# Patient Record
Sex: FEMALE | Race: WHITE | NOT HISPANIC OR LATINO | Employment: OTHER | URBAN - NONMETROPOLITAN AREA
[De-identification: names, ages, dates, MRNs, and addresses within clinical notes are randomized per-mention and may not be internally consistent; named-entity substitution may affect disease eponyms.]

---

## 2017-01-25 ENCOUNTER — OFFICE VISIT (OUTPATIENT)
Dept: NEUROLOGY | Facility: CLINIC | Age: 27
End: 2017-01-25

## 2017-01-25 VITALS
SYSTOLIC BLOOD PRESSURE: 180 MMHG | WEIGHT: 157 LBS | BODY MASS INDEX: 24.64 KG/M2 | OXYGEN SATURATION: 98 % | HEART RATE: 76 BPM | HEIGHT: 67 IN | DIASTOLIC BLOOD PRESSURE: 120 MMHG

## 2017-01-25 DIAGNOSIS — G44.209 TENSION HEADACHE: ICD-10-CM

## 2017-01-25 DIAGNOSIS — M54.2 NECK PAIN: Primary | ICD-10-CM

## 2017-01-25 DIAGNOSIS — M79.18 MYOFASCIAL MUSCLE PAIN: ICD-10-CM

## 2017-01-25 DIAGNOSIS — I69.30 SEQUELAE, POST-STROKE: ICD-10-CM

## 2017-01-25 PROCEDURE — 20553 NJX 1/MLT TRIGGER POINTS 3/>: CPT | Performed by: PSYCHIATRY & NEUROLOGY

## 2017-01-25 RX ORDER — AMITRIPTYLINE HYDROCHLORIDE 10 MG/1
TABLET, FILM COATED ORAL
Qty: 30 TABLET | Refills: 3 | Status: ON HOLD | OUTPATIENT
Start: 2017-01-25 | End: 2017-03-16

## 2017-01-25 RX ORDER — METHYLPREDNISOLONE ACETATE 40 MG/ML
200 INJECTION, SUSPENSION INTRA-ARTICULAR; INTRALESIONAL; INTRAMUSCULAR; SOFT TISSUE ONCE
Status: COMPLETED | OUTPATIENT
Start: 2017-01-25 | End: 2017-01-25

## 2017-01-25 RX ORDER — BUPIVACAINE HYDROCHLORIDE 7.5 MG/ML
5 INJECTION, SOLUTION EPIDURAL; RETROBULBAR ONCE
Status: COMPLETED | OUTPATIENT
Start: 2017-01-25 | End: 2017-01-25

## 2017-01-25 RX ADMIN — METHYLPREDNISOLONE ACETATE 200 MG: 40 INJECTION, SUSPENSION INTRA-ARTICULAR; INTRALESIONAL; INTRAMUSCULAR; SOFT TISSUE at 20:04

## 2017-01-25 RX ADMIN — BUPIVACAINE HYDROCHLORIDE 37.5 MG: 7.5 INJECTION, SOLUTION EPIDURAL; RETROBULBAR at 20:02

## 2017-01-25 RX ADMIN — BUPIVACAINE HYDROCHLORIDE 37.5 MG: 7.5 INJECTION, SOLUTION EPIDURAL; RETROBULBAR at 20:03

## 2017-02-19 ENCOUNTER — HOSPITAL ENCOUNTER (EMERGENCY)
Facility: HOSPITAL | Age: 27
Discharge: ANOTHER HOSPITAL | End: 2017-02-19
Attending: EMERGENCY MEDICINE | Admitting: EMERGENCY MEDICINE

## 2017-02-19 VITALS
BODY MASS INDEX: 23.32 KG/M2 | DIASTOLIC BLOOD PRESSURE: 130 MMHG | SYSTOLIC BLOOD PRESSURE: 220 MMHG | RESPIRATION RATE: 20 BRPM | HEIGHT: 65 IN | OXYGEN SATURATION: 98 % | TEMPERATURE: 97.8 F | HEART RATE: 92 BPM | WEIGHT: 140 LBS

## 2017-02-19 DIAGNOSIS — I67.4 HYPERTENSIVE ENCEPHALOPATHY: Primary | ICD-10-CM

## 2017-02-19 LAB
ALBUMIN SERPL-MCNC: 4.7 G/DL (ref 3.5–5)
ALBUMIN/GLOB SERPL: 1.6 G/DL (ref 1–2)
ALP SERPL-CCNC: 68 U/L (ref 38–126)
ALT SERPL W P-5'-P-CCNC: 16 U/L (ref 13–69)
ANION GAP SERPL CALCULATED.3IONS-SCNC: 26 MMOL/L
AST SERPL-CCNC: 18 U/L (ref 15–46)
BASOPHILS # BLD AUTO: 0.02 10*3/MM3 (ref 0–0.2)
BASOPHILS NFR BLD AUTO: 0.3 % (ref 0–2.5)
BILIRUB SERPL-MCNC: 0.7 MG/DL (ref 0.2–1.3)
BUN BLD-MCNC: 53 MG/DL (ref 7–20)
BUN/CREAT SERPL: 3.6 (ref 7.1–23.5)
CALCIUM SPEC-SCNC: 10.1 MG/DL (ref 8.4–10.2)
CHLORIDE SERPL-SCNC: 101 MMOL/L (ref 98–107)
CO2 SERPL-SCNC: 19 MMOL/L (ref 26–30)
CREAT BLD-MCNC: 14.6 MG/DL (ref 0.6–1.3)
DEPRECATED RDW RBC AUTO: 46.4 FL (ref 37–54)
EOSINOPHIL # BLD AUTO: 0.09 10*3/MM3 (ref 0–0.7)
EOSINOPHIL NFR BLD AUTO: 1.5 % (ref 0–7)
ERYTHROCYTE [DISTWIDTH] IN BLOOD BY AUTOMATED COUNT: 13 % (ref 11.5–14.5)
GFR SERPL CREATININE-BSD FRML MDRD: 3 ML/MIN/1.73
GLOBULIN UR ELPH-MCNC: 3 GM/DL
GLUCOSE BLD-MCNC: 157 MG/DL (ref 74–98)
GLUCOSE BLDC GLUCOMTR-MCNC: 147 MG/DL (ref 70–130)
HCG SERPL QL: NEGATIVE
HCT VFR BLD AUTO: 35.8 % (ref 37–47)
HGB BLD-MCNC: 11.9 G/DL (ref 12–16)
IMM GRANULOCYTES # BLD: 0.02 10*3/MM3 (ref 0–0.06)
IMM GRANULOCYTES NFR BLD: 0.3 % (ref 0–0.6)
LIPASE SERPL-CCNC: 200 U/L (ref 23–300)
LYMPHOCYTES # BLD AUTO: 1.16 10*3/MM3 (ref 0.6–3.4)
LYMPHOCYTES NFR BLD AUTO: 19.2 % (ref 10–50)
MAGNESIUM SERPL-MCNC: 2.7 MG/DL (ref 1.6–2.3)
MCH RBC QN AUTO: 32.8 PG (ref 27–31)
MCHC RBC AUTO-ENTMCNC: 33.2 G/DL (ref 30–37)
MCV RBC AUTO: 98.6 FL (ref 81–99)
MONOCYTES # BLD AUTO: 0.42 10*3/MM3 (ref 0–0.9)
MONOCYTES NFR BLD AUTO: 6.9 % (ref 0–12)
NEUTROPHILS # BLD AUTO: 4.34 10*3/MM3 (ref 2–6.9)
NEUTROPHILS NFR BLD AUTO: 71.8 % (ref 37–80)
NRBC BLD MANUAL-RTO: 0 /100 WBC (ref 0–0)
PLATELET # BLD AUTO: 117 10*3/MM3 (ref 130–400)
PMV BLD AUTO: 10 FL (ref 6–12)
POTASSIUM BLD-SCNC: 5 MMOL/L (ref 3.5–5.1)
PROT SERPL-MCNC: 7.7 G/DL (ref 6.3–8.2)
RBC # BLD AUTO: 3.63 10*6/MM3 (ref 4.2–5.4)
SODIUM BLD-SCNC: 141 MMOL/L (ref 137–145)
WBC NRBC COR # BLD: 6.05 10*3/MM3 (ref 4.8–10.8)

## 2017-02-19 PROCEDURE — 99291 CRITICAL CARE FIRST HOUR: CPT

## 2017-02-19 PROCEDURE — 93005 ELECTROCARDIOGRAM TRACING: CPT | Performed by: EMERGENCY MEDICINE

## 2017-02-19 PROCEDURE — 80053 COMPREHEN METABOLIC PANEL: CPT | Performed by: EMERGENCY MEDICINE

## 2017-02-19 PROCEDURE — 96376 TX/PRO/DX INJ SAME DRUG ADON: CPT

## 2017-02-19 PROCEDURE — 83690 ASSAY OF LIPASE: CPT | Performed by: EMERGENCY MEDICINE

## 2017-02-19 PROCEDURE — 85025 COMPLETE CBC W/AUTO DIFF WBC: CPT | Performed by: EMERGENCY MEDICINE

## 2017-02-19 PROCEDURE — 84703 CHORIONIC GONADOTROPIN ASSAY: CPT | Performed by: EMERGENCY MEDICINE

## 2017-02-19 PROCEDURE — 96361 HYDRATE IV INFUSION ADD-ON: CPT

## 2017-02-19 PROCEDURE — 83735 ASSAY OF MAGNESIUM: CPT | Performed by: EMERGENCY MEDICINE

## 2017-02-19 PROCEDURE — 25010000002 LORAZEPAM PER 2 MG: Performed by: EMERGENCY MEDICINE

## 2017-02-19 PROCEDURE — 96366 THER/PROPH/DIAG IV INF ADDON: CPT

## 2017-02-19 PROCEDURE — 96372 THER/PROPH/DIAG INJ SC/IM: CPT

## 2017-02-19 PROCEDURE — 25010000002 ONDANSETRON PER 1 MG: Performed by: EMERGENCY MEDICINE

## 2017-02-19 PROCEDURE — 96375 TX/PRO/DX INJ NEW DRUG ADDON: CPT

## 2017-02-19 PROCEDURE — 96365 THER/PROPH/DIAG IV INF INIT: CPT

## 2017-02-19 PROCEDURE — 25010000002 CHLORPROMAZINE HCL 50 MG/2ML SOLUTION: Performed by: EMERGENCY MEDICINE

## 2017-02-19 PROCEDURE — 82962 GLUCOSE BLOOD TEST: CPT

## 2017-02-19 RX ORDER — SODIUM CHLORIDE 9 MG/ML
125 INJECTION, SOLUTION INTRAVENOUS CONTINUOUS
Status: DISCONTINUED | OUTPATIENT
Start: 2017-02-19 | End: 2017-02-20 | Stop reason: HOSPADM

## 2017-02-19 RX ORDER — LABETALOL HYDROCHLORIDE 5 MG/ML
10 INJECTION, SOLUTION INTRAVENOUS ONCE
Status: COMPLETED | OUTPATIENT
Start: 2017-02-19 | End: 2017-02-19

## 2017-02-19 RX ORDER — ONDANSETRON 2 MG/ML
4 INJECTION INTRAMUSCULAR; INTRAVENOUS ONCE
Status: COMPLETED | OUTPATIENT
Start: 2017-02-19 | End: 2017-02-19

## 2017-02-19 RX ORDER — SODIUM CHLORIDE 0.9 % (FLUSH) 0.9 %
10 SYRINGE (ML) INJECTION AS NEEDED
Status: DISCONTINUED | OUTPATIENT
Start: 2017-02-19 | End: 2017-02-20 | Stop reason: HOSPADM

## 2017-02-19 RX ORDER — LORAZEPAM 2 MG/ML
1 INJECTION INTRAMUSCULAR ONCE
Status: COMPLETED | OUTPATIENT
Start: 2017-02-19 | End: 2017-02-19

## 2017-02-19 RX ORDER — CHLORPROMAZINE HYDROCHLORIDE 25 MG/ML
12.5 INJECTION INTRAMUSCULAR ONCE
Status: COMPLETED | OUTPATIENT
Start: 2017-02-19 | End: 2017-02-19

## 2017-02-19 RX ORDER — NITROGLYCERIN 20 MG/100ML
5-200 INJECTION INTRAVENOUS
Status: DISCONTINUED | OUTPATIENT
Start: 2017-02-19 | End: 2017-02-20 | Stop reason: HOSPADM

## 2017-02-19 RX ORDER — ONDANSETRON 2 MG/ML
INJECTION INTRAMUSCULAR; INTRAVENOUS
Status: DISCONTINUED
Start: 2017-02-19 | End: 2017-02-20 | Stop reason: HOSPADM

## 2017-02-19 RX ORDER — ONDANSETRON 4 MG/1
4 TABLET, ORALLY DISINTEGRATING ORAL ONCE
Status: DISCONTINUED | OUTPATIENT
Start: 2017-02-19 | End: 2017-02-20 | Stop reason: HOSPADM

## 2017-02-19 RX ADMIN — LABETALOL HYDROCHLORIDE 10 MG: 5 INJECTION, SOLUTION INTRAVENOUS at 20:20

## 2017-02-19 RX ADMIN — ONDANSETRON 4 MG: 2 INJECTION INTRAMUSCULAR; INTRAVENOUS at 20:05

## 2017-02-19 RX ADMIN — SODIUM CHLORIDE 125 ML/HR: 9 INJECTION, SOLUTION INTRAVENOUS at 20:11

## 2017-02-19 RX ADMIN — LORAZEPAM 1 MG: 2 INJECTION INTRAMUSCULAR; INTRAVENOUS at 20:43

## 2017-02-19 RX ADMIN — NITROGLYCERIN 30 MCG/MIN: 20 INJECTION INTRAVENOUS at 20:50

## 2017-02-19 RX ADMIN — CHLORPROMAZINE HYDROCHLORIDE 12.5 MG: 25 INJECTION INTRAMUSCULAR at 20:07

## 2017-02-19 RX ADMIN — LORAZEPAM 1 MG: 2 INJECTION INTRAMUSCULAR; INTRAVENOUS at 20:10

## 2017-02-20 LAB
HOLD SPECIMEN: NORMAL
HOLD SPECIMEN: NORMAL
WHOLE BLOOD HOLD SPECIMEN: NORMAL
WHOLE BLOOD HOLD SPECIMEN: NORMAL

## 2017-03-16 ENCOUNTER — HOSPITAL ENCOUNTER (INPATIENT)
Facility: HOSPITAL | Age: 27
LOS: 22 days | Discharge: HOME | End: 2017-04-07
Attending: FAMILY MEDICINE | Admitting: FAMILY MEDICINE

## 2017-03-16 PROBLEM — R53.81 DEBILITY: Status: ACTIVE | Noted: 2017-03-16

## 2017-03-16 PROCEDURE — 25010000002 HEPARIN (PORCINE) PER 1000 UNITS: Performed by: FAMILY MEDICINE

## 2017-03-16 PROCEDURE — 25010000002 VANCOMYCIN PER 500 MG: Performed by: FAMILY MEDICINE

## 2017-03-16 RX ORDER — BISACODYL 10 MG
10 SUPPOSITORY, RECTAL RECTAL DAILY PRN
Status: DISCONTINUED | OUTPATIENT
Start: 2017-03-16 | End: 2017-04-07 | Stop reason: HOSPADM

## 2017-03-16 RX ORDER — LEVETIRACETAM 100 MG/ML
750 SOLUTION ORAL NIGHTLY
Status: CANCELLED | OUTPATIENT
Start: 2017-03-16

## 2017-03-16 RX ORDER — L.ACID,CASEI/B.ANIMAL/S.THERMO 16B CELL
1 CAPSULE ORAL DAILY
Status: CANCELLED | OUTPATIENT
Start: 2017-03-16

## 2017-03-16 RX ORDER — CLONIDINE HYDROCHLORIDE 0.1 MG/1
0.1 TABLET ORAL EVERY 6 HOURS PRN
Status: DISCONTINUED | OUTPATIENT
Start: 2017-03-16 | End: 2017-04-07 | Stop reason: HOSPADM

## 2017-03-16 RX ORDER — L.ACID,CASEI/B.ANIMAL/S.THERMO 16B CELL
1 CAPSULE ORAL DAILY
Status: DISCONTINUED | OUTPATIENT
Start: 2017-03-17 | End: 2017-04-07 | Stop reason: HOSPADM

## 2017-03-16 RX ORDER — HYDROXYCHLOROQUINE SULFATE 200 MG/1
200 TABLET, FILM COATED ORAL EVERY 12 HOURS SCHEDULED
Status: CANCELLED | OUTPATIENT
Start: 2017-03-16

## 2017-03-16 RX ORDER — SULFAMETHOXAZOLE AND TRIMETHOPRIM 800; 160 MG/1; MG/1
0.5 TABLET ORAL 3 TIMES WEEKLY
Status: CANCELLED | OUTPATIENT
Start: 2017-03-17

## 2017-03-16 RX ORDER — ONDANSETRON 4 MG/1
4 TABLET, ORALLY DISINTEGRATING ORAL EVERY 6 HOURS PRN
Status: DISCONTINUED | OUTPATIENT
Start: 2017-03-16 | End: 2017-04-07 | Stop reason: HOSPADM

## 2017-03-16 RX ORDER — AMLODIPINE BESYLATE 10 MG/1
10 TABLET ORAL
Status: DISCONTINUED | OUTPATIENT
Start: 2017-03-17 | End: 2017-04-07 | Stop reason: HOSPADM

## 2017-03-16 RX ORDER — CLONAZEPAM 0.5 MG/1
0.5 TABLET ORAL DAILY PRN
COMMUNITY
End: 2018-09-13 | Stop reason: SDUPTHER

## 2017-03-16 RX ORDER — AMITRIPTYLINE HYDROCHLORIDE 10 MG/1
10 TABLET, FILM COATED ORAL NIGHTLY
Status: CANCELLED | OUTPATIENT
Start: 2017-03-16

## 2017-03-16 RX ORDER — CINACALCET 30 MG/1
60 TABLET, FILM COATED ORAL
Status: DISCONTINUED | OUTPATIENT
Start: 2017-03-17 | End: 2017-03-16

## 2017-03-16 RX ORDER — SODIUM BICARBONATE 650 MG/1
1300 TABLET ORAL 2 TIMES DAILY
Status: CANCELLED | OUTPATIENT
Start: 2017-03-16

## 2017-03-16 RX ORDER — MAGNESIUM HYDROXIDE/ALUMINUM HYDROXICE/SIMETHICONE 120; 1200; 1200 MG/30ML; MG/30ML; MG/30ML
30 SUSPENSION ORAL EVERY 6 HOURS PRN
Status: CANCELLED | OUTPATIENT
Start: 2017-03-16

## 2017-03-16 RX ORDER — AMITRIPTYLINE HYDROCHLORIDE 10 MG/1
10 TABLET, FILM COATED ORAL NIGHTLY
Status: DISCONTINUED | OUTPATIENT
Start: 2017-03-16 | End: 2017-04-07 | Stop reason: HOSPADM

## 2017-03-16 RX ORDER — PANTOPRAZOLE SODIUM 40 MG/1
40 TABLET, DELAYED RELEASE ORAL
Status: DISCONTINUED | OUTPATIENT
Start: 2017-03-17 | End: 2017-04-07 | Stop reason: HOSPADM

## 2017-03-16 RX ORDER — ENALAPRIL MALEATE 10 MG/1
10 TABLET ORAL 2 TIMES DAILY
COMMUNITY
End: 2017-04-07 | Stop reason: HOSPADM

## 2017-03-16 RX ORDER — CALCIUM ACETATE 667 MG/1
667 CAPSULE ORAL 4 TIMES DAILY
Status: DISCONTINUED | OUTPATIENT
Start: 2017-03-17 | End: 2017-04-07 | Stop reason: HOSPADM

## 2017-03-16 RX ORDER — ONDANSETRON 4 MG/1
4 TABLET, FILM COATED ORAL EVERY 6 HOURS PRN
Status: DISCONTINUED | OUTPATIENT
Start: 2017-03-16 | End: 2017-04-07 | Stop reason: HOSPADM

## 2017-03-16 RX ORDER — CALCIUM ACETATE 667 MG/1
667 CAPSULE ORAL 4 TIMES DAILY
Status: CANCELLED | OUTPATIENT
Start: 2017-03-16

## 2017-03-16 RX ORDER — CLONIDINE HYDROCHLORIDE 0.3 MG/1
0.3 TABLET ORAL EVERY 8 HOURS SCHEDULED
Status: CANCELLED | OUTPATIENT
Start: 2017-03-16

## 2017-03-16 RX ORDER — ONDANSETRON 4 MG/1
4 TABLET, ORALLY DISINTEGRATING ORAL EVERY 6 HOURS PRN
Status: CANCELLED | OUTPATIENT
Start: 2017-03-16

## 2017-03-16 RX ORDER — ONDANSETRON 2 MG/ML
4 INJECTION INTRAMUSCULAR; INTRAVENOUS EVERY 6 HOURS PRN
Status: DISCONTINUED | OUTPATIENT
Start: 2017-03-16 | End: 2017-04-07 | Stop reason: HOSPADM

## 2017-03-16 RX ORDER — SULFAMETHOXAZOLE AND TRIMETHOPRIM 800; 160 MG/1; MG/1
0.5 TABLET ORAL 3 TIMES WEEKLY
Status: DISCONTINUED | OUTPATIENT
Start: 2017-03-17 | End: 2017-04-07 | Stop reason: HOSPADM

## 2017-03-16 RX ORDER — CLONIDINE HYDROCHLORIDE 0.3 MG/1
0.3 TABLET ORAL EVERY 8 HOURS SCHEDULED
Status: DISCONTINUED | OUTPATIENT
Start: 2017-03-16 | End: 2017-03-31

## 2017-03-16 RX ORDER — HEPARIN SODIUM 5000 [USP'U]/ML
5000 INJECTION, SOLUTION INTRAVENOUS; SUBCUTANEOUS EVERY 8 HOURS SCHEDULED
Status: DISCONTINUED | OUTPATIENT
Start: 2017-03-16 | End: 2017-03-21

## 2017-03-16 RX ORDER — CLONIDINE HYDROCHLORIDE 0.1 MG/1
0.1 TABLET ORAL DAILY PRN
COMMUNITY
End: 2017-10-13 | Stop reason: HOSPADM

## 2017-03-16 RX ORDER — OXYCODONE HYDROCHLORIDE 5 MG/1
5 TABLET ORAL EVERY 4 HOURS PRN
Status: CANCELLED | OUTPATIENT
Start: 2017-03-16 | End: 2017-03-26

## 2017-03-16 RX ORDER — AMLODIPINE BESYLATE 5 MG/1
5 TABLET ORAL DAILY
COMMUNITY
End: 2017-04-07 | Stop reason: HOSPADM

## 2017-03-16 RX ORDER — OXYCODONE HYDROCHLORIDE 5 MG/1
5 TABLET ORAL EVERY 4 HOURS PRN
Status: DISCONTINUED | OUTPATIENT
Start: 2017-03-16 | End: 2017-03-16

## 2017-03-16 RX ORDER — CARVEDILOL 25 MG/1
25 TABLET ORAL EVERY 12 HOURS SCHEDULED
Status: CANCELLED | OUTPATIENT
Start: 2017-03-16

## 2017-03-16 RX ORDER — LEVETIRACETAM 100 MG/ML
750 SOLUTION ORAL NIGHTLY
Status: DISCONTINUED | OUTPATIENT
Start: 2017-03-16 | End: 2017-04-07 | Stop reason: HOSPADM

## 2017-03-16 RX ORDER — HEPARIN SODIUM 5000 [USP'U]/ML
5000 INJECTION, SOLUTION INTRAVENOUS; SUBCUTANEOUS EVERY 8 HOURS SCHEDULED
Status: CANCELLED | OUTPATIENT
Start: 2017-03-16

## 2017-03-16 RX ORDER — HYDROXYCHLOROQUINE SULFATE 200 MG/1
200 TABLET, FILM COATED ORAL EVERY 12 HOURS SCHEDULED
Status: DISCONTINUED | OUTPATIENT
Start: 2017-03-16 | End: 2017-04-07 | Stop reason: HOSPADM

## 2017-03-16 RX ORDER — CARVEDILOL 25 MG/1
25 TABLET ORAL EVERY 12 HOURS SCHEDULED
Status: DISCONTINUED | OUTPATIENT
Start: 2017-03-16 | End: 2017-04-05

## 2017-03-16 RX ORDER — ACETAMINOPHEN 325 MG/1
650 TABLET ORAL EVERY 4 HOURS PRN
Status: DISCONTINUED | OUTPATIENT
Start: 2017-03-16 | End: 2017-04-07 | Stop reason: HOSPADM

## 2017-03-16 RX ORDER — AMLODIPINE BESYLATE 10 MG/1
10 TABLET ORAL
Status: CANCELLED | OUTPATIENT
Start: 2017-03-16

## 2017-03-16 RX ORDER — ALUMINA, MAGNESIA, AND SIMETHICONE 2400; 2400; 240 MG/30ML; MG/30ML; MG/30ML
15 SUSPENSION ORAL EVERY 6 HOURS PRN
Status: DISCONTINUED | OUTPATIENT
Start: 2017-03-16 | End: 2017-04-07 | Stop reason: HOSPADM

## 2017-03-16 RX ORDER — ACETAMINOPHEN 325 MG/1
650 TABLET ORAL EVERY 4 HOURS PRN
Status: CANCELLED | OUTPATIENT
Start: 2017-03-16

## 2017-03-16 RX ORDER — CINACALCET 30 MG/1
60 TABLET, FILM COATED ORAL
Status: DISCONTINUED | OUTPATIENT
Start: 2017-03-17 | End: 2017-04-07 | Stop reason: HOSPADM

## 2017-03-16 RX ORDER — ONDANSETRON 4 MG/1
4 TABLET, FILM COATED ORAL EVERY 6 HOURS PRN
Status: CANCELLED | OUTPATIENT
Start: 2017-03-16

## 2017-03-16 RX ORDER — PANTOPRAZOLE SODIUM 40 MG/1
40 TABLET, DELAYED RELEASE ORAL
Status: CANCELLED | OUTPATIENT
Start: 2017-03-17

## 2017-03-16 RX ORDER — SODIUM BICARBONATE 650 MG/1
1300 TABLET ORAL 2 TIMES DAILY
Status: DISCONTINUED | OUTPATIENT
Start: 2017-03-16 | End: 2017-03-17

## 2017-03-16 RX ORDER — BISACODYL 10 MG
10 SUPPOSITORY, RECTAL RECTAL DAILY PRN
Status: CANCELLED | OUTPATIENT
Start: 2017-03-16

## 2017-03-16 RX ORDER — ONDANSETRON 2 MG/ML
4 INJECTION INTRAMUSCULAR; INTRAVENOUS EVERY 6 HOURS PRN
Status: CANCELLED | OUTPATIENT
Start: 2017-03-16

## 2017-03-16 RX ADMIN — CARVEDILOL 25 MG: 25 TABLET, FILM COATED ORAL at 21:11

## 2017-03-16 RX ADMIN — CLONIDINE HYDROCHLORIDE 0.3 MG: 0.3 TABLET ORAL at 21:11

## 2017-03-16 RX ADMIN — LEVETIRACETAM 750 MG: 100 SOLUTION ORAL at 21:12

## 2017-03-16 RX ADMIN — SODIUM BICARBONATE TAB 650 MG 1300 MG: 650 TAB at 21:11

## 2017-03-16 RX ADMIN — HYDROXYCHLOROQUINE SULFATE 200 MG: 200 TABLET ORAL at 21:12

## 2017-03-16 RX ADMIN — VANCOMYCIN HYDROCHLORIDE 1250 MG: 5 INJECTION, POWDER, LYOPHILIZED, FOR SOLUTION INTRAVENOUS at 19:54

## 2017-03-16 RX ADMIN — AMITRIPTYLINE HYDROCHLORIDE 10 MG: 10 TABLET, FILM COATED ORAL at 21:12

## 2017-03-16 RX ADMIN — CLONIDINE HYDROCHLORIDE 0.1 MG: 0.3 TABLET ORAL at 19:55

## 2017-03-17 LAB
ALBUMIN SERPL-MCNC: 2.9 G/DL (ref 3.5–5)
ALBUMIN/GLOB SERPL: 1.1 G/DL (ref 1.5–2.5)
ALP SERPL-CCNC: 46 U/L (ref 35–104)
ALT SERPL W P-5'-P-CCNC: 20 U/L (ref 10–36)
ANION GAP SERPL CALCULATED.3IONS-SCNC: 12.3 MMOL/L (ref 3.6–11.2)
AST SERPL-CCNC: 25 U/L (ref 10–30)
BASOPHILS # BLD AUTO: 0.01 10*3/MM3 (ref 0–0.3)
BASOPHILS NFR BLD AUTO: 0.3 % (ref 0–2)
BILIRUB SERPL-MCNC: 0.2 MG/DL (ref 0.2–1.8)
BUN BLD-MCNC: 44 MG/DL (ref 7–21)
BUN/CREAT SERPL: 5.5 (ref 7–25)
CALCIUM SPEC-SCNC: 9 MG/DL (ref 7.7–10)
CHLORIDE SERPL-SCNC: 98 MMOL/L (ref 99–112)
CO2 SERPL-SCNC: 24.7 MMOL/L (ref 24.3–31.9)
CREAT BLD-MCNC: 8.01 MG/DL (ref 0.43–1.29)
DEPRECATED RDW RBC AUTO: 45.9 FL (ref 37–54)
EOSINOPHIL # BLD AUTO: 0.07 10*3/MM3 (ref 0–0.7)
EOSINOPHIL NFR BLD AUTO: 1.9 % (ref 0–5)
ERYTHROCYTE [DISTWIDTH] IN BLOOD BY AUTOMATED COUNT: 13.1 % (ref 11.5–14.5)
GFR SERPL CREATININE-BSD FRML MDRD: 6 ML/MIN/1.73
GLOBULIN UR ELPH-MCNC: 2.7 GM/DL
GLUCOSE BLD-MCNC: 111 MG/DL (ref 70–110)
HAV IGM SERPL QL IA: NORMAL
HBV CORE IGM SERPL QL IA: NORMAL
HBV SURFACE AG SERPL QL IA: NORMAL
HCT VFR BLD AUTO: 19.6 % (ref 37–47)
HCV AB SER DONR QL: NORMAL
HGB BLD-MCNC: 6.4 G/DL (ref 12–16)
IMM GRANULOCYTES # BLD: 0.07 10*3/MM3 (ref 0–0.03)
IMM GRANULOCYTES NFR BLD: 1.9 % (ref 0–0.5)
LYMPHOCYTES # BLD AUTO: 1 10*3/MM3 (ref 1–3)
LYMPHOCYTES NFR BLD AUTO: 27.2 % (ref 21–51)
MCH RBC QN AUTO: 31.4 PG (ref 27–33)
MCHC RBC AUTO-ENTMCNC: 32.7 G/DL (ref 33–37)
MCV RBC AUTO: 96.1 FL (ref 80–94)
MONOCYTES # BLD AUTO: 0.37 10*3/MM3 (ref 0.1–0.9)
MONOCYTES NFR BLD AUTO: 10.1 % (ref 0–10)
NEUTROPHILS # BLD AUTO: 2.15 10*3/MM3 (ref 1.4–6.5)
NEUTROPHILS NFR BLD AUTO: 58.6 % (ref 30–70)
OSMOLALITY SERPL CALC.SUM OF ELEC: 282 MOSM/KG (ref 273–305)
PLATELET # BLD AUTO: 170 10*3/MM3 (ref 130–400)
PMV BLD AUTO: 10.8 FL (ref 6–10)
POTASSIUM BLD-SCNC: 4.5 MMOL/L (ref 3.5–5.3)
PROT SERPL-MCNC: 5.6 G/DL (ref 6–8)
RBC # BLD AUTO: 2.04 10*6/MM3 (ref 4.2–5.4)
SODIUM BLD-SCNC: 135 MMOL/L (ref 135–153)
VANCOMYCIN SERPL-MCNC: 63.1 MCG/ML
WBC NRBC COR # BLD: 3.67 10*3/MM3 (ref 4.5–12.5)

## 2017-03-17 PROCEDURE — 5A1D60Z PERFORMANCE OF URINARY FILTRATION, MULTIPLE: ICD-10-PCS | Performed by: INTERNAL MEDICINE

## 2017-03-17 PROCEDURE — 92523 SPEECH SOUND LANG COMPREHEN: CPT

## 2017-03-17 PROCEDURE — 63710000001 PREDNISONE PER 5 MG: Performed by: FAMILY MEDICINE

## 2017-03-17 PROCEDURE — 80074 ACUTE HEPATITIS PANEL: CPT | Performed by: INTERNAL MEDICINE

## 2017-03-17 PROCEDURE — 97112 NEUROMUSCULAR REEDUCATION: CPT

## 2017-03-17 PROCEDURE — 80053 COMPREHEN METABOLIC PANEL: CPT | Performed by: FAMILY MEDICINE

## 2017-03-17 PROCEDURE — 63510000001 EPOETIN ALFA PER 1000 UNITS: Performed by: INTERNAL MEDICINE

## 2017-03-17 PROCEDURE — 85025 COMPLETE CBC W/AUTO DIFF WBC: CPT | Performed by: FAMILY MEDICINE

## 2017-03-17 PROCEDURE — 97167 OT EVAL HIGH COMPLEX 60 MIN: CPT

## 2017-03-17 PROCEDURE — 80202 ASSAY OF VANCOMYCIN: CPT | Performed by: INTERNAL MEDICINE

## 2017-03-17 PROCEDURE — 92610 EVALUATE SWALLOWING FUNCTION: CPT

## 2017-03-17 RX ADMIN — CLONIDINE HYDROCHLORIDE 0.3 MG: 0.3 TABLET ORAL at 21:03

## 2017-03-17 RX ADMIN — SULFAMETHOXAZOLE AND TRIMETHOPRIM 80 MG: 800; 160 TABLET ORAL at 09:04

## 2017-03-17 RX ADMIN — LEVETIRACETAM 750 MG: 100 SOLUTION ORAL at 20:16

## 2017-03-17 RX ADMIN — ACETAMINOPHEN 650 MG: 325 TABLET, FILM COATED ORAL at 19:44

## 2017-03-17 RX ADMIN — PANTOPRAZOLE SODIUM 40 MG: 40 TABLET, DELAYED RELEASE ORAL at 06:15

## 2017-03-17 RX ADMIN — CLONIDINE HYDROCHLORIDE 0.1 MG: 0.3 TABLET ORAL at 17:14

## 2017-03-17 RX ADMIN — HYDROXYCHLOROQUINE SULFATE 200 MG: 200 TABLET ORAL at 20:16

## 2017-03-17 RX ADMIN — Medication 1 CAPSULE: at 09:05

## 2017-03-17 RX ADMIN — CALCIUM ACETATE 667 MG: 667 CAPSULE ORAL at 09:04

## 2017-03-17 RX ADMIN — AMITRIPTYLINE HYDROCHLORIDE 10 MG: 10 TABLET, FILM COATED ORAL at 20:15

## 2017-03-17 RX ADMIN — PREDNISONE 35 MG: 20 TABLET ORAL at 09:04

## 2017-03-17 RX ADMIN — HYDROXYCHLOROQUINE SULFATE 200 MG: 200 TABLET ORAL at 09:05

## 2017-03-17 RX ADMIN — CALCIUM ACETATE 667 MG: 667 CAPSULE ORAL at 20:15

## 2017-03-17 RX ADMIN — CARVEDILOL 25 MG: 25 TABLET, FILM COATED ORAL at 09:04

## 2017-03-17 RX ADMIN — CARVEDILOL 25 MG: 25 TABLET, FILM COATED ORAL at 20:15

## 2017-03-17 RX ADMIN — CLONIDINE HYDROCHLORIDE 0.3 MG: 0.3 TABLET ORAL at 14:16

## 2017-03-17 RX ADMIN — AMLODIPINE BESYLATE 10 MG: 10 TABLET ORAL at 09:04

## 2017-03-17 RX ADMIN — CALCIUM ACETATE 667 MG: 667 CAPSULE ORAL at 12:23

## 2017-03-17 RX ADMIN — CLONIDINE HYDROCHLORIDE 0.3 MG: 0.3 TABLET ORAL at 06:11

## 2017-03-17 RX ADMIN — SODIUM CHLORIDE 1000 ML: 9 INJECTION, SOLUTION INTRAVENOUS at 15:02

## 2017-03-17 RX ADMIN — ERYTHROPOIETIN 10000 UNITS: 20000 INJECTION, SOLUTION INTRAVENOUS; SUBCUTANEOUS at 18:45

## 2017-03-17 RX ADMIN — SODIUM BICARBONATE TAB 650 MG 1300 MG: 650 TAB at 09:05

## 2017-03-18 LAB
25(OH)D3 SERPL-MCNC: 20 NG/ML
BASOPHILS # BLD AUTO: 0.02 10*3/MM3 (ref 0–0.3)
BASOPHILS NFR BLD AUTO: 0.5 % (ref 0–2)
CRP SERPL-MCNC: <0.5 MG/DL (ref 0–0.99)
DEPRECATED RDW RBC AUTO: 46 FL (ref 37–54)
EOSINOPHIL # BLD AUTO: 0.06 10*3/MM3 (ref 0–0.7)
EOSINOPHIL NFR BLD AUTO: 1.5 % (ref 0–5)
ERYTHROCYTE [DISTWIDTH] IN BLOOD BY AUTOMATED COUNT: 13 % (ref 11.5–14.5)
FERRITIN SERPL-MCNC: 873 NG/ML (ref 10–290.3)
HCT VFR BLD AUTO: 20.8 % (ref 37–47)
HGB BLD-MCNC: 6.7 G/DL (ref 12–16)
IMM GRANULOCYTES # BLD: 0.14 10*3/MM3 (ref 0–0.03)
IMM GRANULOCYTES NFR BLD: 3.5 % (ref 0–0.5)
IRON 24H UR-MRATE: 104 MCG/DL (ref 49–151)
IRON SATN MFR SERPL: 45 % (ref 15–50)
LYMPHOCYTES # BLD AUTO: 1.21 10*3/MM3 (ref 1–3)
LYMPHOCYTES NFR BLD AUTO: 30.2 % (ref 21–51)
MCH RBC QN AUTO: 31.2 PG (ref 27–33)
MCHC RBC AUTO-ENTMCNC: 32.2 G/DL (ref 33–37)
MCV RBC AUTO: 96.7 FL (ref 80–94)
MONOCYTES # BLD AUTO: 0.33 10*3/MM3 (ref 0.1–0.9)
MONOCYTES NFR BLD AUTO: 8.2 % (ref 0–10)
NEUTROPHILS # BLD AUTO: 2.25 10*3/MM3 (ref 1.4–6.5)
NEUTROPHILS NFR BLD AUTO: 56.1 % (ref 30–70)
PHOSPHATE SERPL-MCNC: 4.7 MG/DL (ref 2.7–4.5)
PLATELET # BLD AUTO: 176 10*3/MM3 (ref 130–400)
PMV BLD AUTO: 9.8 FL (ref 6–10)
PTH-INTACT SERPL-MCNC: 470 PG/ML (ref 14–72)
RBC # BLD AUTO: 2.15 10*6/MM3 (ref 4.2–5.4)
TIBC SERPL-MCNC: 229 MCG/DL (ref 241–421)
VANCOMYCIN SERPL-MCNC: 32.1 MCG/ML
WBC NRBC COR # BLD: 4.01 10*3/MM3 (ref 4.5–12.5)

## 2017-03-18 PROCEDURE — 97530 THERAPEUTIC ACTIVITIES: CPT

## 2017-03-18 PROCEDURE — 63710000001 PREDNISONE PER 5 MG: Performed by: FAMILY MEDICINE

## 2017-03-18 PROCEDURE — 82728 ASSAY OF FERRITIN: CPT | Performed by: INTERNAL MEDICINE

## 2017-03-18 PROCEDURE — 83540 ASSAY OF IRON: CPT | Performed by: INTERNAL MEDICINE

## 2017-03-18 PROCEDURE — 83970 ASSAY OF PARATHORMONE: CPT | Performed by: INTERNAL MEDICINE

## 2017-03-18 PROCEDURE — 97535 SELF CARE MNGMENT TRAINING: CPT

## 2017-03-18 PROCEDURE — 82306 VITAMIN D 25 HYDROXY: CPT | Performed by: INTERNAL MEDICINE

## 2017-03-18 PROCEDURE — 86140 C-REACTIVE PROTEIN: CPT | Performed by: INTERNAL MEDICINE

## 2017-03-18 PROCEDURE — 97112 NEUROMUSCULAR REEDUCATION: CPT

## 2017-03-18 PROCEDURE — 83550 IRON BINDING TEST: CPT | Performed by: INTERNAL MEDICINE

## 2017-03-18 PROCEDURE — 92507 TX SP LANG VOICE COMM INDIV: CPT

## 2017-03-18 PROCEDURE — 85025 COMPLETE CBC W/AUTO DIFF WBC: CPT | Performed by: FAMILY MEDICINE

## 2017-03-18 PROCEDURE — 80202 ASSAY OF VANCOMYCIN: CPT | Performed by: INTERNAL MEDICINE

## 2017-03-18 PROCEDURE — 84100 ASSAY OF PHOSPHORUS: CPT | Performed by: INTERNAL MEDICINE

## 2017-03-18 RX ORDER — LISINOPRIL 10 MG/1
20 TABLET ORAL
Status: DISCONTINUED | OUTPATIENT
Start: 2017-03-18 | End: 2017-03-20

## 2017-03-18 RX ORDER — ERGOCALCIFEROL 1.25 MG/1
50000 CAPSULE ORAL
Status: DISCONTINUED | OUTPATIENT
Start: 2017-03-18 | End: 2017-04-07 | Stop reason: HOSPADM

## 2017-03-18 RX ORDER — CALCITRIOL 0.25 UG/1
0.5 CAPSULE, LIQUID FILLED ORAL DAILY
Status: DISCONTINUED | OUTPATIENT
Start: 2017-03-18 | End: 2017-04-07 | Stop reason: HOSPADM

## 2017-03-18 RX ADMIN — CALCIUM ACETATE 667 MG: 667 CAPSULE ORAL at 20:22

## 2017-03-18 RX ADMIN — LISINOPRIL 20 MG: 10 TABLET ORAL at 09:15

## 2017-03-18 RX ADMIN — CLONIDINE HYDROCHLORIDE 0.3 MG: 0.3 TABLET ORAL at 21:02

## 2017-03-18 RX ADMIN — CARVEDILOL 25 MG: 25 TABLET, FILM COATED ORAL at 20:22

## 2017-03-18 RX ADMIN — PREDNISONE 35 MG: 20 TABLET ORAL at 09:15

## 2017-03-18 RX ADMIN — LEVETIRACETAM 750 MG: 100 SOLUTION ORAL at 20:22

## 2017-03-18 RX ADMIN — ERGOCALCIFEROL 50000 UNITS: 1.25 CAPSULE ORAL at 09:15

## 2017-03-18 RX ADMIN — AMLODIPINE BESYLATE 10 MG: 10 TABLET ORAL at 09:14

## 2017-03-18 RX ADMIN — PANTOPRAZOLE SODIUM 40 MG: 40 TABLET, DELAYED RELEASE ORAL at 05:15

## 2017-03-18 RX ADMIN — Medication 1 CAPSULE: at 09:15

## 2017-03-18 RX ADMIN — HYDROXYCHLOROQUINE SULFATE 200 MG: 200 TABLET ORAL at 20:22

## 2017-03-18 RX ADMIN — CARVEDILOL 25 MG: 25 TABLET, FILM COATED ORAL at 09:15

## 2017-03-18 RX ADMIN — CALCIUM ACETATE 667 MG: 667 CAPSULE ORAL at 09:14

## 2017-03-18 RX ADMIN — CALCIUM ACETATE 667 MG: 667 CAPSULE ORAL at 17:25

## 2017-03-18 RX ADMIN — HYDROXYCHLOROQUINE SULFATE 200 MG: 200 TABLET ORAL at 09:15

## 2017-03-18 RX ADMIN — ALUMINUM HYDROXIDE, MAGNESIUM HYDROXIDE, AND DIMETHICONE 15 ML: 400; 400; 40 SUSPENSION ORAL at 15:13

## 2017-03-18 RX ADMIN — CLONIDINE HYDROCHLORIDE 0.3 MG: 0.3 TABLET ORAL at 15:14

## 2017-03-18 RX ADMIN — AMITRIPTYLINE HYDROCHLORIDE 10 MG: 10 TABLET, FILM COATED ORAL at 20:21

## 2017-03-18 RX ADMIN — CLONIDINE HYDROCHLORIDE 0.1 MG: 0.3 TABLET ORAL at 05:15

## 2017-03-18 RX ADMIN — CALCIUM ACETATE 667 MG: 667 CAPSULE ORAL at 11:56

## 2017-03-18 RX ADMIN — CLONIDINE HYDROCHLORIDE 0.3 MG: 0.3 TABLET ORAL at 05:16

## 2017-03-18 RX ADMIN — CALCITRIOL 0.5 MCG: 0.25 CAPSULE ORAL at 09:15

## 2017-03-19 PROCEDURE — 63710000001 PREDNISONE PER 5 MG: Performed by: FAMILY MEDICINE

## 2017-03-19 RX ORDER — HYDRALAZINE HYDROCHLORIDE 25 MG/1
25 TABLET, FILM COATED ORAL EVERY 12 HOURS SCHEDULED
Status: DISCONTINUED | OUTPATIENT
Start: 2017-03-19 | End: 2017-03-20

## 2017-03-19 RX ADMIN — HYDROXYCHLOROQUINE SULFATE 200 MG: 200 TABLET ORAL at 08:40

## 2017-03-19 RX ADMIN — HYDRALAZINE HYDROCHLORIDE 25 MG: 25 TABLET ORAL at 20:35

## 2017-03-19 RX ADMIN — CALCIUM ACETATE 667 MG: 667 CAPSULE ORAL at 12:01

## 2017-03-19 RX ADMIN — PREDNISONE 35 MG: 20 TABLET ORAL at 08:41

## 2017-03-19 RX ADMIN — CALCIUM ACETATE 667 MG: 667 CAPSULE ORAL at 17:44

## 2017-03-19 RX ADMIN — ALUMINUM HYDROXIDE, MAGNESIUM HYDROXIDE, AND DIMETHICONE 15 ML: 400; 400; 40 SUSPENSION ORAL at 17:44

## 2017-03-19 RX ADMIN — CLONIDINE HYDROCHLORIDE 0.3 MG: 0.3 TABLET ORAL at 14:46

## 2017-03-19 RX ADMIN — AMITRIPTYLINE HYDROCHLORIDE 10 MG: 10 TABLET, FILM COATED ORAL at 20:34

## 2017-03-19 RX ADMIN — LISINOPRIL 20 MG: 10 TABLET ORAL at 08:40

## 2017-03-19 RX ADMIN — Medication: at 12:02

## 2017-03-19 RX ADMIN — ONDANSETRON 4 MG: 4 TABLET, ORALLY DISINTEGRATING ORAL at 11:10

## 2017-03-19 RX ADMIN — CALCIUM ACETATE 667 MG: 667 CAPSULE ORAL at 20:34

## 2017-03-19 RX ADMIN — HYDROXYCHLOROQUINE SULFATE 200 MG: 200 TABLET ORAL at 20:35

## 2017-03-19 RX ADMIN — PANTOPRAZOLE SODIUM 40 MG: 40 TABLET, DELAYED RELEASE ORAL at 05:19

## 2017-03-19 RX ADMIN — CALCITRIOL 0.5 MCG: 0.25 CAPSULE ORAL at 08:40

## 2017-03-19 RX ADMIN — CALCIUM ACETATE 667 MG: 667 CAPSULE ORAL at 08:40

## 2017-03-19 RX ADMIN — CARVEDILOL 25 MG: 25 TABLET, FILM COATED ORAL at 20:35

## 2017-03-19 RX ADMIN — CLONIDINE HYDROCHLORIDE 0.3 MG: 0.3 TABLET ORAL at 21:13

## 2017-03-19 RX ADMIN — HYDRALAZINE HYDROCHLORIDE 25 MG: 25 TABLET ORAL at 11:59

## 2017-03-19 RX ADMIN — AMLODIPINE BESYLATE 10 MG: 10 TABLET ORAL at 08:39

## 2017-03-19 RX ADMIN — CLONIDINE HYDROCHLORIDE 0.1 MG: 0.3 TABLET ORAL at 05:18

## 2017-03-19 RX ADMIN — CLONIDINE HYDROCHLORIDE 0.3 MG: 0.3 TABLET ORAL at 05:19

## 2017-03-19 RX ADMIN — Medication 1 CAPSULE: at 08:40

## 2017-03-19 RX ADMIN — CARVEDILOL 25 MG: 25 TABLET, FILM COATED ORAL at 08:40

## 2017-03-19 RX ADMIN — LEVETIRACETAM 750 MG: 100 SOLUTION ORAL at 20:32

## 2017-03-20 LAB — VANCOMYCIN SERPL-MCNC: 27.8 MCG/ML

## 2017-03-20 PROCEDURE — 92507 TX SP LANG VOICE COMM INDIV: CPT

## 2017-03-20 PROCEDURE — 81001 URINALYSIS AUTO W/SCOPE: CPT | Performed by: FAMILY MEDICINE

## 2017-03-20 PROCEDURE — 87086 URINE CULTURE/COLONY COUNT: CPT | Performed by: FAMILY MEDICINE

## 2017-03-20 PROCEDURE — 80202 ASSAY OF VANCOMYCIN: CPT | Performed by: FAMILY MEDICINE

## 2017-03-20 PROCEDURE — 25010000002 VANCOMYCIN PER 500 MG: Performed by: INTERNAL MEDICINE

## 2017-03-20 PROCEDURE — 97112 NEUROMUSCULAR REEDUCATION: CPT

## 2017-03-20 PROCEDURE — 63710000001 PREDNISONE PER 5 MG: Performed by: FAMILY MEDICINE

## 2017-03-20 PROCEDURE — 63510000001 EPOETIN ALFA PER 1000 UNITS: Performed by: INTERNAL MEDICINE

## 2017-03-20 PROCEDURE — 97535 SELF CARE MNGMENT TRAINING: CPT

## 2017-03-20 RX ORDER — POLYETHYLENE GLYCOL 3350 17 G/17G
17 POWDER, FOR SOLUTION ORAL DAILY
Status: DISCONTINUED | OUTPATIENT
Start: 2017-03-20 | End: 2017-03-28

## 2017-03-20 RX ORDER — HYDRALAZINE HYDROCHLORIDE 25 MG/1
25 TABLET, FILM COATED ORAL EVERY 8 HOURS SCHEDULED
Status: DISCONTINUED | OUTPATIENT
Start: 2017-03-20 | End: 2017-03-23

## 2017-03-20 RX ORDER — LISINOPRIL 10 MG/1
40 TABLET ORAL
Status: DISCONTINUED | OUTPATIENT
Start: 2017-03-20 | End: 2017-03-25

## 2017-03-20 RX ADMIN — CALCIUM ACETATE 667 MG: 667 CAPSULE ORAL at 21:57

## 2017-03-20 RX ADMIN — CLONIDINE HYDROCHLORIDE 0.3 MG: 0.3 TABLET ORAL at 05:25

## 2017-03-20 RX ADMIN — ERYTHROPOIETIN 10000 UNITS: 20000 INJECTION, SOLUTION INTRAVENOUS; SUBCUTANEOUS at 18:49

## 2017-03-20 RX ADMIN — LISINOPRIL 40 MG: 10 TABLET ORAL at 07:42

## 2017-03-20 RX ADMIN — CLONIDINE HYDROCHLORIDE 0.3 MG: 0.3 TABLET ORAL at 22:00

## 2017-03-20 RX ADMIN — Medication 1 CAPSULE: at 09:38

## 2017-03-20 RX ADMIN — CARVEDILOL 25 MG: 25 TABLET, FILM COATED ORAL at 07:43

## 2017-03-20 RX ADMIN — CLONIDINE HYDROCHLORIDE 0.3 MG: 0.3 TABLET ORAL at 13:33

## 2017-03-20 RX ADMIN — PREDNISONE 35 MG: 20 TABLET ORAL at 07:42

## 2017-03-20 RX ADMIN — CALCITRIOL 0.5 MCG: 0.25 CAPSULE ORAL at 09:38

## 2017-03-20 RX ADMIN — VANCOMYCIN HYDROCHLORIDE 1000 MG: 5 INJECTION, POWDER, LYOPHILIZED, FOR SOLUTION INTRAVENOUS at 18:51

## 2017-03-20 RX ADMIN — SODIUM CHLORIDE 1000 ML: 9 INJECTION, SOLUTION INTRAVENOUS at 13:30

## 2017-03-20 RX ADMIN — POLYETHYLENE GLYCOL (3350) 17 G: 17 POWDER, FOR SOLUTION ORAL at 18:50

## 2017-03-20 RX ADMIN — CALCIUM ACETATE 667 MG: 667 CAPSULE ORAL at 18:49

## 2017-03-20 RX ADMIN — SULFAMETHOXAZOLE AND TRIMETHOPRIM 80 MG: 800; 160 TABLET ORAL at 09:37

## 2017-03-20 RX ADMIN — ONDANSETRON 4 MG: 4 TABLET, ORALLY DISINTEGRATING ORAL at 02:10

## 2017-03-20 RX ADMIN — HYDROXYCHLOROQUINE SULFATE 200 MG: 200 TABLET ORAL at 21:57

## 2017-03-20 RX ADMIN — CARVEDILOL 25 MG: 25 TABLET, FILM COATED ORAL at 21:59

## 2017-03-20 RX ADMIN — HYDROXYCHLOROQUINE SULFATE 200 MG: 200 TABLET ORAL at 09:38

## 2017-03-20 RX ADMIN — ACETAMINOPHEN 650 MG: 325 TABLET, FILM COATED ORAL at 16:48

## 2017-03-20 RX ADMIN — AMITRIPTYLINE HYDROCHLORIDE 10 MG: 10 TABLET, FILM COATED ORAL at 21:57

## 2017-03-20 RX ADMIN — HYDRALAZINE HYDROCHLORIDE 25 MG: 25 TABLET ORAL at 07:43

## 2017-03-20 RX ADMIN — CALCIUM ACETATE 667 MG: 667 CAPSULE ORAL at 12:02

## 2017-03-20 RX ADMIN — CINACALCET 60 MG: 30 TABLET, FILM COATED ORAL at 07:41

## 2017-03-20 RX ADMIN — BISACODYL 10 MG: 10 SUPPOSITORY RECTAL at 02:10

## 2017-03-20 RX ADMIN — LEVETIRACETAM 750 MG: 100 SOLUTION ORAL at 21:57

## 2017-03-20 RX ADMIN — HYDRALAZINE HYDROCHLORIDE 25 MG: 25 TABLET ORAL at 22:00

## 2017-03-20 RX ADMIN — AMLODIPINE BESYLATE 10 MG: 10 TABLET ORAL at 07:43

## 2017-03-20 RX ADMIN — ACETAMINOPHEN 650 MG: 325 TABLET, FILM COATED ORAL at 21:57

## 2017-03-20 RX ADMIN — PANTOPRAZOLE SODIUM 40 MG: 40 TABLET, DELAYED RELEASE ORAL at 05:25

## 2017-03-20 RX ADMIN — CALCIUM ACETATE 667 MG: 667 CAPSULE ORAL at 07:41

## 2017-03-20 RX ADMIN — Medication: at 09:39

## 2017-03-21 LAB
AMORPH URATE CRY URNS QL MICRO: ABNORMAL /HPF
BACTERIA UR QL AUTO: ABNORMAL /HPF
BILIRUB UR QL STRIP: NEGATIVE
CLARITY UR: ABNORMAL
COLOR UR: YELLOW
DEPRECATED RDW RBC AUTO: 45.2 FL (ref 37–54)
ERYTHROCYTE [DISTWIDTH] IN BLOOD BY AUTOMATED COUNT: 13 % (ref 11.5–14.5)
GLUCOSE UR STRIP-MCNC: ABNORMAL MG/DL
HCT VFR BLD AUTO: 20.2 % (ref 37–47)
HGB BLD-MCNC: 6.2 G/DL (ref 12–16)
HGB UR QL STRIP.AUTO: NEGATIVE
HYALINE CASTS UR QL AUTO: ABNORMAL /LPF
HYPOCHROMIA BLD QL: ABNORMAL
KETONES UR QL STRIP: NEGATIVE
LEUKOCYTE ESTERASE UR QL STRIP.AUTO: ABNORMAL
LYMPHOCYTES # BLD MANUAL: 1.13 10*3/MM3 (ref 1–3)
LYMPHOCYTES NFR BLD MANUAL: 23 % (ref 21–51)
LYMPHOCYTES NFR BLD MANUAL: 6 % (ref 0–10)
MACROCYTES BLD QL SMEAR: ABNORMAL
MCH RBC QN AUTO: 31.5 PG (ref 27–33)
MCHC RBC AUTO-ENTMCNC: 30.7 G/DL (ref 33–37)
MCV RBC AUTO: 102.5 FL (ref 80–94)
MONOCYTES # BLD AUTO: 0.29 10*3/MM3 (ref 0.1–0.9)
NEUTROPHILS # BLD AUTO: 3.48 10*3/MM3 (ref 1.4–6.5)
NEUTROPHILS NFR BLD MANUAL: 71 % (ref 30–70)
NITRITE UR QL STRIP: POSITIVE
PH UR STRIP.AUTO: >=9 [PH] (ref 5–8)
PLAT MORPH BLD: NORMAL
PLATELET # BLD AUTO: 182 10*3/MM3 (ref 130–400)
PMV BLD AUTO: 10 FL (ref 6–10)
PROT UR QL STRIP: ABNORMAL
RBC # BLD AUTO: 1.97 10*6/MM3 (ref 4.2–5.4)
RBC # UR: ABNORMAL /HPF
REF LAB TEST METHOD: ABNORMAL
SCAN SLIDE: NORMAL
SP GR UR STRIP: 1.01 (ref 1–1.03)
SQUAMOUS #/AREA URNS HPF: ABNORMAL /HPF
UROBILINOGEN UR QL STRIP: ABNORMAL
WBC NRBC COR # BLD: 4.9 10*3/MM3 (ref 4.5–12.5)
WBC UR QL AUTO: ABNORMAL /HPF

## 2017-03-21 PROCEDURE — 85007 BL SMEAR W/DIFF WBC COUNT: CPT | Performed by: INTERNAL MEDICINE

## 2017-03-21 PROCEDURE — 97535 SELF CARE MNGMENT TRAINING: CPT

## 2017-03-21 PROCEDURE — 63710000001 PREDNISONE PER 5 MG: Performed by: FAMILY MEDICINE

## 2017-03-21 PROCEDURE — 63710000001 PREDNISONE PER 1 MG: Performed by: FAMILY MEDICINE

## 2017-03-21 PROCEDURE — 97112 NEUROMUSCULAR REEDUCATION: CPT

## 2017-03-21 PROCEDURE — 92507 TX SP LANG VOICE COMM INDIV: CPT

## 2017-03-21 PROCEDURE — 85025 COMPLETE CBC W/AUTO DIFF WBC: CPT | Performed by: INTERNAL MEDICINE

## 2017-03-21 RX ORDER — HEPARIN SODIUM 5000 [USP'U]/ML
5000 INJECTION, SOLUTION INTRAVENOUS; SUBCUTANEOUS EVERY 12 HOURS SCHEDULED
Status: DISCONTINUED | OUTPATIENT
Start: 2017-03-21 | End: 2017-04-07 | Stop reason: HOSPADM

## 2017-03-21 RX ADMIN — Medication: at 09:06

## 2017-03-21 RX ADMIN — CALCIUM ACETATE 667 MG: 667 CAPSULE ORAL at 21:36

## 2017-03-21 RX ADMIN — AMLODIPINE BESYLATE 10 MG: 10 TABLET ORAL at 09:03

## 2017-03-21 RX ADMIN — LISINOPRIL 40 MG: 10 TABLET ORAL at 09:03

## 2017-03-21 RX ADMIN — HYDRALAZINE HYDROCHLORIDE 25 MG: 25 TABLET ORAL at 05:57

## 2017-03-21 RX ADMIN — ONDANSETRON 4 MG: 4 TABLET, FILM COATED ORAL at 09:25

## 2017-03-21 RX ADMIN — CINACALCET 60 MG: 30 TABLET, FILM COATED ORAL at 09:04

## 2017-03-21 RX ADMIN — CLONIDINE HYDROCHLORIDE 0.3 MG: 0.3 TABLET ORAL at 13:07

## 2017-03-21 RX ADMIN — PREDNISONE 35 MG: 20 TABLET ORAL at 09:02

## 2017-03-21 RX ADMIN — LEVETIRACETAM 750 MG: 100 SOLUTION ORAL at 21:37

## 2017-03-21 RX ADMIN — HYDRALAZINE HYDROCHLORIDE 25 MG: 25 TABLET ORAL at 13:07

## 2017-03-21 RX ADMIN — HYDRALAZINE HYDROCHLORIDE 25 MG: 25 TABLET ORAL at 21:36

## 2017-03-21 RX ADMIN — CARVEDILOL 25 MG: 25 TABLET, FILM COATED ORAL at 09:03

## 2017-03-21 RX ADMIN — ACETAMINOPHEN 650 MG: 325 TABLET, FILM COATED ORAL at 09:04

## 2017-03-21 RX ADMIN — CALCIUM ACETATE 667 MG: 667 CAPSULE ORAL at 09:03

## 2017-03-21 RX ADMIN — CLONIDINE HYDROCHLORIDE 0.3 MG: 0.3 TABLET ORAL at 21:36

## 2017-03-21 RX ADMIN — HYDROXYCHLOROQUINE SULFATE 200 MG: 200 TABLET ORAL at 21:36

## 2017-03-21 RX ADMIN — AMITRIPTYLINE HYDROCHLORIDE 10 MG: 10 TABLET, FILM COATED ORAL at 21:36

## 2017-03-21 RX ADMIN — HYDROXYCHLOROQUINE SULFATE 200 MG: 200 TABLET ORAL at 09:03

## 2017-03-21 RX ADMIN — PANTOPRAZOLE SODIUM 40 MG: 40 TABLET, DELAYED RELEASE ORAL at 05:57

## 2017-03-21 RX ADMIN — CALCITRIOL 0.5 MCG: 0.25 CAPSULE ORAL at 09:03

## 2017-03-21 RX ADMIN — Medication 1 CAPSULE: at 09:03

## 2017-03-21 RX ADMIN — CALCIUM ACETATE 667 MG: 667 CAPSULE ORAL at 11:32

## 2017-03-21 RX ADMIN — CLONIDINE HYDROCHLORIDE 0.1 MG: 0.3 TABLET ORAL at 02:27

## 2017-03-21 RX ADMIN — CALCIUM ACETATE 667 MG: 667 CAPSULE ORAL at 17:28

## 2017-03-21 RX ADMIN — CLONIDINE HYDROCHLORIDE 0.3 MG: 0.3 TABLET ORAL at 05:57

## 2017-03-21 RX ADMIN — CARVEDILOL 25 MG: 25 TABLET, FILM COATED ORAL at 21:36

## 2017-03-22 LAB — VANCOMYCIN SERPL-MCNC: 30.8 MCG/ML

## 2017-03-22 PROCEDURE — 63510000001 EPOETIN ALFA PER 1000 UNITS: Performed by: INTERNAL MEDICINE

## 2017-03-22 PROCEDURE — 97112 NEUROMUSCULAR REEDUCATION: CPT

## 2017-03-22 PROCEDURE — 80202 ASSAY OF VANCOMYCIN: CPT | Performed by: INTERNAL MEDICINE

## 2017-03-22 PROCEDURE — 63710000001 PREDNISONE PER 5 MG: Performed by: FAMILY MEDICINE

## 2017-03-22 PROCEDURE — 97535 SELF CARE MNGMENT TRAINING: CPT

## 2017-03-22 PROCEDURE — 92507 TX SP LANG VOICE COMM INDIV: CPT

## 2017-03-22 PROCEDURE — 63710000001 PREDNISONE PER 1 MG: Performed by: FAMILY MEDICINE

## 2017-03-22 PROCEDURE — 25010000002 VANCOMYCIN PER 500 MG: Performed by: INTERNAL MEDICINE

## 2017-03-22 RX ADMIN — HYDROXYCHLOROQUINE SULFATE 200 MG: 200 TABLET ORAL at 22:52

## 2017-03-22 RX ADMIN — POLYETHYLENE GLYCOL (3350) 17 G: 17 POWDER, FOR SOLUTION ORAL at 08:58

## 2017-03-22 RX ADMIN — PANTOPRAZOLE SODIUM 40 MG: 40 TABLET, DELAYED RELEASE ORAL at 05:51

## 2017-03-22 RX ADMIN — CALCIUM ACETATE 667 MG: 667 CAPSULE ORAL at 11:59

## 2017-03-22 RX ADMIN — LEVETIRACETAM 750 MG: 100 SOLUTION ORAL at 21:55

## 2017-03-22 RX ADMIN — SULFAMETHOXAZOLE AND TRIMETHOPRIM 80 MG: 800; 160 TABLET ORAL at 08:58

## 2017-03-22 RX ADMIN — ACETAMINOPHEN 650 MG: 325 TABLET, FILM COATED ORAL at 18:17

## 2017-03-22 RX ADMIN — Medication: at 09:03

## 2017-03-22 RX ADMIN — CLONIDINE HYDROCHLORIDE 0.3 MG: 0.3 TABLET ORAL at 05:51

## 2017-03-22 RX ADMIN — HYDRALAZINE HYDROCHLORIDE 25 MG: 25 TABLET ORAL at 14:03

## 2017-03-22 RX ADMIN — CALCIUM ACETATE 667 MG: 667 CAPSULE ORAL at 08:58

## 2017-03-22 RX ADMIN — CALCIUM ACETATE 667 MG: 667 CAPSULE ORAL at 17:23

## 2017-03-22 RX ADMIN — ACETAMINOPHEN 650 MG: 325 TABLET, FILM COATED ORAL at 21:55

## 2017-03-22 RX ADMIN — CINACALCET 60 MG: 30 TABLET, FILM COATED ORAL at 08:59

## 2017-03-22 RX ADMIN — CALCITRIOL 0.5 MCG: 0.25 CAPSULE ORAL at 08:58

## 2017-03-22 RX ADMIN — LISINOPRIL 40 MG: 10 TABLET ORAL at 08:58

## 2017-03-22 RX ADMIN — SODIUM CHLORIDE 1000 ML: 9 INJECTION, SOLUTION INTRAVENOUS at 16:45

## 2017-03-22 RX ADMIN — Medication 1 CAPSULE: at 09:01

## 2017-03-22 RX ADMIN — HYDROXYCHLOROQUINE SULFATE 200 MG: 200 TABLET ORAL at 08:58

## 2017-03-22 RX ADMIN — VANCOMYCIN HYDROCHLORIDE 1000 MG: 5 INJECTION, POWDER, LYOPHILIZED, FOR SOLUTION INTRAVENOUS at 20:13

## 2017-03-22 RX ADMIN — AMITRIPTYLINE HYDROCHLORIDE 10 MG: 10 TABLET, FILM COATED ORAL at 21:55

## 2017-03-22 RX ADMIN — ERYTHROPOIETIN 15000 UNITS: 20000 INJECTION, SOLUTION INTRAVENOUS; SUBCUTANEOUS at 21:55

## 2017-03-22 RX ADMIN — ONDANSETRON 4 MG: 4 TABLET, FILM COATED ORAL at 09:07

## 2017-03-22 RX ADMIN — CLONIDINE HYDROCHLORIDE 0.3 MG: 0.3 TABLET ORAL at 14:04

## 2017-03-22 RX ADMIN — CALCIUM ACETATE 667 MG: 667 CAPSULE ORAL at 21:54

## 2017-03-22 RX ADMIN — AMLODIPINE BESYLATE 10 MG: 10 TABLET ORAL at 08:58

## 2017-03-22 RX ADMIN — CARVEDILOL 25 MG: 25 TABLET, FILM COATED ORAL at 21:54

## 2017-03-22 RX ADMIN — CLONIDINE HYDROCHLORIDE 0.3 MG: 0.3 TABLET ORAL at 21:54

## 2017-03-22 RX ADMIN — HYDRALAZINE HYDROCHLORIDE 25 MG: 25 TABLET ORAL at 21:54

## 2017-03-22 RX ADMIN — HYDRALAZINE HYDROCHLORIDE 25 MG: 25 TABLET ORAL at 05:51

## 2017-03-22 RX ADMIN — PREDNISONE 35 MG: 20 TABLET ORAL at 08:59

## 2017-03-22 RX ADMIN — CARVEDILOL 25 MG: 25 TABLET, FILM COATED ORAL at 08:58

## 2017-03-23 LAB
BACTERIA SPEC AEROBE CULT: NORMAL
BASOPHILS # BLD AUTO: 0.09 10*3/MM3 (ref 0–0.3)
BASOPHILS NFR BLD AUTO: 1.3 % (ref 0–2)
DEPRECATED RDW RBC AUTO: 49.7 FL (ref 37–54)
EOSINOPHIL # BLD AUTO: 0.03 10*3/MM3 (ref 0–0.7)
EOSINOPHIL NFR BLD AUTO: 0.4 % (ref 0–5)
ERYTHROCYTE [DISTWIDTH] IN BLOOD BY AUTOMATED COUNT: 13.8 % (ref 11.5–14.5)
GASTROCULT GAST QL: NEGATIVE
HCT VFR BLD AUTO: 20.8 % (ref 37–47)
HGB BLD-MCNC: 6.5 G/DL (ref 12–16)
IMM GRANULOCYTES # BLD: 0.97 10*3/MM3 (ref 0–0.03)
IMM GRANULOCYTES NFR BLD: 13.8 % (ref 0–0.5)
LYMPHOCYTES # BLD AUTO: 1.65 10*3/MM3 (ref 1–3)
LYMPHOCYTES NFR BLD AUTO: 23.5 % (ref 21–51)
MCH RBC QN AUTO: 31.4 PG (ref 27–33)
MCHC RBC AUTO-ENTMCNC: 31.3 G/DL (ref 33–37)
MCV RBC AUTO: 100.5 FL (ref 80–94)
MONOCYTES # BLD AUTO: 0.54 10*3/MM3 (ref 0.1–0.9)
MONOCYTES NFR BLD AUTO: 7.7 % (ref 0–10)
NEUTROPHILS # BLD AUTO: 3.75 10*3/MM3 (ref 1.4–6.5)
NEUTROPHILS NFR BLD AUTO: 53.3 % (ref 30–70)
PLAT MORPH BLD: NORMAL
PLATELET # BLD AUTO: 165 10*3/MM3 (ref 130–400)
PMV BLD AUTO: 9.7 FL (ref 6–10)
RBC # BLD AUTO: 2.07 10*6/MM3 (ref 4.2–5.4)
RBC MORPH BLD: NORMAL
WBC NRBC COR # BLD: 7.03 10*3/MM3 (ref 4.5–12.5)

## 2017-03-23 PROCEDURE — 85025 COMPLETE CBC W/AUTO DIFF WBC: CPT | Performed by: FAMILY MEDICINE

## 2017-03-23 PROCEDURE — 97112 NEUROMUSCULAR REEDUCATION: CPT

## 2017-03-23 PROCEDURE — 85007 BL SMEAR W/DIFF WBC COUNT: CPT | Performed by: FAMILY MEDICINE

## 2017-03-23 PROCEDURE — 97535 SELF CARE MNGMENT TRAINING: CPT

## 2017-03-23 PROCEDURE — 63710000001 PREDNISONE PER 5 MG: Performed by: FAMILY MEDICINE

## 2017-03-23 PROCEDURE — 63710000001 PREDNISONE PER 1 MG: Performed by: FAMILY MEDICINE

## 2017-03-23 PROCEDURE — 92507 TX SP LANG VOICE COMM INDIV: CPT

## 2017-03-23 PROCEDURE — 82270 OCCULT BLOOD FECES: CPT | Performed by: FAMILY MEDICINE

## 2017-03-23 RX ORDER — HYDRALAZINE HYDROCHLORIDE 50 MG/1
50 TABLET, FILM COATED ORAL EVERY 8 HOURS SCHEDULED
Status: DISCONTINUED | OUTPATIENT
Start: 2017-03-23 | End: 2017-03-24

## 2017-03-23 RX ADMIN — HYDRALAZINE HYDROCHLORIDE 50 MG: 50 TABLET ORAL at 21:53

## 2017-03-23 RX ADMIN — CALCIUM ACETATE 667 MG: 667 CAPSULE ORAL at 17:16

## 2017-03-23 RX ADMIN — POLYETHYLENE GLYCOL (3350) 17 G: 17 POWDER, FOR SOLUTION ORAL at 08:56

## 2017-03-23 RX ADMIN — CLONIDINE HYDROCHLORIDE 0.3 MG: 0.3 TABLET ORAL at 13:36

## 2017-03-23 RX ADMIN — PREDNISONE 35 MG: 20 TABLET ORAL at 08:56

## 2017-03-23 RX ADMIN — Medication: at 08:51

## 2017-03-23 RX ADMIN — PANTOPRAZOLE SODIUM 40 MG: 40 TABLET, DELAYED RELEASE ORAL at 05:38

## 2017-03-23 RX ADMIN — HYDRALAZINE HYDROCHLORIDE 25 MG: 25 TABLET ORAL at 05:38

## 2017-03-23 RX ADMIN — CALCIUM ACETATE 667 MG: 667 CAPSULE ORAL at 08:56

## 2017-03-23 RX ADMIN — HYDROXYCHLOROQUINE SULFATE 200 MG: 200 TABLET ORAL at 21:52

## 2017-03-23 RX ADMIN — LEVETIRACETAM 750 MG: 100 SOLUTION ORAL at 21:53

## 2017-03-23 RX ADMIN — CLONIDINE HYDROCHLORIDE 0.3 MG: 0.3 TABLET ORAL at 05:38

## 2017-03-23 RX ADMIN — CINACALCET 60 MG: 30 TABLET, FILM COATED ORAL at 08:56

## 2017-03-23 RX ADMIN — CLONIDINE HYDROCHLORIDE 0.1 MG: 0.3 TABLET ORAL at 02:12

## 2017-03-23 RX ADMIN — CALCIUM ACETATE 667 MG: 667 CAPSULE ORAL at 21:52

## 2017-03-23 RX ADMIN — HYDROXYCHLOROQUINE SULFATE 200 MG: 200 TABLET ORAL at 08:59

## 2017-03-23 RX ADMIN — Medication 1 CAPSULE: at 08:56

## 2017-03-23 RX ADMIN — CALCITRIOL 0.5 MCG: 0.25 CAPSULE ORAL at 08:56

## 2017-03-23 RX ADMIN — CLONIDINE HYDROCHLORIDE 0.3 MG: 0.3 TABLET ORAL at 21:53

## 2017-03-23 RX ADMIN — CALCIUM ACETATE 667 MG: 667 CAPSULE ORAL at 12:13

## 2017-03-23 RX ADMIN — AMITRIPTYLINE HYDROCHLORIDE 10 MG: 10 TABLET, FILM COATED ORAL at 21:53

## 2017-03-23 RX ADMIN — AMLODIPINE BESYLATE 10 MG: 10 TABLET ORAL at 08:56

## 2017-03-23 RX ADMIN — LISINOPRIL 40 MG: 10 TABLET ORAL at 08:56

## 2017-03-23 RX ADMIN — CARVEDILOL 25 MG: 25 TABLET, FILM COATED ORAL at 21:52

## 2017-03-23 RX ADMIN — HYDRALAZINE HYDROCHLORIDE 50 MG: 50 TABLET ORAL at 13:37

## 2017-03-23 RX ADMIN — CARVEDILOL 25 MG: 25 TABLET, FILM COATED ORAL at 08:56

## 2017-03-24 LAB
ANION GAP SERPL CALCULATED.3IONS-SCNC: 10.5 MMOL/L (ref 3.6–11.2)
BUN BLD-MCNC: 39 MG/DL (ref 7–21)
BUN/CREAT SERPL: 6.1 (ref 7–25)
CALCIUM SPEC-SCNC: 9.2 MG/DL (ref 7.7–10)
CHLORIDE SERPL-SCNC: 102 MMOL/L (ref 99–112)
CO2 SERPL-SCNC: 26.5 MMOL/L (ref 24.3–31.9)
CREAT BLD-MCNC: 6.43 MG/DL (ref 0.43–1.29)
GFR SERPL CREATININE-BSD FRML MDRD: 8 ML/MIN/1.73
GLUCOSE BLD-MCNC: 138 MG/DL (ref 70–110)
OSMOLALITY SERPL CALC.SUM OF ELEC: 289.1 MOSM/KG (ref 273–305)
POTASSIUM BLD-SCNC: 4.1 MMOL/L (ref 3.5–5.3)
SODIUM BLD-SCNC: 139 MMOL/L (ref 135–153)
VANCOMYCIN SERPL-MCNC: 23.5 MCG/ML

## 2017-03-24 PROCEDURE — 80202 ASSAY OF VANCOMYCIN: CPT | Performed by: INTERNAL MEDICINE

## 2017-03-24 PROCEDURE — 97112 NEUROMUSCULAR REEDUCATION: CPT

## 2017-03-24 PROCEDURE — 97535 SELF CARE MNGMENT TRAINING: CPT

## 2017-03-24 PROCEDURE — 25010000002 HEPARIN (PORCINE) PER 1000 UNITS: Performed by: INTERNAL MEDICINE

## 2017-03-24 PROCEDURE — 80048 BASIC METABOLIC PNL TOTAL CA: CPT | Performed by: INTERNAL MEDICINE

## 2017-03-24 PROCEDURE — 92507 TX SP LANG VOICE COMM INDIV: CPT

## 2017-03-24 PROCEDURE — 63510000001 EPOETIN ALFA PER 1000 UNITS: Performed by: INTERNAL MEDICINE

## 2017-03-24 PROCEDURE — 63710000001 PREDNISONE PER 5 MG: Performed by: FAMILY MEDICINE

## 2017-03-24 PROCEDURE — 63710000001 PREDNISONE PER 1 MG: Performed by: FAMILY MEDICINE

## 2017-03-24 RX ADMIN — POLYETHYLENE GLYCOL (3350) 17 G: 17 POWDER, FOR SOLUTION ORAL at 18:26

## 2017-03-24 RX ADMIN — HYDRALAZINE HYDROCHLORIDE 50 MG: 50 TABLET ORAL at 05:37

## 2017-03-24 RX ADMIN — Medication 1 CAPSULE: at 09:14

## 2017-03-24 RX ADMIN — ALUMINUM HYDROXIDE, MAGNESIUM HYDROXIDE, AND DIMETHICONE 15 ML: 400; 400; 40 SUSPENSION ORAL at 14:40

## 2017-03-24 RX ADMIN — CINACALCET 60 MG: 30 TABLET, FILM COATED ORAL at 09:15

## 2017-03-24 RX ADMIN — HYDROXYCHLOROQUINE SULFATE 200 MG: 200 TABLET ORAL at 09:14

## 2017-03-24 RX ADMIN — CALCITRIOL 0.5 MCG: 0.25 CAPSULE ORAL at 09:14

## 2017-03-24 RX ADMIN — SULFAMETHOXAZOLE AND TRIMETHOPRIM 80 MG: 800; 160 TABLET ORAL at 09:14

## 2017-03-24 RX ADMIN — PANTOPRAZOLE SODIUM 40 MG: 40 TABLET, DELAYED RELEASE ORAL at 05:37

## 2017-03-24 RX ADMIN — CLONIDINE HYDROCHLORIDE 0.3 MG: 0.3 TABLET ORAL at 05:37

## 2017-03-24 RX ADMIN — HYDRALAZINE HYDROCHLORIDE 75 MG: 50 TABLET ORAL at 14:03

## 2017-03-24 RX ADMIN — ACETAMINOPHEN 650 MG: 325 TABLET, FILM COATED ORAL at 18:26

## 2017-03-24 RX ADMIN — CALCIUM ACETATE 667 MG: 667 CAPSULE ORAL at 21:19

## 2017-03-24 RX ADMIN — AMLODIPINE BESYLATE 10 MG: 10 TABLET ORAL at 09:22

## 2017-03-24 RX ADMIN — SODIUM CHLORIDE 1000 ML: 9 INJECTION, SOLUTION INTRAVENOUS at 14:28

## 2017-03-24 RX ADMIN — CALCIUM ACETATE 667 MG: 667 CAPSULE ORAL at 09:22

## 2017-03-24 RX ADMIN — CLONIDINE HYDROCHLORIDE 0.3 MG: 0.3 TABLET ORAL at 14:03

## 2017-03-24 RX ADMIN — PREDNISONE 35 MG: 20 TABLET ORAL at 09:14

## 2017-03-24 RX ADMIN — AMITRIPTYLINE HYDROCHLORIDE 10 MG: 10 TABLET, FILM COATED ORAL at 21:18

## 2017-03-24 RX ADMIN — HYDROXYCHLOROQUINE SULFATE 200 MG: 200 TABLET ORAL at 21:20

## 2017-03-24 RX ADMIN — CARVEDILOL 25 MG: 25 TABLET, FILM COATED ORAL at 09:14

## 2017-03-24 RX ADMIN — CARVEDILOL 25 MG: 25 TABLET, FILM COATED ORAL at 21:20

## 2017-03-24 RX ADMIN — LISINOPRIL 40 MG: 10 TABLET ORAL at 09:14

## 2017-03-24 RX ADMIN — CLONIDINE HYDROCHLORIDE 0.1 MG: 0.3 TABLET ORAL at 16:00

## 2017-03-24 RX ADMIN — ERYTHROPOIETIN 15000 UNITS: 20000 INJECTION, SOLUTION INTRAVENOUS; SUBCUTANEOUS at 18:20

## 2017-03-24 RX ADMIN — SODIUM CHLORIDE 1000 ML: 9 INJECTION, SOLUTION INTRAVENOUS at 14:29

## 2017-03-24 RX ADMIN — LEVETIRACETAM 750 MG: 100 SOLUTION ORAL at 21:19

## 2017-03-24 RX ADMIN — CLONIDINE HYDROCHLORIDE 0.3 MG: 0.3 TABLET ORAL at 21:18

## 2017-03-24 RX ADMIN — CALCIUM ACETATE 667 MG: 667 CAPSULE ORAL at 12:03

## 2017-03-24 RX ADMIN — HYDRALAZINE HYDROCHLORIDE 75 MG: 50 TABLET ORAL at 21:19

## 2017-03-25 PROCEDURE — 63710000001 PREDNISONE PER 5 MG: Performed by: FAMILY MEDICINE

## 2017-03-25 PROCEDURE — 63710000001 PREDNISONE PER 1 MG: Performed by: FAMILY MEDICINE

## 2017-03-25 RX ORDER — PREDNISONE 1 MG/1
5 TABLET ORAL
Status: DISCONTINUED | OUTPATIENT
Start: 2017-04-30 | End: 2017-04-07 | Stop reason: HOSPADM

## 2017-03-25 RX ORDER — LOSARTAN POTASSIUM 50 MG/1
100 TABLET ORAL
Status: DISCONTINUED | OUTPATIENT
Start: 2017-03-26 | End: 2017-03-29

## 2017-03-25 RX ORDER — PREDNISONE 20 MG/1
20 TABLET ORAL
Status: DISCONTINUED | OUTPATIENT
Start: 2017-04-09 | End: 2017-04-07 | Stop reason: HOSPADM

## 2017-03-25 RX ORDER — CETIRIZINE HYDROCHLORIDE 10 MG/1
5 TABLET ORAL NIGHTLY
Status: DISCONTINUED | OUTPATIENT
Start: 2017-03-25 | End: 2017-03-31

## 2017-03-25 RX ORDER — PREDNISONE 10 MG/1
10 TABLET ORAL
Status: DISCONTINUED | OUTPATIENT
Start: 2017-04-23 | End: 2017-04-07 | Stop reason: HOSPADM

## 2017-03-25 RX ADMIN — CINACALCET 60 MG: 30 TABLET, FILM COATED ORAL at 08:28

## 2017-03-25 RX ADMIN — CLONIDINE HYDROCHLORIDE 0.3 MG: 0.3 TABLET ORAL at 13:54

## 2017-03-25 RX ADMIN — HYDRALAZINE HYDROCHLORIDE 75 MG: 50 TABLET ORAL at 06:25

## 2017-03-25 RX ADMIN — HYDROXYCHLOROQUINE SULFATE 200 MG: 200 TABLET ORAL at 08:27

## 2017-03-25 RX ADMIN — AMLODIPINE BESYLATE 10 MG: 10 TABLET ORAL at 08:27

## 2017-03-25 RX ADMIN — AMITRIPTYLINE HYDROCHLORIDE 10 MG: 10 TABLET, FILM COATED ORAL at 21:18

## 2017-03-25 RX ADMIN — CLONIDINE HYDROCHLORIDE 0.3 MG: 0.3 TABLET ORAL at 06:24

## 2017-03-25 RX ADMIN — CALCIUM ACETATE 667 MG: 667 CAPSULE ORAL at 08:27

## 2017-03-25 RX ADMIN — PREDNISONE 35 MG: 20 TABLET ORAL at 08:27

## 2017-03-25 RX ADMIN — CALCIUM ACETATE 667 MG: 667 CAPSULE ORAL at 21:18

## 2017-03-25 RX ADMIN — LEVETIRACETAM 750 MG: 100 SOLUTION ORAL at 21:18

## 2017-03-25 RX ADMIN — ERGOCALCIFEROL 50000 UNITS: 1.25 CAPSULE ORAL at 08:27

## 2017-03-25 RX ADMIN — CALCIUM ACETATE 667 MG: 667 CAPSULE ORAL at 11:44

## 2017-03-25 RX ADMIN — CALCITRIOL 0.5 MCG: 0.25 CAPSULE ORAL at 08:27

## 2017-03-25 RX ADMIN — ACETAMINOPHEN 650 MG: 325 TABLET, FILM COATED ORAL at 08:27

## 2017-03-25 RX ADMIN — CARVEDILOL 25 MG: 25 TABLET, FILM COATED ORAL at 21:18

## 2017-03-25 RX ADMIN — PANTOPRAZOLE SODIUM 40 MG: 40 TABLET, DELAYED RELEASE ORAL at 06:25

## 2017-03-25 RX ADMIN — CLONIDINE HYDROCHLORIDE 0.3 MG: 0.3 TABLET ORAL at 21:18

## 2017-03-25 RX ADMIN — CALCIUM ACETATE 667 MG: 667 CAPSULE ORAL at 17:19

## 2017-03-25 RX ADMIN — LISINOPRIL 40 MG: 10 TABLET ORAL at 08:27

## 2017-03-25 RX ADMIN — POLYETHYLENE GLYCOL (3350) 17 G: 17 POWDER, FOR SOLUTION ORAL at 08:28

## 2017-03-25 RX ADMIN — HYDRALAZINE HYDROCHLORIDE 75 MG: 50 TABLET ORAL at 13:54

## 2017-03-25 RX ADMIN — CARVEDILOL 25 MG: 25 TABLET, FILM COATED ORAL at 08:27

## 2017-03-25 RX ADMIN — HYDROXYCHLOROQUINE SULFATE 200 MG: 200 TABLET ORAL at 21:18

## 2017-03-25 RX ADMIN — HYDRALAZINE HYDROCHLORIDE 75 MG: 50 TABLET ORAL at 21:18

## 2017-03-25 RX ADMIN — CETIRIZINE HYDROCHLORIDE 5 MG: 10 TABLET ORAL at 21:18

## 2017-03-25 RX ADMIN — Medication 1 CAPSULE: at 08:27

## 2017-03-26 PROCEDURE — 63710000001 PREDNISONE PER 1 MG: Performed by: FAMILY MEDICINE

## 2017-03-26 PROCEDURE — 25010000002 HEPARIN (PORCINE) PER 1000 UNITS: Performed by: INTERNAL MEDICINE

## 2017-03-26 PROCEDURE — 63710000001 PREDNISONE PER 5 MG: Performed by: FAMILY MEDICINE

## 2017-03-26 RX ADMIN — CARVEDILOL 25 MG: 25 TABLET, FILM COATED ORAL at 09:15

## 2017-03-26 RX ADMIN — CLONIDINE HYDROCHLORIDE 0.3 MG: 0.3 TABLET ORAL at 21:05

## 2017-03-26 RX ADMIN — CINACALCET 60 MG: 30 TABLET, FILM COATED ORAL at 07:18

## 2017-03-26 RX ADMIN — AMLODIPINE BESYLATE 10 MG: 10 TABLET ORAL at 09:15

## 2017-03-26 RX ADMIN — Medication 1 CAPSULE: at 09:15

## 2017-03-26 RX ADMIN — HYDRALAZINE HYDROCHLORIDE 75 MG: 50 TABLET ORAL at 06:04

## 2017-03-26 RX ADMIN — CARVEDILOL 25 MG: 25 TABLET, FILM COATED ORAL at 21:04

## 2017-03-26 RX ADMIN — CALCIUM ACETATE 667 MG: 667 CAPSULE ORAL at 21:04

## 2017-03-26 RX ADMIN — HYDROXYCHLOROQUINE SULFATE 200 MG: 200 TABLET ORAL at 09:14

## 2017-03-26 RX ADMIN — CLONIDINE HYDROCHLORIDE 0.3 MG: 0.3 TABLET ORAL at 06:04

## 2017-03-26 RX ADMIN — CLONIDINE HYDROCHLORIDE 0.3 MG: 0.3 TABLET ORAL at 13:07

## 2017-03-26 RX ADMIN — HYDROXYCHLOROQUINE SULFATE 200 MG: 200 TABLET ORAL at 21:04

## 2017-03-26 RX ADMIN — CALCITRIOL 0.5 MCG: 0.25 CAPSULE ORAL at 09:15

## 2017-03-26 RX ADMIN — AMITRIPTYLINE HYDROCHLORIDE 10 MG: 10 TABLET, FILM COATED ORAL at 21:04

## 2017-03-26 RX ADMIN — POLYETHYLENE GLYCOL (3350) 17 G: 17 POWDER, FOR SOLUTION ORAL at 17:02

## 2017-03-26 RX ADMIN — PREDNISONE 30 MG: 20 TABLET ORAL at 07:18

## 2017-03-26 RX ADMIN — CALCIUM ACETATE 667 MG: 667 CAPSULE ORAL at 17:03

## 2017-03-26 RX ADMIN — CALCIUM ACETATE 667 MG: 667 CAPSULE ORAL at 12:06

## 2017-03-26 RX ADMIN — HYDRALAZINE HYDROCHLORIDE 75 MG: 50 TABLET ORAL at 21:06

## 2017-03-26 RX ADMIN — LEVETIRACETAM 750 MG: 100 SOLUTION ORAL at 21:04

## 2017-03-26 RX ADMIN — LOSARTAN POTASSIUM 100 MG: 50 TABLET, FILM COATED ORAL at 09:14

## 2017-03-26 RX ADMIN — PANTOPRAZOLE SODIUM 40 MG: 40 TABLET, DELAYED RELEASE ORAL at 06:04

## 2017-03-26 RX ADMIN — HYDRALAZINE HYDROCHLORIDE 75 MG: 50 TABLET ORAL at 13:07

## 2017-03-26 RX ADMIN — CALCIUM ACETATE 667 MG: 667 CAPSULE ORAL at 07:18

## 2017-03-26 RX ADMIN — CETIRIZINE HYDROCHLORIDE 5 MG: 10 TABLET ORAL at 21:05

## 2017-03-27 PROCEDURE — 97112 NEUROMUSCULAR REEDUCATION: CPT

## 2017-03-27 PROCEDURE — 97535 SELF CARE MNGMENT TRAINING: CPT

## 2017-03-27 PROCEDURE — 63710000001 PREDNISONE PER 5 MG: Performed by: FAMILY MEDICINE

## 2017-03-27 PROCEDURE — 63510000001 EPOETIN ALFA PER 1000 UNITS: Performed by: INTERNAL MEDICINE

## 2017-03-27 PROCEDURE — 63710000001 PREDNISONE PER 1 MG: Performed by: FAMILY MEDICINE

## 2017-03-27 RX ADMIN — POLYETHYLENE GLYCOL (3350) 17 G: 17 POWDER, FOR SOLUTION ORAL at 09:29

## 2017-03-27 RX ADMIN — CARVEDILOL 25 MG: 25 TABLET, FILM COATED ORAL at 09:31

## 2017-03-27 RX ADMIN — SODIUM CHLORIDE 1000 ML: 9 INJECTION, SOLUTION INTRAVENOUS at 14:10

## 2017-03-27 RX ADMIN — HYDRALAZINE HYDROCHLORIDE 75 MG: 50 TABLET ORAL at 21:47

## 2017-03-27 RX ADMIN — Medication 1 CAPSULE: at 09:31

## 2017-03-27 RX ADMIN — HYDRALAZINE HYDROCHLORIDE 75 MG: 50 TABLET ORAL at 05:18

## 2017-03-27 RX ADMIN — CETIRIZINE HYDROCHLORIDE 5 MG: 10 TABLET ORAL at 21:46

## 2017-03-27 RX ADMIN — ACETAMINOPHEN 650 MG: 325 TABLET, FILM COATED ORAL at 21:46

## 2017-03-27 RX ADMIN — AMLODIPINE BESYLATE 10 MG: 10 TABLET ORAL at 10:13

## 2017-03-27 RX ADMIN — LOSARTAN POTASSIUM 100 MG: 50 TABLET, FILM COATED ORAL at 09:30

## 2017-03-27 RX ADMIN — SODIUM CHLORIDE 1000 ML: 9 INJECTION, SOLUTION INTRAVENOUS at 14:27

## 2017-03-27 RX ADMIN — ERYTHROPOIETIN 15000 UNITS: 20000 INJECTION, SOLUTION INTRAVENOUS; SUBCUTANEOUS at 18:21

## 2017-03-27 RX ADMIN — PREDNISONE 30 MG: 20 TABLET ORAL at 09:31

## 2017-03-27 RX ADMIN — CALCIUM ACETATE 667 MG: 667 CAPSULE ORAL at 18:22

## 2017-03-27 RX ADMIN — CLONIDINE HYDROCHLORIDE 0.3 MG: 0.3 TABLET ORAL at 05:18

## 2017-03-27 RX ADMIN — PANTOPRAZOLE SODIUM 40 MG: 40 TABLET, DELAYED RELEASE ORAL at 05:18

## 2017-03-27 RX ADMIN — CARVEDILOL 25 MG: 25 TABLET, FILM COATED ORAL at 21:47

## 2017-03-27 RX ADMIN — CLONIDINE HYDROCHLORIDE 0.3 MG: 0.3 TABLET ORAL at 13:29

## 2017-03-27 RX ADMIN — CALCIUM ACETATE 667 MG: 667 CAPSULE ORAL at 21:47

## 2017-03-27 RX ADMIN — HYDROXYCHLOROQUINE SULFATE 200 MG: 200 TABLET ORAL at 09:31

## 2017-03-27 RX ADMIN — CINACALCET 60 MG: 30 TABLET, FILM COATED ORAL at 09:32

## 2017-03-27 RX ADMIN — HYDRALAZINE HYDROCHLORIDE 75 MG: 50 TABLET ORAL at 13:29

## 2017-03-27 RX ADMIN — CLONIDINE HYDROCHLORIDE 0.3 MG: 0.3 TABLET ORAL at 21:47

## 2017-03-27 RX ADMIN — CALCITRIOL 0.5 MCG: 0.25 CAPSULE ORAL at 09:32

## 2017-03-27 RX ADMIN — SULFAMETHOXAZOLE AND TRIMETHOPRIM 80 MG: 800; 160 TABLET ORAL at 09:30

## 2017-03-27 RX ADMIN — CALCIUM ACETATE 667 MG: 667 CAPSULE ORAL at 12:02

## 2017-03-27 RX ADMIN — AMITRIPTYLINE HYDROCHLORIDE 10 MG: 10 TABLET, FILM COATED ORAL at 21:47

## 2017-03-27 RX ADMIN — CALCIUM ACETATE 667 MG: 667 CAPSULE ORAL at 09:32

## 2017-03-27 RX ADMIN — Medication: at 10:14

## 2017-03-27 RX ADMIN — LEVETIRACETAM 750 MG: 100 SOLUTION ORAL at 21:48

## 2017-03-27 RX ADMIN — HYDROXYCHLOROQUINE SULFATE 200 MG: 200 TABLET ORAL at 21:47

## 2017-03-28 LAB — GASTROCULT GAST QL: NEGATIVE

## 2017-03-28 PROCEDURE — 82270 OCCULT BLOOD FECES: CPT | Performed by: FAMILY MEDICINE

## 2017-03-28 PROCEDURE — 63710000001 PREDNISONE PER 5 MG: Performed by: FAMILY MEDICINE

## 2017-03-28 PROCEDURE — 92507 TX SP LANG VOICE COMM INDIV: CPT

## 2017-03-28 PROCEDURE — 97112 NEUROMUSCULAR REEDUCATION: CPT

## 2017-03-28 PROCEDURE — 97535 SELF CARE MNGMENT TRAINING: CPT

## 2017-03-28 PROCEDURE — 63710000001 PREDNISONE PER 1 MG: Performed by: FAMILY MEDICINE

## 2017-03-28 RX ORDER — POLYETHYLENE GLYCOL 3350 17 G/17G
17 POWDER, FOR SOLUTION ORAL NIGHTLY
Status: DISCONTINUED | OUTPATIENT
Start: 2017-03-28 | End: 2017-04-07 | Stop reason: HOSPADM

## 2017-03-28 RX ADMIN — CALCIUM ACETATE 667 MG: 667 CAPSULE ORAL at 08:35

## 2017-03-28 RX ADMIN — HYDROXYCHLOROQUINE SULFATE 200 MG: 200 TABLET ORAL at 21:15

## 2017-03-28 RX ADMIN — CALCIUM ACETATE 667 MG: 667 CAPSULE ORAL at 21:14

## 2017-03-28 RX ADMIN — LOSARTAN POTASSIUM 100 MG: 50 TABLET, FILM COATED ORAL at 08:35

## 2017-03-28 RX ADMIN — CALCIUM ACETATE 667 MG: 667 CAPSULE ORAL at 11:54

## 2017-03-28 RX ADMIN — HYDRALAZINE HYDROCHLORIDE 75 MG: 50 TABLET ORAL at 21:14

## 2017-03-28 RX ADMIN — HYDRALAZINE HYDROCHLORIDE 75 MG: 50 TABLET ORAL at 06:01

## 2017-03-28 RX ADMIN — CLONIDINE HYDROCHLORIDE 0.3 MG: 0.3 TABLET ORAL at 06:01

## 2017-03-28 RX ADMIN — Medication 1 CAPSULE: at 08:35

## 2017-03-28 RX ADMIN — CALCITRIOL 0.5 MCG: 0.25 CAPSULE ORAL at 08:36

## 2017-03-28 RX ADMIN — CINACALCET 60 MG: 30 TABLET, FILM COATED ORAL at 08:36

## 2017-03-28 RX ADMIN — CLONIDINE HYDROCHLORIDE 0.3 MG: 0.3 TABLET ORAL at 13:27

## 2017-03-28 RX ADMIN — CLONIDINE HYDROCHLORIDE 0.3 MG: 0.3 TABLET ORAL at 21:15

## 2017-03-28 RX ADMIN — LEVETIRACETAM 750 MG: 100 SOLUTION ORAL at 21:15

## 2017-03-28 RX ADMIN — HYDRALAZINE HYDROCHLORIDE 75 MG: 50 TABLET ORAL at 13:27

## 2017-03-28 RX ADMIN — CALCIUM ACETATE 667 MG: 667 CAPSULE ORAL at 17:34

## 2017-03-28 RX ADMIN — POLYETHYLENE GLYCOL (3350) 17 G: 17 POWDER, FOR SOLUTION ORAL at 21:15

## 2017-03-28 RX ADMIN — PREDNISONE 30 MG: 20 TABLET ORAL at 08:35

## 2017-03-28 RX ADMIN — AMITRIPTYLINE HYDROCHLORIDE 10 MG: 10 TABLET, FILM COATED ORAL at 21:14

## 2017-03-28 RX ADMIN — CARVEDILOL 25 MG: 25 TABLET, FILM COATED ORAL at 08:35

## 2017-03-28 RX ADMIN — PANTOPRAZOLE SODIUM 40 MG: 40 TABLET, DELAYED RELEASE ORAL at 06:01

## 2017-03-28 RX ADMIN — HYDROXYCHLOROQUINE SULFATE 200 MG: 200 TABLET ORAL at 08:35

## 2017-03-28 RX ADMIN — AMLODIPINE BESYLATE 10 MG: 10 TABLET ORAL at 08:36

## 2017-03-28 RX ADMIN — CARVEDILOL 25 MG: 25 TABLET, FILM COATED ORAL at 21:14

## 2017-03-28 RX ADMIN — CETIRIZINE HYDROCHLORIDE 5 MG: 10 TABLET ORAL at 21:14

## 2017-03-29 LAB
ANISOCYTOSIS BLD QL: ABNORMAL
DACRYOCYTES BLD QL SMEAR: ABNORMAL
DEPRECATED RDW RBC AUTO: 58.1 FL (ref 37–54)
EOSINOPHIL # BLD MANUAL: 0.09 10*3/MM3 (ref 0–0.7)
EOSINOPHIL NFR BLD MANUAL: 1 % (ref 0–5)
ERYTHROCYTE [DISTWIDTH] IN BLOOD BY AUTOMATED COUNT: 18.6 % (ref 11.5–14.5)
HCT VFR BLD AUTO: 24 % (ref 37–47)
HGB BLD-MCNC: 7.1 G/DL (ref 12–16)
HYPOCHROMIA BLD QL: ABNORMAL
LYMPHOCYTES # BLD MANUAL: 2.16 10*3/MM3 (ref 1–3)
LYMPHOCYTES NFR BLD MANUAL: 23 % (ref 21–51)
LYMPHOCYTES NFR BLD MANUAL: 5 % (ref 0–10)
MACROCYTES BLD QL SMEAR: ABNORMAL
MCH RBC QN AUTO: 32.1 PG (ref 27–33)
MCHC RBC AUTO-ENTMCNC: 29.6 G/DL (ref 33–37)
MCV RBC AUTO: 108.6 FL (ref 80–94)
METAMYELOCYTES NFR BLD MANUAL: 4 % (ref 0–0)
MONOCYTES # BLD AUTO: 0.47 10*3/MM3 (ref 0.1–0.9)
MYELOCYTES NFR BLD MANUAL: 1 % (ref 0–0)
NEUTROPHILS # BLD AUTO: 6.21 10*3/MM3 (ref 1.4–6.5)
NEUTROPHILS NFR BLD MANUAL: 64 % (ref 30–70)
NEUTS BAND NFR BLD MANUAL: 2 % (ref 4–12)
NRBC SPEC MANUAL: 7 /100 WBC (ref 0–0)
PLAT MORPH BLD: NORMAL
PLATELET # BLD AUTO: 134 10*3/MM3 (ref 130–400)
PMV BLD AUTO: 10.1 FL (ref 6–10)
POIKILOCYTOSIS BLD QL SMEAR: ABNORMAL
RBC # BLD AUTO: 2.21 10*6/MM3 (ref 4.2–5.4)
SCAN SLIDE: NORMAL
VANCOMYCIN SERPL-MCNC: 15 MCG/ML
WBC NRBC COR # BLD: 9.4 10*3/MM3 (ref 4.5–12.5)

## 2017-03-29 PROCEDURE — 63710000001 PREDNISONE PER 1 MG: Performed by: FAMILY MEDICINE

## 2017-03-29 PROCEDURE — 97535 SELF CARE MNGMENT TRAINING: CPT

## 2017-03-29 PROCEDURE — 80202 ASSAY OF VANCOMYCIN: CPT | Performed by: INTERNAL MEDICINE

## 2017-03-29 PROCEDURE — 85007 BL SMEAR W/DIFF WBC COUNT: CPT | Performed by: FAMILY MEDICINE

## 2017-03-29 PROCEDURE — 63510000001 EPOETIN ALFA PER 1000 UNITS: Performed by: INTERNAL MEDICINE

## 2017-03-29 PROCEDURE — 25010000002 VANCOMYCIN PER 500 MG: Performed by: INTERNAL MEDICINE

## 2017-03-29 PROCEDURE — 92507 TX SP LANG VOICE COMM INDIV: CPT

## 2017-03-29 PROCEDURE — 63710000001 PREDNISONE PER 5 MG: Performed by: FAMILY MEDICINE

## 2017-03-29 PROCEDURE — 97112 NEUROMUSCULAR REEDUCATION: CPT

## 2017-03-29 PROCEDURE — 85025 COMPLETE CBC W/AUTO DIFF WBC: CPT | Performed by: FAMILY MEDICINE

## 2017-03-29 RX ORDER — HYDRALAZINE HYDROCHLORIDE 50 MG/1
100 TABLET, FILM COATED ORAL EVERY 8 HOURS SCHEDULED
Status: DISCONTINUED | OUTPATIENT
Start: 2017-03-29 | End: 2017-04-07 | Stop reason: HOSPADM

## 2017-03-29 RX ORDER — LOSARTAN POTASSIUM 50 MG/1
100 TABLET ORAL NIGHTLY
Status: DISCONTINUED | OUTPATIENT
Start: 2017-03-30 | End: 2017-04-07 | Stop reason: HOSPADM

## 2017-03-29 RX ADMIN — CLONIDINE HYDROCHLORIDE 0.3 MG: 0.3 TABLET ORAL at 21:46

## 2017-03-29 RX ADMIN — ERYTHROPOIETIN 15000 UNITS: 20000 INJECTION, SOLUTION INTRAVENOUS; SUBCUTANEOUS at 21:47

## 2017-03-29 RX ADMIN — CARVEDILOL 25 MG: 25 TABLET, FILM COATED ORAL at 08:45

## 2017-03-29 RX ADMIN — SODIUM CHLORIDE 1000 ML: 9 INJECTION, SOLUTION INTRAVENOUS at 16:45

## 2017-03-29 RX ADMIN — HYDRALAZINE HYDROCHLORIDE 75 MG: 50 TABLET ORAL at 05:46

## 2017-03-29 RX ADMIN — CALCIUM ACETATE 667 MG: 667 CAPSULE ORAL at 21:50

## 2017-03-29 RX ADMIN — CLONIDINE HYDROCHLORIDE 0.3 MG: 0.3 TABLET ORAL at 05:46

## 2017-03-29 RX ADMIN — CLONIDINE HYDROCHLORIDE 0.3 MG: 0.3 TABLET ORAL at 14:10

## 2017-03-29 RX ADMIN — PANTOPRAZOLE SODIUM 40 MG: 40 TABLET, DELAYED RELEASE ORAL at 05:46

## 2017-03-29 RX ADMIN — POLYETHYLENE GLYCOL (3350) 17 G: 17 POWDER, FOR SOLUTION ORAL at 21:46

## 2017-03-29 RX ADMIN — AMITRIPTYLINE HYDROCHLORIDE 10 MG: 10 TABLET, FILM COATED ORAL at 21:46

## 2017-03-29 RX ADMIN — Medication 1 CAPSULE: at 08:46

## 2017-03-29 RX ADMIN — PREDNISONE 30 MG: 20 TABLET ORAL at 08:45

## 2017-03-29 RX ADMIN — HYDRALAZINE HYDROCHLORIDE 100 MG: 50 TABLET ORAL at 21:46

## 2017-03-29 RX ADMIN — AMLODIPINE BESYLATE 10 MG: 10 TABLET ORAL at 08:46

## 2017-03-29 RX ADMIN — CALCIUM ACETATE 667 MG: 667 CAPSULE ORAL at 11:01

## 2017-03-29 RX ADMIN — HYDRALAZINE HYDROCHLORIDE 100 MG: 50 TABLET ORAL at 14:10

## 2017-03-29 RX ADMIN — CALCITRIOL 0.5 MCG: 0.25 CAPSULE ORAL at 08:46

## 2017-03-29 RX ADMIN — CETIRIZINE HYDROCHLORIDE 5 MG: 10 TABLET ORAL at 21:50

## 2017-03-29 RX ADMIN — HYDROXYCHLOROQUINE SULFATE 200 MG: 200 TABLET ORAL at 08:47

## 2017-03-29 RX ADMIN — CALCIUM ACETATE 667 MG: 667 CAPSULE ORAL at 08:46

## 2017-03-29 RX ADMIN — CARVEDILOL 25 MG: 25 TABLET, FILM COATED ORAL at 21:46

## 2017-03-29 RX ADMIN — CINACALCET 60 MG: 30 TABLET, FILM COATED ORAL at 08:46

## 2017-03-29 RX ADMIN — SULFAMETHOXAZOLE AND TRIMETHOPRIM 80 MG: 800; 160 TABLET ORAL at 08:45

## 2017-03-29 RX ADMIN — LEVETIRACETAM 750 MG: 100 SOLUTION ORAL at 21:47

## 2017-03-29 RX ADMIN — VANCOMYCIN HYDROCHLORIDE 1000 MG: 5 INJECTION, POWDER, LYOPHILIZED, FOR SOLUTION INTRAVENOUS at 15:48

## 2017-03-29 RX ADMIN — LOSARTAN POTASSIUM 100 MG: 50 TABLET, FILM COATED ORAL at 08:45

## 2017-03-29 RX ADMIN — HYDROXYCHLOROQUINE SULFATE 200 MG: 200 TABLET ORAL at 21:46

## 2017-03-29 RX ADMIN — Medication: at 08:51

## 2017-03-30 LAB — GASTROCULT GAST QL: NEGATIVE

## 2017-03-30 PROCEDURE — 97112 NEUROMUSCULAR REEDUCATION: CPT

## 2017-03-30 PROCEDURE — 63710000001 PREDNISONE PER 5 MG: Performed by: FAMILY MEDICINE

## 2017-03-30 PROCEDURE — 63710000001 PREDNISONE PER 1 MG: Performed by: FAMILY MEDICINE

## 2017-03-30 PROCEDURE — 97535 SELF CARE MNGMENT TRAINING: CPT

## 2017-03-30 PROCEDURE — 92507 TX SP LANG VOICE COMM INDIV: CPT

## 2017-03-30 PROCEDURE — 82270 OCCULT BLOOD FECES: CPT | Performed by: INTERNAL MEDICINE

## 2017-03-30 RX ORDER — CLONAZEPAM 0.5 MG/1
0.5 TABLET ORAL 2 TIMES DAILY PRN
Status: DISCONTINUED | OUTPATIENT
Start: 2017-03-30 | End: 2017-04-07 | Stop reason: HOSPADM

## 2017-03-30 RX ADMIN — HYDROXYCHLOROQUINE SULFATE 200 MG: 200 TABLET ORAL at 08:13

## 2017-03-30 RX ADMIN — CLONAZEPAM 0.5 MG: 0.5 TABLET ORAL at 21:19

## 2017-03-30 RX ADMIN — CALCIUM ACETATE 667 MG: 667 CAPSULE ORAL at 17:21

## 2017-03-30 RX ADMIN — LOSARTAN POTASSIUM 100 MG: 50 TABLET, FILM COATED ORAL at 21:10

## 2017-03-30 RX ADMIN — POLYETHYLENE GLYCOL (3350) 17 G: 17 POWDER, FOR SOLUTION ORAL at 21:10

## 2017-03-30 RX ADMIN — CARVEDILOL 25 MG: 25 TABLET, FILM COATED ORAL at 21:11

## 2017-03-30 RX ADMIN — CINACALCET 60 MG: 30 TABLET, FILM COATED ORAL at 08:14

## 2017-03-30 RX ADMIN — CALCIUM ACETATE 667 MG: 667 CAPSULE ORAL at 08:13

## 2017-03-30 RX ADMIN — CALCIUM ACETATE 667 MG: 667 CAPSULE ORAL at 12:35

## 2017-03-30 RX ADMIN — HYDRALAZINE HYDROCHLORIDE 100 MG: 50 TABLET ORAL at 13:37

## 2017-03-30 RX ADMIN — CETIRIZINE HYDROCHLORIDE 5 MG: 10 TABLET ORAL at 21:10

## 2017-03-30 RX ADMIN — CARVEDILOL 25 MG: 25 TABLET, FILM COATED ORAL at 08:13

## 2017-03-30 RX ADMIN — AMLODIPINE BESYLATE 10 MG: 10 TABLET ORAL at 08:14

## 2017-03-30 RX ADMIN — CALCITRIOL 0.5 MCG: 0.25 CAPSULE ORAL at 08:14

## 2017-03-30 RX ADMIN — HYDROXYCHLOROQUINE SULFATE 200 MG: 200 TABLET ORAL at 21:11

## 2017-03-30 RX ADMIN — LEVETIRACETAM 750 MG: 100 SOLUTION ORAL at 21:10

## 2017-03-30 RX ADMIN — CALCIUM ACETATE 667 MG: 667 CAPSULE ORAL at 21:11

## 2017-03-30 RX ADMIN — CLONIDINE HYDROCHLORIDE 0.3 MG: 0.3 TABLET ORAL at 05:38

## 2017-03-30 RX ADMIN — AMITRIPTYLINE HYDROCHLORIDE 10 MG: 10 TABLET, FILM COATED ORAL at 21:11

## 2017-03-30 RX ADMIN — Medication 1 CAPSULE: at 08:13

## 2017-03-30 RX ADMIN — CLONIDINE HYDROCHLORIDE 0.3 MG: 0.3 TABLET ORAL at 21:11

## 2017-03-30 RX ADMIN — HYDRALAZINE HYDROCHLORIDE 100 MG: 50 TABLET ORAL at 05:38

## 2017-03-30 RX ADMIN — HYDRALAZINE HYDROCHLORIDE 100 MG: 50 TABLET ORAL at 21:10

## 2017-03-30 RX ADMIN — BUSPIRONE HYDROCHLORIDE 15 MG: 5 TABLET ORAL at 09:18

## 2017-03-30 RX ADMIN — Medication: at 08:22

## 2017-03-30 RX ADMIN — ACETAMINOPHEN 650 MG: 325 TABLET, FILM COATED ORAL at 06:23

## 2017-03-30 RX ADMIN — CLONIDINE HYDROCHLORIDE 0.3 MG: 0.3 TABLET ORAL at 13:38

## 2017-03-30 RX ADMIN — PANTOPRAZOLE SODIUM 40 MG: 40 TABLET, DELAYED RELEASE ORAL at 05:38

## 2017-03-30 RX ADMIN — PREDNISONE 30 MG: 20 TABLET ORAL at 08:15

## 2017-03-31 PROCEDURE — 63710000001 PREDNISONE PER 1 MG: Performed by: FAMILY MEDICINE

## 2017-03-31 PROCEDURE — 63510000001 EPOETIN ALFA PER 1000 UNITS: Performed by: INTERNAL MEDICINE

## 2017-03-31 PROCEDURE — 63710000001 PREDNISONE PER 5 MG: Performed by: FAMILY MEDICINE

## 2017-03-31 PROCEDURE — 97112 NEUROMUSCULAR REEDUCATION: CPT

## 2017-03-31 PROCEDURE — 92507 TX SP LANG VOICE COMM INDIV: CPT

## 2017-03-31 PROCEDURE — 97535 SELF CARE MNGMENT TRAINING: CPT

## 2017-03-31 RX ORDER — BUMETANIDE 1 MG/1
2 TABLET ORAL 2 TIMES DAILY
Status: DISCONTINUED | OUTPATIENT
Start: 2017-03-31 | End: 2017-03-31

## 2017-03-31 RX ORDER — TIZANIDINE 4 MG/1
4 TABLET ORAL EVERY 8 HOURS PRN
Status: DISCONTINUED | OUTPATIENT
Start: 2017-03-31 | End: 2017-04-07 | Stop reason: HOSPADM

## 2017-03-31 RX ORDER — FEXOFENADINE HCL 180 MG/1
180 TABLET ORAL NIGHTLY
Status: DISCONTINUED | OUTPATIENT
Start: 2017-03-31 | End: 2017-04-07 | Stop reason: HOSPADM

## 2017-03-31 RX ADMIN — CALCIUM ACETATE 667 MG: 667 CAPSULE ORAL at 11:51

## 2017-03-31 RX ADMIN — HYDRALAZINE HYDROCHLORIDE 100 MG: 50 TABLET ORAL at 13:48

## 2017-03-31 RX ADMIN — AMLODIPINE BESYLATE 10 MG: 10 TABLET ORAL at 08:28

## 2017-03-31 RX ADMIN — CALCITRIOL 0.5 MCG: 0.25 CAPSULE ORAL at 08:28

## 2017-03-31 RX ADMIN — CLONIDINE HYDROCHLORIDE 0.3 MG: 0.3 TABLET ORAL at 06:10

## 2017-03-31 RX ADMIN — LEVETIRACETAM 750 MG: 100 SOLUTION ORAL at 20:55

## 2017-03-31 RX ADMIN — FEXOFENADINE HCL 180 MG: 180 TABLET ORAL at 20:56

## 2017-03-31 RX ADMIN — SODIUM CHLORIDE 1000 ML: 9 INJECTION, SOLUTION INTRAVENOUS at 14:19

## 2017-03-31 RX ADMIN — SULFAMETHOXAZOLE AND TRIMETHOPRIM 80 MG: 800; 160 TABLET ORAL at 08:29

## 2017-03-31 RX ADMIN — HYDROXYCHLOROQUINE SULFATE 200 MG: 200 TABLET ORAL at 20:54

## 2017-03-31 RX ADMIN — SODIUM CHLORIDE 1000 ML: 9 INJECTION, SOLUTION INTRAVENOUS at 14:22

## 2017-03-31 RX ADMIN — PANTOPRAZOLE SODIUM 40 MG: 40 TABLET, DELAYED RELEASE ORAL at 06:10

## 2017-03-31 RX ADMIN — HYDRALAZINE HYDROCHLORIDE 100 MG: 50 TABLET ORAL at 21:00

## 2017-03-31 RX ADMIN — Medication 1 CAPSULE: at 08:28

## 2017-03-31 RX ADMIN — CALCIUM ACETATE 667 MG: 667 CAPSULE ORAL at 08:28

## 2017-03-31 RX ADMIN — CALCIUM ACETATE 667 MG: 667 CAPSULE ORAL at 20:54

## 2017-03-31 RX ADMIN — CLONIDINE 1 PATCH: 0.3 PATCH, EXTENDED RELEASE TRANSDERMAL at 13:48

## 2017-03-31 RX ADMIN — HYDRALAZINE HYDROCHLORIDE 100 MG: 50 TABLET ORAL at 06:10

## 2017-03-31 RX ADMIN — LOSARTAN POTASSIUM 100 MG: 50 TABLET, FILM COATED ORAL at 20:56

## 2017-03-31 RX ADMIN — CALCIUM ACETATE 667 MG: 667 CAPSULE ORAL at 18:08

## 2017-03-31 RX ADMIN — ERYTHROPOIETIN 15000 UNITS: 20000 INJECTION, SOLUTION INTRAVENOUS; SUBCUTANEOUS at 18:08

## 2017-03-31 RX ADMIN — CARVEDILOL 25 MG: 25 TABLET, FILM COATED ORAL at 08:28

## 2017-03-31 RX ADMIN — HYDROXYCHLOROQUINE SULFATE 200 MG: 200 TABLET ORAL at 08:28

## 2017-03-31 RX ADMIN — AMITRIPTYLINE HYDROCHLORIDE 10 MG: 10 TABLET, FILM COATED ORAL at 20:53

## 2017-03-31 RX ADMIN — PREDNISONE 30 MG: 20 TABLET ORAL at 08:28

## 2017-03-31 RX ADMIN — CARVEDILOL 25 MG: 25 TABLET, FILM COATED ORAL at 20:54

## 2017-03-31 RX ADMIN — POLYETHYLENE GLYCOL (3350) 17 G: 17 POWDER, FOR SOLUTION ORAL at 20:53

## 2017-03-31 RX ADMIN — CINACALCET 60 MG: 30 TABLET, FILM COATED ORAL at 08:28

## 2017-04-01 PROCEDURE — 63710000001 PREDNISONE PER 5 MG: Performed by: FAMILY MEDICINE

## 2017-04-01 PROCEDURE — 63710000001 PREDNISONE PER 1 MG: Performed by: FAMILY MEDICINE

## 2017-04-01 RX ORDER — GUAIFENESIN 600 MG/1
600 TABLET, EXTENDED RELEASE ORAL 2 TIMES DAILY
Status: DISCONTINUED | OUTPATIENT
Start: 2017-04-01 | End: 2017-04-07 | Stop reason: HOSPADM

## 2017-04-01 RX ADMIN — GUAIFENESIN 600 MG: 600 TABLET, EXTENDED RELEASE ORAL at 17:15

## 2017-04-01 RX ADMIN — HYDRALAZINE HYDROCHLORIDE 100 MG: 50 TABLET ORAL at 13:35

## 2017-04-01 RX ADMIN — HYDRALAZINE HYDROCHLORIDE 100 MG: 50 TABLET ORAL at 05:35

## 2017-04-01 RX ADMIN — POLYETHYLENE GLYCOL (3350) 17 G: 17 POWDER, FOR SOLUTION ORAL at 20:51

## 2017-04-01 RX ADMIN — HYDROXYCHLOROQUINE SULFATE 200 MG: 200 TABLET ORAL at 20:51

## 2017-04-01 RX ADMIN — CARVEDILOL 25 MG: 25 TABLET, FILM COATED ORAL at 08:24

## 2017-04-01 RX ADMIN — HYDROXYCHLOROQUINE SULFATE 200 MG: 200 TABLET ORAL at 08:24

## 2017-04-01 RX ADMIN — PREDNISONE 30 MG: 20 TABLET ORAL at 08:24

## 2017-04-01 RX ADMIN — GUAIFENESIN 600 MG: 600 TABLET, EXTENDED RELEASE ORAL at 11:14

## 2017-04-01 RX ADMIN — PANTOPRAZOLE SODIUM 40 MG: 40 TABLET, DELAYED RELEASE ORAL at 05:35

## 2017-04-01 RX ADMIN — Medication 1 CAPSULE: at 08:24

## 2017-04-01 RX ADMIN — CARVEDILOL 25 MG: 25 TABLET, FILM COATED ORAL at 20:51

## 2017-04-01 RX ADMIN — CALCIUM ACETATE 667 MG: 667 CAPSULE ORAL at 11:14

## 2017-04-01 RX ADMIN — CLONAZEPAM 0.5 MG: 0.5 TABLET ORAL at 10:58

## 2017-04-01 RX ADMIN — FEXOFENADINE HCL 180 MG: 180 TABLET ORAL at 20:52

## 2017-04-01 RX ADMIN — CALCIUM ACETATE 667 MG: 667 CAPSULE ORAL at 17:15

## 2017-04-01 RX ADMIN — CLONIDINE HYDROCHLORIDE 0.1 MG: 0.3 TABLET ORAL at 13:36

## 2017-04-01 RX ADMIN — AMITRIPTYLINE HYDROCHLORIDE 10 MG: 10 TABLET, FILM COATED ORAL at 20:49

## 2017-04-01 RX ADMIN — CALCIUM ACETATE 667 MG: 667 CAPSULE ORAL at 08:24

## 2017-04-01 RX ADMIN — ERGOCALCIFEROL 50000 UNITS: 1.25 CAPSULE ORAL at 08:24

## 2017-04-01 RX ADMIN — AMLODIPINE BESYLATE 10 MG: 10 TABLET ORAL at 08:24

## 2017-04-01 RX ADMIN — HYDRALAZINE HYDROCHLORIDE 100 MG: 50 TABLET ORAL at 21:02

## 2017-04-01 RX ADMIN — CINACALCET 60 MG: 30 TABLET, FILM COATED ORAL at 08:25

## 2017-04-01 RX ADMIN — LEVETIRACETAM 750 MG: 100 SOLUTION ORAL at 20:51

## 2017-04-01 RX ADMIN — CLONIDINE HYDROCHLORIDE 0.1 MG: 0.3 TABLET ORAL at 05:35

## 2017-04-01 RX ADMIN — CALCITRIOL 0.5 MCG: 0.25 CAPSULE ORAL at 08:24

## 2017-04-01 RX ADMIN — CALCIUM ACETATE 667 MG: 667 CAPSULE ORAL at 20:51

## 2017-04-01 RX ADMIN — LOSARTAN POTASSIUM 100 MG: 50 TABLET, FILM COATED ORAL at 20:52

## 2017-04-02 PROCEDURE — 63710000001 PREDNISONE PER 5 MG: Performed by: FAMILY MEDICINE

## 2017-04-02 PROCEDURE — 63710000001 PREDNISONE PER 1 MG: Performed by: FAMILY MEDICINE

## 2017-04-02 RX ADMIN — PREDNISONE 25 MG: 20 TABLET ORAL at 08:23

## 2017-04-02 RX ADMIN — CALCIUM ACETATE 667 MG: 667 CAPSULE ORAL at 08:23

## 2017-04-02 RX ADMIN — HYDRALAZINE HYDROCHLORIDE 100 MG: 50 TABLET ORAL at 05:32

## 2017-04-02 RX ADMIN — CLONIDINE HYDROCHLORIDE 0.1 MG: 0.3 TABLET ORAL at 17:25

## 2017-04-02 RX ADMIN — LOSARTAN POTASSIUM 100 MG: 50 TABLET, FILM COATED ORAL at 20:30

## 2017-04-02 RX ADMIN — CALCIUM ACETATE 667 MG: 667 CAPSULE ORAL at 12:05

## 2017-04-02 RX ADMIN — AMITRIPTYLINE HYDROCHLORIDE 10 MG: 10 TABLET, FILM COATED ORAL at 20:31

## 2017-04-02 RX ADMIN — HYDROXYCHLOROQUINE SULFATE 200 MG: 200 TABLET ORAL at 20:30

## 2017-04-02 RX ADMIN — CALCIUM ACETATE 667 MG: 667 CAPSULE ORAL at 20:31

## 2017-04-02 RX ADMIN — PANTOPRAZOLE SODIUM 40 MG: 40 TABLET, DELAYED RELEASE ORAL at 05:32

## 2017-04-02 RX ADMIN — HYDRALAZINE HYDROCHLORIDE 100 MG: 50 TABLET ORAL at 14:34

## 2017-04-02 RX ADMIN — CLONIDINE HYDROCHLORIDE 0.1 MG: 0.3 TABLET ORAL at 05:32

## 2017-04-02 RX ADMIN — POLYETHYLENE GLYCOL (3350) 17 G: 17 POWDER, FOR SOLUTION ORAL at 20:28

## 2017-04-02 RX ADMIN — CINACALCET 60 MG: 30 TABLET, FILM COATED ORAL at 08:23

## 2017-04-02 RX ADMIN — HYDROXYCHLOROQUINE SULFATE 200 MG: 200 TABLET ORAL at 08:23

## 2017-04-02 RX ADMIN — AMLODIPINE BESYLATE 10 MG: 10 TABLET ORAL at 08:23

## 2017-04-02 RX ADMIN — CALCIUM ACETATE 667 MG: 667 CAPSULE ORAL at 17:04

## 2017-04-02 RX ADMIN — Medication 1 CAPSULE: at 08:22

## 2017-04-02 RX ADMIN — HYDRALAZINE HYDROCHLORIDE 100 MG: 50 TABLET ORAL at 21:04

## 2017-04-02 RX ADMIN — CARVEDILOL 25 MG: 25 TABLET, FILM COATED ORAL at 20:31

## 2017-04-02 RX ADMIN — LEVETIRACETAM 750 MG: 100 SOLUTION ORAL at 20:29

## 2017-04-02 RX ADMIN — CLONIDINE HYDROCHLORIDE 0.1 MG: 0.3 TABLET ORAL at 00:13

## 2017-04-02 RX ADMIN — FEXOFENADINE HCL 180 MG: 180 TABLET ORAL at 20:29

## 2017-04-02 RX ADMIN — CARVEDILOL 25 MG: 25 TABLET, FILM COATED ORAL at 08:22

## 2017-04-02 RX ADMIN — GUAIFENESIN 600 MG: 600 TABLET, EXTENDED RELEASE ORAL at 08:23

## 2017-04-02 RX ADMIN — CALCITRIOL 0.5 MCG: 0.25 CAPSULE ORAL at 08:23

## 2017-04-02 RX ADMIN — GUAIFENESIN 600 MG: 600 TABLET, EXTENDED RELEASE ORAL at 17:04

## 2017-04-03 LAB
ALBUMIN SERPL-MCNC: 3.2 G/DL (ref 3.5–5)
ALBUMIN/GLOB SERPL: 1.5 G/DL (ref 1.5–2.5)
ALP SERPL-CCNC: 44 U/L (ref 35–104)
ALT SERPL W P-5'-P-CCNC: 25 U/L (ref 10–36)
ANION GAP SERPL CALCULATED.3IONS-SCNC: 8.1 MMOL/L (ref 3.6–11.2)
ANISOCYTOSIS BLD QL: NORMAL
AST SERPL-CCNC: 20 U/L (ref 10–30)
BASOPHILS # BLD AUTO: 0.03 10*3/MM3 (ref 0–0.3)
BASOPHILS NFR BLD AUTO: 0.4 % (ref 0–2)
BILIRUB SERPL-MCNC: 0.1 MG/DL (ref 0.2–1.8)
BUN BLD-MCNC: 47 MG/DL (ref 7–21)
BUN/CREAT SERPL: 5.9 (ref 7–25)
CALCIUM SPEC-SCNC: 8.8 MG/DL (ref 7.7–10)
CHLORIDE SERPL-SCNC: 106 MMOL/L (ref 99–112)
CO2 SERPL-SCNC: 23.9 MMOL/L (ref 24.3–31.9)
CREAT BLD-MCNC: 7.91 MG/DL (ref 0.43–1.29)
CRP SERPL-MCNC: <0.5 MG/DL (ref 0–0.99)
DACRYOCYTES BLD QL SMEAR: NORMAL
DEPRECATED RDW RBC AUTO: 78.7 FL (ref 37–54)
EOSINOPHIL # BLD AUTO: 0.01 10*3/MM3 (ref 0–0.7)
EOSINOPHIL NFR BLD AUTO: 0.1 % (ref 0–5)
ERYTHROCYTE [DISTWIDTH] IN BLOOD BY AUTOMATED COUNT: 20.9 % (ref 11.5–14.5)
GFR SERPL CREATININE-BSD FRML MDRD: 6 ML/MIN/1.73
GLOBULIN UR ELPH-MCNC: 2.1 GM/DL
GLUCOSE BLD-MCNC: 96 MG/DL (ref 70–110)
HCT VFR BLD AUTO: 26.3 % (ref 37–47)
HGB BLD-MCNC: 7.6 G/DL (ref 12–16)
HYPOCHROMIA BLD QL: NORMAL
IMM GRANULOCYTES # BLD: 0.27 10*3/MM3 (ref 0–0.03)
IMM GRANULOCYTES NFR BLD: 3.4 % (ref 0–0.5)
LYMPHOCYTES # BLD AUTO: 1.02 10*3/MM3 (ref 1–3)
LYMPHOCYTES NFR BLD AUTO: 12.7 % (ref 21–51)
MACROCYTES BLD QL SMEAR: NORMAL
MCH RBC QN AUTO: 32.6 PG (ref 27–33)
MCHC RBC AUTO-ENTMCNC: 28.9 G/DL (ref 33–37)
MCV RBC AUTO: 112.9 FL (ref 80–94)
MONOCYTES # BLD AUTO: 0.82 10*3/MM3 (ref 0.1–0.9)
MONOCYTES NFR BLD AUTO: 10.2 % (ref 0–10)
NEUTROPHILS # BLD AUTO: 5.87 10*3/MM3 (ref 1.4–6.5)
NEUTROPHILS NFR BLD AUTO: 73.2 % (ref 30–70)
OSMOLALITY SERPL CALC.SUM OF ELEC: 287.8 MOSM/KG (ref 273–305)
PLATELET # BLD AUTO: 118 10*3/MM3 (ref 130–400)
PMV BLD AUTO: 10.1 FL (ref 6–10)
POIKILOCYTOSIS BLD QL SMEAR: NORMAL
POTASSIUM BLD-SCNC: 4.3 MMOL/L (ref 3.5–5.3)
PROT SERPL-MCNC: 5.3 G/DL (ref 6–8)
RBC # BLD AUTO: 2.33 10*6/MM3 (ref 4.2–5.4)
SMALL PLATELETS BLD QL SMEAR: NORMAL
SODIUM BLD-SCNC: 138 MMOL/L (ref 135–153)
WBC NRBC COR # BLD: 8.02 10*3/MM3 (ref 4.5–12.5)

## 2017-04-03 PROCEDURE — 86140 C-REACTIVE PROTEIN: CPT | Performed by: INTERNAL MEDICINE

## 2017-04-03 PROCEDURE — 92507 TX SP LANG VOICE COMM INDIV: CPT

## 2017-04-03 PROCEDURE — 97112 NEUROMUSCULAR REEDUCATION: CPT

## 2017-04-03 PROCEDURE — 63510000001 EPOETIN ALFA PER 1000 UNITS: Performed by: INTERNAL MEDICINE

## 2017-04-03 PROCEDURE — 80053 COMPREHEN METABOLIC PANEL: CPT | Performed by: INTERNAL MEDICINE

## 2017-04-03 PROCEDURE — 85007 BL SMEAR W/DIFF WBC COUNT: CPT | Performed by: INTERNAL MEDICINE

## 2017-04-03 PROCEDURE — 63710000001 PREDNISONE PER 5 MG: Performed by: FAMILY MEDICINE

## 2017-04-03 PROCEDURE — 63710000001 PREDNISONE PER 1 MG: Performed by: FAMILY MEDICINE

## 2017-04-03 PROCEDURE — 85025 COMPLETE CBC W/AUTO DIFF WBC: CPT | Performed by: INTERNAL MEDICINE

## 2017-04-03 PROCEDURE — 97535 SELF CARE MNGMENT TRAINING: CPT

## 2017-04-03 RX ORDER — BENZONATATE 100 MG/1
100 CAPSULE ORAL 3 TIMES DAILY PRN
Status: DISCONTINUED | OUTPATIENT
Start: 2017-04-03 | End: 2017-04-07 | Stop reason: HOSPADM

## 2017-04-03 RX ADMIN — CLONIDINE HYDROCHLORIDE 0.1 MG: 0.3 TABLET ORAL at 05:19

## 2017-04-03 RX ADMIN — LOSARTAN POTASSIUM 100 MG: 50 TABLET, FILM COATED ORAL at 21:49

## 2017-04-03 RX ADMIN — SODIUM CHLORIDE 1000 ML: 9 INJECTION, SOLUTION INTRAVENOUS at 15:37

## 2017-04-03 RX ADMIN — AMLODIPINE BESYLATE 10 MG: 10 TABLET ORAL at 08:53

## 2017-04-03 RX ADMIN — HYDRALAZINE HYDROCHLORIDE 100 MG: 50 TABLET ORAL at 05:20

## 2017-04-03 RX ADMIN — PANTOPRAZOLE SODIUM 40 MG: 40 TABLET, DELAYED RELEASE ORAL at 05:20

## 2017-04-03 RX ADMIN — HYDROXYCHLOROQUINE SULFATE 200 MG: 200 TABLET ORAL at 21:45

## 2017-04-03 RX ADMIN — CALCIUM ACETATE 667 MG: 667 CAPSULE ORAL at 21:45

## 2017-04-03 RX ADMIN — LEVETIRACETAM 750 MG: 100 SOLUTION ORAL at 21:44

## 2017-04-03 RX ADMIN — CINACALCET 30 MG: 30 TABLET, FILM COATED ORAL at 07:23

## 2017-04-03 RX ADMIN — CLONIDINE HYDROCHLORIDE 0.1 MG: 0.3 TABLET ORAL at 19:37

## 2017-04-03 RX ADMIN — HYDRALAZINE HYDROCHLORIDE 100 MG: 50 TABLET ORAL at 21:44

## 2017-04-03 RX ADMIN — AMITRIPTYLINE HYDROCHLORIDE 10 MG: 10 TABLET, FILM COATED ORAL at 21:45

## 2017-04-03 RX ADMIN — CARVEDILOL 25 MG: 25 TABLET, FILM COATED ORAL at 08:53

## 2017-04-03 RX ADMIN — CALCIUM ACETATE 667 MG: 667 CAPSULE ORAL at 12:12

## 2017-04-03 RX ADMIN — SULFAMETHOXAZOLE AND TRIMETHOPRIM 80 MG: 800; 160 TABLET ORAL at 08:54

## 2017-04-03 RX ADMIN — ERYTHROPOIETIN 10000 UNITS: 20000 INJECTION, SOLUTION INTRAVENOUS; SUBCUTANEOUS at 19:36

## 2017-04-03 RX ADMIN — HYDROXYCHLOROQUINE SULFATE 200 MG: 200 TABLET ORAL at 08:54

## 2017-04-03 RX ADMIN — CALCITRIOL 0.5 MCG: 0.25 CAPSULE ORAL at 08:54

## 2017-04-03 RX ADMIN — BUMETANIDE 2.5 MG: 1 TABLET ORAL at 14:29

## 2017-04-03 RX ADMIN — CALCIUM ACETATE 667 MG: 667 CAPSULE ORAL at 07:23

## 2017-04-03 RX ADMIN — POLYETHYLENE GLYCOL (3350) 17 G: 17 POWDER, FOR SOLUTION ORAL at 21:44

## 2017-04-03 RX ADMIN — CARVEDILOL 25 MG: 25 TABLET, FILM COATED ORAL at 21:45

## 2017-04-03 RX ADMIN — GUAIFENESIN 600 MG: 600 TABLET, EXTENDED RELEASE ORAL at 08:53

## 2017-04-03 RX ADMIN — HYDRALAZINE HYDROCHLORIDE 100 MG: 50 TABLET ORAL at 14:30

## 2017-04-03 RX ADMIN — CLONIDINE HYDROCHLORIDE 0.1 MG: 0.3 TABLET ORAL at 14:34

## 2017-04-03 RX ADMIN — CLONAZEPAM 0.5 MG: 0.5 TABLET ORAL at 15:04

## 2017-04-03 RX ADMIN — ACETAMINOPHEN 650 MG: 325 TABLET, FILM COATED ORAL at 19:37

## 2017-04-03 RX ADMIN — Medication 1 CAPSULE: at 08:53

## 2017-04-03 RX ADMIN — FEXOFENADINE HCL 180 MG: 180 TABLET ORAL at 21:44

## 2017-04-03 RX ADMIN — PREDNISONE 25 MG: 20 TABLET ORAL at 07:26

## 2017-04-04 PROCEDURE — 63710000001 PREDNISONE PER 5 MG: Performed by: FAMILY MEDICINE

## 2017-04-04 PROCEDURE — 97112 NEUROMUSCULAR REEDUCATION: CPT

## 2017-04-04 PROCEDURE — 97535 SELF CARE MNGMENT TRAINING: CPT

## 2017-04-04 PROCEDURE — 92507 TX SP LANG VOICE COMM INDIV: CPT

## 2017-04-04 PROCEDURE — 63710000001 PREDNISONE PER 1 MG: Performed by: FAMILY MEDICINE

## 2017-04-04 RX ORDER — CLONIDINE HYDROCHLORIDE 0.3 MG/1
0.3 TABLET ORAL EVERY 8 HOURS SCHEDULED
Status: DISCONTINUED | OUTPATIENT
Start: 2017-04-04 | End: 2017-04-07 | Stop reason: HOSPADM

## 2017-04-04 RX ADMIN — HYDROXYCHLOROQUINE SULFATE 200 MG: 200 TABLET ORAL at 08:09

## 2017-04-04 RX ADMIN — POLYETHYLENE GLYCOL (3350) 17 G: 17 POWDER, FOR SOLUTION ORAL at 21:14

## 2017-04-04 RX ADMIN — BENZONATATE 100 MG: 100 CAPSULE ORAL at 21:14

## 2017-04-04 RX ADMIN — CINACALCET 30 MG: 30 TABLET, FILM COATED ORAL at 07:29

## 2017-04-04 RX ADMIN — BUMETANIDE 2.5 MG: 1 TABLET ORAL at 08:08

## 2017-04-04 RX ADMIN — AMLODIPINE BESYLATE 10 MG: 10 TABLET ORAL at 08:08

## 2017-04-04 RX ADMIN — CLONIDINE HYDROCHLORIDE 0.3 MG: 0.3 TABLET ORAL at 07:40

## 2017-04-04 RX ADMIN — GUAIFENESIN 600 MG: 600 TABLET, EXTENDED RELEASE ORAL at 17:40

## 2017-04-04 RX ADMIN — FEXOFENADINE HCL 180 MG: 180 TABLET ORAL at 21:15

## 2017-04-04 RX ADMIN — CLONIDINE HYDROCHLORIDE 0.3 MG: 0.3 TABLET ORAL at 21:14

## 2017-04-04 RX ADMIN — HYDROXYCHLOROQUINE SULFATE 200 MG: 200 TABLET ORAL at 21:14

## 2017-04-04 RX ADMIN — CALCITRIOL 0.5 MCG: 0.25 CAPSULE ORAL at 08:11

## 2017-04-04 RX ADMIN — LEVETIRACETAM 750 MG: 100 SOLUTION ORAL at 21:15

## 2017-04-04 RX ADMIN — CLONIDINE HYDROCHLORIDE 0.3 MG: 0.3 TABLET ORAL at 14:17

## 2017-04-04 RX ADMIN — Medication 1 CAPSULE: at 08:09

## 2017-04-04 RX ADMIN — PREDNISONE 25 MG: 20 TABLET ORAL at 07:29

## 2017-04-04 RX ADMIN — AMITRIPTYLINE HYDROCHLORIDE 10 MG: 10 TABLET, FILM COATED ORAL at 21:14

## 2017-04-04 RX ADMIN — CALCIUM ACETATE 667 MG: 667 CAPSULE ORAL at 21:14

## 2017-04-04 RX ADMIN — CARVEDILOL 25 MG: 25 TABLET, FILM COATED ORAL at 08:08

## 2017-04-04 RX ADMIN — CALCIUM ACETATE 667 MG: 667 CAPSULE ORAL at 17:40

## 2017-04-04 RX ADMIN — HYDRALAZINE HYDROCHLORIDE 100 MG: 50 TABLET ORAL at 21:14

## 2017-04-04 RX ADMIN — GUAIFENESIN 600 MG: 600 TABLET, EXTENDED RELEASE ORAL at 08:08

## 2017-04-04 RX ADMIN — CALCIUM ACETATE 667 MG: 667 CAPSULE ORAL at 07:29

## 2017-04-04 RX ADMIN — HYDRALAZINE HYDROCHLORIDE 100 MG: 50 TABLET ORAL at 06:04

## 2017-04-04 RX ADMIN — BENZONATATE 100 MG: 100 CAPSULE ORAL at 08:11

## 2017-04-04 RX ADMIN — CLONIDINE HYDROCHLORIDE 0.1 MG: 0.3 TABLET ORAL at 01:59

## 2017-04-04 RX ADMIN — CARVEDILOL 25 MG: 25 TABLET, FILM COATED ORAL at 21:14

## 2017-04-04 RX ADMIN — PANTOPRAZOLE SODIUM 40 MG: 40 TABLET, DELAYED RELEASE ORAL at 06:04

## 2017-04-04 RX ADMIN — HYDRALAZINE HYDROCHLORIDE 100 MG: 50 TABLET ORAL at 14:17

## 2017-04-04 RX ADMIN — LOSARTAN POTASSIUM 100 MG: 50 TABLET, FILM COATED ORAL at 21:14

## 2017-04-04 RX ADMIN — CALCIUM ACETATE 667 MG: 667 CAPSULE ORAL at 12:23

## 2017-04-05 LAB
ALBUMIN SERPL-MCNC: 3.2 G/DL (ref 3.5–5)
ALBUMIN/GLOB SERPL: 1.5 G/DL (ref 1.5–2.5)
ALP SERPL-CCNC: 43 U/L (ref 35–104)
ALT SERPL W P-5'-P-CCNC: 24 U/L (ref 10–36)
ANION GAP SERPL CALCULATED.3IONS-SCNC: 10.8 MMOL/L (ref 3.6–11.2)
ANISOCYTOSIS BLD QL: NORMAL
AST SERPL-CCNC: 23 U/L (ref 10–30)
BASOPHILS # BLD AUTO: 0.03 10*3/MM3 (ref 0–0.3)
BASOPHILS NFR BLD AUTO: 0.5 % (ref 0–2)
BILIRUB SERPL-MCNC: 0.2 MG/DL (ref 0.2–1.8)
BUN BLD-MCNC: 34 MG/DL (ref 7–21)
BUN/CREAT SERPL: 4.9 (ref 7–25)
CALCIUM SPEC-SCNC: 9.1 MG/DL (ref 7.7–10)
CHLORIDE SERPL-SCNC: 105 MMOL/L (ref 99–112)
CO2 SERPL-SCNC: 21.2 MMOL/L (ref 24.3–31.9)
CREAT BLD-MCNC: 6.97 MG/DL (ref 0.43–1.29)
DACRYOCYTES BLD QL SMEAR: NORMAL
DEPRECATED RDW RBC AUTO: 79.4 FL (ref 37–54)
EOSINOPHIL # BLD AUTO: 0.04 10*3/MM3 (ref 0–0.7)
EOSINOPHIL NFR BLD AUTO: 0.7 % (ref 0–5)
ERYTHROCYTE [DISTWIDTH] IN BLOOD BY AUTOMATED COUNT: 20.4 % (ref 11.5–14.5)
GFR SERPL CREATININE-BSD FRML MDRD: 7 ML/MIN/1.73
GLOBULIN UR ELPH-MCNC: 2.2 GM/DL
GLUCOSE BLD-MCNC: 89 MG/DL (ref 70–110)
HCT VFR BLD AUTO: 28.2 % (ref 37–47)
HGB BLD-MCNC: 8.5 G/DL (ref 12–16)
IMM GRANULOCYTES # BLD: 0.19 10*3/MM3 (ref 0–0.03)
IMM GRANULOCYTES NFR BLD: 3.3 % (ref 0–0.5)
LYMPHOCYTES # BLD AUTO: 1.13 10*3/MM3 (ref 1–3)
LYMPHOCYTES NFR BLD AUTO: 19.4 % (ref 21–51)
MACROCYTES BLD QL SMEAR: NORMAL
MCH RBC QN AUTO: 32.9 PG (ref 27–33)
MCHC RBC AUTO-ENTMCNC: 30.1 G/DL (ref 33–37)
MCV RBC AUTO: 109.3 FL (ref 80–94)
MONOCYTES # BLD AUTO: 0.66 10*3/MM3 (ref 0.1–0.9)
MONOCYTES NFR BLD AUTO: 11.4 % (ref 0–10)
NEUTROPHILS # BLD AUTO: 3.76 10*3/MM3 (ref 1.4–6.5)
NEUTROPHILS NFR BLD AUTO: 64.7 % (ref 30–70)
OSMOLALITY SERPL CALC.SUM OF ELEC: 280.9 MOSM/KG (ref 273–305)
PLAT MORPH BLD: NORMAL
PLATELET # BLD AUTO: 135 10*3/MM3 (ref 130–400)
PMV BLD AUTO: 10.1 FL (ref 6–10)
POLYCHROMASIA BLD QL SMEAR: NORMAL
POTASSIUM BLD-SCNC: 4.3 MMOL/L (ref 3.5–5.3)
PROT SERPL-MCNC: 5.4 G/DL (ref 6–8)
RBC # BLD AUTO: 2.58 10*6/MM3 (ref 4.2–5.4)
SODIUM BLD-SCNC: 137 MMOL/L (ref 135–153)
WBC NRBC COR # BLD: 5.81 10*3/MM3 (ref 4.5–12.5)

## 2017-04-05 PROCEDURE — 80053 COMPREHEN METABOLIC PANEL: CPT | Performed by: FAMILY MEDICINE

## 2017-04-05 PROCEDURE — 97112 NEUROMUSCULAR REEDUCATION: CPT

## 2017-04-05 PROCEDURE — 85025 COMPLETE CBC W/AUTO DIFF WBC: CPT | Performed by: FAMILY MEDICINE

## 2017-04-05 PROCEDURE — 63510000001 EPOETIN ALFA PER 1000 UNITS: Performed by: INTERNAL MEDICINE

## 2017-04-05 PROCEDURE — 85007 BL SMEAR W/DIFF WBC COUNT: CPT | Performed by: FAMILY MEDICINE

## 2017-04-05 PROCEDURE — 92507 TX SP LANG VOICE COMM INDIV: CPT

## 2017-04-05 PROCEDURE — 63710000001 PREDNISONE PER 5 MG: Performed by: FAMILY MEDICINE

## 2017-04-05 PROCEDURE — 25010000002 HEPARIN (PORCINE) PER 1000 UNITS: Performed by: INTERNAL MEDICINE

## 2017-04-05 PROCEDURE — 25010000002 HYDRALAZINE PER 20 MG: Performed by: INTERNAL MEDICINE

## 2017-04-05 PROCEDURE — 97535 SELF CARE MNGMENT TRAINING: CPT

## 2017-04-05 RX ORDER — METOPROLOL SUCCINATE 50 MG/1
200 TABLET, EXTENDED RELEASE ORAL EVERY 12 HOURS SCHEDULED
Status: DISCONTINUED | OUTPATIENT
Start: 2017-04-05 | End: 2017-04-07 | Stop reason: HOSPADM

## 2017-04-05 RX ORDER — HYDRALAZINE HYDROCHLORIDE 20 MG/ML
20 INJECTION INTRAMUSCULAR; INTRAVENOUS ONCE
Status: COMPLETED | OUTPATIENT
Start: 2017-04-05 | End: 2017-04-05

## 2017-04-05 RX ORDER — METOLAZONE 2.5 MG/1
10 TABLET ORAL DAILY
Status: DISCONTINUED | OUTPATIENT
Start: 2017-04-05 | End: 2017-04-07 | Stop reason: HOSPADM

## 2017-04-05 RX ORDER — METOPROLOL SUCCINATE 50 MG/1
200 TABLET, EXTENDED RELEASE ORAL
Status: DISCONTINUED | OUTPATIENT
Start: 2017-04-05 | End: 2017-04-05

## 2017-04-05 RX ORDER — ENALAPRILAT 2.5 MG/2ML
1.25 INJECTION INTRAVENOUS EVERY 6 HOURS
Status: DISCONTINUED | OUTPATIENT
Start: 2017-04-05 | End: 2017-04-05

## 2017-04-05 RX ADMIN — PANTOPRAZOLE SODIUM 40 MG: 40 TABLET, DELAYED RELEASE ORAL at 05:43

## 2017-04-05 RX ADMIN — METOLAZONE 10 MG: 2.5 TABLET ORAL at 12:35

## 2017-04-05 RX ADMIN — HYDRALAZINE HYDROCHLORIDE 100 MG: 50 TABLET ORAL at 05:42

## 2017-04-05 RX ADMIN — SULFAMETHOXAZOLE AND TRIMETHOPRIM 80 MG: 800; 160 TABLET ORAL at 08:11

## 2017-04-05 RX ADMIN — SODIUM CHLORIDE 1000 ML: 9 INJECTION, SOLUTION INTRAVENOUS at 14:48

## 2017-04-05 RX ADMIN — CLONIDINE HYDROCHLORIDE 0.1 MG: 0.3 TABLET ORAL at 11:25

## 2017-04-05 RX ADMIN — AMITRIPTYLINE HYDROCHLORIDE 10 MG: 10 TABLET, FILM COATED ORAL at 20:05

## 2017-04-05 RX ADMIN — METOPROLOL SUCCINATE 200 MG: 50 TABLET, FILM COATED, EXTENDED RELEASE ORAL at 08:43

## 2017-04-05 RX ADMIN — CLONIDINE HYDROCHLORIDE 0.1 MG: 0.3 TABLET ORAL at 01:02

## 2017-04-05 RX ADMIN — CLONIDINE HYDROCHLORIDE 0.3 MG: 0.3 TABLET ORAL at 21:44

## 2017-04-05 RX ADMIN — SODIUM CHLORIDE 1000 ML: 9 INJECTION, SOLUTION INTRAVENOUS at 14:45

## 2017-04-05 RX ADMIN — CINACALCET 60 MG: 30 TABLET, FILM COATED ORAL at 08:12

## 2017-04-05 RX ADMIN — GUAIFENESIN 600 MG: 600 TABLET, EXTENDED RELEASE ORAL at 08:11

## 2017-04-05 RX ADMIN — CALCIUM ACETATE 667 MG: 667 CAPSULE ORAL at 20:06

## 2017-04-05 RX ADMIN — CALCIUM ACETATE 667 MG: 667 CAPSULE ORAL at 18:29

## 2017-04-05 RX ADMIN — CLONIDINE HYDROCHLORIDE 0.3 MG: 0.3 TABLET ORAL at 13:32

## 2017-04-05 RX ADMIN — CALCIUM ACETATE 667 MG: 667 CAPSULE ORAL at 08:11

## 2017-04-05 RX ADMIN — HYDROXYCHLOROQUINE SULFATE 200 MG: 200 TABLET ORAL at 08:11

## 2017-04-05 RX ADMIN — Medication 1 CAPSULE: at 08:11

## 2017-04-05 RX ADMIN — HYDRALAZINE HYDROCHLORIDE 20 MG: 20 INJECTION INTRAMUSCULAR; INTRAVENOUS at 15:46

## 2017-04-05 RX ADMIN — GUAIFENESIN 600 MG: 600 TABLET, EXTENDED RELEASE ORAL at 18:29

## 2017-04-05 RX ADMIN — LOSARTAN POTASSIUM 100 MG: 50 TABLET, FILM COATED ORAL at 20:08

## 2017-04-05 RX ADMIN — CLONAZEPAM 0.5 MG: 0.5 TABLET ORAL at 16:39

## 2017-04-05 RX ADMIN — PREDNISONE 25 MG: 20 TABLET ORAL at 08:11

## 2017-04-05 RX ADMIN — CLONAZEPAM 0.5 MG: 0.5 TABLET ORAL at 01:02

## 2017-04-05 RX ADMIN — HEPARIN SODIUM 5000 UNITS: 5000 INJECTION, SOLUTION INTRAVENOUS; SUBCUTANEOUS at 08:19

## 2017-04-05 RX ADMIN — LEVETIRACETAM 750 MG: 100 SOLUTION ORAL at 20:06

## 2017-04-05 RX ADMIN — CLONIDINE HYDROCHLORIDE 0.3 MG: 0.3 TABLET ORAL at 05:42

## 2017-04-05 RX ADMIN — HYDRALAZINE HYDROCHLORIDE 100 MG: 50 TABLET ORAL at 21:44

## 2017-04-05 RX ADMIN — HYDROXYCHLOROQUINE SULFATE 200 MG: 200 TABLET ORAL at 20:05

## 2017-04-05 RX ADMIN — POLYETHYLENE GLYCOL (3350) 17 G: 17 POWDER, FOR SOLUTION ORAL at 20:06

## 2017-04-05 RX ADMIN — CALCIUM ACETATE 667 MG: 667 CAPSULE ORAL at 12:35

## 2017-04-05 RX ADMIN — ERYTHROPOIETIN 10000 UNITS: 20000 INJECTION, SOLUTION INTRAVENOUS; SUBCUTANEOUS at 18:29

## 2017-04-05 RX ADMIN — AMLODIPINE BESYLATE 10 MG: 10 TABLET ORAL at 08:11

## 2017-04-05 RX ADMIN — BUMETANIDE 2.5 MG: 1 TABLET ORAL at 08:11

## 2017-04-05 RX ADMIN — CALCITRIOL 0.5 MCG: 0.25 CAPSULE ORAL at 08:11

## 2017-04-05 RX ADMIN — METOPROLOL SUCCINATE 200 MG: 50 TABLET, FILM COATED, EXTENDED RELEASE ORAL at 20:05

## 2017-04-05 RX ADMIN — HYDRALAZINE HYDROCHLORIDE 100 MG: 50 TABLET ORAL at 13:32

## 2017-04-05 RX ADMIN — FEXOFENADINE HCL 180 MG: 180 TABLET ORAL at 20:07

## 2017-04-06 PROCEDURE — 97112 NEUROMUSCULAR REEDUCATION: CPT

## 2017-04-06 PROCEDURE — 63710000001 PREDNISONE PER 5 MG: Performed by: FAMILY MEDICINE

## 2017-04-06 PROCEDURE — 97535 SELF CARE MNGMENT TRAINING: CPT

## 2017-04-06 PROCEDURE — 92507 TX SP LANG VOICE COMM INDIV: CPT

## 2017-04-06 PROCEDURE — 63710000001 PREDNISONE PER 1 MG: Performed by: FAMILY MEDICINE

## 2017-04-06 RX ORDER — MINOXIDIL 10 MG/1
10 TABLET ORAL EVERY 12 HOURS SCHEDULED
Status: DISCONTINUED | OUTPATIENT
Start: 2017-04-06 | End: 2017-04-06

## 2017-04-06 RX ORDER — MINOXIDIL 10 MG/1
10 TABLET ORAL
Status: DISCONTINUED | OUTPATIENT
Start: 2017-04-07 | End: 2017-04-07 | Stop reason: HOSPADM

## 2017-04-06 RX ADMIN — HYDROXYCHLOROQUINE SULFATE 200 MG: 200 TABLET ORAL at 08:24

## 2017-04-06 RX ADMIN — ALUMINUM HYDROXIDE, MAGNESIUM HYDROXIDE, AND DIMETHICONE 15 ML: 400; 400; 40 SUSPENSION ORAL at 08:31

## 2017-04-06 RX ADMIN — CLONIDINE HYDROCHLORIDE 0.3 MG: 0.3 TABLET ORAL at 05:19

## 2017-04-06 RX ADMIN — METOPROLOL SUCCINATE 200 MG: 50 TABLET, FILM COATED, EXTENDED RELEASE ORAL at 08:25

## 2017-04-06 RX ADMIN — HYDROXYCHLOROQUINE SULFATE 200 MG: 200 TABLET ORAL at 22:23

## 2017-04-06 RX ADMIN — BUMETANIDE 2.5 MG: 1 TABLET ORAL at 08:24

## 2017-04-06 RX ADMIN — CLONIDINE HYDROCHLORIDE 0.1 MG: 0.3 TABLET ORAL at 00:25

## 2017-04-06 RX ADMIN — CALCIUM ACETATE 667 MG: 667 CAPSULE ORAL at 07:32

## 2017-04-06 RX ADMIN — CINACALCET 60 MG: 30 TABLET, FILM COATED ORAL at 07:32

## 2017-04-06 RX ADMIN — FEXOFENADINE HCL 180 MG: 180 TABLET ORAL at 22:21

## 2017-04-06 RX ADMIN — LEVETIRACETAM 750 MG: 100 SOLUTION ORAL at 22:24

## 2017-04-06 RX ADMIN — Medication 1 CAPSULE: at 08:25

## 2017-04-06 RX ADMIN — GUAIFENESIN 600 MG: 600 TABLET, EXTENDED RELEASE ORAL at 08:24

## 2017-04-06 RX ADMIN — HYDRALAZINE HYDROCHLORIDE 100 MG: 50 TABLET ORAL at 22:22

## 2017-04-06 RX ADMIN — CLONIDINE HYDROCHLORIDE 0.1 MG: 0.3 TABLET ORAL at 13:47

## 2017-04-06 RX ADMIN — SODIUM CHLORIDE 1000 ML: 9 INJECTION, SOLUTION INTRAVENOUS at 14:08

## 2017-04-06 RX ADMIN — CALCIUM ACETATE 667 MG: 667 CAPSULE ORAL at 17:23

## 2017-04-06 RX ADMIN — PANTOPRAZOLE SODIUM 40 MG: 40 TABLET, DELAYED RELEASE ORAL at 05:18

## 2017-04-06 RX ADMIN — CALCIUM ACETATE 667 MG: 667 CAPSULE ORAL at 11:14

## 2017-04-06 RX ADMIN — HYDRALAZINE HYDROCHLORIDE 100 MG: 50 TABLET ORAL at 13:46

## 2017-04-06 RX ADMIN — CALCIUM ACETATE 667 MG: 667 CAPSULE ORAL at 22:23

## 2017-04-06 RX ADMIN — CLONIDINE HYDROCHLORIDE 0.3 MG: 0.3 TABLET ORAL at 22:22

## 2017-04-06 RX ADMIN — AMITRIPTYLINE HYDROCHLORIDE 10 MG: 10 TABLET, FILM COATED ORAL at 22:22

## 2017-04-06 RX ADMIN — POLYETHYLENE GLYCOL (3350) 17 G: 17 POWDER, FOR SOLUTION ORAL at 22:22

## 2017-04-06 RX ADMIN — HYDRALAZINE HYDROCHLORIDE 100 MG: 50 TABLET ORAL at 05:18

## 2017-04-06 RX ADMIN — MINOXIDIL 10 MG: 10 TABLET ORAL at 08:25

## 2017-04-06 RX ADMIN — METOLAZONE 10 MG: 2.5 TABLET ORAL at 08:24

## 2017-04-06 RX ADMIN — CLONIDINE HYDROCHLORIDE 0.3 MG: 0.3 TABLET ORAL at 13:48

## 2017-04-06 RX ADMIN — CALCITRIOL 0.5 MCG: 0.25 CAPSULE ORAL at 08:24

## 2017-04-06 RX ADMIN — PREDNISONE 25 MG: 20 TABLET ORAL at 07:32

## 2017-04-06 RX ADMIN — GUAIFENESIN 600 MG: 600 TABLET, EXTENDED RELEASE ORAL at 17:23

## 2017-04-06 RX ADMIN — AMLODIPINE BESYLATE 10 MG: 10 TABLET ORAL at 08:24

## 2017-04-06 RX ADMIN — CLONAZEPAM 0.5 MG: 0.5 TABLET ORAL at 13:46

## 2017-04-06 RX ADMIN — LOSARTAN POTASSIUM 100 MG: 50 TABLET, FILM COATED ORAL at 22:22

## 2017-04-07 VITALS
BODY MASS INDEX: 24.14 KG/M2 | HEIGHT: 63 IN | RESPIRATION RATE: 16 BRPM | WEIGHT: 136.24 LBS | OXYGEN SATURATION: 93 % | HEART RATE: 74 BPM | SYSTOLIC BLOOD PRESSURE: 133 MMHG | TEMPERATURE: 98 F | DIASTOLIC BLOOD PRESSURE: 80 MMHG

## 2017-04-07 PROCEDURE — 97535 SELF CARE MNGMENT TRAINING: CPT

## 2017-04-07 PROCEDURE — 97530 THERAPEUTIC ACTIVITIES: CPT

## 2017-04-07 PROCEDURE — 63710000001 PREDNISONE PER 5 MG: Performed by: FAMILY MEDICINE

## 2017-04-07 PROCEDURE — 97110 THERAPEUTIC EXERCISES: CPT

## 2017-04-07 RX ORDER — METOLAZONE 10 MG/1
10 TABLET ORAL DAILY
Qty: 30 TABLET | Refills: 0 | Status: SHIPPED | OUTPATIENT
Start: 2017-04-07 | End: 2017-07-03 | Stop reason: ALTCHOICE

## 2017-04-07 RX ORDER — BUMETANIDE 0.5 MG/1
2.5 TABLET ORAL DAILY
Qty: 30 TABLET | Refills: 0 | Status: SHIPPED | OUTPATIENT
Start: 2017-04-07 | End: 2017-07-03 | Stop reason: ALTCHOICE

## 2017-04-07 RX ORDER — METOPROLOL SUCCINATE 200 MG/1
200 TABLET, EXTENDED RELEASE ORAL EVERY 12 HOURS SCHEDULED
Qty: 60 TABLET | Refills: 0 | Status: SHIPPED | OUTPATIENT
Start: 2017-04-07 | End: 2017-07-03 | Stop reason: ALTCHOICE

## 2017-04-07 RX ORDER — LOSARTAN POTASSIUM 100 MG/1
100 TABLET ORAL NIGHTLY
Qty: 30 TABLET | Refills: 0 | Status: ON HOLD | OUTPATIENT
Start: 2017-04-07 | End: 2018-05-27

## 2017-04-07 RX ORDER — AMLODIPINE BESYLATE 10 MG/1
10 TABLET ORAL
Qty: 30 TABLET | Refills: 0 | Status: SHIPPED | OUTPATIENT
Start: 2017-04-07 | End: 2017-10-31 | Stop reason: HOSPADM

## 2017-04-07 RX ORDER — PREDNISONE 20 MG/1
20 TABLET ORAL
Qty: 7 TABLET | Refills: 0 | Status: SHIPPED | OUTPATIENT
Start: 2017-04-09 | End: 2017-04-16

## 2017-04-07 RX ORDER — LEVETIRACETAM 100 MG/ML
750 SOLUTION ORAL NIGHTLY
Qty: 1 EACH | Refills: 0 | Status: SHIPPED | OUTPATIENT
Start: 2017-04-07 | End: 2017-05-07

## 2017-04-07 RX ORDER — AMITRIPTYLINE HYDROCHLORIDE 10 MG/1
10 TABLET, FILM COATED ORAL NIGHTLY
Qty: 30 TABLET | Refills: 0 | Status: SHIPPED | OUTPATIENT
Start: 2017-04-07 | End: 2017-07-03 | Stop reason: ALTCHOICE

## 2017-04-07 RX ORDER — PANTOPRAZOLE SODIUM 40 MG/1
40 TABLET, DELAYED RELEASE ORAL DAILY
Qty: 30 TABLET | Refills: 0 | Status: SHIPPED | OUTPATIENT
Start: 2017-04-07 | End: 2017-09-20

## 2017-04-07 RX ORDER — MINOXIDIL 10 MG/1
10 TABLET ORAL
Qty: 30 TABLET | Refills: 0 | Status: SHIPPED | OUTPATIENT
Start: 2017-04-07 | End: 2017-04-14 | Stop reason: SDUPTHER

## 2017-04-07 RX ORDER — L.ACID,CASEI/B.ANIMAL/S.THERMO 16B CELL
1 CAPSULE ORAL DAILY
Qty: 30 CAPSULE | Refills: 0 | Status: SHIPPED | OUTPATIENT
Start: 2017-04-07 | End: 2017-09-20

## 2017-04-07 RX ORDER — TIZANIDINE 4 MG/1
4 TABLET ORAL EVERY 8 HOURS PRN
Qty: 90 TABLET | Refills: 0 | Status: SHIPPED | OUTPATIENT
Start: 2017-04-07 | End: 2017-07-03

## 2017-04-07 RX ORDER — CLONIDINE HYDROCHLORIDE 0.3 MG/1
0.3 TABLET ORAL EVERY 8 HOURS SCHEDULED
Qty: 90 TABLET | Refills: 0 | Status: SHIPPED | OUTPATIENT
Start: 2017-04-07 | End: 2017-10-13 | Stop reason: HOSPADM

## 2017-04-07 RX ORDER — ERGOCALCIFEROL 1.25 MG/1
50000 CAPSULE ORAL
Qty: 8 CAPSULE | Refills: 0 | Status: SHIPPED | OUTPATIENT
Start: 2017-04-07 | End: 2017-05-06

## 2017-04-07 RX ORDER — CALCITRIOL 0.5 UG/1
0.5 CAPSULE, LIQUID FILLED ORAL DAILY
Qty: 30 CAPSULE | Refills: 0 | Status: SHIPPED | OUTPATIENT
Start: 2017-04-07 | End: 2018-09-07 | Stop reason: HOSPADM

## 2017-04-07 RX ORDER — PREDNISONE 1 MG/1
25 TABLET ORAL
Qty: 2 TABLET | Refills: 0 | Status: SHIPPED | OUTPATIENT
Start: 2017-04-07 | End: 2017-04-08

## 2017-04-07 RX ORDER — HYDRALAZINE HYDROCHLORIDE 100 MG/1
100 TABLET, FILM COATED ORAL EVERY 8 HOURS SCHEDULED
Qty: 90 TABLET | Refills: 0 | Status: SHIPPED | OUTPATIENT
Start: 2017-04-07 | End: 2018-07-25 | Stop reason: HOSPADM

## 2017-04-07 RX ADMIN — AMLODIPINE BESYLATE 10 MG: 10 TABLET ORAL at 08:07

## 2017-04-07 RX ADMIN — METOLAZONE 10 MG: 2.5 TABLET ORAL at 08:08

## 2017-04-07 RX ADMIN — PANTOPRAZOLE SODIUM 40 MG: 40 TABLET, DELAYED RELEASE ORAL at 05:28

## 2017-04-07 RX ADMIN — CALCITRIOL 0.5 MCG: 0.25 CAPSULE ORAL at 08:08

## 2017-04-07 RX ADMIN — CLONIDINE HYDROCHLORIDE 0.3 MG: 0.3 TABLET ORAL at 05:28

## 2017-04-07 RX ADMIN — HYDRALAZINE HYDROCHLORIDE 100 MG: 50 TABLET ORAL at 05:28

## 2017-04-07 RX ADMIN — CALCIUM ACETATE 667 MG: 667 CAPSULE ORAL at 11:02

## 2017-04-07 RX ADMIN — CALCIUM ACETATE 667 MG: 667 CAPSULE ORAL at 08:07

## 2017-04-07 RX ADMIN — Medication 1 CAPSULE: at 08:09

## 2017-04-07 RX ADMIN — CINACALCET 60 MG: 30 TABLET, FILM COATED ORAL at 08:10

## 2017-04-07 RX ADMIN — GUAIFENESIN 600 MG: 600 TABLET, EXTENDED RELEASE ORAL at 08:07

## 2017-04-07 RX ADMIN — MINOXIDIL 10 MG: 10 TABLET ORAL at 08:10

## 2017-04-07 RX ADMIN — BUMETANIDE 2.5 MG: 1 TABLET ORAL at 08:07

## 2017-04-07 RX ADMIN — METOPROLOL SUCCINATE 200 MG: 50 TABLET, FILM COATED, EXTENDED RELEASE ORAL at 02:32

## 2017-04-07 RX ADMIN — PREDNISONE 25 MG: 20 TABLET ORAL at 08:07

## 2017-04-07 RX ADMIN — METOPROLOL SUCCINATE 200 MG: 50 TABLET, FILM COATED, EXTENDED RELEASE ORAL at 08:09

## 2017-04-07 RX ADMIN — HYDROXYCHLOROQUINE SULFATE 200 MG: 200 TABLET ORAL at 08:08

## 2017-04-14 ENCOUNTER — OFFICE VISIT (OUTPATIENT)
Dept: FAMILY MEDICINE CLINIC | Facility: CLINIC | Age: 27
End: 2017-04-14

## 2017-04-14 VITALS
HEART RATE: 81 BPM | BODY MASS INDEX: 30.12 KG/M2 | OXYGEN SATURATION: 98 % | DIASTOLIC BLOOD PRESSURE: 69 MMHG | RESPIRATION RATE: 16 BRPM | WEIGHT: 170 LBS | SYSTOLIC BLOOD PRESSURE: 115 MMHG | TEMPERATURE: 98.1 F | HEIGHT: 63 IN

## 2017-04-14 DIAGNOSIS — I10 ESSENTIAL HYPERTENSION: ICD-10-CM

## 2017-04-14 DIAGNOSIS — I63.9 CEREBROVASCULAR ACCIDENT (CVA) INVOLVING LEFT CEREBRAL HEMISPHERE (HCC): ICD-10-CM

## 2017-04-14 DIAGNOSIS — M32.19 LUPUS ENCEPHALITIS (HCC): Primary | ICD-10-CM

## 2017-04-14 PROCEDURE — 99214 OFFICE O/P EST MOD 30 MIN: CPT | Performed by: INTERNAL MEDICINE

## 2017-04-14 RX ORDER — MINOXIDIL 10 MG/1
5 TABLET ORAL
Qty: 30 TABLET | Refills: 0 | Status: SHIPPED | OUTPATIENT
Start: 2017-04-14 | End: 2017-10-13 | Stop reason: HOSPADM

## 2017-05-01 ENCOUNTER — PROCEDURE VISIT (OUTPATIENT)
Dept: NEUROLOGY | Facility: CLINIC | Age: 27
End: 2017-05-01

## 2017-05-01 VITALS
HEIGHT: 67 IN | BODY MASS INDEX: 25.11 KG/M2 | DIASTOLIC BLOOD PRESSURE: 102 MMHG | WEIGHT: 160 LBS | OXYGEN SATURATION: 98 % | HEART RATE: 79 BPM | SYSTOLIC BLOOD PRESSURE: 168 MMHG

## 2017-05-01 DIAGNOSIS — I69.30 SEQUELAE, POST-STROKE: Primary | ICD-10-CM

## 2017-05-01 PROCEDURE — 99213 OFFICE O/P EST LOW 20 MIN: CPT | Performed by: PSYCHIATRY & NEUROLOGY

## 2017-05-10 ENCOUNTER — HOSPITAL ENCOUNTER (OUTPATIENT)
Dept: MRI IMAGING | Facility: HOSPITAL | Age: 27
Discharge: HOME | End: 2017-05-10
Attending: PSYCHIATRY & NEUROLOGY | Admitting: PSYCHIATRY & NEUROLOGY

## 2017-05-10 DIAGNOSIS — I69.30 SEQUELAE, POST-STROKE: ICD-10-CM

## 2017-05-10 PROCEDURE — 70551 MRI BRAIN STEM W/O DYE: CPT

## 2017-05-19 ENCOUNTER — DOCUMENTATION (OUTPATIENT)
Dept: NEUROLOGY | Facility: CLINIC | Age: 27
End: 2017-05-19

## 2017-06-13 ENCOUNTER — OFFICE VISIT (OUTPATIENT)
Dept: FAMILY MEDICINE CLINIC | Facility: CLINIC | Age: 27
End: 2017-06-13

## 2017-06-13 VITALS
HEIGHT: 67 IN | WEIGHT: 160 LBS | SYSTOLIC BLOOD PRESSURE: 160 MMHG | BODY MASS INDEX: 25.11 KG/M2 | OXYGEN SATURATION: 98 % | HEART RATE: 85 BPM | TEMPERATURE: 98.1 F | DIASTOLIC BLOOD PRESSURE: 92 MMHG

## 2017-06-13 DIAGNOSIS — R21 RASH OF BODY: Primary | ICD-10-CM

## 2017-06-13 PROCEDURE — 99213 OFFICE O/P EST LOW 20 MIN: CPT | Performed by: INTERNAL MEDICINE

## 2017-07-03 ENCOUNTER — PROCEDURE VISIT (OUTPATIENT)
Dept: NEUROLOGY | Facility: CLINIC | Age: 27
End: 2017-07-03

## 2017-07-03 VITALS
DIASTOLIC BLOOD PRESSURE: 100 MMHG | BODY MASS INDEX: 25.43 KG/M2 | HEART RATE: 85 BPM | WEIGHT: 162 LBS | HEIGHT: 67 IN | OXYGEN SATURATION: 98 % | SYSTOLIC BLOOD PRESSURE: 164 MMHG

## 2017-07-03 DIAGNOSIS — M54.2 NECK PAIN: ICD-10-CM

## 2017-07-03 DIAGNOSIS — I69.30 SEQUELAE, POST-STROKE: ICD-10-CM

## 2017-07-03 DIAGNOSIS — G44.209 TENSION HEADACHE: Primary | ICD-10-CM

## 2017-07-03 PROCEDURE — 20553 NJX 1/MLT TRIGGER POINTS 3/>: CPT | Performed by: PSYCHIATRY & NEUROLOGY

## 2017-07-03 RX ORDER — BUPIVACAINE HYDROCHLORIDE 7.5 MG/ML
5 INJECTION, SOLUTION EPIDURAL; RETROBULBAR ONCE
Status: DISCONTINUED | OUTPATIENT
Start: 2017-07-03 | End: 2017-10-13 | Stop reason: HOSPADM

## 2017-07-03 RX ORDER — METHYLPREDNISOLONE ACETATE 40 MG/ML
200 INJECTION, SUSPENSION INTRA-ARTICULAR; INTRALESIONAL; INTRAMUSCULAR; SOFT TISSUE ONCE
Status: COMPLETED | OUTPATIENT
Start: 2017-07-03 | End: 2017-07-03

## 2017-07-03 RX ORDER — LEVETIRACETAM 500 MG/1
500 TABLET ORAL DAILY
COMMUNITY
End: 2017-09-11 | Stop reason: SDUPTHER

## 2017-07-03 RX ADMIN — METHYLPREDNISOLONE ACETATE 200 MG: 40 INJECTION, SUSPENSION INTRA-ARTICULAR; INTRALESIONAL; INTRAMUSCULAR; SOFT TISSUE at 20:24

## 2017-07-10 ENCOUNTER — TELEPHONE (OUTPATIENT)
Dept: NEUROLOGY | Facility: CLINIC | Age: 27
End: 2017-07-10

## 2017-07-10 DIAGNOSIS — M32.9 LUPUS (HCC): Primary | ICD-10-CM

## 2017-07-21 DIAGNOSIS — N18.6 ESRD (END-STAGE RENAL DISEASE) DUE TO SLE (HCC): ICD-10-CM

## 2017-07-21 DIAGNOSIS — M32.19 LUPUS ENCEPHALITIS (HCC): ICD-10-CM

## 2017-07-21 DIAGNOSIS — M32.14 ESRD (END-STAGE RENAL DISEASE) DUE TO SLE (HCC): ICD-10-CM

## 2017-07-21 DIAGNOSIS — Z01.810 PRE-OPERATIVE CARDIOVASCULAR EXAMINATION: Primary | ICD-10-CM

## 2017-07-21 DIAGNOSIS — M32.14 LUPUS NEPHRITIS (HCC): ICD-10-CM

## 2017-08-01 DIAGNOSIS — Z01.810 PRE-OPERATIVE CARDIOVASCULAR EXAMINATION: Primary | ICD-10-CM

## 2017-08-02 ENCOUNTER — HOSPITAL ENCOUNTER (OUTPATIENT)
Dept: ULTRASOUND IMAGING | Facility: HOSPITAL | Age: 27
Discharge: HOME | End: 2017-08-02
Attending: PSYCHIATRY & NEUROLOGY | Admitting: PSYCHIATRY & NEUROLOGY

## 2017-08-02 ENCOUNTER — HOSPITAL ENCOUNTER (OUTPATIENT)
Dept: ULTRASOUND IMAGING | Facility: HOSPITAL | Age: 27
Discharge: HOME | End: 2017-08-02
Attending: PSYCHIATRY & NEUROLOGY

## 2017-08-02 DIAGNOSIS — M32.19 LUPUS ENCEPHALITIS (HCC): ICD-10-CM

## 2017-08-02 DIAGNOSIS — M32.14 LUPUS NEPHRITIS (HCC): ICD-10-CM

## 2017-08-02 DIAGNOSIS — N18.6 ESRD (END-STAGE RENAL DISEASE) DUE TO SLE (HCC): ICD-10-CM

## 2017-08-02 DIAGNOSIS — M32.14 ESRD (END-STAGE RENAL DISEASE) DUE TO SLE (HCC): ICD-10-CM

## 2017-08-02 PROCEDURE — 93923 UPR/LXTR ART STDY 3+ LVLS: CPT

## 2017-08-02 PROCEDURE — 93880 EXTRACRANIAL BILAT STUDY: CPT

## 2017-08-21 ENCOUNTER — TELEPHONE (OUTPATIENT)
Dept: FAMILY MEDICINE CLINIC | Facility: CLINIC | Age: 27
End: 2017-08-21

## 2017-08-21 RX ORDER — AZITHROMYCIN 250 MG/1
TABLET, FILM COATED ORAL
Qty: 6 TABLET | Refills: 0 | Status: SHIPPED | OUTPATIENT
Start: 2017-08-21 | End: 2017-09-20

## 2017-09-15 RX ORDER — LEVETIRACETAM 500 MG/1
500 TABLET ORAL DAILY
Qty: 30 TABLET | Refills: 3 | Status: SHIPPED | OUTPATIENT
Start: 2017-09-15 | End: 2017-09-20 | Stop reason: SDUPTHER

## 2017-09-20 ENCOUNTER — OFFICE VISIT (OUTPATIENT)
Dept: OBSTETRICS AND GYNECOLOGY | Facility: CLINIC | Age: 27
End: 2017-09-20

## 2017-09-20 VITALS
BODY MASS INDEX: 26.03 KG/M2 | DIASTOLIC BLOOD PRESSURE: 84 MMHG | WEIGHT: 162 LBS | SYSTOLIC BLOOD PRESSURE: 149 MMHG | HEIGHT: 66 IN

## 2017-09-20 DIAGNOSIS — Z01.419 ENCOUNTER FOR GYNECOLOGICAL EXAMINATION WITHOUT ABNORMAL FINDING: Primary | ICD-10-CM

## 2017-09-20 DIAGNOSIS — Z12.4 SCREENING FOR MALIGNANT NEOPLASM OF CERVIX: ICD-10-CM

## 2017-09-20 PROCEDURE — G0101 CA SCREEN;PELVIC/BREAST EXAM: HCPCS | Performed by: PHYSICIAN ASSISTANT

## 2017-09-20 RX ORDER — ENALAPRIL MALEATE 5 MG/1
TABLET ORAL
COMMUNITY
Start: 2016-05-10 | End: 2017-10-13 | Stop reason: HOSPADM

## 2017-09-20 RX ORDER — CALCIUM ACETATE 667 MG/1
TABLET ORAL
COMMUNITY
Start: 2015-05-15 | End: 2017-09-20

## 2017-09-20 RX ORDER — NEBIVOLOL 20 MG/1
TABLET ORAL
COMMUNITY
Start: 2016-05-09 | End: 2018-09-07 | Stop reason: HOSPADM

## 2017-09-20 RX ORDER — HYDRALAZINE HYDROCHLORIDE 100 MG/1
100 TABLET, FILM COATED ORAL
COMMUNITY
End: 2017-10-13 | Stop reason: HOSPADM

## 2017-09-20 RX ORDER — DOXAZOSIN 8 MG/1
TABLET ORAL
Status: ON HOLD | COMMUNITY
Start: 2016-05-02 | End: 2017-10-13

## 2017-09-20 RX ORDER — LOSARTAN POTASSIUM 100 MG/1
100 TABLET ORAL
COMMUNITY
End: 2017-09-20 | Stop reason: SDUPTHER

## 2017-09-20 RX ORDER — FEXOFENADINE HCL 180 MG/1
TABLET ORAL
COMMUNITY
Start: 2013-05-28 | End: 2017-09-20 | Stop reason: SDUPTHER

## 2017-09-20 RX ORDER — HYDROXYCHLOROQUINE SULFATE 200 MG/1
TABLET, FILM COATED ORAL
COMMUNITY
Start: 2015-05-15 | End: 2017-09-20 | Stop reason: SDUPTHER

## 2017-09-20 RX ORDER — LEVETIRACETAM 500 MG/1
500 TABLET ORAL NIGHTLY
Status: ON HOLD | COMMUNITY
Start: 2016-08-16 | End: 2018-05-24 | Stop reason: SDUPTHER

## 2017-09-20 RX ORDER — CINACALCET 60 MG/1
TABLET, FILM COATED ORAL
COMMUNITY
End: 2017-09-20 | Stop reason: SDUPTHER

## 2017-09-20 RX ORDER — MINOXIDIL 10 MG/1
10 TABLET ORAL
COMMUNITY
End: 2017-09-20 | Stop reason: SDUPTHER

## 2017-09-29 DIAGNOSIS — Z12.4 SCREENING FOR MALIGNANT NEOPLASM OF CERVIX: ICD-10-CM

## 2017-10-11 ENCOUNTER — APPOINTMENT (OUTPATIENT)
Dept: CT IMAGING | Facility: HOSPITAL | Age: 27
End: 2017-10-11

## 2017-10-11 ENCOUNTER — HOSPITAL ENCOUNTER (INPATIENT)
Facility: HOSPITAL | Age: 27
LOS: 2 days | Discharge: HOME | End: 2017-10-13
Attending: EMERGENCY MEDICINE | Admitting: INTERNAL MEDICINE

## 2017-10-11 DIAGNOSIS — K52.9 COLITIS: Primary | ICD-10-CM

## 2017-10-11 DIAGNOSIS — J18.9 PNEUMONIA OF RIGHT LOWER LOBE DUE TO INFECTIOUS ORGANISM: ICD-10-CM

## 2017-10-11 DIAGNOSIS — Z99.2 ESRD NEEDING DIALYSIS (HCC): ICD-10-CM

## 2017-10-11 DIAGNOSIS — E87.5 HYPERKALEMIA: ICD-10-CM

## 2017-10-11 DIAGNOSIS — N18.6 ESRD NEEDING DIALYSIS (HCC): ICD-10-CM

## 2017-10-11 PROBLEM — A04.9 BACTERIAL ENTERITIS: Status: ACTIVE | Noted: 2017-10-11

## 2017-10-11 PROBLEM — M32.14 ESRD (END-STAGE RENAL DISEASE) DUE TO SLE: Status: ACTIVE | Noted: 2017-10-11

## 2017-10-11 PROBLEM — Z91.199 MEDICAL NON-COMPLIANCE: Status: ACTIVE | Noted: 2017-10-11

## 2017-10-11 PROBLEM — N18.9 CHRONIC KIDNEY DISEASE-MINERAL AND BONE DISORDER: Status: ACTIVE | Noted: 2017-10-11

## 2017-10-11 PROBLEM — I10 HTN (HYPERTENSION): Status: ACTIVE | Noted: 2017-10-11

## 2017-10-11 PROBLEM — E83.9 CHRONIC KIDNEY DISEASE-MINERAL AND BONE DISORDER: Status: ACTIVE | Noted: 2017-10-11

## 2017-10-11 PROBLEM — I16.0 HYPERTENSIVE URGENCY: Status: ACTIVE | Noted: 2017-10-11

## 2017-10-11 PROBLEM — M89.9 CHRONIC KIDNEY DISEASE-MINERAL AND BONE DISORDER: Status: ACTIVE | Noted: 2017-10-11

## 2017-10-11 PROBLEM — D63.8 ANEMIA OF CHRONIC DISEASE: Status: ACTIVE | Noted: 2017-10-11

## 2017-10-11 PROBLEM — G40.909 SEIZURE DISORDER (HCC): Status: ACTIVE | Noted: 2017-10-11

## 2017-10-11 PROBLEM — D64.9 ANEMIA: Status: ACTIVE | Noted: 2017-10-11

## 2017-10-11 LAB
ALBUMIN SERPL-MCNC: 3.5 G/DL (ref 3.5–5)
ALBUMIN/GLOB SERPL: 1.3 G/DL (ref 1–2)
ALP SERPL-CCNC: 47 U/L (ref 38–126)
ALT SERPL W P-5'-P-CCNC: 21 U/L (ref 13–69)
ANION GAP SERPL CALCULATED.3IONS-SCNC: 19.2 MMOL/L
AST SERPL-CCNC: 38 U/L (ref 15–46)
BACTERIA UR QL AUTO: ABNORMAL /HPF
BASOPHILS # BLD AUTO: 0.01 10*3/MM3 (ref 0–0.2)
BASOPHILS NFR BLD AUTO: 0.3 % (ref 0–2.5)
BILIRUB SERPL-MCNC: 0.7 MG/DL (ref 0.2–1.3)
BILIRUB UR QL STRIP: NEGATIVE
BUN BLD-MCNC: 40 MG/DL (ref 7–20)
BUN/CREAT SERPL: 4.7 (ref 7.1–23.5)
CALCIUM SPEC-SCNC: 9.5 MG/DL (ref 8.4–10.2)
CHLORIDE SERPL-SCNC: 103 MMOL/L (ref 98–107)
CLARITY UR: CLEAR
CO2 SERPL-SCNC: 22 MMOL/L (ref 26–30)
COLOR UR: YELLOW
CREAT BLD-MCNC: 8.6 MG/DL (ref 0.6–1.3)
D-LACTATE SERPL-SCNC: 0.9 MMOL/L (ref 0.5–2)
DEPRECATED RDW RBC AUTO: 52.3 FL (ref 37–54)
EOSINOPHIL # BLD AUTO: 0.18 10*3/MM3 (ref 0–0.7)
EOSINOPHIL NFR BLD AUTO: 5.8 % (ref 0–7)
ERYTHROCYTE [DISTWIDTH] IN BLOOD BY AUTOMATED COUNT: 15.2 % (ref 11.5–14.5)
GFR SERPL CREATININE-BSD FRML MDRD: 6 ML/MIN/1.73
GLOBULIN UR ELPH-MCNC: 2.7 GM/DL
GLUCOSE BLD-MCNC: 107 MG/DL (ref 74–98)
GLUCOSE BLDC GLUCOMTR-MCNC: 104 MG/DL (ref 70–130)
GLUCOSE UR STRIP-MCNC: ABNORMAL MG/DL
HCG SERPL QL: NEGATIVE
HCT VFR BLD AUTO: 31.4 % (ref 37–47)
HGB BLD-MCNC: 10.1 G/DL (ref 12–16)
HGB UR QL STRIP.AUTO: ABNORMAL
HOLD SPECIMEN: NORMAL
HOLD SPECIMEN: NORMAL
HYALINE CASTS UR QL AUTO: ABNORMAL /LPF
IMM GRANULOCYTES # BLD: 0.02 10*3/MM3 (ref 0–0.06)
IMM GRANULOCYTES NFR BLD: 0.6 % (ref 0–0.6)
KETONES UR QL STRIP: ABNORMAL
LEUKOCYTE ESTERASE UR QL STRIP.AUTO: NEGATIVE
LIPASE SERPL-CCNC: 116 U/L (ref 23–300)
LYMPHOCYTES # BLD AUTO: 0.31 10*3/MM3 (ref 0.6–3.4)
LYMPHOCYTES NFR BLD AUTO: 9.9 % (ref 10–50)
MCH RBC QN AUTO: 29.9 PG (ref 27–31)
MCHC RBC AUTO-ENTMCNC: 32.2 G/DL (ref 30–37)
MCV RBC AUTO: 92.9 FL (ref 81–99)
MONOCYTES # BLD AUTO: 0.15 10*3/MM3 (ref 0–0.9)
MONOCYTES NFR BLD AUTO: 4.8 % (ref 0–12)
NEUTROPHILS # BLD AUTO: 2.45 10*3/MM3 (ref 2–6.9)
NEUTROPHILS NFR BLD AUTO: 78.6 % (ref 37–80)
NITRITE UR QL STRIP: NEGATIVE
NRBC BLD MANUAL-RTO: 0 /100 WBC (ref 0–0)
NT-PROBNP SERPL-MCNC: ABNORMAL PG/ML (ref 0–125)
PH UR STRIP.AUTO: 8.5 [PH] (ref 5–8)
PLATELET # BLD AUTO: 76 10*3/MM3 (ref 130–400)
PMV BLD AUTO: 11.3 FL (ref 6–12)
POTASSIUM BLD-SCNC: 6.2 MMOL/L (ref 3.5–5.1)
PROT SERPL-MCNC: 6.2 G/DL (ref 6.3–8.2)
PROT UR QL STRIP: ABNORMAL
RBC # BLD AUTO: 3.38 10*6/MM3 (ref 4.2–5.4)
RBC # UR: ABNORMAL /HPF
REF LAB TEST METHOD: ABNORMAL
SODIUM BLD-SCNC: 138 MMOL/L (ref 137–145)
SP GR UR STRIP: 1.02 (ref 1–1.03)
SQUAMOUS #/AREA URNS HPF: ABNORMAL /HPF
TROPONIN I SERPL-MCNC: 0.04 NG/ML (ref 0–0.03)
UROBILINOGEN UR QL STRIP: ABNORMAL
WBC NRBC COR # BLD: 3.12 10*3/MM3 (ref 4.8–10.8)
WBC UR QL AUTO: ABNORMAL /HPF
WHOLE BLOOD HOLD SPECIMEN: NORMAL
WHOLE BLOOD HOLD SPECIMEN: NORMAL

## 2017-10-11 PROCEDURE — 25010000002 LEVETIRACETAM IN NACL 0.82% 500 MG/100ML SOLUTION: Performed by: FAMILY MEDICINE

## 2017-10-11 PROCEDURE — 25010000002 DIPHENHYDRAMINE PER 50 MG: Performed by: FAMILY MEDICINE

## 2017-10-11 PROCEDURE — 25010000002 FENTANYL CITRATE (PF) 100 MCG/2ML SOLUTION: Performed by: EMERGENCY MEDICINE

## 2017-10-11 PROCEDURE — 83605 ASSAY OF LACTIC ACID: CPT | Performed by: EMERGENCY MEDICINE

## 2017-10-11 PROCEDURE — 25010000002 AZITHROMYCIN: Performed by: EMERGENCY MEDICINE

## 2017-10-11 PROCEDURE — 87086 URINE CULTURE/COLONY COUNT: CPT | Performed by: EMERGENCY MEDICINE

## 2017-10-11 PROCEDURE — 85025 COMPLETE CBC W/AUTO DIFF WBC: CPT | Performed by: EMERGENCY MEDICINE

## 2017-10-11 PROCEDURE — 25010000002 PIPERACILLIN SOD-TAZOBACTAM PER 1 G: Performed by: EMERGENCY MEDICINE

## 2017-10-11 PROCEDURE — 90935 HEMODIALYSIS ONE EVALUATION: CPT

## 2017-10-11 PROCEDURE — 87081 CULTURE SCREEN ONLY: CPT | Performed by: INTERNAL MEDICINE

## 2017-10-11 PROCEDURE — 84703 CHORIONIC GONADOTROPIN ASSAY: CPT | Performed by: EMERGENCY MEDICINE

## 2017-10-11 PROCEDURE — 25010000002 PIPERACILLIN SOD-TAZOBACTAM PER 1 G: Performed by: INTERNAL MEDICINE

## 2017-10-11 PROCEDURE — 74176 CT ABD & PELVIS W/O CONTRAST: CPT

## 2017-10-11 PROCEDURE — 93005 ELECTROCARDIOGRAM TRACING: CPT | Performed by: EMERGENCY MEDICINE

## 2017-10-11 PROCEDURE — 25010000002 VANCOMYCIN PER 500 MG: Performed by: EMERGENCY MEDICINE

## 2017-10-11 PROCEDURE — 81001 URINALYSIS AUTO W/SCOPE: CPT | Performed by: EMERGENCY MEDICINE

## 2017-10-11 PROCEDURE — 25010000002 DIPHENHYDRAMINE PER 50 MG: Performed by: EMERGENCY MEDICINE

## 2017-10-11 PROCEDURE — 25010000002 ONDANSETRON PER 1 MG: Performed by: INTERNAL MEDICINE

## 2017-10-11 PROCEDURE — 83880 ASSAY OF NATRIURETIC PEPTIDE: CPT | Performed by: EMERGENCY MEDICINE

## 2017-10-11 PROCEDURE — 80053 COMPREHEN METABOLIC PANEL: CPT | Performed by: EMERGENCY MEDICINE

## 2017-10-11 PROCEDURE — 25010000002 HEPARIN (PORCINE) PER 1000 UNITS: Performed by: INTERNAL MEDICINE

## 2017-10-11 PROCEDURE — 25010000002 ONDANSETRON PER 1 MG: Performed by: EMERGENCY MEDICINE

## 2017-10-11 PROCEDURE — 99223 1ST HOSP IP/OBS HIGH 75: CPT | Performed by: INTERNAL MEDICINE

## 2017-10-11 PROCEDURE — 83690 ASSAY OF LIPASE: CPT | Performed by: EMERGENCY MEDICINE

## 2017-10-11 PROCEDURE — 82962 GLUCOSE BLOOD TEST: CPT

## 2017-10-11 PROCEDURE — 25010000002 HYDROMORPHONE PER 4 MG: Performed by: INTERNAL MEDICINE

## 2017-10-11 PROCEDURE — 25010000002 HALOPERIDOL LACTATE PER 5 MG: Performed by: EMERGENCY MEDICINE

## 2017-10-11 PROCEDURE — 99285 EMERGENCY DEPT VISIT HI MDM: CPT

## 2017-10-11 PROCEDURE — 25010000002 HYDRALAZINE PER 20 MG: Performed by: INTERNAL MEDICINE

## 2017-10-11 PROCEDURE — 25010000002 VANCOMYCIN PER 500 MG: Performed by: INTERNAL MEDICINE

## 2017-10-11 PROCEDURE — 25010000002 CALCIUM GLUCONATE PER 10 ML: Performed by: EMERGENCY MEDICINE

## 2017-10-11 PROCEDURE — 84484 ASSAY OF TROPONIN QUANT: CPT | Performed by: EMERGENCY MEDICINE

## 2017-10-11 PROCEDURE — 87040 BLOOD CULTURE FOR BACTERIA: CPT | Performed by: EMERGENCY MEDICINE

## 2017-10-11 PROCEDURE — 25010000002 PROMETHAZINE PER 50 MG: Performed by: EMERGENCY MEDICINE

## 2017-10-11 PROCEDURE — 25010000002 HYDRALAZINE PER 20 MG: Performed by: EMERGENCY MEDICINE

## 2017-10-11 RX ORDER — DIPHENHYDRAMINE HYDROCHLORIDE 50 MG/ML
25 INJECTION INTRAMUSCULAR; INTRAVENOUS EVERY 6 HOURS
Status: DISCONTINUED | OUTPATIENT
Start: 2017-10-11 | End: 2017-10-12

## 2017-10-11 RX ORDER — ONDANSETRON 2 MG/ML
4 INJECTION INTRAMUSCULAR; INTRAVENOUS ONCE
Status: COMPLETED | OUTPATIENT
Start: 2017-10-11 | End: 2017-10-11

## 2017-10-11 RX ORDER — ONDANSETRON 2 MG/ML
4 INJECTION INTRAMUSCULAR; INTRAVENOUS EVERY 6 HOURS PRN
Status: DISCONTINUED | OUTPATIENT
Start: 2017-10-11 | End: 2017-10-13 | Stop reason: HOSPADM

## 2017-10-11 RX ORDER — ONDANSETRON 4 MG/1
4 TABLET, FILM COATED ORAL EVERY 6 HOURS PRN
Status: DISCONTINUED | OUTPATIENT
Start: 2017-10-11 | End: 2017-10-13 | Stop reason: HOSPADM

## 2017-10-11 RX ORDER — SODIUM CHLORIDE 0.9 % (FLUSH) 0.9 %
10 SYRINGE (ML) INJECTION AS NEEDED
Status: DISCONTINUED | OUTPATIENT
Start: 2017-10-11 | End: 2017-10-11

## 2017-10-11 RX ORDER — HALOPERIDOL 5 MG/ML
2 INJECTION INTRAMUSCULAR ONCE
Status: COMPLETED | OUTPATIENT
Start: 2017-10-11 | End: 2017-10-11

## 2017-10-11 RX ORDER — SODIUM CHLORIDE 0.9 % (FLUSH) 0.9 %
1-10 SYRINGE (ML) INJECTION AS NEEDED
Status: DISCONTINUED | OUTPATIENT
Start: 2017-10-11 | End: 2017-10-13 | Stop reason: HOSPADM

## 2017-10-11 RX ORDER — HYDRALAZINE HYDROCHLORIDE 20 MG/ML
20 INJECTION INTRAMUSCULAR; INTRAVENOUS EVERY 6 HOURS
Status: DISCONTINUED | OUTPATIENT
Start: 2017-10-11 | End: 2017-10-12

## 2017-10-11 RX ORDER — FAMOTIDINE 10 MG/ML
10 INJECTION, SOLUTION INTRAVENOUS EVERY 12 HOURS SCHEDULED
Status: DISCONTINUED | OUTPATIENT
Start: 2017-10-11 | End: 2017-10-13 | Stop reason: HOSPADM

## 2017-10-11 RX ORDER — FENTANYL CITRATE 50 UG/ML
50 INJECTION, SOLUTION INTRAMUSCULAR; INTRAVENOUS ONCE
Status: COMPLETED | OUTPATIENT
Start: 2017-10-11 | End: 2017-10-11

## 2017-10-11 RX ORDER — HYDRALAZINE HYDROCHLORIDE 20 MG/ML
20 INJECTION INTRAMUSCULAR; INTRAVENOUS ONCE
Status: COMPLETED | OUTPATIENT
Start: 2017-10-11 | End: 2017-10-11

## 2017-10-11 RX ORDER — CALCIUM GLUCONATE 94 MG/ML
1 INJECTION, SOLUTION INTRAVENOUS ONCE
Status: COMPLETED | OUTPATIENT
Start: 2017-10-11 | End: 2017-10-11

## 2017-10-11 RX ORDER — HEPARIN SODIUM 1000 [USP'U]/ML
4000 INJECTION, SOLUTION INTRAVENOUS; SUBCUTANEOUS ONCE
Status: DISCONTINUED | OUTPATIENT
Start: 2017-10-11 | End: 2017-10-13 | Stop reason: HOSPADM

## 2017-10-11 RX ORDER — ACETAMINOPHEN 325 MG/1
650 TABLET ORAL EVERY 4 HOURS PRN
Status: DISCONTINUED | OUTPATIENT
Start: 2017-10-11 | End: 2017-10-13 | Stop reason: HOSPADM

## 2017-10-11 RX ORDER — NALOXONE HCL 0.4 MG/ML
0.4 VIAL (ML) INJECTION
Status: DISCONTINUED | OUTPATIENT
Start: 2017-10-11 | End: 2017-10-13 | Stop reason: HOSPADM

## 2017-10-11 RX ORDER — LEVETIRACETAM 5 MG/ML
500 INJECTION INTRAVASCULAR NIGHTLY
Status: DISCONTINUED | OUTPATIENT
Start: 2017-10-11 | End: 2017-10-12 | Stop reason: SDUPTHER

## 2017-10-11 RX ORDER — FENTANYL CITRATE 50 UG/ML
50 INJECTION, SOLUTION INTRAMUSCULAR; INTRAVENOUS ONCE
Status: DISCONTINUED | OUTPATIENT
Start: 2017-10-11 | End: 2017-10-11

## 2017-10-11 RX ORDER — DIPHENHYDRAMINE HYDROCHLORIDE 50 MG/ML
25 INJECTION INTRAMUSCULAR; INTRAVENOUS ONCE
Status: COMPLETED | OUTPATIENT
Start: 2017-10-11 | End: 2017-10-11

## 2017-10-11 RX ORDER — HEPARIN SODIUM 5000 [USP'U]/ML
5000 INJECTION, SOLUTION INTRAVENOUS; SUBCUTANEOUS EVERY 12 HOURS SCHEDULED
Status: DISCONTINUED | OUTPATIENT
Start: 2017-10-11 | End: 2017-10-13 | Stop reason: HOSPADM

## 2017-10-11 RX ORDER — PROMETHAZINE HYDROCHLORIDE 25 MG/ML
25 INJECTION, SOLUTION INTRAMUSCULAR; INTRAVENOUS ONCE
Status: COMPLETED | OUTPATIENT
Start: 2017-10-11 | End: 2017-10-11

## 2017-10-11 RX ORDER — LABETALOL HYDROCHLORIDE 5 MG/ML
20 INJECTION, SOLUTION INTRAVENOUS
Status: DISCONTINUED | OUTPATIENT
Start: 2017-10-11 | End: 2017-10-13 | Stop reason: HOSPADM

## 2017-10-11 RX ORDER — PROMETHAZINE HYDROCHLORIDE 25 MG/ML
12.5 INJECTION, SOLUTION INTRAMUSCULAR; INTRAVENOUS EVERY 6 HOURS PRN
Status: DISCONTINUED | OUTPATIENT
Start: 2017-10-11 | End: 2017-10-13 | Stop reason: HOSPADM

## 2017-10-11 RX ORDER — NITROGLYCERIN 20 MG/100ML
10-50 INJECTION INTRAVENOUS
Status: DISCONTINUED | OUTPATIENT
Start: 2017-10-11 | End: 2017-10-11

## 2017-10-11 RX ORDER — LABETALOL HYDROCHLORIDE 5 MG/ML
20 INJECTION, SOLUTION INTRAVENOUS EVERY 6 HOURS
Status: DISCONTINUED | OUTPATIENT
Start: 2017-10-11 | End: 2017-10-11

## 2017-10-11 RX ORDER — ONDANSETRON 4 MG/1
4 TABLET, ORALLY DISINTEGRATING ORAL EVERY 6 HOURS PRN
Status: DISCONTINUED | OUTPATIENT
Start: 2017-10-11 | End: 2017-10-13 | Stop reason: HOSPADM

## 2017-10-11 RX ADMIN — FENTANYL CITRATE 50 MCG: 50 INJECTION, SOLUTION INTRAMUSCULAR; INTRAVENOUS at 09:12

## 2017-10-11 RX ADMIN — TAZOBACTAM SODIUM AND PIPERACILLIN SODIUM 3.38 G: 375; 3 INJECTION, SOLUTION INTRAVENOUS at 13:23

## 2017-10-11 RX ADMIN — FAMOTIDINE 10 MG: 10 INJECTION, SOLUTION INTRAVENOUS at 20:34

## 2017-10-11 RX ADMIN — TAZOBACTAM SODIUM AND PIPERACILLIN SODIUM 2.25 G: 250; 2 INJECTION, SOLUTION INTRAVENOUS at 21:31

## 2017-10-11 RX ADMIN — HYDROMORPHONE HYDROCHLORIDE 0.5 MG: 1 INJECTION, SOLUTION INTRAMUSCULAR; INTRAVENOUS; SUBCUTANEOUS at 23:50

## 2017-10-11 RX ADMIN — FENTANYL CITRATE 50 MCG: 50 INJECTION, SOLUTION INTRAMUSCULAR; INTRAVENOUS at 13:55

## 2017-10-11 RX ADMIN — HYDROMORPHONE HYDROCHLORIDE 0.5 MG: 1 INJECTION, SOLUTION INTRAMUSCULAR; INTRAVENOUS; SUBCUTANEOUS at 19:41

## 2017-10-11 RX ADMIN — CLONIDINE 1 PATCH: 0.3 PATCH, EXTENDED RELEASE TRANSDERMAL at 22:17

## 2017-10-11 RX ADMIN — VANCOMYCIN HYDROCHLORIDE 1000 MG: 1 INJECTION, POWDER, LYOPHILIZED, FOR SOLUTION INTRAVENOUS at 20:24

## 2017-10-11 RX ADMIN — ONDANSETRON 4 MG: 2 INJECTION INTRAMUSCULAR; INTRAVENOUS at 13:22

## 2017-10-11 RX ADMIN — HYDRALAZINE HYDROCHLORIDE 20 MG: 20 INJECTION INTRAMUSCULAR; INTRAVENOUS at 09:12

## 2017-10-11 RX ADMIN — VANCOMYCIN HYDROCHLORIDE 1500 MG: 500 INJECTION, POWDER, LYOPHILIZED, FOR SOLUTION INTRAVENOUS at 13:25

## 2017-10-11 RX ADMIN — HYDRALAZINE HYDROCHLORIDE 20 MG: 20 INJECTION INTRAMUSCULAR; INTRAVENOUS at 17:31

## 2017-10-11 RX ADMIN — LABETALOL HYDROCHLORIDE 20 MG: 5 INJECTION, SOLUTION INTRAVENOUS at 22:05

## 2017-10-11 RX ADMIN — HEPARIN SODIUM 5000 UNITS: 5000 INJECTION, SOLUTION INTRAVENOUS; SUBCUTANEOUS at 20:34

## 2017-10-11 RX ADMIN — ONDANSETRON 4 MG: 2 INJECTION INTRAMUSCULAR; INTRAVENOUS at 09:12

## 2017-10-11 RX ADMIN — CALCIUM GLUCONATE 1 G: 94 INJECTION, SOLUTION INTRAVENOUS at 11:58

## 2017-10-11 RX ADMIN — DIPHENHYDRAMINE HYDROCHLORIDE 25 MG: 50 INJECTION, SOLUTION INTRAMUSCULAR; INTRAVENOUS at 22:05

## 2017-10-11 RX ADMIN — HYDRALAZINE HYDROCHLORIDE 20 MG: 20 INJECTION INTRAMUSCULAR; INTRAVENOUS at 13:22

## 2017-10-11 RX ADMIN — LABETALOL HYDROCHLORIDE 20 MG: 5 INJECTION, SOLUTION INTRAVENOUS at 18:36

## 2017-10-11 RX ADMIN — HALOPERIDOL LACTATE 2 MG: 5 INJECTION, SOLUTION INTRAMUSCULAR at 10:58

## 2017-10-11 RX ADMIN — ONDANSETRON 4 MG: 2 INJECTION INTRAMUSCULAR; INTRAVENOUS at 23:56

## 2017-10-11 RX ADMIN — Medication 10 ML: at 18:38

## 2017-10-11 RX ADMIN — HYDRALAZINE HYDROCHLORIDE 20 MG: 20 INJECTION INTRAMUSCULAR; INTRAVENOUS at 23:30

## 2017-10-11 RX ADMIN — SODIUM CHLORIDE 500 ML: 9 INJECTION, SOLUTION INTRAVENOUS at 09:21

## 2017-10-11 RX ADMIN — LEVETIRACETAM 500 MG: 5 INJECTION INTRAVENOUS at 20:34

## 2017-10-11 RX ADMIN — DIPHENHYDRAMINE HYDROCHLORIDE 25 MG: 50 INJECTION, SOLUTION INTRAMUSCULAR; INTRAVENOUS at 10:58

## 2017-10-11 RX ADMIN — PROMETHAZINE HYDROCHLORIDE 25 MG: 25 INJECTION INTRAMUSCULAR; INTRAVENOUS at 10:03

## 2017-10-11 RX ADMIN — AZITHROMYCIN 500 MG: 500 INJECTION, POWDER, LYOPHILIZED, FOR SOLUTION INTRAVENOUS at 14:56

## 2017-10-11 RX ADMIN — NITROGLYCERIN 10 MCG/MIN: 20 INJECTION INTRAVENOUS at 15:43

## 2017-10-11 RX ADMIN — HYDROMORPHONE HYDROCHLORIDE 0.5 MG: 1 INJECTION, SOLUTION INTRAMUSCULAR; INTRAVENOUS; SUBCUTANEOUS at 17:31

## 2017-10-12 ENCOUNTER — EPISODE CHANGES (OUTPATIENT)
Dept: CASE MANAGEMENT | Facility: OTHER | Age: 27
End: 2017-10-12

## 2017-10-12 LAB
ANION GAP SERPL CALCULATED.3IONS-SCNC: 17.3 MMOL/L
BASOPHILS # BLD AUTO: 0.01 10*3/MM3 (ref 0–0.2)
BASOPHILS NFR BLD AUTO: 0.2 % (ref 0–2.5)
BUN BLD-MCNC: 15 MG/DL (ref 7–20)
BUN/CREAT SERPL: 3.1 (ref 7.1–23.5)
CALCIUM SPEC-SCNC: 9.3 MG/DL (ref 8.4–10.2)
CHLORIDE SERPL-SCNC: 103 MMOL/L (ref 98–107)
CO2 SERPL-SCNC: 24 MMOL/L (ref 26–30)
CREAT BLD-MCNC: 4.8 MG/DL (ref 0.6–1.3)
DEPRECATED RDW RBC AUTO: 54.8 FL (ref 37–54)
EOSINOPHIL # BLD AUTO: 0.13 10*3/MM3 (ref 0–0.7)
EOSINOPHIL NFR BLD AUTO: 3.1 % (ref 0–7)
ERYTHROCYTE [DISTWIDTH] IN BLOOD BY AUTOMATED COUNT: 15.6 % (ref 11.5–14.5)
GFR SERPL CREATININE-BSD FRML MDRD: 11 ML/MIN/1.73
GLUCOSE BLD-MCNC: 114 MG/DL (ref 74–98)
HCT VFR BLD AUTO: 32.1 % (ref 37–47)
HGB BLD-MCNC: 10 G/DL (ref 12–16)
IMM GRANULOCYTES # BLD: 0.05 10*3/MM3 (ref 0–0.06)
IMM GRANULOCYTES NFR BLD: 1.2 % (ref 0–0.6)
LYMPHOCYTES # BLD AUTO: 0.6 10*3/MM3 (ref 0.6–3.4)
LYMPHOCYTES NFR BLD AUTO: 14.5 % (ref 10–50)
MCH RBC QN AUTO: 29.8 PG (ref 27–31)
MCHC RBC AUTO-ENTMCNC: 31.2 G/DL (ref 30–37)
MCV RBC AUTO: 95.5 FL (ref 81–99)
MONOCYTES # BLD AUTO: 0.4 10*3/MM3 (ref 0–0.9)
MONOCYTES NFR BLD AUTO: 9.7 % (ref 0–12)
NEUTROPHILS # BLD AUTO: 2.94 10*3/MM3 (ref 2–6.9)
NEUTROPHILS NFR BLD AUTO: 71.3 % (ref 37–80)
NRBC BLD MANUAL-RTO: 0 /100 WBC (ref 0–0)
PLATELET # BLD AUTO: 95 10*3/MM3 (ref 130–400)
PMV BLD AUTO: 11.8 FL (ref 6–12)
POTASSIUM BLD-SCNC: 4.3 MMOL/L (ref 3.5–5.1)
RBC # BLD AUTO: 3.36 10*6/MM3 (ref 4.2–5.4)
SODIUM BLD-SCNC: 140 MMOL/L (ref 137–145)
VANCOMYCIN SERPL-MCNC: 24.03 MCG/ML (ref 5–40)
WBC NRBC COR # BLD: 4.13 10*3/MM3 (ref 4.8–10.8)

## 2017-10-12 PROCEDURE — 25010000002 PIPERACILLIN SOD-TAZOBACTAM PER 1 G: Performed by: INTERNAL MEDICINE

## 2017-10-12 PROCEDURE — 5A1D70Z PERFORMANCE OF URINARY FILTRATION, INTERMITTENT, LESS THAN 6 HOURS PER DAY: ICD-10-PCS | Performed by: INTERNAL MEDICINE

## 2017-10-12 PROCEDURE — 85025 COMPLETE CBC W/AUTO DIFF WBC: CPT | Performed by: INTERNAL MEDICINE

## 2017-10-12 PROCEDURE — 80048 BASIC METABOLIC PNL TOTAL CA: CPT | Performed by: INTERNAL MEDICINE

## 2017-10-12 PROCEDURE — 80202 ASSAY OF VANCOMYCIN: CPT | Performed by: INTERNAL MEDICINE

## 2017-10-12 PROCEDURE — 99233 SBSQ HOSP IP/OBS HIGH 50: CPT | Performed by: INTERNAL MEDICINE

## 2017-10-12 PROCEDURE — 25010000002 DIPHENHYDRAMINE PER 50 MG: Performed by: FAMILY MEDICINE

## 2017-10-12 PROCEDURE — 25010000002 HEPARIN (PORCINE) PER 1000 UNITS: Performed by: INTERNAL MEDICINE

## 2017-10-12 PROCEDURE — 25010000002 HYDROMORPHONE PER 4 MG: Performed by: INTERNAL MEDICINE

## 2017-10-12 PROCEDURE — 25010000002 HYDRALAZINE PER 20 MG: Performed by: INTERNAL MEDICINE

## 2017-10-12 RX ORDER — CALCIUM ACETATE 667 MG/1
667 CAPSULE ORAL
Status: DISCONTINUED | OUTPATIENT
Start: 2017-10-12 | End: 2017-10-13 | Stop reason: HOSPADM

## 2017-10-12 RX ORDER — NEBIVOLOL 10 MG/1
20 TABLET ORAL
Status: DISCONTINUED | OUTPATIENT
Start: 2017-10-12 | End: 2017-10-13 | Stop reason: HOSPADM

## 2017-10-12 RX ORDER — CALCIUM ACETATE 667 MG/1
667 TABLET ORAL
Status: DISCONTINUED | OUTPATIENT
Start: 2017-10-12 | End: 2017-10-12 | Stop reason: CLARIF

## 2017-10-12 RX ORDER — DIPHENHYDRAMINE HYDROCHLORIDE 50 MG/ML
25 INJECTION INTRAMUSCULAR; INTRAVENOUS EVERY 6 HOURS PRN
Status: DISCONTINUED | OUTPATIENT
Start: 2017-10-12 | End: 2017-10-13 | Stop reason: HOSPADM

## 2017-10-12 RX ORDER — HYDRALAZINE HYDROCHLORIDE 25 MG/1
100 TABLET, FILM COATED ORAL EVERY 8 HOURS SCHEDULED
Status: DISCONTINUED | OUTPATIENT
Start: 2017-10-12 | End: 2017-10-13 | Stop reason: HOSPADM

## 2017-10-12 RX ORDER — CALCITRIOL 0.25 UG/1
0.5 CAPSULE, LIQUID FILLED ORAL DAILY
Status: DISCONTINUED | OUTPATIENT
Start: 2017-10-12 | End: 2017-10-13 | Stop reason: HOSPADM

## 2017-10-12 RX ORDER — HYDRALAZINE HYDROCHLORIDE 20 MG/ML
40 INJECTION INTRAMUSCULAR; INTRAVENOUS EVERY 6 HOURS
Status: DISCONTINUED | OUTPATIENT
Start: 2017-10-12 | End: 2017-10-12 | Stop reason: SDUPTHER

## 2017-10-12 RX ORDER — LEVETIRACETAM 500 MG/1
500 TABLET ORAL NIGHTLY
Status: DISCONTINUED | OUTPATIENT
Start: 2017-10-12 | End: 2017-10-13 | Stop reason: HOSPADM

## 2017-10-12 RX ORDER — CLONAZEPAM 0.5 MG/1
0.5 TABLET ORAL DAILY PRN
Status: DISCONTINUED | OUTPATIENT
Start: 2017-10-12 | End: 2017-10-13

## 2017-10-12 RX ORDER — HYDROXYCHLOROQUINE SULFATE 200 MG/1
200 TABLET, FILM COATED ORAL EVERY 12 HOURS SCHEDULED
Status: DISCONTINUED | OUTPATIENT
Start: 2017-10-12 | End: 2017-10-13 | Stop reason: HOSPADM

## 2017-10-12 RX ORDER — LOSARTAN POTASSIUM 50 MG/1
100 TABLET ORAL NIGHTLY
Status: DISCONTINUED | OUTPATIENT
Start: 2017-10-12 | End: 2017-10-13 | Stop reason: HOSPADM

## 2017-10-12 RX ADMIN — LOSARTAN POTASSIUM 100 MG: 50 TABLET, FILM COATED ORAL at 21:22

## 2017-10-12 RX ADMIN — SODIUM CHLORIDE 7.5 MG/HR: 9 INJECTION, SOLUTION INTRAVENOUS at 22:22

## 2017-10-12 RX ADMIN — SODIUM CHLORIDE 15 MG/HR: 9 INJECTION, SOLUTION INTRAVENOUS at 19:28

## 2017-10-12 RX ADMIN — SODIUM CHLORIDE 12.5 MG/HR: 9 INJECTION, SOLUTION INTRAVENOUS at 13:02

## 2017-10-12 RX ADMIN — HYDROMORPHONE HYDROCHLORIDE 0.5 MG: 1 INJECTION, SOLUTION INTRAMUSCULAR; INTRAVENOUS; SUBCUTANEOUS at 21:35

## 2017-10-12 RX ADMIN — LABETALOL HYDROCHLORIDE 20 MG: 5 INJECTION, SOLUTION INTRAVENOUS at 03:09

## 2017-10-12 RX ADMIN — TAZOBACTAM SODIUM AND PIPERACILLIN SODIUM 2.25 G: 250; 2 INJECTION, SOLUTION INTRAVENOUS at 21:23

## 2017-10-12 RX ADMIN — HYDROXYCHLOROQUINE SULFATE 200 MG: 200 TABLET, FILM COATED ORAL at 11:29

## 2017-10-12 RX ADMIN — HYDROMORPHONE HYDROCHLORIDE 0.5 MG: 1 INJECTION, SOLUTION INTRAMUSCULAR; INTRAVENOUS; SUBCUTANEOUS at 05:27

## 2017-10-12 RX ADMIN — CALCIUM ACETATE 667 MG: 667 CAPSULE ORAL at 18:54

## 2017-10-12 RX ADMIN — SODIUM CHLORIDE 5 MG/HR: 9 INJECTION, SOLUTION INTRAVENOUS at 09:36

## 2017-10-12 RX ADMIN — HEPARIN SODIUM 5000 UNITS: 5000 INJECTION, SOLUTION INTRAVENOUS; SUBCUTANEOUS at 09:57

## 2017-10-12 RX ADMIN — HYDRALAZINE HYDROCHLORIDE 20 MG: 20 INJECTION INTRAMUSCULAR; INTRAVENOUS at 05:28

## 2017-10-12 RX ADMIN — CALCIUM ACETATE 667 MG: 667 TABLET ORAL at 13:22

## 2017-10-12 RX ADMIN — HYDRALAZINE HYDROCHLORIDE 100 MG: 25 TABLET ORAL at 21:22

## 2017-10-12 RX ADMIN — CLONAZEPAM 0.5 MG: 0.5 TABLET ORAL at 11:23

## 2017-10-12 RX ADMIN — SODIUM CHLORIDE 15 MG/HR: 9 INJECTION, SOLUTION INTRAVENOUS at 17:20

## 2017-10-12 RX ADMIN — NEBIVOLOL HYDROCHLORIDE 20 MG: 10 TABLET ORAL at 11:29

## 2017-10-12 RX ADMIN — HYDROXYCHLOROQUINE SULFATE 200 MG: 200 TABLET, FILM COATED ORAL at 21:22

## 2017-10-12 RX ADMIN — DIPHENHYDRAMINE HYDROCHLORIDE 25 MG: 50 INJECTION, SOLUTION INTRAMUSCULAR; INTRAVENOUS at 11:22

## 2017-10-12 RX ADMIN — HYDRALAZINE HYDROCHLORIDE 100 MG: 25 TABLET ORAL at 13:22

## 2017-10-12 RX ADMIN — LEVETIRACETAM 500 MG: 500 TABLET, FILM COATED ORAL at 21:23

## 2017-10-12 RX ADMIN — FAMOTIDINE 10 MG: 10 INJECTION, SOLUTION INTRAVENOUS at 09:58

## 2017-10-12 RX ADMIN — TAZOBACTAM SODIUM AND PIPERACILLIN SODIUM 2.25 G: 250; 2 INJECTION, SOLUTION INTRAVENOUS at 13:03

## 2017-10-12 RX ADMIN — TAZOBACTAM SODIUM AND PIPERACILLIN SODIUM 2.25 G: 250; 2 INJECTION, SOLUTION INTRAVENOUS at 05:28

## 2017-10-12 RX ADMIN — SODIUM CHLORIDE 15 MG/HR: 9 INJECTION, SOLUTION INTRAVENOUS at 15:24

## 2017-10-12 RX ADMIN — HYDROMORPHONE HYDROCHLORIDE 0.5 MG: 1 INJECTION, SOLUTION INTRAMUSCULAR; INTRAVENOUS; SUBCUTANEOUS at 09:11

## 2017-10-12 RX ADMIN — CALCITRIOL 0.5 MCG: 0.25 CAPSULE ORAL at 11:28

## 2017-10-12 RX ADMIN — FAMOTIDINE 10 MG: 10 INJECTION, SOLUTION INTRAVENOUS at 21:21

## 2017-10-13 VITALS
RESPIRATION RATE: 20 BRPM | TEMPERATURE: 98.6 F | WEIGHT: 161.2 LBS | HEIGHT: 67 IN | SYSTOLIC BLOOD PRESSURE: 167 MMHG | DIASTOLIC BLOOD PRESSURE: 109 MMHG | HEART RATE: 82 BPM | BODY MASS INDEX: 25.3 KG/M2 | OXYGEN SATURATION: 98 %

## 2017-10-13 LAB
BACTERIA SPEC AEROBE CULT: NORMAL
C DIFF TOX A+B STL QL IA: NEGATIVE

## 2017-10-13 PROCEDURE — 99238 HOSP IP/OBS DSCHRG MGMT 30/<: CPT | Performed by: INTERNAL MEDICINE

## 2017-10-13 PROCEDURE — 87324 CLOSTRIDIUM AG IA: CPT | Performed by: FAMILY MEDICINE

## 2017-10-13 PROCEDURE — 25010000002 DIPHENHYDRAMINE PER 50 MG: Performed by: FAMILY MEDICINE

## 2017-10-13 PROCEDURE — 25010000002 PIPERACILLIN SOD-TAZOBACTAM PER 1 G: Performed by: INTERNAL MEDICINE

## 2017-10-13 PROCEDURE — 25010000002 HYDROMORPHONE PER 4 MG: Performed by: INTERNAL MEDICINE

## 2017-10-13 PROCEDURE — 25010000002 PROMETHAZINE PER 50 MG: Performed by: INTERNAL MEDICINE

## 2017-10-13 RX ORDER — FOLIC ACID/VIT B COMPLEX AND C 0.8 MG
1 TABLET ORAL DAILY
Status: DISCONTINUED | OUTPATIENT
Start: 2017-10-13 | End: 2017-10-13 | Stop reason: HOSPADM

## 2017-10-13 RX ORDER — TERAZOSIN 5 MG/1
5 CAPSULE ORAL 2 TIMES DAILY
Status: DISCONTINUED | OUTPATIENT
Start: 2017-10-13 | End: 2017-10-13

## 2017-10-13 RX ORDER — AMLODIPINE BESYLATE 5 MG/1
10 TABLET ORAL
Status: DISCONTINUED | OUTPATIENT
Start: 2017-10-13 | End: 2017-10-13 | Stop reason: HOSPADM

## 2017-10-13 RX ORDER — AMOXICILLIN AND CLAVULANATE POTASSIUM 500; 125 MG/1; MG/1
1 TABLET, FILM COATED ORAL DAILY
Qty: 7 TABLET | Refills: 0 | Status: SHIPPED | OUTPATIENT
Start: 2017-10-13 | End: 2017-10-31 | Stop reason: HOSPADM

## 2017-10-13 RX ORDER — SODIUM CHLORIDE 9 MG/ML
INJECTION, SOLUTION INTRAVENOUS
Status: COMPLETED
Start: 2017-10-13 | End: 2017-10-13

## 2017-10-13 RX ORDER — DOXAZOSIN 8 MG/1
8 TABLET ORAL NIGHTLY
Qty: 30 TABLET | Refills: 0 | Status: ON HOLD | OUTPATIENT
Start: 2017-10-13 | End: 2017-10-31

## 2017-10-13 RX ORDER — TERAZOSIN 5 MG/1
10 CAPSULE ORAL NIGHTLY
Status: DISCONTINUED | OUTPATIENT
Start: 2017-10-13 | End: 2017-10-13

## 2017-10-13 RX ORDER — TERAZOSIN 5 MG/1
10 CAPSULE ORAL ONCE
Status: COMPLETED | OUTPATIENT
Start: 2017-10-13 | End: 2017-10-13

## 2017-10-13 RX ORDER — CLONAZEPAM 0.5 MG/1
0.5 TABLET ORAL NIGHTLY
Status: DISCONTINUED | OUTPATIENT
Start: 2017-10-13 | End: 2017-10-13 | Stop reason: HOSPADM

## 2017-10-13 RX ORDER — FOLIC ACID/VIT B COMPLEX AND C 0.8 MG
1 TABLET ORAL DAILY
Qty: 30 TABLET | Refills: 0 | Status: SHIPPED | OUTPATIENT
Start: 2017-10-14 | End: 2020-06-03

## 2017-10-13 RX ORDER — HYDROCODONE BITARTRATE AND ACETAMINOPHEN 5; 325 MG/1; MG/1
1 TABLET ORAL EVERY 6 HOURS PRN
Qty: 20 TABLET | Refills: 0 | Status: SHIPPED | OUTPATIENT
Start: 2017-10-13 | End: 2018-01-24

## 2017-10-13 RX ORDER — TERAZOSIN 5 MG/1
10 CAPSULE ORAL NIGHTLY
Status: DISCONTINUED | OUTPATIENT
Start: 2017-10-14 | End: 2017-10-13 | Stop reason: HOSPADM

## 2017-10-13 RX ADMIN — HYDRALAZINE HYDROCHLORIDE 100 MG: 25 TABLET ORAL at 14:30

## 2017-10-13 RX ADMIN — SODIUM CHLORIDE 7.5 MG/HR: 9 INJECTION, SOLUTION INTRAVENOUS at 01:44

## 2017-10-13 RX ADMIN — ACETAMINOPHEN 650 MG: 325 TABLET, FILM COATED ORAL at 05:58

## 2017-10-13 RX ADMIN — TAZOBACTAM SODIUM AND PIPERACILLIN SODIUM 2.25 G: 250; 2 INJECTION, SOLUTION INTRAVENOUS at 14:45

## 2017-10-13 RX ADMIN — TERAZOSIN HYDROCHLORIDE 5 MG: 5 CAPSULE ORAL at 15:24

## 2017-10-13 RX ADMIN — PROMETHAZINE HYDROCHLORIDE 12.5 MG: 25 INJECTION INTRAMUSCULAR; INTRAVENOUS at 00:10

## 2017-10-13 RX ADMIN — HYDRALAZINE HYDROCHLORIDE 100 MG: 25 TABLET ORAL at 06:14

## 2017-10-13 RX ADMIN — LABETALOL HYDROCHLORIDE 20 MG: 5 INJECTION, SOLUTION INTRAVENOUS at 05:55

## 2017-10-13 RX ADMIN — TERAZOSIN HYDROCHLORIDE ANHYDROUS 5 MG: 5 CAPSULE ORAL at 15:52

## 2017-10-13 RX ADMIN — CALCIUM ACETATE 667 MG: 667 CAPSULE ORAL at 10:32

## 2017-10-13 RX ADMIN — HYDROMORPHONE HYDROCHLORIDE 0.5 MG: 1 INJECTION, SOLUTION INTRAMUSCULAR; INTRAVENOUS; SUBCUTANEOUS at 06:13

## 2017-10-13 RX ADMIN — CLONAZEPAM 0.5 MG: 0.5 TABLET ORAL at 05:40

## 2017-10-13 RX ADMIN — Medication 1 TABLET: at 10:31

## 2017-10-13 RX ADMIN — FAMOTIDINE 10 MG: 10 INJECTION, SOLUTION INTRAVENOUS at 10:33

## 2017-10-13 RX ADMIN — SODIUM CHLORIDE 25 ML: 900 INJECTION, SOLUTION INTRAVENOUS at 05:59

## 2017-10-13 RX ADMIN — AMLODIPINE BESYLATE 10 MG: 5 TABLET ORAL at 10:30

## 2017-10-13 RX ADMIN — SODIUM CHLORIDE: 9 INJECTION, SOLUTION INTRAVENOUS at 00:06

## 2017-10-13 RX ADMIN — DIPHENHYDRAMINE HYDROCHLORIDE 25 MG: 50 INJECTION, SOLUTION INTRAMUSCULAR; INTRAVENOUS at 06:32

## 2017-10-13 RX ADMIN — HYDROXYCHLOROQUINE SULFATE 200 MG: 200 TABLET, FILM COATED ORAL at 10:32

## 2017-10-13 RX ADMIN — TAZOBACTAM SODIUM AND PIPERACILLIN SODIUM 2.25 G: 250; 2 INJECTION, SOLUTION INTRAVENOUS at 06:37

## 2017-10-13 RX ADMIN — HYDROMORPHONE HYDROCHLORIDE 0.5 MG: 1 INJECTION, SOLUTION INTRAMUSCULAR; INTRAVENOUS; SUBCUTANEOUS at 15:10

## 2017-10-13 RX ADMIN — CALCIUM ACETATE 667 MG: 667 CAPSULE ORAL at 12:00

## 2017-10-13 RX ADMIN — NEBIVOLOL HYDROCHLORIDE 20 MG: 10 TABLET ORAL at 10:31

## 2017-10-14 LAB
ACINETOBACTER SCREEN CX: NORMAL
MRSA SPEC QL CULT: NORMAL

## 2017-10-16 ENCOUNTER — HOSPITAL ENCOUNTER (EMERGENCY)
Facility: HOSPITAL | Age: 27
Discharge: HOME | End: 2017-10-16
Attending: EMERGENCY MEDICINE | Admitting: EMERGENCY MEDICINE

## 2017-10-16 ENCOUNTER — APPOINTMENT (OUTPATIENT)
Dept: CT IMAGING | Facility: HOSPITAL | Age: 27
End: 2017-10-16

## 2017-10-16 VITALS
TEMPERATURE: 97.8 F | HEIGHT: 67 IN | BODY MASS INDEX: 25.11 KG/M2 | DIASTOLIC BLOOD PRESSURE: 119 MMHG | HEART RATE: 75 BPM | WEIGHT: 160 LBS | RESPIRATION RATE: 18 BRPM | SYSTOLIC BLOOD PRESSURE: 178 MMHG | OXYGEN SATURATION: 97 %

## 2017-10-16 DIAGNOSIS — R10.84 GENERALIZED ABDOMINAL PAIN: Primary | ICD-10-CM

## 2017-10-16 DIAGNOSIS — N18.6 ESRD (END STAGE RENAL DISEASE) (HCC): ICD-10-CM

## 2017-10-16 DIAGNOSIS — R11.0 NAUSEA: ICD-10-CM

## 2017-10-16 LAB
ALBUMIN SERPL-MCNC: 3.7 G/DL (ref 3.5–5)
ALBUMIN/GLOB SERPL: 1.4 G/DL (ref 1–2)
ALP SERPL-CCNC: 50 U/L (ref 38–126)
ALT SERPL W P-5'-P-CCNC: 29 U/L (ref 13–69)
ANION GAP SERPL CALCULATED.3IONS-SCNC: 16.9 MMOL/L
AST SERPL-CCNC: 33 U/L (ref 15–46)
B-HCG UR QL: NEGATIVE
BACTERIA SPEC AEROBE CULT: NORMAL
BACTERIA SPEC AEROBE CULT: NORMAL
BACTERIA UR QL AUTO: ABNORMAL /HPF
BASOPHILS # BLD AUTO: 0.02 10*3/MM3 (ref 0–0.2)
BASOPHILS NFR BLD AUTO: 0.3 % (ref 0–2.5)
BILIRUB SERPL-MCNC: 0.7 MG/DL (ref 0.2–1.3)
BILIRUB UR QL STRIP: NEGATIVE
BUN BLD-MCNC: 22 MG/DL (ref 7–20)
BUN/CREAT SERPL: 2.3 (ref 7.1–23.5)
CALCIUM SPEC-SCNC: 9.6 MG/DL (ref 8.4–10.2)
CHLORIDE SERPL-SCNC: 99 MMOL/L (ref 98–107)
CLARITY UR: CLEAR
CO2 SERPL-SCNC: 24 MMOL/L (ref 26–30)
COLOR UR: YELLOW
CREAT BLD-MCNC: 9.7 MG/DL (ref 0.6–1.3)
D-LACTATE SERPL-SCNC: 0.7 MMOL/L (ref 0.5–2)
DEPRECATED RDW RBC AUTO: 56.9 FL (ref 37–54)
EOSINOPHIL # BLD AUTO: 1.25 10*3/MM3 (ref 0–0.7)
EOSINOPHIL NFR BLD AUTO: 18 % (ref 0–7)
ERYTHROCYTE [DISTWIDTH] IN BLOOD BY AUTOMATED COUNT: 16.6 % (ref 11.5–14.5)
GFR SERPL CREATININE-BSD FRML MDRD: 5 ML/MIN/1.73
GLOBULIN UR ELPH-MCNC: 2.6 GM/DL
GLUCOSE BLD-MCNC: 110 MG/DL (ref 74–98)
GLUCOSE UR STRIP-MCNC: ABNORMAL MG/DL
HCT VFR BLD AUTO: 36.6 % (ref 37–47)
HGB BLD-MCNC: 11.4 G/DL (ref 12–16)
HGB UR QL STRIP.AUTO: ABNORMAL
HYALINE CASTS UR QL AUTO: ABNORMAL /LPF
IMM GRANULOCYTES # BLD: 0.09 10*3/MM3 (ref 0–0.06)
IMM GRANULOCYTES NFR BLD: 1.3 % (ref 0–0.6)
KETONES UR QL STRIP: NEGATIVE
LEUKOCYTE ESTERASE UR QL STRIP.AUTO: NEGATIVE
LIPASE SERPL-CCNC: 143 U/L (ref 23–300)
LYMPHOCYTES # BLD AUTO: 0.7 10*3/MM3 (ref 0.6–3.4)
LYMPHOCYTES NFR BLD AUTO: 10.1 % (ref 10–50)
MCH RBC QN AUTO: 29.9 PG (ref 27–31)
MCHC RBC AUTO-ENTMCNC: 31.1 G/DL (ref 30–37)
MCV RBC AUTO: 96.1 FL (ref 81–99)
MONOCYTES # BLD AUTO: 0.35 10*3/MM3 (ref 0–0.9)
MONOCYTES NFR BLD AUTO: 5 % (ref 0–12)
NEUTROPHILS # BLD AUTO: 4.55 10*3/MM3 (ref 2–6.9)
NEUTROPHILS NFR BLD AUTO: 65.3 % (ref 37–80)
NITRITE UR QL STRIP: NEGATIVE
NRBC BLD MANUAL-RTO: 0 /100 WBC (ref 0–0)
PH UR STRIP.AUTO: 8.5 [PH] (ref 5–8)
PLATELET # BLD AUTO: 165 10*3/MM3 (ref 130–400)
PMV BLD AUTO: 10.8 FL (ref 6–12)
POTASSIUM BLD-SCNC: 3.9 MMOL/L (ref 3.5–5.1)
PROT SERPL-MCNC: 6.3 G/DL (ref 6.3–8.2)
PROT UR QL STRIP: ABNORMAL
RBC # BLD AUTO: 3.81 10*6/MM3 (ref 4.2–5.4)
RBC # UR: ABNORMAL /HPF
REF LAB TEST METHOD: ABNORMAL
SODIUM BLD-SCNC: 136 MMOL/L (ref 137–145)
SP GR UR STRIP: 1.02 (ref 1–1.03)
SQUAMOUS #/AREA URNS HPF: ABNORMAL /HPF
UROBILINOGEN UR QL STRIP: ABNORMAL
WBC NRBC COR # BLD: 6.96 10*3/MM3 (ref 4.8–10.8)
WBC UR QL AUTO: ABNORMAL /HPF

## 2017-10-16 PROCEDURE — 74176 CT ABD & PELVIS W/O CONTRAST: CPT

## 2017-10-16 PROCEDURE — 83605 ASSAY OF LACTIC ACID: CPT | Performed by: EMERGENCY MEDICINE

## 2017-10-16 PROCEDURE — 85025 COMPLETE CBC W/AUTO DIFF WBC: CPT | Performed by: EMERGENCY MEDICINE

## 2017-10-16 PROCEDURE — 81025 URINE PREGNANCY TEST: CPT | Performed by: EMERGENCY MEDICINE

## 2017-10-16 PROCEDURE — 81001 URINALYSIS AUTO W/SCOPE: CPT | Performed by: EMERGENCY MEDICINE

## 2017-10-16 PROCEDURE — 83690 ASSAY OF LIPASE: CPT | Performed by: EMERGENCY MEDICINE

## 2017-10-16 PROCEDURE — 80053 COMPREHEN METABOLIC PANEL: CPT | Performed by: EMERGENCY MEDICINE

## 2017-10-16 PROCEDURE — 99283 EMERGENCY DEPT VISIT LOW MDM: CPT

## 2017-10-16 RX ORDER — ONDANSETRON 4 MG/1
4 TABLET, ORALLY DISINTEGRATING ORAL EVERY 6 HOURS PRN
Qty: 12 TABLET | Refills: 0 | Status: SHIPPED | OUTPATIENT
Start: 2017-10-16 | End: 2018-02-01 | Stop reason: HOSPADM

## 2017-10-16 RX ORDER — ONDANSETRON 4 MG/1
4 TABLET, ORALLY DISINTEGRATING ORAL ONCE
Status: COMPLETED | OUTPATIENT
Start: 2017-10-16 | End: 2017-10-16

## 2017-10-16 RX ORDER — DICYCLOMINE HYDROCHLORIDE 10 MG/1
20 CAPSULE ORAL ONCE
Status: COMPLETED | OUTPATIENT
Start: 2017-10-16 | End: 2017-10-16

## 2017-10-16 RX ORDER — SODIUM CHLORIDE 0.9 % (FLUSH) 0.9 %
10 SYRINGE (ML) INJECTION AS NEEDED
Status: DISCONTINUED | OUTPATIENT
Start: 2017-10-16 | End: 2017-10-16 | Stop reason: HOSPADM

## 2017-10-16 RX ADMIN — DICYCLOMINE HYDROCHLORIDE 20 MG: 10 CAPSULE ORAL at 21:51

## 2017-10-16 RX ADMIN — ONDANSETRON 4 MG: 4 TABLET, ORALLY DISINTEGRATING ORAL at 21:52

## 2017-10-16 RX ADMIN — ONDANSETRON 4 MG: 4 TABLET, ORALLY DISINTEGRATING ORAL at 20:19

## 2017-10-17 ENCOUNTER — EPISODE CHANGES (OUTPATIENT)
Dept: CASE MANAGEMENT | Facility: OTHER | Age: 27
End: 2017-10-17

## 2017-10-18 ENCOUNTER — OFFICE VISIT (OUTPATIENT)
Dept: FAMILY MEDICINE CLINIC | Facility: CLINIC | Age: 27
End: 2017-10-18

## 2017-10-18 VITALS
OXYGEN SATURATION: 98 % | HEART RATE: 73 BPM | TEMPERATURE: 97.7 F | WEIGHT: 162 LBS | HEIGHT: 67 IN | BODY MASS INDEX: 25.43 KG/M2 | DIASTOLIC BLOOD PRESSURE: 101 MMHG | SYSTOLIC BLOOD PRESSURE: 165 MMHG

## 2017-10-18 DIAGNOSIS — K52.9 COLITIS: Primary | ICD-10-CM

## 2017-10-18 DIAGNOSIS — K31.84 GASTROPARESIS: ICD-10-CM

## 2017-10-18 PROCEDURE — 99214 OFFICE O/P EST MOD 30 MIN: CPT | Performed by: INTERNAL MEDICINE

## 2017-10-18 RX ORDER — DICYCLOMINE HYDROCHLORIDE 10 MG/1
10 CAPSULE ORAL
Qty: 20 CAPSULE | Refills: 0 | Status: SHIPPED | OUTPATIENT
Start: 2017-10-18 | End: 2017-11-10 | Stop reason: HOSPADM

## 2017-10-18 RX ORDER — METOCLOPRAMIDE 5 MG/1
2.5 TABLET ORAL
Qty: 20 TABLET | Refills: 0 | Status: SHIPPED | OUTPATIENT
Start: 2017-10-18 | End: 2017-11-10 | Stop reason: HOSPADM

## 2017-10-24 ENCOUNTER — TELEPHONE (OUTPATIENT)
Dept: FAMILY MEDICINE CLINIC | Facility: CLINIC | Age: 27
End: 2017-10-24

## 2017-10-24 ENCOUNTER — OFFICE VISIT (OUTPATIENT)
Dept: FAMILY MEDICINE CLINIC | Facility: CLINIC | Age: 27
End: 2017-10-24

## 2017-10-24 VITALS
SYSTOLIC BLOOD PRESSURE: 180 MMHG | HEART RATE: 75 BPM | TEMPERATURE: 98.3 F | DIASTOLIC BLOOD PRESSURE: 106 MMHG | OXYGEN SATURATION: 98 % | HEIGHT: 67 IN

## 2017-10-24 DIAGNOSIS — K52.9 COLITIS: ICD-10-CM

## 2017-10-24 DIAGNOSIS — R10.84 GENERALIZED ABDOMINAL PAIN: Primary | ICD-10-CM

## 2017-10-24 PROCEDURE — 99214 OFFICE O/P EST MOD 30 MIN: CPT | Performed by: INTERNAL MEDICINE

## 2017-10-24 RX ORDER — CIPROFLOXACIN 500 MG/1
500 TABLET, FILM COATED ORAL DAILY
Qty: 7 TABLET | Refills: 0 | Status: SHIPPED | OUTPATIENT
Start: 2017-10-24 | End: 2017-10-31 | Stop reason: HOSPADM

## 2017-10-24 RX ORDER — METRONIDAZOLE 500 MG/1
500 TABLET ORAL 3 TIMES DAILY
Qty: 21 TABLET | Refills: 0 | Status: SHIPPED | OUTPATIENT
Start: 2017-10-24 | End: 2017-10-31 | Stop reason: HOSPADM

## 2017-10-29 ENCOUNTER — APPOINTMENT (OUTPATIENT)
Dept: CT IMAGING | Facility: HOSPITAL | Age: 27
End: 2017-10-29

## 2017-10-29 ENCOUNTER — HOSPITAL ENCOUNTER (INPATIENT)
Facility: HOSPITAL | Age: 27
LOS: 1 days | Discharge: HOME | End: 2017-10-31
Attending: EMERGENCY MEDICINE | Admitting: INTERNAL MEDICINE

## 2017-10-29 ENCOUNTER — APPOINTMENT (OUTPATIENT)
Dept: GENERAL RADIOLOGY | Facility: HOSPITAL | Age: 27
End: 2017-10-29

## 2017-10-29 DIAGNOSIS — I67.4 HYPERTENSIVE ENCEPHALOPATHY: Primary | ICD-10-CM

## 2017-10-29 LAB
ALBUMIN SERPL-MCNC: 3.9 G/DL (ref 3.5–5)
ALBUMIN/GLOB SERPL: 1.4 G/DL (ref 1–2)
ALP SERPL-CCNC: 62 U/L (ref 38–126)
ALT SERPL W P-5'-P-CCNC: 32 U/L (ref 13–69)
AMPHET+METHAMPHET UR QL: NEGATIVE
AMPHETAMINES UR QL: NEGATIVE
ANION GAP SERPL CALCULATED.3IONS-SCNC: 17 MMOL/L
AST SERPL-CCNC: 44 U/L (ref 15–46)
BARBITURATES UR QL SCN: NEGATIVE
BASOPHILS # BLD AUTO: 0.02 10*3/MM3 (ref 0–0.2)
BASOPHILS NFR BLD AUTO: 0.5 % (ref 0–2.5)
BENZODIAZ UR QL SCN: NEGATIVE
BILIRUB SERPL-MCNC: 0.7 MG/DL (ref 0.2–1.3)
BUN BLD-MCNC: 11 MG/DL (ref 7–20)
BUN/CREAT SERPL: 1.7 (ref 7.1–23.5)
BUPRENORPHINE SERPL-MCNC: NEGATIVE NG/ML
CALCIUM SPEC-SCNC: 10.7 MG/DL (ref 8.4–10.2)
CANNABINOIDS SERPL QL: NEGATIVE
CHLORIDE SERPL-SCNC: 104 MMOL/L (ref 98–107)
CO2 SERPL-SCNC: 24 MMOL/L (ref 26–30)
COCAINE UR QL: NEGATIVE
CREAT BLD-MCNC: 6.6 MG/DL (ref 0.6–1.3)
D-LACTATE SERPL-SCNC: 0.8 MMOL/L (ref 0.5–2)
DEPRECATED RDW RBC AUTO: 52.6 FL (ref 37–54)
EOSINOPHIL # BLD AUTO: 0.68 10*3/MM3 (ref 0–0.7)
EOSINOPHIL NFR BLD AUTO: 17.1 % (ref 0–7)
ERYTHROCYTE [DISTWIDTH] IN BLOOD BY AUTOMATED COUNT: 15.6 % (ref 11.5–14.5)
GFR SERPL CREATININE-BSD FRML MDRD: 8 ML/MIN/1.73
GLOBULIN UR ELPH-MCNC: 2.7 GM/DL
GLUCOSE BLD-MCNC: 111 MG/DL (ref 74–98)
GLUCOSE BLDC GLUCOMTR-MCNC: 94 MG/DL (ref 70–130)
HCT VFR BLD AUTO: 36.6 % (ref 37–47)
HGB BLD-MCNC: 11.4 G/DL (ref 12–16)
HOLD SPECIMEN: NORMAL
HOLD SPECIMEN: NORMAL
IMM GRANULOCYTES # BLD: 0.05 10*3/MM3 (ref 0–0.06)
IMM GRANULOCYTES NFR BLD: 1.3 % (ref 0–0.6)
INR PPP: 1.01 (ref 0.9–1.1)
LYMPHOCYTES # BLD AUTO: 0.6 10*3/MM3 (ref 0.6–3.4)
LYMPHOCYTES NFR BLD AUTO: 15.1 % (ref 10–50)
MCH RBC QN AUTO: 29.3 PG (ref 27–31)
MCHC RBC AUTO-ENTMCNC: 31.1 G/DL (ref 30–37)
MCV RBC AUTO: 94.1 FL (ref 81–99)
METHADONE UR QL SCN: NEGATIVE
MONOCYTES # BLD AUTO: 0.23 10*3/MM3 (ref 0–0.9)
MONOCYTES NFR BLD AUTO: 5.8 % (ref 0–12)
NEUTROPHILS # BLD AUTO: 2.39 10*3/MM3 (ref 2–6.9)
NEUTROPHILS NFR BLD AUTO: 60.2 % (ref 37–80)
NRBC BLD MANUAL-RTO: 0 /100 WBC (ref 0–0)
NT-PROBNP SERPL-MCNC: ABNORMAL PG/ML (ref 0–125)
OPIATES UR QL: POSITIVE
OXYCODONE UR QL SCN: NEGATIVE
PCP UR QL SCN: NEGATIVE
PHOSPHATE SERPL-MCNC: 2.7 MG/DL (ref 2.5–4.5)
PLATELET # BLD AUTO: 110 10*3/MM3 (ref 130–400)
PMV BLD AUTO: 11.3 FL (ref 6–12)
POTASSIUM BLD-SCNC: 4 MMOL/L (ref 3.5–5.1)
PROPOXYPH UR QL: NEGATIVE
PROT SERPL-MCNC: 6.6 G/DL (ref 6.3–8.2)
PROTHROMBIN TIME: 11.1 SECONDS (ref 9.3–12.1)
RBC # BLD AUTO: 3.89 10*6/MM3 (ref 4.2–5.4)
SODIUM BLD-SCNC: 141 MMOL/L (ref 137–145)
TRICYCLICS UR QL SCN: NEGATIVE
TROPONIN I SERPL-MCNC: 0.06 NG/ML (ref 0–0.03)
WBC NRBC COR # BLD: 3.97 10*3/MM3 (ref 4.8–10.8)
WHOLE BLOOD HOLD SPECIMEN: NORMAL
WHOLE BLOOD HOLD SPECIMEN: NORMAL

## 2017-10-29 PROCEDURE — 99284 EMERGENCY DEPT VISIT MOD MDM: CPT

## 2017-10-29 PROCEDURE — 96375 TX/PRO/DX INJ NEW DRUG ADDON: CPT

## 2017-10-29 PROCEDURE — 25010000002 HEPARIN (PORCINE) PER 1000 UNITS: Performed by: FAMILY MEDICINE

## 2017-10-29 PROCEDURE — 93005 ELECTROCARDIOGRAM TRACING: CPT | Performed by: EMERGENCY MEDICINE

## 2017-10-29 PROCEDURE — 87081 CULTURE SCREEN ONLY: CPT | Performed by: FAMILY MEDICINE

## 2017-10-29 PROCEDURE — 25010000002 HYDROMORPHONE PER 4 MG: Performed by: FAMILY MEDICINE

## 2017-10-29 PROCEDURE — 25010000002 DIPHENHYDRAMINE PER 50 MG: Performed by: FAMILY MEDICINE

## 2017-10-29 PROCEDURE — 84484 ASSAY OF TROPONIN QUANT: CPT | Performed by: EMERGENCY MEDICINE

## 2017-10-29 PROCEDURE — 25010000002 PROCHLORPERAZINE EDISYLATE PER 10 MG: Performed by: EMERGENCY MEDICINE

## 2017-10-29 PROCEDURE — 82962 GLUCOSE BLOOD TEST: CPT

## 2017-10-29 PROCEDURE — 87040 BLOOD CULTURE FOR BACTERIA: CPT | Performed by: FAMILY MEDICINE

## 2017-10-29 PROCEDURE — G0378 HOSPITAL OBSERVATION PER HR: HCPCS

## 2017-10-29 PROCEDURE — 96366 THER/PROPH/DIAG IV INF ADDON: CPT

## 2017-10-29 PROCEDURE — 96365 THER/PROPH/DIAG IV INF INIT: CPT

## 2017-10-29 PROCEDURE — 25010000002 CEFEPIME PER 500 MG: Performed by: FAMILY MEDICINE

## 2017-10-29 PROCEDURE — 99220 PR INITIAL OBSERVATION CARE/DAY 70 MINUTES: CPT | Performed by: FAMILY MEDICINE

## 2017-10-29 PROCEDURE — 70450 CT HEAD/BRAIN W/O DYE: CPT

## 2017-10-29 PROCEDURE — 25010000002 DIPHENHYDRAMINE PER 50 MG: Performed by: EMERGENCY MEDICINE

## 2017-10-29 PROCEDURE — 83880 ASSAY OF NATRIURETIC PEPTIDE: CPT | Performed by: EMERGENCY MEDICINE

## 2017-10-29 PROCEDURE — 85610 PROTHROMBIN TIME: CPT | Performed by: EMERGENCY MEDICINE

## 2017-10-29 PROCEDURE — 80306 DRUG TEST PRSMV INSTRMNT: CPT | Performed by: EMERGENCY MEDICINE

## 2017-10-29 PROCEDURE — 25010000002 METHYLPREDNISOLONE PER 40 MG: Performed by: INTERNAL MEDICINE

## 2017-10-29 PROCEDURE — 84100 ASSAY OF PHOSPHORUS: CPT | Performed by: EMERGENCY MEDICINE

## 2017-10-29 PROCEDURE — 96376 TX/PRO/DX INJ SAME DRUG ADON: CPT

## 2017-10-29 PROCEDURE — 25010000002 ONDANSETRON PER 1 MG: Performed by: EMERGENCY MEDICINE

## 2017-10-29 PROCEDURE — 25010000002 MORPHINE PER 10 MG: Performed by: EMERGENCY MEDICINE

## 2017-10-29 PROCEDURE — 71010 HC CHEST PA OR AP: CPT

## 2017-10-29 PROCEDURE — 80053 COMPREHEN METABOLIC PANEL: CPT | Performed by: EMERGENCY MEDICINE

## 2017-10-29 PROCEDURE — 83605 ASSAY OF LACTIC ACID: CPT | Performed by: FAMILY MEDICINE

## 2017-10-29 PROCEDURE — 85025 COMPLETE CBC W/AUTO DIFF WBC: CPT | Performed by: EMERGENCY MEDICINE

## 2017-10-29 RX ORDER — LABETALOL HYDROCHLORIDE 5 MG/ML
20 INJECTION, SOLUTION INTRAVENOUS ONCE
Status: COMPLETED | OUTPATIENT
Start: 2017-10-29 | End: 2017-10-29

## 2017-10-29 RX ORDER — PROMETHAZINE HYDROCHLORIDE 25 MG/1
12.5 SUPPOSITORY RECTAL EVERY 6 HOURS PRN
Status: DISCONTINUED | OUTPATIENT
Start: 2017-10-29 | End: 2017-10-31 | Stop reason: HOSPADM

## 2017-10-29 RX ORDER — SODIUM CHLORIDE 0.9 % (FLUSH) 0.9 %
1-10 SYRINGE (ML) INJECTION AS NEEDED
Status: DISCONTINUED | OUTPATIENT
Start: 2017-10-29 | End: 2017-10-31 | Stop reason: HOSPADM

## 2017-10-29 RX ORDER — SODIUM CHLORIDE 0.9 % (FLUSH) 0.9 %
10 SYRINGE (ML) INJECTION AS NEEDED
Status: DISCONTINUED | OUTPATIENT
Start: 2017-10-29 | End: 2017-10-31 | Stop reason: HOSPADM

## 2017-10-29 RX ORDER — CLONAZEPAM 0.5 MG/1
0.5 TABLET ORAL DAILY PRN
Status: DISCONTINUED | OUTPATIENT
Start: 2017-10-29 | End: 2017-10-31 | Stop reason: HOSPADM

## 2017-10-29 RX ORDER — ACETAMINOPHEN 325 MG/1
650 TABLET ORAL EVERY 4 HOURS PRN
Status: DISCONTINUED | OUTPATIENT
Start: 2017-10-29 | End: 2017-10-31 | Stop reason: HOSPADM

## 2017-10-29 RX ORDER — FOLIC ACID/VIT B COMPLEX AND C 0.8 MG
1 TABLET ORAL DAILY
Status: DISCONTINUED | OUTPATIENT
Start: 2017-10-29 | End: 2017-10-31 | Stop reason: HOSPADM

## 2017-10-29 RX ORDER — LABETALOL HYDROCHLORIDE 5 MG/ML
40 INJECTION, SOLUTION INTRAVENOUS ONCE
Status: COMPLETED | OUTPATIENT
Start: 2017-10-29 | End: 2017-10-29

## 2017-10-29 RX ORDER — HYDROCODONE BITARTRATE AND ACETAMINOPHEN 5; 325 MG/1; MG/1
1 TABLET ORAL EVERY 6 HOURS PRN
Status: DISCONTINUED | OUTPATIENT
Start: 2017-10-29 | End: 2017-10-31 | Stop reason: HOSPADM

## 2017-10-29 RX ORDER — METHYLPREDNISOLONE SODIUM SUCCINATE 40 MG/ML
40 INJECTION, POWDER, LYOPHILIZED, FOR SOLUTION INTRAMUSCULAR; INTRAVENOUS ONCE
Status: COMPLETED | OUTPATIENT
Start: 2017-10-29 | End: 2017-10-29

## 2017-10-29 RX ORDER — ONDANSETRON 4 MG/1
4 TABLET, ORALLY DISINTEGRATING ORAL EVERY 6 HOURS PRN
Status: DISCONTINUED | OUTPATIENT
Start: 2017-10-29 | End: 2017-10-31 | Stop reason: HOSPADM

## 2017-10-29 RX ORDER — CINACALCET 60 MG/1
60 TABLET, FILM COATED ORAL DAILY
Status: DISCONTINUED | OUTPATIENT
Start: 2017-10-29 | End: 2017-10-29 | Stop reason: SDUPTHER

## 2017-10-29 RX ORDER — HEPARIN SODIUM 5000 [USP'U]/ML
5000 INJECTION, SOLUTION INTRAVENOUS; SUBCUTANEOUS EVERY 8 HOURS SCHEDULED
Status: DISCONTINUED | OUTPATIENT
Start: 2017-10-29 | End: 2017-10-31 | Stop reason: HOSPADM

## 2017-10-29 RX ORDER — DIPHENHYDRAMINE HYDROCHLORIDE 50 MG/ML
50 INJECTION INTRAMUSCULAR; INTRAVENOUS ONCE
Status: COMPLETED | OUTPATIENT
Start: 2017-10-29 | End: 2017-10-29

## 2017-10-29 RX ORDER — LOSARTAN POTASSIUM 50 MG/1
100 TABLET ORAL NIGHTLY
Status: DISCONTINUED | OUTPATIENT
Start: 2017-10-29 | End: 2017-10-31 | Stop reason: HOSPADM

## 2017-10-29 RX ORDER — NITROGLYCERIN 20 MG/100ML
5-200 INJECTION INTRAVENOUS
Status: DISCONTINUED | OUTPATIENT
Start: 2017-10-29 | End: 2017-10-29

## 2017-10-29 RX ORDER — CINACALCET 60 MG/1
60 TABLET, FILM COATED ORAL
Status: DISCONTINUED | OUTPATIENT
Start: 2017-10-29 | End: 2017-10-31 | Stop reason: HOSPADM

## 2017-10-29 RX ORDER — AMLODIPINE BESYLATE 5 MG/1
10 TABLET ORAL
Status: DISCONTINUED | OUTPATIENT
Start: 2017-10-29 | End: 2017-10-30

## 2017-10-29 RX ORDER — LABETALOL HYDROCHLORIDE 5 MG/ML
80 INJECTION, SOLUTION INTRAVENOUS ONCE
Status: DISCONTINUED | OUTPATIENT
Start: 2017-10-29 | End: 2017-10-29

## 2017-10-29 RX ORDER — DIPHENHYDRAMINE HYDROCHLORIDE 50 MG/ML
25 INJECTION INTRAMUSCULAR; INTRAVENOUS EVERY 6 HOURS PRN
Status: DISCONTINUED | OUTPATIENT
Start: 2017-10-29 | End: 2017-10-30

## 2017-10-29 RX ORDER — PROMETHAZINE HYDROCHLORIDE 25 MG/ML
12.5 INJECTION, SOLUTION INTRAMUSCULAR; INTRAVENOUS EVERY 6 HOURS PRN
Status: DISCONTINUED | OUTPATIENT
Start: 2017-10-29 | End: 2017-10-31 | Stop reason: HOSPADM

## 2017-10-29 RX ORDER — PROMETHAZINE HYDROCHLORIDE 12.5 MG/1
12.5 TABLET ORAL EVERY 6 HOURS PRN
Status: DISCONTINUED | OUTPATIENT
Start: 2017-10-29 | End: 2017-10-31 | Stop reason: HOSPADM

## 2017-10-29 RX ORDER — CALCITRIOL 0.25 UG/1
0.5 CAPSULE, LIQUID FILLED ORAL DAILY
Status: DISCONTINUED | OUTPATIENT
Start: 2017-10-29 | End: 2017-10-31 | Stop reason: HOSPADM

## 2017-10-29 RX ORDER — ONDANSETRON 2 MG/ML
4 INJECTION INTRAMUSCULAR; INTRAVENOUS ONCE
Status: COMPLETED | OUTPATIENT
Start: 2017-10-29 | End: 2017-10-29

## 2017-10-29 RX ORDER — METOCLOPRAMIDE 5 MG/1
2.5 TABLET ORAL
Status: DISCONTINUED | OUTPATIENT
Start: 2017-10-29 | End: 2017-10-31 | Stop reason: HOSPADM

## 2017-10-29 RX ORDER — MORPHINE SULFATE 2 MG/ML
6 INJECTION, SOLUTION INTRAMUSCULAR; INTRAVENOUS ONCE
Status: COMPLETED | OUTPATIENT
Start: 2017-10-29 | End: 2017-10-29

## 2017-10-29 RX ORDER — CETIRIZINE HYDROCHLORIDE 10 MG/1
10 TABLET ORAL DAILY
Status: DISCONTINUED | OUTPATIENT
Start: 2017-10-29 | End: 2017-10-31 | Stop reason: HOSPADM

## 2017-10-29 RX ORDER — ACETAMINOPHEN 325 MG/1
650 TABLET ORAL ONCE
Status: DISCONTINUED | OUTPATIENT
Start: 2017-10-29 | End: 2017-10-31 | Stop reason: HOSPADM

## 2017-10-29 RX ORDER — ALBUTEROL SULFATE 2.5 MG/3ML
2.5 SOLUTION RESPIRATORY (INHALATION) EVERY 6 HOURS PRN
Status: DISCONTINUED | OUTPATIENT
Start: 2017-10-29 | End: 2017-10-31 | Stop reason: HOSPADM

## 2017-10-29 RX ORDER — LEVETIRACETAM 500 MG/1
500 TABLET ORAL NIGHTLY
Status: DISCONTINUED | OUTPATIENT
Start: 2017-10-29 | End: 2017-10-31 | Stop reason: HOSPADM

## 2017-10-29 RX ORDER — FAMOTIDINE 10 MG/ML
20 INJECTION, SOLUTION INTRAVENOUS EVERY 12 HOURS SCHEDULED
Status: DISCONTINUED | OUTPATIENT
Start: 2017-10-29 | End: 2017-10-30

## 2017-10-29 RX ORDER — NEBIVOLOL 10 MG/1
20 TABLET ORAL
Status: DISCONTINUED | OUTPATIENT
Start: 2017-10-29 | End: 2017-10-31 | Stop reason: HOSPADM

## 2017-10-29 RX ORDER — LABETALOL HYDROCHLORIDE 5 MG/ML
10 INJECTION, SOLUTION INTRAVENOUS ONCE
Status: COMPLETED | OUTPATIENT
Start: 2017-10-29 | End: 2017-10-29

## 2017-10-29 RX ORDER — TERAZOSIN 5 MG/1
10 CAPSULE ORAL NIGHTLY
Status: DISCONTINUED | OUTPATIENT
Start: 2017-10-29 | End: 2017-10-31 | Stop reason: HOSPADM

## 2017-10-29 RX ORDER — CALCIUM ACETATE 667 MG/1
667 TABLET ORAL
Status: DISCONTINUED | OUTPATIENT
Start: 2017-10-29 | End: 2017-10-30

## 2017-10-29 RX ORDER — HYDROXYCHLOROQUINE SULFATE 200 MG/1
200 TABLET, FILM COATED ORAL EVERY 12 HOURS SCHEDULED
Status: DISCONTINUED | OUTPATIENT
Start: 2017-10-29 | End: 2017-10-31 | Stop reason: HOSPADM

## 2017-10-29 RX ORDER — HYDROXYZINE HYDROCHLORIDE 25 MG/1
25 TABLET, FILM COATED ORAL 3 TIMES DAILY PRN
Status: DISCONTINUED | OUTPATIENT
Start: 2017-10-29 | End: 2017-10-31 | Stop reason: HOSPADM

## 2017-10-29 RX ORDER — SODIUM CHLORIDE 9 MG/ML
50 INJECTION, SOLUTION INTRAVENOUS CONTINUOUS
Status: DISCONTINUED | OUTPATIENT
Start: 2017-10-29 | End: 2017-10-30

## 2017-10-29 RX ORDER — HYDRALAZINE HYDROCHLORIDE 25 MG/1
100 TABLET, FILM COATED ORAL EVERY 8 HOURS SCHEDULED
Status: DISCONTINUED | OUTPATIENT
Start: 2017-10-29 | End: 2017-10-31 | Stop reason: HOSPADM

## 2017-10-29 RX ORDER — DICYCLOMINE HYDROCHLORIDE 10 MG/1
10 CAPSULE ORAL
Status: DISCONTINUED | OUTPATIENT
Start: 2017-10-29 | End: 2017-10-31 | Stop reason: HOSPADM

## 2017-10-29 RX ORDER — METRONIDAZOLE 500 MG/1
500 TABLET ORAL 3 TIMES DAILY
Status: DISCONTINUED | OUTPATIENT
Start: 2017-10-29 | End: 2017-10-29

## 2017-10-29 RX ORDER — ACETAMINOPHEN 650 MG/1
650 SUPPOSITORY RECTAL EVERY 4 HOURS PRN
Status: DISCONTINUED | OUTPATIENT
Start: 2017-10-29 | End: 2017-10-31 | Stop reason: HOSPADM

## 2017-10-29 RX ADMIN — DIPHENHYDRAMINE HYDROCHLORIDE 50 MG: 50 INJECTION, SOLUTION INTRAMUSCULAR; INTRAVENOUS at 09:08

## 2017-10-29 RX ADMIN — HYDROXYCHLOROQUINE SULFATE 200 MG: 200 TABLET, FILM COATED ORAL at 21:09

## 2017-10-29 RX ADMIN — LABETALOL HYDROCHLORIDE 20 MG: 5 INJECTION, SOLUTION INTRAVENOUS at 09:52

## 2017-10-29 RX ADMIN — HYDRALAZINE HYDROCHLORIDE 100 MG: 25 TABLET ORAL at 14:38

## 2017-10-29 RX ADMIN — METOCLOPRAMIDE HYDROCHLORIDE 2.5 MG: 5 TABLET ORAL at 17:01

## 2017-10-29 RX ADMIN — CLONAZEPAM 0.5 MG: 0.5 TABLET ORAL at 21:18

## 2017-10-29 RX ADMIN — SODIUM CHLORIDE 5 MG/HR: 9 INJECTION, SOLUTION INTRAVENOUS at 11:53

## 2017-10-29 RX ADMIN — HYDROXYZINE HYDROCHLORIDE 25 MG: 25 TABLET, FILM COATED ORAL at 18:03

## 2017-10-29 RX ADMIN — SODIUM CHLORIDE 10 ML/HR: 9 INJECTION, SOLUTION INTRAVENOUS at 14:54

## 2017-10-29 RX ADMIN — MORPHINE SULFATE 6 MG: 2 INJECTION, SOLUTION INTRAMUSCULAR; INTRAVENOUS at 11:33

## 2017-10-29 RX ADMIN — METRONIDAZOLE 500 MG: 500 INJECTION, SOLUTION INTRAVENOUS at 18:03

## 2017-10-29 RX ADMIN — LEVETIRACETAM 500 MG: 500 TABLET, FILM COATED ORAL at 21:10

## 2017-10-29 RX ADMIN — FAMOTIDINE 20 MG: 10 INJECTION, SOLUTION INTRAVENOUS at 14:37

## 2017-10-29 RX ADMIN — HYDRALAZINE HYDROCHLORIDE 100 MG: 25 TABLET ORAL at 21:06

## 2017-10-29 RX ADMIN — LABETALOL HYDROCHLORIDE 10 MG: 5 INJECTION, SOLUTION INTRAVENOUS at 09:13

## 2017-10-29 RX ADMIN — SODIUM CHLORIDE 250 ML: 9 INJECTION, SOLUTION INTRAVENOUS at 17:02

## 2017-10-29 RX ADMIN — AMLODIPINE BESYLATE 10 MG: 5 TABLET ORAL at 14:42

## 2017-10-29 RX ADMIN — ACETAMINOPHEN 650 MG: 325 TABLET, FILM COATED ORAL at 19:16

## 2017-10-29 RX ADMIN — HYDROXYCHLOROQUINE SULFATE 200 MG: 200 TABLET, FILM COATED ORAL at 14:43

## 2017-10-29 RX ADMIN — CALCIUM ACETATE 667 MG: 667 TABLET ORAL at 21:27

## 2017-10-29 RX ADMIN — HEPARIN SODIUM 5000 UNITS: 5000 INJECTION, SOLUTION INTRAVENOUS; SUBCUTANEOUS at 14:37

## 2017-10-29 RX ADMIN — PROCHLORPERAZINE EDISYLATE 10 MG: 5 INJECTION INTRAMUSCULAR; INTRAVENOUS at 09:08

## 2017-10-29 RX ADMIN — ONDANSETRON 4 MG: 2 INJECTION INTRAMUSCULAR; INTRAVENOUS at 10:59

## 2017-10-29 RX ADMIN — DIPHENHYDRAMINE HYDROCHLORIDE 25 MG: 50 INJECTION, SOLUTION INTRAMUSCULAR; INTRAVENOUS at 16:55

## 2017-10-29 RX ADMIN — TERAZOSIN HYDROCHLORIDE 10 MG: 5 CAPSULE ORAL at 21:10

## 2017-10-29 RX ADMIN — ACETAMINOPHEN 650 MG: 325 TABLET, FILM COATED ORAL at 14:48

## 2017-10-29 RX ADMIN — HYDROMORPHONE HYDROCHLORIDE 0.5 MG: 1 INJECTION, SOLUTION INTRAMUSCULAR; INTRAVENOUS; SUBCUTANEOUS at 14:15

## 2017-10-29 RX ADMIN — LABETALOL HYDROCHLORIDE 40 MG: 5 INJECTION, SOLUTION INTRAVENOUS at 10:34

## 2017-10-29 RX ADMIN — METHYLPREDNISOLONE SODIUM SUCCINATE 40 MG: 40 INJECTION, POWDER, FOR SOLUTION INTRAMUSCULAR; INTRAVENOUS at 18:03

## 2017-10-29 RX ADMIN — LOSARTAN POTASSIUM 100 MG: 50 TABLET, FILM COATED ORAL at 21:08

## 2017-10-29 RX ADMIN — CEFEPIME HYDROCHLORIDE 2 G: 2 INJECTION, SOLUTION INTRAVENOUS at 19:15

## 2017-10-30 ENCOUNTER — APPOINTMENT (OUTPATIENT)
Dept: CARDIOLOGY | Facility: HOSPITAL | Age: 27
End: 2017-10-30
Attending: INTERNAL MEDICINE

## 2017-10-30 ENCOUNTER — EPISODE CHANGES (OUTPATIENT)
Dept: CASE MANAGEMENT | Facility: OTHER | Age: 27
End: 2017-10-30

## 2017-10-30 LAB
ALBUMIN SERPL-MCNC: 3.4 G/DL (ref 3.5–5)
ANION GAP SERPL CALCULATED.3IONS-SCNC: 16.1 MMOL/L
BASOPHILS # BLD AUTO: 0.01 10*3/MM3 (ref 0–0.2)
BASOPHILS NFR BLD AUTO: 0.5 % (ref 0–2.5)
BH CV ECHO MEAS - EF(MOD-SP4): 66 %
BH CV ECHO MEAS - RVSP: 40 MMHG
BUN BLD-MCNC: 16 MG/DL (ref 7–20)
BUN/CREAT SERPL: 1.9 (ref 7.1–23.5)
CALCIUM SPEC-SCNC: 9.8 MG/DL (ref 8.4–10.2)
CHLORIDE SERPL-SCNC: 106 MMOL/L (ref 98–107)
CO2 SERPL-SCNC: 23 MMOL/L (ref 26–30)
CREAT BLD-MCNC: 8.3 MG/DL (ref 0.6–1.3)
DEPRECATED RDW RBC AUTO: 55.6 FL (ref 37–54)
EOSINOPHIL # BLD AUTO: 0.03 10*3/MM3 (ref 0–0.7)
EOSINOPHIL NFR BLD AUTO: 1.4 % (ref 0–7)
ERYTHROCYTE [DISTWIDTH] IN BLOOD BY AUTOMATED COUNT: 15.7 % (ref 11.5–14.5)
GFR SERPL CREATININE-BSD FRML MDRD: 6 ML/MIN/1.73
GLUCOSE BLD-MCNC: 165 MG/DL (ref 74–98)
HCT VFR BLD AUTO: 31.3 % (ref 37–47)
HGB BLD-MCNC: 9.5 G/DL (ref 12–16)
IMM GRANULOCYTES # BLD: 0.05 10*3/MM3 (ref 0–0.06)
IMM GRANULOCYTES NFR BLD: 2.3 % (ref 0–0.6)
LYMPHOCYTES # BLD AUTO: 0.45 10*3/MM3 (ref 0.6–3.4)
LYMPHOCYTES NFR BLD AUTO: 20.8 % (ref 10–50)
MCH RBC QN AUTO: 29.8 PG (ref 27–31)
MCHC RBC AUTO-ENTMCNC: 30.4 G/DL (ref 30–37)
MCV RBC AUTO: 98.1 FL (ref 81–99)
MONOCYTES # BLD AUTO: 0.1 10*3/MM3 (ref 0–0.9)
MONOCYTES NFR BLD AUTO: 4.6 % (ref 0–12)
NEUTROPHILS # BLD AUTO: 1.52 10*3/MM3 (ref 2–6.9)
NEUTROPHILS NFR BLD AUTO: 70.4 % (ref 37–80)
NRBC BLD MANUAL-RTO: 0 /100 WBC (ref 0–0)
PHOSPHATE SERPL-MCNC: 4 MG/DL (ref 2.5–4.5)
PLATELET # BLD AUTO: 113 10*3/MM3 (ref 130–400)
PMV BLD AUTO: 12.1 FL (ref 6–12)
POTASSIUM BLD-SCNC: 4.1 MMOL/L (ref 3.5–5.1)
RBC # BLD AUTO: 3.19 10*6/MM3 (ref 4.2–5.4)
SODIUM BLD-SCNC: 141 MMOL/L (ref 137–145)
WBC NRBC COR # BLD: 2.16 10*3/MM3 (ref 4.8–10.8)

## 2017-10-30 PROCEDURE — 25010000002 CEFEPIME PER 500 MG: Performed by: FAMILY MEDICINE

## 2017-10-30 PROCEDURE — 94799 UNLISTED PULMONARY SVC/PX: CPT

## 2017-10-30 PROCEDURE — 80069 RENAL FUNCTION PANEL: CPT | Performed by: INTERNAL MEDICINE

## 2017-10-30 PROCEDURE — 99232 SBSQ HOSP IP/OBS MODERATE 35: CPT | Performed by: FAMILY MEDICINE

## 2017-10-30 PROCEDURE — 25010000002 DIPHENHYDRAMINE PER 50 MG: Performed by: FAMILY MEDICINE

## 2017-10-30 PROCEDURE — 99222 1ST HOSP IP/OBS MODERATE 55: CPT | Performed by: PSYCHIATRY & NEUROLOGY

## 2017-10-30 PROCEDURE — 93306 TTE W/DOPPLER COMPLETE: CPT

## 2017-10-30 PROCEDURE — 85025 COMPLETE CBC W/AUTO DIFF WBC: CPT | Performed by: INTERNAL MEDICINE

## 2017-10-30 RX ORDER — CETIRIZINE HYDROCHLORIDE 10 MG/1
10 TABLET ORAL DAILY
Status: COMPLETED | OUTPATIENT
Start: 2017-10-30 | End: 2017-10-30

## 2017-10-30 RX ORDER — METRONIDAZOLE 500 MG/1
500 TABLET ORAL EVERY 8 HOURS SCHEDULED
Status: DISCONTINUED | OUTPATIENT
Start: 2017-10-30 | End: 2017-10-31 | Stop reason: HOSPADM

## 2017-10-30 RX ORDER — AMMONIUM LACTATE 12 G/100G
LOTION TOPICAL 2 TIMES DAILY PRN
Status: DISCONTINUED | OUTPATIENT
Start: 2017-10-30 | End: 2017-10-31 | Stop reason: HOSPADM

## 2017-10-30 RX ORDER — NICARDIPINE HYDROCHLORIDE 20 MG/1
40 CAPSULE ORAL EVERY 8 HOURS SCHEDULED
Status: DISCONTINUED | OUTPATIENT
Start: 2017-10-30 | End: 2017-10-31 | Stop reason: HOSPADM

## 2017-10-30 RX ORDER — DIPHENHYDRAMINE HCL 25 MG
25 CAPSULE ORAL EVERY 6 HOURS PRN
Status: DISCONTINUED | OUTPATIENT
Start: 2017-10-30 | End: 2017-10-31 | Stop reason: HOSPADM

## 2017-10-30 RX ORDER — ALBUMIN (HUMAN) 12.5 G/50ML
12.5 SOLUTION INTRAVENOUS AS NEEDED
Status: DISCONTINUED | OUTPATIENT
Start: 2017-10-30 | End: 2017-10-31 | Stop reason: HOSPADM

## 2017-10-30 RX ORDER — NICARDIPINE HYDROCHLORIDE 20 MG/1
40 CAPSULE ORAL EVERY 8 HOURS SCHEDULED
Status: DISCONTINUED | OUTPATIENT
Start: 2017-10-30 | End: 2017-10-30

## 2017-10-30 RX ORDER — CALCIUM ACETATE 667 MG/1
2001 TABLET ORAL
Status: DISCONTINUED | OUTPATIENT
Start: 2017-10-30 | End: 2017-10-30 | Stop reason: CLARIF

## 2017-10-30 RX ORDER — CALCIUM ACETATE 667 MG/1
2001 CAPSULE ORAL
Status: DISCONTINUED | OUTPATIENT
Start: 2017-10-30 | End: 2017-10-31 | Stop reason: HOSPADM

## 2017-10-30 RX ORDER — FAMOTIDINE 20 MG/1
20 TABLET, FILM COATED ORAL DAILY
Status: DISCONTINUED | OUTPATIENT
Start: 2017-10-31 | End: 2017-10-31 | Stop reason: HOSPADM

## 2017-10-30 RX ADMIN — CALCIUM ACETATE 667 MG: 667 TABLET ORAL at 09:00

## 2017-10-30 RX ADMIN — CALCIUM ACETATE 2001 MG: 667 CAPSULE ORAL at 17:01

## 2017-10-30 RX ADMIN — NEBIVOLOL HYDROCHLORIDE 20 MG: 10 TABLET ORAL at 09:21

## 2017-10-30 RX ADMIN — LEVETIRACETAM 500 MG: 500 TABLET, FILM COATED ORAL at 21:01

## 2017-10-30 RX ADMIN — Medication 1 TABLET: at 09:21

## 2017-10-30 RX ADMIN — Medication: at 20:21

## 2017-10-30 RX ADMIN — HYDRALAZINE HYDROCHLORIDE 100 MG: 25 TABLET ORAL at 05:46

## 2017-10-30 RX ADMIN — CETIRIZINE HYDROCHLORIDE 10 MG: 10 TABLET, FILM COATED ORAL at 20:59

## 2017-10-30 RX ADMIN — SODIUM CHLORIDE 5 MG/HR: 9 INJECTION, SOLUTION INTRAVENOUS at 02:05

## 2017-10-30 RX ADMIN — METRONIDAZOLE 500 MG: 500 TABLET ORAL at 21:00

## 2017-10-30 RX ADMIN — CLONIDINE 1 PATCH: 0.3 PATCH, EXTENDED RELEASE TRANSDERMAL at 11:03

## 2017-10-30 RX ADMIN — METRONIDAZOLE 500 MG: 500 TABLET ORAL at 11:03

## 2017-10-30 RX ADMIN — FAMOTIDINE 20 MG: 10 INJECTION, SOLUTION INTRAVENOUS at 09:20

## 2017-10-30 RX ADMIN — CALCIUM ACETATE 2001 MG: 667 CAPSULE ORAL at 12:06

## 2017-10-30 RX ADMIN — NICARDIPINE HYDROCHLORIDE 40 MG: 20 CAPSULE ORAL at 21:00

## 2017-10-30 RX ADMIN — TERAZOSIN HYDROCHLORIDE 10 MG: 5 CAPSULE ORAL at 21:00

## 2017-10-30 RX ADMIN — HYDROXYCHLOROQUINE SULFATE 200 MG: 200 TABLET, FILM COATED ORAL at 09:22

## 2017-10-30 RX ADMIN — METRONIDAZOLE 500 MG: 500 INJECTION, SOLUTION INTRAVENOUS at 02:06

## 2017-10-30 RX ADMIN — HYDRALAZINE HYDROCHLORIDE 100 MG: 25 TABLET ORAL at 21:00

## 2017-10-30 RX ADMIN — NICARDIPINE HYDROCHLORIDE 40 MG: 20 CAPSULE ORAL at 11:24

## 2017-10-30 RX ADMIN — HYDROXYCHLOROQUINE SULFATE 200 MG: 200 TABLET, FILM COATED ORAL at 21:29

## 2017-10-30 RX ADMIN — LOSARTAN POTASSIUM 100 MG: 50 TABLET, FILM COATED ORAL at 21:01

## 2017-10-30 RX ADMIN — DIPHENHYDRAMINE HYDROCHLORIDE 25 MG: 50 INJECTION, SOLUTION INTRAMUSCULAR; INTRAVENOUS at 12:53

## 2017-10-30 RX ADMIN — DICYCLOMINE HYDROCHLORIDE 10 MG: 10 CAPSULE ORAL at 21:00

## 2017-10-30 RX ADMIN — Medication 10 ML: at 20:24

## 2017-10-30 RX ADMIN — AMLODIPINE BESYLATE 10 MG: 5 TABLET ORAL at 09:21

## 2017-10-30 RX ADMIN — CLONAZEPAM 0.5 MG: 0.5 TABLET ORAL at 21:00

## 2017-10-30 RX ADMIN — HYDRALAZINE HYDROCHLORIDE 100 MG: 25 TABLET ORAL at 15:17

## 2017-10-31 ENCOUNTER — APPOINTMENT (OUTPATIENT)
Dept: MRI IMAGING | Facility: HOSPITAL | Age: 27
End: 2017-10-31

## 2017-10-31 VITALS
RESPIRATION RATE: 14 BRPM | TEMPERATURE: 97.6 F | OXYGEN SATURATION: 95 % | DIASTOLIC BLOOD PRESSURE: 120 MMHG | SYSTOLIC BLOOD PRESSURE: 187 MMHG | HEIGHT: 67 IN | WEIGHT: 158.4 LBS | HEART RATE: 75 BPM | BODY MASS INDEX: 24.86 KG/M2

## 2017-10-31 LAB
ANION GAP SERPL CALCULATED.3IONS-SCNC: 15.8 MMOL/L
BUN BLD-MCNC: 28 MG/DL (ref 7–20)
BUN/CREAT SERPL: 2.8 (ref 7.1–23.5)
CALCIUM SPEC-SCNC: 10 MG/DL (ref 8.4–10.2)
CHLORIDE SERPL-SCNC: 107 MMOL/L (ref 98–107)
CHOLEST SERPL-MCNC: 98 MG/DL (ref 0–199)
CO2 SERPL-SCNC: 22 MMOL/L (ref 26–30)
CREAT BLD-MCNC: 10.1 MG/DL (ref 0.6–1.3)
DEPRECATED RDW RBC AUTO: 57.8 FL (ref 37–54)
ERYTHROCYTE [DISTWIDTH] IN BLOOD BY AUTOMATED COUNT: 16.1 % (ref 11.5–14.5)
GFR SERPL CREATININE-BSD FRML MDRD: 5 ML/MIN/1.73
GLUCOSE BLD-MCNC: 100 MG/DL (ref 74–98)
GLUCOSE BLDC GLUCOMTR-MCNC: 124 MG/DL (ref 70–130)
HCT VFR BLD AUTO: 33.2 % (ref 37–47)
HDLC SERPL-MCNC: 51 MG/DL (ref 40–60)
HGB BLD-MCNC: 10 G/DL (ref 12–16)
LDLC SERPL CALC-MCNC: 34 MG/DL (ref 0–99)
LDLC/HDLC SERPL: 0.67 {RATIO}
MCH RBC QN AUTO: 29.5 PG (ref 27–31)
MCHC RBC AUTO-ENTMCNC: 30.1 G/DL (ref 30–37)
MCV RBC AUTO: 97.9 FL (ref 81–99)
PLATELET # BLD AUTO: 125 10*3/MM3 (ref 130–400)
PMV BLD AUTO: 11.9 FL (ref 6–12)
POTASSIUM BLD-SCNC: 3.8 MMOL/L (ref 3.5–5.1)
RBC # BLD AUTO: 3.39 10*6/MM3 (ref 4.2–5.4)
SCAN SLIDE: NORMAL
SODIUM BLD-SCNC: 141 MMOL/L (ref 137–145)
TRIGL SERPL-MCNC: 64 MG/DL
VLDLC SERPL-MCNC: 12.8 MG/DL
WBC NRBC COR # BLD: 5.18 10*3/MM3 (ref 4.8–10.8)

## 2017-10-31 PROCEDURE — 80048 BASIC METABOLIC PNL TOTAL CA: CPT | Performed by: FAMILY MEDICINE

## 2017-10-31 PROCEDURE — 80061 LIPID PANEL: CPT | Performed by: INTERNAL MEDICINE

## 2017-10-31 PROCEDURE — 85025 COMPLETE CBC W/AUTO DIFF WBC: CPT | Performed by: FAMILY MEDICINE

## 2017-10-31 PROCEDURE — 85007 BL SMEAR W/DIFF WBC COUNT: CPT | Performed by: FAMILY MEDICINE

## 2017-10-31 PROCEDURE — 82962 GLUCOSE BLOOD TEST: CPT

## 2017-10-31 PROCEDURE — 99239 HOSP IP/OBS DSCHRG MGMT >30: CPT | Performed by: FAMILY MEDICINE

## 2017-10-31 PROCEDURE — 90935 HEMODIALYSIS ONE EVALUATION: CPT

## 2017-10-31 PROCEDURE — 70551 MRI BRAIN STEM W/O DYE: CPT

## 2017-10-31 PROCEDURE — 94799 UNLISTED PULMONARY SVC/PX: CPT

## 2017-10-31 PROCEDURE — 85060 BLOOD SMEAR INTERPRETATION: CPT | Performed by: FAMILY MEDICINE

## 2017-10-31 RX ORDER — HYDROXYZINE HYDROCHLORIDE 25 MG/1
25 TABLET, FILM COATED ORAL 3 TIMES DAILY PRN
Qty: 60 TABLET | Refills: 0 | OUTPATIENT
Start: 2017-10-31 | End: 2017-10-31

## 2017-10-31 RX ORDER — FAMOTIDINE 20 MG/1
20 TABLET, FILM COATED ORAL DAILY
Qty: 30 TABLET | Refills: 0 | Status: SHIPPED | OUTPATIENT
Start: 2017-11-01 | End: 2017-11-10 | Stop reason: HOSPADM

## 2017-10-31 RX ORDER — HYDROXYZINE PAMOATE 25 MG/1
25 CAPSULE ORAL 3 TIMES DAILY PRN
Qty: 60 CAPSULE | Refills: 0 | Status: SHIPPED | OUTPATIENT
Start: 2017-10-31 | End: 2018-02-01 | Stop reason: HOSPADM

## 2017-10-31 RX ORDER — METRONIDAZOLE 500 MG/1
500 TABLET ORAL EVERY 8 HOURS SCHEDULED
Qty: 21 TABLET | Refills: 0 | OUTPATIENT
Start: 2017-10-31 | End: 2017-10-31

## 2017-10-31 RX ORDER — CINACALCET 60 MG/1
60 TABLET, FILM COATED ORAL
Qty: 30 TABLET | Refills: 0 | OUTPATIENT
Start: 2017-10-31 | End: 2017-10-31

## 2017-10-31 RX ORDER — FAMOTIDINE 20 MG/1
20 TABLET, FILM COATED ORAL DAILY
Qty: 30 TABLET | Refills: 0 | OUTPATIENT
Start: 2017-11-01 | End: 2017-10-31

## 2017-10-31 RX ORDER — CINACALCET 60 MG/1
60 TABLET, FILM COATED ORAL NIGHTLY
Qty: 30 TABLET | Refills: 0 | Status: SHIPPED | COMMUNITY
Start: 2017-10-31 | End: 2017-11-10 | Stop reason: HOSPADM

## 2017-10-31 RX ORDER — METRONIDAZOLE 500 MG/1
500 TABLET ORAL EVERY 8 HOURS SCHEDULED
Qty: 21 TABLET | Refills: 0 | Status: SHIPPED | OUTPATIENT
Start: 2017-10-31 | End: 2017-11-10 | Stop reason: HOSPADM

## 2017-10-31 RX ORDER — DOXAZOSIN 8 MG/1
8 TABLET ORAL NIGHTLY
Qty: 30 TABLET | Refills: 0 | OUTPATIENT
Start: 2017-10-31 | End: 2017-10-31

## 2017-10-31 RX ORDER — DOXAZOSIN 8 MG/1
8 TABLET ORAL NIGHTLY
Qty: 30 TABLET | Refills: 0 | Status: ON HOLD | COMMUNITY
Start: 2017-10-31 | End: 2018-02-01

## 2017-10-31 RX ORDER — NICARDIPINE HYDROCHLORIDE 20 MG/1
40 CAPSULE ORAL EVERY 8 HOURS SCHEDULED
Qty: 90 CAPSULE | Refills: 0 | OUTPATIENT
Start: 2017-10-31 | End: 2017-10-31

## 2017-10-31 RX ORDER — NICARDIPINE HYDROCHLORIDE 20 MG/1
20 CAPSULE ORAL EVERY 8 HOURS SCHEDULED
Qty: 90 CAPSULE | Refills: 0 | Status: SHIPPED | COMMUNITY
Start: 2017-10-31 | End: 2017-11-10 | Stop reason: HOSPADM

## 2017-10-31 RX ADMIN — CALCIUM ACETATE 2001 MG: 667 CAPSULE ORAL at 13:29

## 2017-10-31 RX ADMIN — HYDRALAZINE HYDROCHLORIDE 100 MG: 25 TABLET ORAL at 13:29

## 2017-10-31 RX ADMIN — NICARDIPINE HYDROCHLORIDE 40 MG: 20 CAPSULE ORAL at 05:16

## 2017-10-31 RX ADMIN — CALCITRIOL 0.5 MCG: 0.25 CAPSULE ORAL at 09:26

## 2017-10-31 RX ADMIN — HYDROXYCHLOROQUINE SULFATE 200 MG: 200 TABLET, FILM COATED ORAL at 09:25

## 2017-10-31 RX ADMIN — FAMOTIDINE 20 MG: 20 TABLET, FILM COATED ORAL at 09:25

## 2017-10-31 RX ADMIN — CALCIUM ACETATE 2001 MG: 667 CAPSULE ORAL at 09:26

## 2017-10-31 RX ADMIN — METRONIDAZOLE 500 MG: 500 TABLET ORAL at 05:16

## 2017-10-31 RX ADMIN — NICARDIPINE HYDROCHLORIDE 40 MG: 20 CAPSULE ORAL at 13:29

## 2017-10-31 RX ADMIN — METRONIDAZOLE 500 MG: 500 TABLET ORAL at 13:29

## 2017-10-31 RX ADMIN — NEBIVOLOL HYDROCHLORIDE 20 MG: 10 TABLET ORAL at 09:25

## 2017-10-31 RX ADMIN — Medication 1 TABLET: at 09:26

## 2017-10-31 RX ADMIN — CLONAZEPAM 0.5 MG: 0.5 TABLET ORAL at 09:36

## 2017-10-31 RX ADMIN — HYDRALAZINE HYDROCHLORIDE 100 MG: 25 TABLET ORAL at 05:16

## 2017-10-31 RX ADMIN — DICYCLOMINE HYDROCHLORIDE 10 MG: 10 CAPSULE ORAL at 07:04

## 2017-11-01 ENCOUNTER — EPISODE CHANGES (OUTPATIENT)
Dept: CASE MANAGEMENT | Facility: OTHER | Age: 27
End: 2017-11-01

## 2017-11-01 ENCOUNTER — TRANSITIONAL CARE MANAGEMENT TELEPHONE ENCOUNTER (OUTPATIENT)
Dept: FAMILY MEDICINE CLINIC | Facility: CLINIC | Age: 27
End: 2017-11-01

## 2017-11-01 LAB
ACINETOBACTER SCREEN CX: NORMAL
MRSA SPEC QL CULT: NO GROWTH
VRE SPEC QL CULT: NORMAL

## 2017-11-02 ENCOUNTER — TELEPHONE (OUTPATIENT)
Dept: FAMILY MEDICINE CLINIC | Facility: CLINIC | Age: 27
End: 2017-11-02

## 2017-11-03 ENCOUNTER — HOSPITAL ENCOUNTER (EMERGENCY)
Facility: HOSPITAL | Age: 27
Discharge: HOME | End: 2017-11-03
Attending: EMERGENCY MEDICINE | Admitting: EMERGENCY MEDICINE

## 2017-11-03 ENCOUNTER — APPOINTMENT (OUTPATIENT)
Dept: ULTRASOUND IMAGING | Facility: HOSPITAL | Age: 27
End: 2017-11-03

## 2017-11-03 VITALS
OXYGEN SATURATION: 97 % | WEIGHT: 155 LBS | TEMPERATURE: 98.2 F | RESPIRATION RATE: 18 BRPM | SYSTOLIC BLOOD PRESSURE: 184 MMHG | HEART RATE: 74 BPM | HEIGHT: 67 IN | DIASTOLIC BLOOD PRESSURE: 123 MMHG | BODY MASS INDEX: 24.33 KG/M2

## 2017-11-03 DIAGNOSIS — R19.7 DIARRHEA, UNSPECIFIED TYPE: ICD-10-CM

## 2017-11-03 DIAGNOSIS — R11.0 NAUSEA: Primary | ICD-10-CM

## 2017-11-03 DIAGNOSIS — R10.9 ABDOMINAL PAIN, UNSPECIFIED ABDOMINAL LOCATION: ICD-10-CM

## 2017-11-03 LAB
ALBUMIN SERPL-MCNC: 2.6 G/DL (ref 3.5–5)
ALBUMIN/GLOB SERPL: 1.1 G/DL (ref 1–2)
ALP SERPL-CCNC: 37 U/L (ref 38–126)
ALT SERPL W P-5'-P-CCNC: 24 U/L (ref 13–69)
AMMONIA BLD-SCNC: <9 UMOL/L (ref 9–30)
ANION GAP SERPL CALCULATED.3IONS-SCNC: 13.1 MMOL/L
ANISOCYTOSIS BLD QL: ABNORMAL
ANISOCYTOSIS BLD QL: NORMAL
APTT PPP: 26.8 SECONDS (ref 25–36)
AST SERPL-CCNC: 25 U/L (ref 15–46)
BACTERIA SPEC AEROBE CULT: NORMAL
BACTERIA SPEC AEROBE CULT: NORMAL
BACTERIA UR QL AUTO: ABNORMAL /HPF
BASOPHILS # BLD AUTO: 0.02 10*3/MM3 (ref 0–0.2)
BASOPHILS NFR BLD AUTO: 0.4 % (ref 0–2.5)
BILIRUB SERPL-MCNC: 0.4 MG/DL (ref 0.2–1.3)
BILIRUB UR QL STRIP: NEGATIVE
BUN BLD-MCNC: 21 MG/DL (ref 7–20)
BUN/CREAT SERPL: 3.3 (ref 7.1–23.5)
BURR CELLS BLD QL SMEAR: NORMAL
CALCIUM SPEC-SCNC: 8.4 MG/DL (ref 8.4–10.2)
CHLORIDE SERPL-SCNC: 105 MMOL/L (ref 98–107)
CLARITY UR: ABNORMAL
CO2 SERPL-SCNC: 23 MMOL/L (ref 26–30)
COLOR UR: YELLOW
CREAT BLD-MCNC: 6.3 MG/DL (ref 0.6–1.3)
CYTOLOGIST CVX/VAG CYTO: NORMAL
D-LACTATE SERPL-SCNC: 0.6 MMOL/L (ref 0.5–2)
DACRYOCYTES BLD QL SMEAR: NORMAL
DEPRECATED RDW RBC AUTO: 55 FL (ref 37–54)
EOSINOPHIL # BLD AUTO: 0.71 10*3/MM3 (ref 0–0.7)
EOSINOPHIL # BLD MANUAL: 1.45 10*3/MM3 (ref 0–0.7)
EOSINOPHIL NFR BLD AUTO: 14.4 % (ref 0–7)
EOSINOPHIL NFR BLD MANUAL: 28 % (ref 0–7)
ERYTHROCYTE [DISTWIDTH] IN BLOOD BY AUTOMATED COUNT: 15.9 % (ref 11.5–14.5)
GFR SERPL CREATININE-BSD FRML MDRD: 8 ML/MIN/1.73
GLOBULIN UR ELPH-MCNC: 2.3 GM/DL
GLUCOSE BLD-MCNC: 107 MG/DL (ref 74–98)
GLUCOSE UR STRIP-MCNC: ABNORMAL MG/DL
HCT VFR BLD AUTO: 40.9 % (ref 37–47)
HGB BLD-MCNC: 13.1 G/DL (ref 12–16)
HGB UR QL STRIP.AUTO: ABNORMAL
HOLD SPECIMEN: NORMAL
HOLD SPECIMEN: NORMAL
HYALINE CASTS UR QL AUTO: ABNORMAL /LPF
IMM GRANULOCYTES # BLD: 0.04 10*3/MM3 (ref 0–0.06)
IMM GRANULOCYTES NFR BLD: 0.8 % (ref 0–0.6)
INR PPP: 0.98 (ref 0.9–1.1)
KETONES UR QL STRIP: NEGATIVE
LARGE PLATELETS: NORMAL
LEUKOCYTE ESTERASE UR QL STRIP.AUTO: ABNORMAL
LYMPHOCYTES # BLD AUTO: 0.52 10*3/MM3 (ref 0.6–3.4)
LYMPHOCYTES # BLD MANUAL: 1.35 10*3/MM3 (ref 0.6–3.4)
LYMPHOCYTES NFR BLD AUTO: 10.6 % (ref 10–50)
LYMPHOCYTES NFR BLD MANUAL: 2 % (ref 0–12)
LYMPHOCYTES NFR BLD MANUAL: 26 % (ref 10–50)
MACROCYTES BLD QL SMEAR: ABNORMAL
MCH RBC QN AUTO: 30 PG (ref 27–31)
MCHC RBC AUTO-ENTMCNC: 32 G/DL (ref 30–37)
MCV RBC AUTO: 93.6 FL (ref 81–99)
MONOCYTES # BLD AUTO: 0.1 10*3/MM3 (ref 0–0.9)
MONOCYTES # BLD AUTO: 0.34 10*3/MM3 (ref 0–0.9)
MONOCYTES NFR BLD AUTO: 6.9 % (ref 0–12)
NEUTROPHILS # BLD AUTO: 2.28 10*3/MM3 (ref 2–6.9)
NEUTROPHILS # BLD AUTO: 3.29 10*3/MM3 (ref 2–6.9)
NEUTROPHILS NFR BLD AUTO: 66.9 % (ref 37–80)
NEUTROPHILS NFR BLD MANUAL: 42 % (ref 37–80)
NEUTS BAND NFR BLD MANUAL: 2 % (ref 0–6)
NITRITE UR QL STRIP: NEGATIVE
NRBC BLD MANUAL-RTO: 0 /100 WBC (ref 0–0)
OVALOCYTES BLD QL SMEAR: NORMAL
PATH INTERP BLD-IMP: NORMAL
PH UR STRIP.AUTO: >=9 [PH] (ref 5–8)
PLATELET # BLD AUTO: 53 10*3/MM3 (ref 130–400)
PMV BLD AUTO: ABNORMAL FL (ref 6–12)
POIKILOCYTOSIS BLD QL SMEAR: ABNORMAL
POIKILOCYTOSIS BLD QL SMEAR: NORMAL
POLYCHROMASIA BLD QL SMEAR: ABNORMAL
POTASSIUM BLD-SCNC: 4.1 MMOL/L (ref 3.5–5.1)
PROT SERPL-MCNC: 4.9 G/DL (ref 6.3–8.2)
PROT UR QL STRIP: ABNORMAL
PROTHROMBIN TIME: 10.7 SECONDS (ref 9.3–12.1)
RBC # BLD AUTO: 4.37 10*6/MM3 (ref 4.2–5.4)
RBC # UR: ABNORMAL /HPF
REF LAB TEST METHOD: ABNORMAL
SMALL PLATELETS BLD QL SMEAR: ABNORMAL
SMALL PLATELETS BLD QL SMEAR: NORMAL
SODIUM BLD-SCNC: 137 MMOL/L (ref 137–145)
SP GR UR STRIP: 1.01 (ref 1–1.03)
SQUAMOUS #/AREA URNS HPF: ABNORMAL /HPF
TRANS CELLS #/AREA URNS HPF: ABNORMAL /HPF
UROBILINOGEN UR QL STRIP: ABNORMAL
WBC MORPH BLD: NORMAL
WBC MORPH BLD: NORMAL
WBC NRBC COR # BLD: 4.92 10*3/MM3 (ref 4.8–10.8)
WBC UR QL AUTO: ABNORMAL /HPF
WHOLE BLOOD HOLD SPECIMEN: NORMAL
WHOLE BLOOD HOLD SPECIMEN: NORMAL
YEAST URNS QL MICRO: ABNORMAL /HPF

## 2017-11-03 PROCEDURE — 96361 HYDRATE IV INFUSION ADD-ON: CPT

## 2017-11-03 PROCEDURE — 25010000002 ONDANSETRON PER 1 MG: Performed by: EMERGENCY MEDICINE

## 2017-11-03 PROCEDURE — 25010000002 HYDROMORPHONE PER 4 MG: Performed by: EMERGENCY MEDICINE

## 2017-11-03 PROCEDURE — 85007 BL SMEAR W/DIFF WBC COUNT: CPT | Performed by: EMERGENCY MEDICINE

## 2017-11-03 PROCEDURE — 96375 TX/PRO/DX INJ NEW DRUG ADDON: CPT

## 2017-11-03 PROCEDURE — 80053 COMPREHEN METABOLIC PANEL: CPT | Performed by: EMERGENCY MEDICINE

## 2017-11-03 PROCEDURE — 76705 ECHO EXAM OF ABDOMEN: CPT

## 2017-11-03 PROCEDURE — 87086 URINE CULTURE/COLONY COUNT: CPT | Performed by: EMERGENCY MEDICINE

## 2017-11-03 PROCEDURE — 81001 URINALYSIS AUTO W/SCOPE: CPT | Performed by: EMERGENCY MEDICINE

## 2017-11-03 PROCEDURE — 83605 ASSAY OF LACTIC ACID: CPT | Performed by: EMERGENCY MEDICINE

## 2017-11-03 PROCEDURE — 85610 PROTHROMBIN TIME: CPT | Performed by: EMERGENCY MEDICINE

## 2017-11-03 PROCEDURE — 85025 COMPLETE CBC W/AUTO DIFF WBC: CPT | Performed by: EMERGENCY MEDICINE

## 2017-11-03 PROCEDURE — 96376 TX/PRO/DX INJ SAME DRUG ADON: CPT

## 2017-11-03 PROCEDURE — 96374 THER/PROPH/DIAG INJ IV PUSH: CPT

## 2017-11-03 PROCEDURE — 99284 EMERGENCY DEPT VISIT MOD MDM: CPT

## 2017-11-03 PROCEDURE — 87040 BLOOD CULTURE FOR BACTERIA: CPT | Performed by: EMERGENCY MEDICINE

## 2017-11-03 PROCEDURE — 85730 THROMBOPLASTIN TIME PARTIAL: CPT | Performed by: EMERGENCY MEDICINE

## 2017-11-03 PROCEDURE — 82140 ASSAY OF AMMONIA: CPT | Performed by: EMERGENCY MEDICINE

## 2017-11-03 RX ORDER — ONDANSETRON 2 MG/ML
4 INJECTION INTRAMUSCULAR; INTRAVENOUS ONCE
Status: COMPLETED | OUTPATIENT
Start: 2017-11-03 | End: 2017-11-03

## 2017-11-03 RX ORDER — SODIUM CHLORIDE 0.9 % (FLUSH) 0.9 %
10 SYRINGE (ML) INJECTION AS NEEDED
Status: DISCONTINUED | OUTPATIENT
Start: 2017-11-03 | End: 2017-11-03 | Stop reason: HOSPADM

## 2017-11-03 RX ADMIN — ONDANSETRON 4 MG: 2 INJECTION INTRAMUSCULAR; INTRAVENOUS at 14:27

## 2017-11-03 RX ADMIN — HYDROMORPHONE HYDROCHLORIDE 0.5 MG: 1 INJECTION, SOLUTION INTRAMUSCULAR; INTRAVENOUS; SUBCUTANEOUS at 14:29

## 2017-11-03 RX ADMIN — HYDROMORPHONE HYDROCHLORIDE 1 MG: 1 INJECTION, SOLUTION INTRAMUSCULAR; INTRAVENOUS; SUBCUTANEOUS at 15:06

## 2017-11-03 RX ADMIN — SODIUM CHLORIDE 1000 ML: 9 INJECTION, SOLUTION INTRAVENOUS at 14:27

## 2017-11-06 ENCOUNTER — EPISODE CHANGES (OUTPATIENT)
Dept: CASE MANAGEMENT | Facility: OTHER | Age: 27
End: 2017-11-06

## 2017-11-06 LAB — BACTERIA SPEC AEROBE CULT: ABNORMAL

## 2017-11-07 ENCOUNTER — APPOINTMENT (OUTPATIENT)
Dept: CT IMAGING | Facility: HOSPITAL | Age: 27
End: 2017-11-07

## 2017-11-07 ENCOUNTER — EPISODE CHANGES (OUTPATIENT)
Dept: CASE MANAGEMENT | Facility: OTHER | Age: 27
End: 2017-11-07

## 2017-11-07 ENCOUNTER — HOSPITAL ENCOUNTER (INPATIENT)
Facility: HOSPITAL | Age: 27
LOS: 3 days | Discharge: HOME | End: 2017-11-10
Attending: EMERGENCY MEDICINE | Admitting: FAMILY MEDICINE

## 2017-11-07 DIAGNOSIS — R10.13 EPIGASTRIC PAIN: ICD-10-CM

## 2017-11-07 DIAGNOSIS — R18.8 OTHER ASCITES: ICD-10-CM

## 2017-11-07 DIAGNOSIS — R11.0 NAUSEA: ICD-10-CM

## 2017-11-07 DIAGNOSIS — K52.9 ENTERITIS: Primary | ICD-10-CM

## 2017-11-07 LAB
ALBUMIN SERPL-MCNC: 2.4 G/DL (ref 3.5–5)
ALBUMIN/GLOB SERPL: 1 G/DL (ref 1–2)
ALP SERPL-CCNC: 41 U/L (ref 38–126)
ALT SERPL W P-5'-P-CCNC: 20 U/L (ref 13–69)
ANION GAP SERPL CALCULATED.3IONS-SCNC: 17.1 MMOL/L
AST SERPL-CCNC: 18 U/L (ref 15–46)
BACTERIA UR QL AUTO: ABNORMAL /HPF
BASOPHILS # BLD AUTO: 0.01 10*3/MM3 (ref 0–0.2)
BASOPHILS NFR BLD AUTO: 0.2 % (ref 0–2.5)
BILIRUB SERPL-MCNC: 0.3 MG/DL (ref 0.2–1.3)
BILIRUB UR QL STRIP: NEGATIVE
BUN BLD-MCNC: 56 MG/DL (ref 7–20)
BUN/CREAT SERPL: 4.6 (ref 7.1–23.5)
C DIFF TOX A+B STL QL IA: NEGATIVE
CALCIUM SPEC-SCNC: 8.4 MG/DL (ref 8.4–10.2)
CHLORIDE SERPL-SCNC: 100 MMOL/L (ref 98–107)
CLARITY UR: ABNORMAL
CO2 SERPL-SCNC: 20 MMOL/L (ref 26–30)
COLOR UR: YELLOW
CREAT BLD-MCNC: 12.3 MG/DL (ref 0.6–1.3)
D-LACTATE SERPL-SCNC: <0.5 MMOL/L (ref 0.5–2)
DEPRECATED RDW RBC AUTO: 49.1 FL (ref 37–54)
EOSINOPHIL # BLD AUTO: 0.89 10*3/MM3 (ref 0–0.7)
EOSINOPHIL NFR BLD AUTO: 14.9 % (ref 0–7)
ERYTHROCYTE [DISTWIDTH] IN BLOOD BY AUTOMATED COUNT: 14.7 % (ref 11.5–14.5)
GFR SERPL CREATININE-BSD FRML MDRD: 4 ML/MIN/1.73
GLOBULIN UR ELPH-MCNC: 2.3 GM/DL
GLUCOSE BLD-MCNC: 101 MG/DL (ref 74–98)
GLUCOSE BLDC GLUCOMTR-MCNC: 145 MG/DL (ref 70–130)
GLUCOSE BLDC GLUCOMTR-MCNC: 91 MG/DL (ref 70–130)
GLUCOSE UR STRIP-MCNC: ABNORMAL MG/DL
HCT VFR BLD AUTO: 35.9 % (ref 37–47)
HGB BLD-MCNC: 11.7 G/DL (ref 12–16)
HGB UR QL STRIP.AUTO: ABNORMAL
HOLD SPECIMEN: NORMAL
HOLD SPECIMEN: NORMAL
HYALINE CASTS UR QL AUTO: ABNORMAL /LPF
IMM GRANULOCYTES # BLD: 0.02 10*3/MM3 (ref 0–0.06)
IMM GRANULOCYTES NFR BLD: 0.3 % (ref 0–0.6)
KETONES UR QL STRIP: NEGATIVE
LEUKOCYTE ESTERASE UR QL STRIP.AUTO: ABNORMAL
LIPASE SERPL-CCNC: 78 U/L (ref 23–300)
LYMPHOCYTES # BLD AUTO: 0.57 10*3/MM3 (ref 0.6–3.4)
LYMPHOCYTES NFR BLD AUTO: 9.6 % (ref 10–50)
MAGNESIUM SERPL-MCNC: 2.1 MG/DL (ref 1.6–2.3)
MCH RBC QN AUTO: 29.5 PG (ref 27–31)
MCHC RBC AUTO-ENTMCNC: 32.6 G/DL (ref 30–37)
MCV RBC AUTO: 90.4 FL (ref 81–99)
MONOCYTES # BLD AUTO: 0.36 10*3/MM3 (ref 0–0.9)
MONOCYTES NFR BLD AUTO: 6 % (ref 0–12)
NEUTROPHILS # BLD AUTO: 4.11 10*3/MM3 (ref 2–6.9)
NEUTROPHILS NFR BLD AUTO: 69 % (ref 37–80)
NITRITE UR QL STRIP: NEGATIVE
NRBC BLD MANUAL-RTO: 0 /100 WBC (ref 0–0)
PH UR STRIP.AUTO: 8.5 [PH] (ref 5–8)
PLATELET # BLD AUTO: 91 10*3/MM3 (ref 130–400)
PMV BLD AUTO: ABNORMAL FL (ref 6–12)
POTASSIUM BLD-SCNC: 4.1 MMOL/L (ref 3.5–5.1)
PROT SERPL-MCNC: 4.7 G/DL (ref 6.3–8.2)
PROT UR QL STRIP: ABNORMAL
RBC # BLD AUTO: 3.97 10*6/MM3 (ref 4.2–5.4)
RBC # UR: ABNORMAL /HPF
RBC MORPH BLD: NORMAL
REF LAB TEST METHOD: ABNORMAL
SMALL PLATELETS BLD QL SMEAR: NORMAL
SODIUM BLD-SCNC: 133 MMOL/L (ref 137–145)
SP GR UR STRIP: 1.02 (ref 1–1.03)
SQUAMOUS #/AREA URNS HPF: ABNORMAL /HPF
URATE SERPL-MCNC: 7.9 MG/DL (ref 2.5–8.5)
UROBILINOGEN UR QL STRIP: ABNORMAL
WBC MORPH BLD: NORMAL
WBC NRBC COR # BLD: 5.96 10*3/MM3 (ref 4.8–10.8)
WBC UR QL AUTO: ABNORMAL /HPF
WHOLE BLOOD HOLD SPECIMEN: NORMAL
WHOLE BLOOD HOLD SPECIMEN: NORMAL

## 2017-11-07 PROCEDURE — 25010000002 DIPHENHYDRAMINE PER 50 MG: Performed by: FAMILY MEDICINE

## 2017-11-07 PROCEDURE — 87086 URINE CULTURE/COLONY COUNT: CPT | Performed by: EMERGENCY MEDICINE

## 2017-11-07 PROCEDURE — 85007 BL SMEAR W/DIFF WBC COUNT: CPT | Performed by: EMERGENCY MEDICINE

## 2017-11-07 PROCEDURE — 81001 URINALYSIS AUTO W/SCOPE: CPT | Performed by: EMERGENCY MEDICINE

## 2017-11-07 PROCEDURE — 85025 COMPLETE CBC W/AUTO DIFF WBC: CPT | Performed by: EMERGENCY MEDICINE

## 2017-11-07 PROCEDURE — 93005 ELECTROCARDIOGRAM TRACING: CPT

## 2017-11-07 PROCEDURE — 90935 HEMODIALYSIS ONE EVALUATION: CPT

## 2017-11-07 PROCEDURE — 87186 SC STD MICRODIL/AGAR DIL: CPT | Performed by: EMERGENCY MEDICINE

## 2017-11-07 PROCEDURE — 74177 CT ABD & PELVIS W/CONTRAST: CPT

## 2017-11-07 PROCEDURE — 80053 COMPREHEN METABOLIC PANEL: CPT | Performed by: EMERGENCY MEDICINE

## 2017-11-07 PROCEDURE — 99285 EMERGENCY DEPT VISIT HI MDM: CPT

## 2017-11-07 PROCEDURE — 81025 URINE PREGNANCY TEST: CPT | Performed by: INTERNAL MEDICINE

## 2017-11-07 PROCEDURE — 0 IOPAMIDOL 61 % SOLUTION: Performed by: EMERGENCY MEDICINE

## 2017-11-07 PROCEDURE — 82962 GLUCOSE BLOOD TEST: CPT

## 2017-11-07 PROCEDURE — 99222 1ST HOSP IP/OBS MODERATE 55: CPT | Performed by: FAMILY MEDICINE

## 2017-11-07 PROCEDURE — 99223 1ST HOSP IP/OBS HIGH 75: CPT | Performed by: INTERNAL MEDICINE

## 2017-11-07 PROCEDURE — 87493 C DIFF AMPLIFIED PROBE: CPT | Performed by: FAMILY MEDICINE

## 2017-11-07 PROCEDURE — 25010000002 ONDANSETRON PER 1 MG: Performed by: EMERGENCY MEDICINE

## 2017-11-07 PROCEDURE — 87324 CLOSTRIDIUM AG IA: CPT | Performed by: INTERNAL MEDICINE

## 2017-11-07 PROCEDURE — 83605 ASSAY OF LACTIC ACID: CPT | Performed by: EMERGENCY MEDICINE

## 2017-11-07 PROCEDURE — 83735 ASSAY OF MAGNESIUM: CPT | Performed by: EMERGENCY MEDICINE

## 2017-11-07 PROCEDURE — 84550 ASSAY OF BLOOD/URIC ACID: CPT | Performed by: INTERNAL MEDICINE

## 2017-11-07 PROCEDURE — 25010000002 HYDROMORPHONE PER 4 MG: Performed by: FAMILY MEDICINE

## 2017-11-07 PROCEDURE — 83690 ASSAY OF LIPASE: CPT | Performed by: EMERGENCY MEDICINE

## 2017-11-07 RX ORDER — ALUMINA, MAGNESIA, AND SIMETHICONE 2400; 2400; 240 MG/30ML; MG/30ML; MG/30ML
15 SUSPENSION ORAL ONCE
Status: COMPLETED | OUTPATIENT
Start: 2017-11-07 | End: 2017-11-07

## 2017-11-07 RX ORDER — HYDROXYCHLOROQUINE SULFATE 200 MG/1
200 TABLET, FILM COATED ORAL EVERY 12 HOURS SCHEDULED
Status: DISCONTINUED | OUTPATIENT
Start: 2017-11-07 | End: 2017-11-10 | Stop reason: HOSPADM

## 2017-11-07 RX ORDER — HYDROXYZINE PAMOATE 25 MG/1
25 CAPSULE ORAL ONCE
Status: COMPLETED | OUTPATIENT
Start: 2017-11-07 | End: 2017-11-07

## 2017-11-07 RX ORDER — ONDANSETRON 4 MG/1
4 TABLET, ORALLY DISINTEGRATING ORAL EVERY 6 HOURS PRN
Status: DISCONTINUED | OUTPATIENT
Start: 2017-11-07 | End: 2017-11-10 | Stop reason: HOSPADM

## 2017-11-07 RX ORDER — FAMOTIDINE 10 MG/ML
20 INJECTION, SOLUTION INTRAVENOUS ONCE
Status: COMPLETED | OUTPATIENT
Start: 2017-11-07 | End: 2017-11-07

## 2017-11-07 RX ORDER — SODIUM CHLORIDE 0.9 % (FLUSH) 0.9 %
10 SYRINGE (ML) INJECTION AS NEEDED
Status: DISCONTINUED | OUTPATIENT
Start: 2017-11-07 | End: 2017-11-10 | Stop reason: HOSPADM

## 2017-11-07 RX ORDER — DICYCLOMINE HYDROCHLORIDE 10 MG/1
10 CAPSULE ORAL
Status: DISCONTINUED | OUTPATIENT
Start: 2017-11-07 | End: 2017-11-07

## 2017-11-07 RX ORDER — TERAZOSIN 5 MG/1
10 CAPSULE ORAL NIGHTLY
Status: DISCONTINUED | OUTPATIENT
Start: 2017-11-07 | End: 2017-11-10 | Stop reason: HOSPADM

## 2017-11-07 RX ORDER — BISACODYL 10 MG
10 SUPPOSITORY, RECTAL RECTAL DAILY PRN
Status: DISCONTINUED | OUTPATIENT
Start: 2017-11-07 | End: 2017-11-08

## 2017-11-07 RX ORDER — DIPHENHYDRAMINE HYDROCHLORIDE 50 MG/ML
12.5 INJECTION INTRAMUSCULAR; INTRAVENOUS EVERY 6 HOURS PRN
Status: DISCONTINUED | OUTPATIENT
Start: 2017-11-07 | End: 2017-11-10 | Stop reason: HOSPADM

## 2017-11-07 RX ORDER — ACETAMINOPHEN 325 MG/1
650 TABLET ORAL EVERY 4 HOURS PRN
Status: DISCONTINUED | OUTPATIENT
Start: 2017-11-07 | End: 2017-11-10 | Stop reason: HOSPADM

## 2017-11-07 RX ORDER — FAMOTIDINE 20 MG/1
20 TABLET, FILM COATED ORAL DAILY
Status: DISCONTINUED | OUTPATIENT
Start: 2017-11-07 | End: 2017-11-08

## 2017-11-07 RX ORDER — SODIUM CHLORIDE 0.9 % (FLUSH) 0.9 %
1-10 SYRINGE (ML) INJECTION AS NEEDED
Status: DISCONTINUED | OUTPATIENT
Start: 2017-11-07 | End: 2017-11-10 | Stop reason: HOSPADM

## 2017-11-07 RX ORDER — LOSARTAN POTASSIUM 50 MG/1
100 TABLET ORAL NIGHTLY
Status: DISCONTINUED | OUTPATIENT
Start: 2017-11-07 | End: 2017-11-10 | Stop reason: HOSPADM

## 2017-11-07 RX ORDER — LEVETIRACETAM 500 MG/1
500 TABLET ORAL NIGHTLY
Status: DISCONTINUED | OUTPATIENT
Start: 2017-11-07 | End: 2017-11-10 | Stop reason: HOSPADM

## 2017-11-07 RX ORDER — NEBIVOLOL 10 MG/1
20 TABLET ORAL EVERY 24 HOURS
Status: DISCONTINUED | OUTPATIENT
Start: 2017-11-07 | End: 2017-11-10 | Stop reason: HOSPADM

## 2017-11-07 RX ORDER — CALCITRIOL 0.25 UG/1
0.5 CAPSULE, LIQUID FILLED ORAL EVERY 24 HOURS
Status: DISCONTINUED | OUTPATIENT
Start: 2017-11-07 | End: 2017-11-10 | Stop reason: HOSPADM

## 2017-11-07 RX ORDER — ONDANSETRON 4 MG/1
4 TABLET, FILM COATED ORAL EVERY 6 HOURS PRN
Status: DISCONTINUED | OUTPATIENT
Start: 2017-11-07 | End: 2017-11-10 | Stop reason: HOSPADM

## 2017-11-07 RX ORDER — METOCLOPRAMIDE 5 MG/1
2.5 TABLET ORAL
Status: DISCONTINUED | OUTPATIENT
Start: 2017-11-07 | End: 2017-11-07

## 2017-11-07 RX ORDER — NICARDIPINE HYDROCHLORIDE 20 MG/1
20 CAPSULE ORAL EVERY 8 HOURS SCHEDULED
Status: DISCONTINUED | OUTPATIENT
Start: 2017-11-07 | End: 2017-11-10

## 2017-11-07 RX ORDER — FOLIC ACID/VIT B COMPLEX AND C 0.8 MG
1 TABLET ORAL EVERY 24 HOURS
Status: DISCONTINUED | OUTPATIENT
Start: 2017-11-07 | End: 2017-11-10 | Stop reason: HOSPADM

## 2017-11-07 RX ORDER — SODIUM CHLORIDE 9 MG/ML
70 INJECTION, SOLUTION INTRAVENOUS CONTINUOUS PRN
Status: DISCONTINUED | OUTPATIENT
Start: 2017-11-07 | End: 2017-11-08 | Stop reason: HOSPADM

## 2017-11-07 RX ORDER — CETIRIZINE HYDROCHLORIDE 10 MG/1
10 TABLET ORAL DAILY
Status: DISCONTINUED | OUTPATIENT
Start: 2017-11-08 | End: 2017-11-10 | Stop reason: HOSPADM

## 2017-11-07 RX ORDER — HYDRALAZINE HYDROCHLORIDE 25 MG/1
100 TABLET, FILM COATED ORAL EVERY 8 HOURS SCHEDULED
Status: DISCONTINUED | OUTPATIENT
Start: 2017-11-07 | End: 2017-11-10 | Stop reason: HOSPADM

## 2017-11-07 RX ORDER — ONDANSETRON 2 MG/ML
4 INJECTION INTRAMUSCULAR; INTRAVENOUS EVERY 6 HOURS PRN
Status: DISCONTINUED | OUTPATIENT
Start: 2017-11-07 | End: 2017-11-10 | Stop reason: HOSPADM

## 2017-11-07 RX ORDER — ONDANSETRON 2 MG/ML
4 INJECTION INTRAMUSCULAR; INTRAVENOUS ONCE
Status: COMPLETED | OUTPATIENT
Start: 2017-11-07 | End: 2017-11-07

## 2017-11-07 RX ORDER — FLUCONAZOLE 2 MG/ML
200 INJECTION, SOLUTION INTRAVENOUS DAILY
Status: DISCONTINUED | OUTPATIENT
Start: 2017-11-08 | End: 2017-11-09

## 2017-11-07 RX ORDER — CALCIUM ACETATE 667 MG/1
667 TABLET ORAL
Status: DISCONTINUED | OUTPATIENT
Start: 2017-11-07 | End: 2017-11-10 | Stop reason: HOSPADM

## 2017-11-07 RX ORDER — CLONAZEPAM 0.5 MG/1
0.5 TABLET ORAL DAILY PRN
Status: DISCONTINUED | OUTPATIENT
Start: 2017-11-07 | End: 2017-11-10 | Stop reason: HOSPADM

## 2017-11-07 RX ORDER — HYDROCODONE BITARTRATE AND ACETAMINOPHEN 5; 325 MG/1; MG/1
1 TABLET ORAL EVERY 6 HOURS PRN
Status: DISCONTINUED | OUTPATIENT
Start: 2017-11-07 | End: 2017-11-10 | Stop reason: HOSPADM

## 2017-11-07 RX ORDER — NICARDIPINE HYDROCHLORIDE 20 MG/1
40 CAPSULE ORAL EVERY 8 HOURS SCHEDULED
Status: DISCONTINUED | OUTPATIENT
Start: 2017-11-07 | End: 2017-11-07

## 2017-11-07 RX ORDER — LEVOFLOXACIN 5 MG/ML
500 INJECTION, SOLUTION INTRAVENOUS
Status: DISCONTINUED | OUTPATIENT
Start: 2017-11-07 | End: 2017-11-08

## 2017-11-07 RX ORDER — HEPARIN SODIUM 5000 [USP'U]/ML
5000 INJECTION, SOLUTION INTRAVENOUS; SUBCUTANEOUS EVERY 8 HOURS SCHEDULED
Status: DISCONTINUED | OUTPATIENT
Start: 2017-11-07 | End: 2017-11-10 | Stop reason: HOSPADM

## 2017-11-07 RX ADMIN — ONDANSETRON 4 MG: 2 INJECTION INTRAMUSCULAR; INTRAVENOUS at 10:39

## 2017-11-07 RX ADMIN — ALUMINUM HYDROXIDE, MAGNESIUM HYDROXIDE, AND DIMETHICONE 15 ML: 400; 400; 40 SUSPENSION ORAL at 09:13

## 2017-11-07 RX ADMIN — SODIUM CHLORIDE 70 ML/HR: 9 INJECTION, SOLUTION INTRAVENOUS at 23:44

## 2017-11-07 RX ADMIN — HYDROMORPHONE HYDROCHLORIDE 0.5 MG: 1 INJECTION, SOLUTION INTRAMUSCULAR; INTRAVENOUS; SUBCUTANEOUS at 14:12

## 2017-11-07 RX ADMIN — IOPAMIDOL 100 ML: 612 INJECTION, SOLUTION INTRAVENOUS at 10:15

## 2017-11-07 RX ADMIN — LIDOCAINE HYDROCHLORIDE 15 ML: 20 SOLUTION ORAL; TOPICAL at 09:13

## 2017-11-07 RX ADMIN — NICARDIPINE HYDROCHLORIDE 20 MG: 20 CAPSULE ORAL at 23:43

## 2017-11-07 RX ADMIN — ONDANSETRON 4 MG: 2 INJECTION INTRAMUSCULAR; INTRAVENOUS at 09:14

## 2017-11-07 RX ADMIN — ACETAMINOPHEN 650 MG: 325 TABLET, FILM COATED ORAL at 23:36

## 2017-11-07 RX ADMIN — FAMOTIDINE 20 MG: 10 INJECTION, SOLUTION INTRAVENOUS at 09:12

## 2017-11-07 RX ADMIN — HYDROXYZINE PAMOATE 25 MG: 25 CAPSULE ORAL at 18:12

## 2017-11-07 RX ADMIN — METRONIDAZOLE 500 MG: 500 INJECTION, SOLUTION INTRAVENOUS at 23:38

## 2017-11-07 RX ADMIN — LEVETIRACETAM 500 MG: 500 TABLET, FILM COATED ORAL at 21:32

## 2017-11-07 RX ADMIN — HYDROXYCHLOROQUINE SULFATE 200 MG: 200 TABLET, FILM COATED ORAL at 21:41

## 2017-11-07 RX ADMIN — DIPHENHYDRAMINE HYDROCHLORIDE 12.5 MG: 50 INJECTION, SOLUTION INTRAMUSCULAR; INTRAVENOUS at 14:12

## 2017-11-07 RX ADMIN — HYDRALAZINE HYDROCHLORIDE 100 MG: 25 TABLET ORAL at 21:41

## 2017-11-07 RX ADMIN — HYDROMORPHONE HYDROCHLORIDE 0.5 MG: 1 INJECTION, SOLUTION INTRAMUSCULAR; INTRAVENOUS; SUBCUTANEOUS at 16:19

## 2017-11-07 RX ADMIN — CLONAZEPAM 0.5 MG: 0.5 TABLET ORAL at 23:50

## 2017-11-07 RX ADMIN — SODIUM CHLORIDE 1000 ML: 9 INJECTION, SOLUTION INTRAVENOUS at 12:59

## 2017-11-08 ENCOUNTER — ANESTHESIA (OUTPATIENT)
Dept: GASTROENTEROLOGY | Facility: HOSPITAL | Age: 27
End: 2017-11-08

## 2017-11-08 ENCOUNTER — ANESTHESIA EVENT (OUTPATIENT)
Dept: GASTROENTEROLOGY | Facility: HOSPITAL | Age: 27
End: 2017-11-08

## 2017-11-08 PROBLEM — R11.0 NAUSEA: Status: ACTIVE | Noted: 2017-11-07

## 2017-11-08 PROBLEM — K29.30 CHRONIC SUPERFICIAL GASTRITIS WITHOUT BLEEDING: Status: ACTIVE | Noted: 2017-11-08

## 2017-11-08 LAB
ANION GAP SERPL CALCULATED.3IONS-SCNC: 14 MMOL/L
B-HCG UR QL: NEGATIVE
BACTERIA SPEC AEROBE CULT: NORMAL
BACTERIA SPEC AEROBE CULT: NORMAL
BASOPHILS # BLD AUTO: 0.02 10*3/MM3 (ref 0–0.2)
BASOPHILS NFR BLD AUTO: 0.5 % (ref 0–2.5)
BUN BLD-MCNC: 25 MG/DL (ref 7–20)
BUN/CREAT SERPL: 3.2 (ref 7.1–23.5)
C DIFF TOX GENS STL QL NAA+PROBE: NOT DETECTED
CALCIUM SPEC-SCNC: 8 MG/DL (ref 8.4–10.2)
CHLORIDE SERPL-SCNC: 104 MMOL/L (ref 98–107)
CO2 SERPL-SCNC: 23 MMOL/L (ref 26–30)
CREAT BLD-MCNC: 7.9 MG/DL (ref 0.6–1.3)
DEPRECATED RDW RBC AUTO: 50.4 FL (ref 37–54)
EOSINOPHIL # BLD AUTO: 0.74 10*3/MM3 (ref 0–0.7)
EOSINOPHIL NFR BLD AUTO: 19.5 % (ref 0–7)
ERYTHROCYTE [DISTWIDTH] IN BLOOD BY AUTOMATED COUNT: 15 % (ref 11.5–14.5)
GFR SERPL CREATININE-BSD FRML MDRD: 6 ML/MIN/1.73
GLUCOSE BLD-MCNC: 97 MG/DL (ref 74–98)
HCT VFR BLD AUTO: 31.5 % (ref 37–47)
HGB BLD-MCNC: 9.9 G/DL (ref 12–16)
IMM GRANULOCYTES # BLD: 0.05 10*3/MM3 (ref 0–0.06)
IMM GRANULOCYTES NFR BLD: 1.3 % (ref 0–0.6)
LYMPHOCYTES # BLD AUTO: 0.54 10*3/MM3 (ref 0.6–3.4)
LYMPHOCYTES NFR BLD AUTO: 14.2 % (ref 10–50)
MCH RBC QN AUTO: 28.9 PG (ref 27–31)
MCHC RBC AUTO-ENTMCNC: 31.4 G/DL (ref 30–37)
MCV RBC AUTO: 92.1 FL (ref 81–99)
MONOCYTES # BLD AUTO: 0.38 10*3/MM3 (ref 0–0.9)
MONOCYTES NFR BLD AUTO: 10 % (ref 0–12)
NEUTROPHILS # BLD AUTO: 2.06 10*3/MM3 (ref 2–6.9)
NEUTROPHILS NFR BLD AUTO: 54.5 % (ref 37–80)
NRBC BLD MANUAL-RTO: 0 /100 WBC (ref 0–0)
PLATELET # BLD AUTO: 99 10*3/MM3 (ref 130–400)
PMV BLD AUTO: 11.6 FL (ref 6–12)
POTASSIUM BLD-SCNC: 4 MMOL/L (ref 3.5–5.1)
RBC # BLD AUTO: 3.42 10*6/MM3 (ref 4.2–5.4)
SODIUM BLD-SCNC: 137 MMOL/L (ref 137–145)
WBC NRBC COR # BLD: 3.79 10*3/MM3 (ref 4.8–10.8)

## 2017-11-08 PROCEDURE — 25010000002 HYDROMORPHONE PER 4 MG: Performed by: FAMILY MEDICINE

## 2017-11-08 PROCEDURE — 0DB68ZX EXCISION OF STOMACH, VIA NATURAL OR ARTIFICIAL OPENING ENDOSCOPIC, DIAGNOSTIC: ICD-10-PCS | Performed by: INTERNAL MEDICINE

## 2017-11-08 PROCEDURE — 99225 PR SBSQ OBSERVATION CARE/DAY 25 MINUTES: CPT | Performed by: INTERNAL MEDICINE

## 2017-11-08 PROCEDURE — 25010000002 MIDAZOLAM PER 1 MG: Performed by: NURSE ANESTHETIST, CERTIFIED REGISTERED

## 2017-11-08 PROCEDURE — 80048 BASIC METABOLIC PNL TOTAL CA: CPT | Performed by: FAMILY MEDICINE

## 2017-11-08 PROCEDURE — 25010000002 PROPOFOL 200 MG/20ML EMULSION: Performed by: NURSE ANESTHETIST, CERTIFIED REGISTERED

## 2017-11-08 PROCEDURE — 0DB98ZX EXCISION OF DUODENUM, VIA NATURAL OR ARTIFICIAL OPENING ENDOSCOPIC, DIAGNOSTIC: ICD-10-PCS | Performed by: INTERNAL MEDICINE

## 2017-11-08 PROCEDURE — 82941 ASSAY OF GASTRIN: CPT | Performed by: INTERNAL MEDICINE

## 2017-11-08 PROCEDURE — 88305 TISSUE EXAM BY PATHOLOGIST: CPT | Performed by: INTERNAL MEDICINE

## 2017-11-08 PROCEDURE — 44361 SMALL BOWEL ENDOSCOPY/BIOPSY: CPT | Performed by: INTERNAL MEDICINE

## 2017-11-08 PROCEDURE — 25010000002 HYDRALAZINE PER 20 MG: Performed by: NURSE ANESTHETIST, CERTIFIED REGISTERED

## 2017-11-08 PROCEDURE — 25010000002 ONDANSETRON PER 1 MG: Performed by: FAMILY MEDICINE

## 2017-11-08 PROCEDURE — 85025 COMPLETE CBC W/AUTO DIFF WBC: CPT | Performed by: FAMILY MEDICINE

## 2017-11-08 PROCEDURE — 0DB78ZX EXCISION OF STOMACH, PYLORUS, VIA NATURAL OR ARTIFICIAL OPENING ENDOSCOPIC, DIAGNOSTIC: ICD-10-PCS | Performed by: INTERNAL MEDICINE

## 2017-11-08 PROCEDURE — 88312 SPECIAL STAINS GROUP 1: CPT | Performed by: INTERNAL MEDICINE

## 2017-11-08 RX ORDER — PANTOPRAZOLE SODIUM 40 MG/10ML
40 INJECTION, POWDER, LYOPHILIZED, FOR SOLUTION INTRAVENOUS EVERY 12 HOURS SCHEDULED
Status: DISCONTINUED | OUTPATIENT
Start: 2017-11-08 | End: 2017-11-10 | Stop reason: HOSPADM

## 2017-11-08 RX ORDER — PROPOFOL 10 MG/ML
INJECTION, EMULSION INTRAVENOUS AS NEEDED
Status: DISCONTINUED | OUTPATIENT
Start: 2017-11-08 | End: 2017-11-08 | Stop reason: SURG

## 2017-11-08 RX ORDER — HYDRALAZINE HYDROCHLORIDE 20 MG/ML
INJECTION INTRAMUSCULAR; INTRAVENOUS AS NEEDED
Status: DISCONTINUED | OUTPATIENT
Start: 2017-11-08 | End: 2017-11-08 | Stop reason: SURG

## 2017-11-08 RX ORDER — MIDAZOLAM HYDROCHLORIDE 1 MG/ML
INJECTION INTRAMUSCULAR; INTRAVENOUS AS NEEDED
Status: DISCONTINUED | OUTPATIENT
Start: 2017-11-08 | End: 2017-11-08 | Stop reason: SURG

## 2017-11-08 RX ORDER — LABETALOL HYDROCHLORIDE 5 MG/ML
INJECTION, SOLUTION INTRAVENOUS AS NEEDED
Status: DISCONTINUED | OUTPATIENT
Start: 2017-11-08 | End: 2017-11-08 | Stop reason: SURG

## 2017-11-08 RX ORDER — CALCIUM ACETATE 667 MG/1
667 CAPSULE ORAL
Status: DISCONTINUED | OUTPATIENT
Start: 2017-11-08 | End: 2017-11-10 | Stop reason: HOSPADM

## 2017-11-08 RX ADMIN — NICARDIPINE HYDROCHLORIDE 20 MG: 20 CAPSULE ORAL at 21:11

## 2017-11-08 RX ADMIN — LOSARTAN POTASSIUM 100 MG: 50 TABLET ORAL at 21:12

## 2017-11-08 RX ADMIN — PROPOFOL 50 MG: 10 INJECTION, EMULSION INTRAVENOUS at 09:20

## 2017-11-08 RX ADMIN — HYDROMORPHONE HYDROCHLORIDE 0.5 MG: 1 INJECTION, SOLUTION INTRAMUSCULAR; INTRAVENOUS; SUBCUTANEOUS at 11:09

## 2017-11-08 RX ADMIN — NEBIVOLOL HYDROCHLORIDE 20 MG: 10 TABLET ORAL at 16:31

## 2017-11-08 RX ADMIN — CALCIUM ACETATE 667 MG: 667 CAPSULE ORAL at 18:28

## 2017-11-08 RX ADMIN — PROPOFOL 50 MG: 10 INJECTION, EMULSION INTRAVENOUS at 09:15

## 2017-11-08 RX ADMIN — LABETALOL HYDROCHLORIDE 10 MG: 5 INJECTION, SOLUTION INTRAVENOUS at 09:02

## 2017-11-08 RX ADMIN — HYDROXYCHLOROQUINE SULFATE 200 MG: 200 TABLET, FILM COATED ORAL at 10:49

## 2017-11-08 RX ADMIN — PROPOFOL 50 MG: 10 INJECTION, EMULSION INTRAVENOUS at 09:08

## 2017-11-08 RX ADMIN — HYDROXYCHLOROQUINE SULFATE 200 MG: 200 TABLET, FILM COATED ORAL at 21:11

## 2017-11-08 RX ADMIN — PROPOFOL 50 MG: 10 INJECTION, EMULSION INTRAVENOUS at 09:06

## 2017-11-08 RX ADMIN — HYDROCODONE BITARTRATE AND ACETAMINOPHEN 1 TABLET: 5; 325 TABLET ORAL at 17:15

## 2017-11-08 RX ADMIN — NYSTATIN 500000 UNITS: 100000 SUSPENSION ORAL at 10:48

## 2017-11-08 RX ADMIN — LABETALOL HYDROCHLORIDE 10 MG: 5 INJECTION, SOLUTION INTRAVENOUS at 08:59

## 2017-11-08 RX ADMIN — PROPOFOL 50 MG: 10 INJECTION, EMULSION INTRAVENOUS at 09:11

## 2017-11-08 RX ADMIN — HYDRALAZINE HYDROCHLORIDE 10 MG: 20 INJECTION INTRAMUSCULAR; INTRAVENOUS at 09:24

## 2017-11-08 RX ADMIN — SODIUM CHLORIDE 70 ML/HR: 9 INJECTION, SOLUTION INTRAVENOUS at 08:03

## 2017-11-08 RX ADMIN — NYSTATIN 500000 UNITS: 100000 SUSPENSION ORAL at 21:11

## 2017-11-08 RX ADMIN — HYDRALAZINE HYDROCHLORIDE 100 MG: 25 TABLET ORAL at 21:12

## 2017-11-08 RX ADMIN — HYDRALAZINE HYDROCHLORIDE 100 MG: 25 TABLET ORAL at 14:35

## 2017-11-08 RX ADMIN — Medication 10 ML: at 10:49

## 2017-11-08 RX ADMIN — PANTOPRAZOLE SODIUM 40 MG: 40 INJECTION, POWDER, FOR SOLUTION INTRAVENOUS at 21:11

## 2017-11-08 RX ADMIN — NYSTATIN 500000 UNITS: 100000 SUSPENSION ORAL at 18:30

## 2017-11-08 RX ADMIN — Medication 1 TABLET: at 16:32

## 2017-11-08 RX ADMIN — NICARDIPINE HYDROCHLORIDE 20 MG: 20 CAPSULE ORAL at 14:35

## 2017-11-08 RX ADMIN — TERAZOSIN HYDROCHLORIDE 10 MG: 5 CAPSULE ORAL at 21:15

## 2017-11-08 RX ADMIN — ONDANSETRON 4 MG: 2 INJECTION INTRAMUSCULAR; INTRAVENOUS at 17:13

## 2017-11-08 RX ADMIN — PANTOPRAZOLE SODIUM 40 MG: 40 INJECTION, POWDER, FOR SOLUTION INTRAVENOUS at 10:48

## 2017-11-08 RX ADMIN — MIDAZOLAM HYDROCHLORIDE 2 MG: 1 INJECTION, SOLUTION INTRAMUSCULAR; INTRAVENOUS at 09:00

## 2017-11-08 RX ADMIN — CALCITRIOL 0.5 MCG: 0.25 CAPSULE ORAL at 16:32

## 2017-11-08 RX ADMIN — SODIUM CHLORIDE 70 ML/HR: 9 INJECTION, SOLUTION INTRAVENOUS at 03:54

## 2017-11-08 RX ADMIN — LEVETIRACETAM 500 MG: 500 TABLET, FILM COATED ORAL at 21:12

## 2017-11-09 PROBLEM — B37.49 CANDIDAL UTI (URINARY TRACT INFECTION): Status: ACTIVE | Noted: 2017-11-09

## 2017-11-09 PROCEDURE — 99232 SBSQ HOSP IP/OBS MODERATE 35: CPT | Performed by: INTERNAL MEDICINE

## 2017-11-09 PROCEDURE — 25010000002 FLUCONAZOLE PER 200 MG: Performed by: FAMILY MEDICINE

## 2017-11-09 PROCEDURE — 5A1D70Z PERFORMANCE OF URINARY FILTRATION, INTERMITTENT, LESS THAN 6 HOURS PER DAY: ICD-10-PCS | Performed by: INTERNAL MEDICINE

## 2017-11-09 PROCEDURE — 99225 PR SBSQ OBSERVATION CARE/DAY 25 MINUTES: CPT | Performed by: INTERNAL MEDICINE

## 2017-11-09 PROCEDURE — 90935 HEMODIALYSIS ONE EVALUATION: CPT

## 2017-11-09 RX ORDER — FLUCONAZOLE 2 MG/ML
200 INJECTION, SOLUTION INTRAVENOUS
Status: DISCONTINUED | OUTPATIENT
Start: 2017-11-11 | End: 2017-11-10

## 2017-11-09 RX ORDER — MANNITOL 250 MG/ML
25 INJECTION, SOLUTION INTRAVENOUS AS NEEDED
Status: DISCONTINUED | OUTPATIENT
Start: 2017-11-09 | End: 2017-11-10 | Stop reason: HOSPADM

## 2017-11-09 RX ORDER — FLUCONAZOLE, SODIUM CHLORIDE 2 MG/ML
100 INJECTION INTRAVENOUS
Status: DISCONTINUED | OUTPATIENT
Start: 2017-11-10 | End: 2017-11-10

## 2017-11-09 RX ORDER — FLUCONAZOLE 2 MG/ML
200 INJECTION, SOLUTION INTRAVENOUS ONCE
Status: COMPLETED | OUTPATIENT
Start: 2017-11-09 | End: 2017-11-09

## 2017-11-09 RX ADMIN — HYDRALAZINE HYDROCHLORIDE 100 MG: 25 TABLET ORAL at 06:21

## 2017-11-09 RX ADMIN — NICARDIPINE HYDROCHLORIDE 20 MG: 20 CAPSULE ORAL at 13:35

## 2017-11-09 RX ADMIN — CETIRIZINE HYDROCHLORIDE 10 MG: 10 TABLET, FILM COATED ORAL at 20:30

## 2017-11-09 RX ADMIN — LOSARTAN POTASSIUM 100 MG: 50 TABLET ORAL at 20:30

## 2017-11-09 RX ADMIN — LEVETIRACETAM 500 MG: 500 TABLET, FILM COATED ORAL at 20:30

## 2017-11-09 RX ADMIN — NICARDIPINE HYDROCHLORIDE 20 MG: 20 CAPSULE ORAL at 22:04

## 2017-11-09 RX ADMIN — HYDRALAZINE HYDROCHLORIDE 100 MG: 25 TABLET ORAL at 13:36

## 2017-11-09 RX ADMIN — CALCIUM ACETATE 667 MG: 667 CAPSULE ORAL at 13:40

## 2017-11-09 RX ADMIN — CALCITRIOL 0.5 MCG: 0.25 CAPSULE ORAL at 16:26

## 2017-11-09 RX ADMIN — FLUCONAZOLE 200 MG: 2 INJECTION, SOLUTION INTRAVENOUS at 13:40

## 2017-11-09 RX ADMIN — NYSTATIN 500000 UNITS: 100000 SUSPENSION ORAL at 18:53

## 2017-11-09 RX ADMIN — NYSTATIN 500000 UNITS: 100000 SUSPENSION ORAL at 20:30

## 2017-11-09 RX ADMIN — PANTOPRAZOLE SODIUM 40 MG: 40 INJECTION, POWDER, FOR SOLUTION INTRAVENOUS at 08:30

## 2017-11-09 RX ADMIN — PANTOPRAZOLE SODIUM 40 MG: 40 INJECTION, POWDER, FOR SOLUTION INTRAVENOUS at 20:30

## 2017-11-09 RX ADMIN — CLONAZEPAM 0.5 MG: 0.5 TABLET ORAL at 01:32

## 2017-11-09 RX ADMIN — HYDROXYCHLOROQUINE SULFATE 200 MG: 200 TABLET, FILM COATED ORAL at 20:30

## 2017-11-09 RX ADMIN — CALCIUM ACETATE 667 MG: 667 CAPSULE ORAL at 18:53

## 2017-11-09 RX ADMIN — NYSTATIN 500000 UNITS: 100000 SUSPENSION ORAL at 08:30

## 2017-11-09 RX ADMIN — NICARDIPINE HYDROCHLORIDE 20 MG: 20 CAPSULE ORAL at 06:21

## 2017-11-09 RX ADMIN — HYDROXYCHLOROQUINE SULFATE 200 MG: 200 TABLET, FILM COATED ORAL at 08:30

## 2017-11-09 RX ADMIN — TERAZOSIN HYDROCHLORIDE 10 MG: 5 CAPSULE ORAL at 20:30

## 2017-11-09 RX ADMIN — HYDRALAZINE HYDROCHLORIDE 100 MG: 25 TABLET ORAL at 22:04

## 2017-11-09 RX ADMIN — NEBIVOLOL HYDROCHLORIDE 20 MG: 10 TABLET ORAL at 16:27

## 2017-11-09 RX ADMIN — Medication 1 TABLET: at 16:27

## 2017-11-09 RX ADMIN — CALCIUM ACETATE 667 MG: 667 CAPSULE ORAL at 08:30

## 2017-11-10 VITALS
RESPIRATION RATE: 16 BRPM | HEIGHT: 67 IN | OXYGEN SATURATION: 98 % | DIASTOLIC BLOOD PRESSURE: 86 MMHG | BODY MASS INDEX: 25.41 KG/M2 | WEIGHT: 161.9 LBS | SYSTOLIC BLOOD PRESSURE: 142 MMHG | HEART RATE: 85 BPM | TEMPERATURE: 98.2 F

## 2017-11-10 LAB
ANION GAP SERPL CALCULATED.3IONS-SCNC: 12.1 MMOL/L
BACTERIA SPEC AEROBE CULT: NORMAL
BASOPHILS # BLD AUTO: 0.01 10*3/MM3 (ref 0–0.2)
BASOPHILS NFR BLD AUTO: 0.3 % (ref 0–2.5)
BUN BLD-MCNC: 14 MG/DL (ref 7–20)
BUN/CREAT SERPL: 2 (ref 7.1–23.5)
CALCIUM SPEC-SCNC: 8.5 MG/DL (ref 8.4–10.2)
CHLORIDE SERPL-SCNC: 107 MMOL/L (ref 98–107)
CO2 SERPL-SCNC: 24 MMOL/L (ref 26–30)
CREAT BLD-MCNC: 6.9 MG/DL (ref 0.6–1.3)
DEPRECATED RDW RBC AUTO: 51.5 FL (ref 37–54)
EOSINOPHIL # BLD AUTO: 0.57 10*3/MM3 (ref 0–0.7)
EOSINOPHIL NFR BLD AUTO: 18.9 % (ref 0–7)
ERYTHROCYTE [DISTWIDTH] IN BLOOD BY AUTOMATED COUNT: 15 % (ref 11.5–14.5)
GASTRIN SERPL-MCNC: 76 PG/ML (ref 0–115)
GFR SERPL CREATININE-BSD FRML MDRD: 7 ML/MIN/1.73
GLUCOSE BLD-MCNC: 104 MG/DL (ref 74–98)
HCT VFR BLD AUTO: 31.1 % (ref 37–47)
HGB BLD-MCNC: 9.8 G/DL (ref 12–16)
IMM GRANULOCYTES # BLD: 0.02 10*3/MM3 (ref 0–0.06)
IMM GRANULOCYTES NFR BLD: 0.7 % (ref 0–0.6)
LYMPHOCYTES # BLD AUTO: 0.52 10*3/MM3 (ref 0.6–3.4)
LYMPHOCYTES NFR BLD AUTO: 17.2 % (ref 10–50)
MCH RBC QN AUTO: 29.5 PG (ref 27–31)
MCHC RBC AUTO-ENTMCNC: 31.5 G/DL (ref 30–37)
MCV RBC AUTO: 93.7 FL (ref 81–99)
MONOCYTES # BLD AUTO: 0.22 10*3/MM3 (ref 0–0.9)
MONOCYTES NFR BLD AUTO: 7.3 % (ref 0–12)
NEUTROPHILS # BLD AUTO: 1.68 10*3/MM3 (ref 2–6.9)
NEUTROPHILS NFR BLD AUTO: 55.6 % (ref 37–80)
NRBC BLD MANUAL-RTO: 0 /100 WBC (ref 0–0)
PLATELET # BLD AUTO: 142 10*3/MM3 (ref 130–400)
PMV BLD AUTO: 12.2 FL (ref 6–12)
POTASSIUM BLD-SCNC: 4.1 MMOL/L (ref 3.5–5.1)
RBC # BLD AUTO: 3.32 10*6/MM3 (ref 4.2–5.4)
SODIUM BLD-SCNC: 139 MMOL/L (ref 137–145)
WBC NRBC COR # BLD: 3.02 10*3/MM3 (ref 4.8–10.8)

## 2017-11-10 PROCEDURE — 99239 HOSP IP/OBS DSCHRG MGMT >30: CPT | Performed by: INTERNAL MEDICINE

## 2017-11-10 PROCEDURE — 82941 ASSAY OF GASTRIN: CPT | Performed by: INTERNAL MEDICINE

## 2017-11-10 PROCEDURE — 80048 BASIC METABOLIC PNL TOTAL CA: CPT | Performed by: INTERNAL MEDICINE

## 2017-11-10 PROCEDURE — 25010000002 HYDRALAZINE PER 20 MG: Performed by: INTERNAL MEDICINE

## 2017-11-10 PROCEDURE — 85025 COMPLETE CBC W/AUTO DIFF WBC: CPT | Performed by: INTERNAL MEDICINE

## 2017-11-10 RX ORDER — PANTOPRAZOLE SODIUM 40 MG/1
40 TABLET, DELAYED RELEASE ORAL DAILY
Qty: 30 TABLET | Refills: 1 | Status: SHIPPED | OUTPATIENT
Start: 2017-11-10 | End: 2017-11-23 | Stop reason: HOSPADM

## 2017-11-10 RX ORDER — HYDRALAZINE HYDROCHLORIDE 20 MG/ML
10 INJECTION INTRAMUSCULAR; INTRAVENOUS ONCE
Status: COMPLETED | OUTPATIENT
Start: 2017-11-10 | End: 2017-11-10

## 2017-11-10 RX ORDER — NICARDIPINE HYDROCHLORIDE 20 MG/1
40 CAPSULE ORAL EVERY 12 HOURS SCHEDULED
Qty: 120 CAPSULE | Refills: 5 | Status: SHIPPED | OUTPATIENT
Start: 2017-11-10 | End: 2018-02-01 | Stop reason: HOSPADM

## 2017-11-10 RX ORDER — NICARDIPINE HYDROCHLORIDE 20 MG/1
40 CAPSULE ORAL EVERY 12 HOURS SCHEDULED
Status: DISCONTINUED | OUTPATIENT
Start: 2017-11-10 | End: 2017-11-10 | Stop reason: HOSPADM

## 2017-11-10 RX ADMIN — Medication 10 ML: at 08:37

## 2017-11-10 RX ADMIN — NEBIVOLOL HYDROCHLORIDE 20 MG: 10 TABLET ORAL at 06:02

## 2017-11-10 RX ADMIN — HYDROXYCHLOROQUINE SULFATE 200 MG: 200 TABLET, FILM COATED ORAL at 06:02

## 2017-11-10 RX ADMIN — NICARDIPINE HYDROCHLORIDE 20 MG: 20 CAPSULE ORAL at 05:45

## 2017-11-10 RX ADMIN — NYSTATIN 500000 UNITS: 100000 SUSPENSION ORAL at 08:35

## 2017-11-10 RX ADMIN — CALCIUM ACETATE 667 MG: 667 CAPSULE ORAL at 08:35

## 2017-11-10 RX ADMIN — PANTOPRAZOLE SODIUM 40 MG: 40 INJECTION, POWDER, FOR SOLUTION INTRAVENOUS at 08:35

## 2017-11-10 RX ADMIN — HYDRALAZINE HYDROCHLORIDE 100 MG: 25 TABLET ORAL at 05:44

## 2017-11-10 RX ADMIN — LOSARTAN POTASSIUM 100 MG: 50 TABLET ORAL at 06:02

## 2017-11-10 RX ADMIN — HYDRALAZINE HYDROCHLORIDE 10 MG: 20 INJECTION INTRAMUSCULAR; INTRAVENOUS at 01:14

## 2017-11-12 LAB — GASTRIN SERPL-MCNC: 126 PG/ML (ref 0–115)

## 2017-11-19 ENCOUNTER — APPOINTMENT (OUTPATIENT)
Dept: GENERAL RADIOLOGY | Facility: HOSPITAL | Age: 27
End: 2017-11-19

## 2017-11-19 ENCOUNTER — HOSPITAL ENCOUNTER (INPATIENT)
Facility: HOSPITAL | Age: 27
LOS: 4 days | Discharge: HOME | End: 2017-11-23
Attending: EMERGENCY MEDICINE | Admitting: INTERNAL MEDICINE

## 2017-11-19 ENCOUNTER — APPOINTMENT (OUTPATIENT)
Dept: CT IMAGING | Facility: HOSPITAL | Age: 27
End: 2017-11-19

## 2017-11-19 DIAGNOSIS — M32.9 SYSTEMIC LUPUS ERYTHEMATOSUS, UNSPECIFIED SLE TYPE, UNSPECIFIED ORGAN INVOLVEMENT STATUS (HCC): ICD-10-CM

## 2017-11-19 DIAGNOSIS — I16.9 HYPERTENSIVE CRISIS: Primary | ICD-10-CM

## 2017-11-19 DIAGNOSIS — N18.6 END STAGE RENAL DISEASE (HCC): ICD-10-CM

## 2017-11-19 DIAGNOSIS — J90 BILATERAL PLEURAL EFFUSION: ICD-10-CM

## 2017-11-19 LAB
ALBUMIN SERPL-MCNC: 2.6 G/DL (ref 3.5–5)
ALBUMIN/GLOB SERPL: 0.9 G/DL (ref 1–2)
ALP SERPL-CCNC: 51 U/L (ref 38–126)
ALT SERPL W P-5'-P-CCNC: 26 U/L (ref 13–69)
ANION GAP SERPL CALCULATED.3IONS-SCNC: 14.6 MMOL/L
ANISOCYTOSIS BLD QL: NORMAL
ARTERIAL PATENCY WRIST A: ABNORMAL
ARTERIAL PATENCY WRIST A: ABNORMAL
AST SERPL-CCNC: 51 U/L (ref 15–46)
BASE EXCESS BLDA CALC-SCNC: 3.3 MMOL/L
BASE EXCESS BLDA CALC-SCNC: 3.3 MMOL/L
BASOPHILS # BLD AUTO: 0.01 10*3/MM3 (ref 0–0.2)
BASOPHILS NFR BLD AUTO: 0.3 % (ref 0–2.5)
BDY SITE: ABNORMAL
BDY SITE: ABNORMAL
BILIRUB SERPL-MCNC: 0.5 MG/DL (ref 0.2–1.3)
BUN BLD-MCNC: 29 MG/DL (ref 7–20)
BUN/CREAT SERPL: 4.3 (ref 7.1–23.5)
CALCIUM SPEC-SCNC: 8.6 MG/DL (ref 8.4–10.2)
CHLORIDE SERPL-SCNC: 101 MMOL/L (ref 98–107)
CO2 SERPL-SCNC: 25 MMOL/L (ref 26–30)
COHGB MFR BLD: 1.2 %
COHGB MFR BLD: 1.2 %
CREAT BLD-MCNC: 6.7 MG/DL (ref 0.6–1.3)
CRP SERPL-MCNC: 3.1 MG/DL (ref 0–1)
D-LACTATE SERPL-SCNC: 0.7 MMOL/L (ref 0.5–2)
DACRYOCYTES BLD QL SMEAR: NORMAL
DEPRECATED RDW RBC AUTO: 50.9 FL (ref 37–54)
EOSINOPHIL # BLD AUTO: 0.52 10*3/MM3 (ref 0–0.7)
EOSINOPHIL NFR BLD AUTO: 13.7 % (ref 0–7)
ERYTHROCYTE [DISTWIDTH] IN BLOOD BY AUTOMATED COUNT: 15.6 % (ref 11.5–14.5)
ERYTHROCYTE [SEDIMENTATION RATE] IN BLOOD: 10 MM/HR (ref 0–20)
GFR SERPL CREATININE-BSD FRML MDRD: 7 ML/MIN/1.73
GLOBULIN UR ELPH-MCNC: 2.8 GM/DL
GLUCOSE BLD-MCNC: 103 MG/DL (ref 74–98)
HCO3 BLDA-SCNC: 27 MMOL/L (ref 22–28)
HCO3 BLDA-SCNC: 27 MMOL/L (ref 22–28)
HCT VFR BLD AUTO: 29.9 % (ref 37–47)
HGB BLD-MCNC: 9.4 G/DL (ref 12–16)
HGB BLDA-MCNC: 9.8 G/DL (ref 12–18)
HGB BLDA-MCNC: 9.8 G/DL (ref 12–18)
HOROWITZ INDEX BLD+IHG-RTO: 21 %
HOROWITZ INDEX BLD+IHG-RTO: 21 %
HYPOCHROMIA BLD QL: NORMAL
IMM GRANULOCYTES # BLD: 0.03 10*3/MM3 (ref 0–0.06)
IMM GRANULOCYTES NFR BLD: 0.8 % (ref 0–0.6)
LARGE PLATELETS: NORMAL
LIPASE SERPL-CCNC: 123 U/L (ref 23–300)
LYMPHOCYTES # BLD AUTO: 0.57 10*3/MM3 (ref 0.6–3.4)
LYMPHOCYTES NFR BLD AUTO: 15 % (ref 10–50)
MAGNESIUM SERPL-MCNC: 1.8 MG/DL (ref 1.6–2.3)
MCH RBC QN AUTO: 28.1 PG (ref 27–31)
MCHC RBC AUTO-ENTMCNC: 31.4 G/DL (ref 30–37)
MCV RBC AUTO: 89.3 FL (ref 81–99)
METHGB BLD QL: 1.4 %
METHGB BLD QL: 1.4 %
MODALITY: ABNORMAL
MODALITY: ABNORMAL
MONOCYTES # BLD AUTO: 0.22 10*3/MM3 (ref 0–0.9)
MONOCYTES NFR BLD AUTO: 5.8 % (ref 0–12)
NEUTROPHILS # BLD AUTO: 2.44 10*3/MM3 (ref 2–6.9)
NEUTROPHILS NFR BLD AUTO: 64.4 % (ref 37–80)
NRBC BLD MANUAL-RTO: 0 /100 WBC (ref 0–0)
OXYHGB MFR BLDV: 94.3 % (ref 94–99)
OXYHGB MFR BLDV: 94.3 % (ref 94–99)
PCO2 BLDA: 37.1 MM HG (ref 35–45)
PCO2 BLDA: 37.1 MM HG (ref 35–45)
PH BLDA: 7.47 PH UNITS (ref 7.3–7.5)
PH BLDA: 7.47 PH UNITS (ref 7.3–7.5)
PLATELET # BLD AUTO: 71 10*3/MM3 (ref 130–400)
PMV BLD AUTO: ABNORMAL FL (ref 6–12)
PO2 BLDA: 82.5 MM HG (ref 75–100)
PO2 BLDA: 82.5 MM HG (ref 75–100)
POIKILOCYTOSIS BLD QL SMEAR: NORMAL
POTASSIUM BLD-SCNC: 4.6 MMOL/L (ref 3.5–5.1)
PROT SERPL-MCNC: 5.4 G/DL (ref 6.3–8.2)
RBC # BLD AUTO: 3.35 10*6/MM3 (ref 4.2–5.4)
SAO2 % BLDCOA: 96.8 %
SAO2 % BLDCOA: 96.8 %
SMALL PLATELETS BLD QL SMEAR: NORMAL
SODIUM BLD-SCNC: 136 MMOL/L (ref 137–145)
TROPONIN I SERPL-MCNC: 0.06 NG/ML (ref 0–0.03)
WBC MORPH BLD: NORMAL
WBC NRBC COR # BLD: 3.79 10*3/MM3 (ref 4.8–10.8)

## 2017-11-19 PROCEDURE — 87081 CULTURE SCREEN ONLY: CPT | Performed by: INTERNAL MEDICINE

## 2017-11-19 PROCEDURE — 83050 HGB METHEMOGLOBIN QUAN: CPT

## 2017-11-19 PROCEDURE — 83690 ASSAY OF LIPASE: CPT | Performed by: EMERGENCY MEDICINE

## 2017-11-19 PROCEDURE — 71250 CT THORAX DX C-: CPT

## 2017-11-19 PROCEDURE — 82805 BLOOD GASES W/O2 SATURATION: CPT

## 2017-11-19 PROCEDURE — 85651 RBC SED RATE NONAUTOMATED: CPT | Performed by: NURSE PRACTITIONER

## 2017-11-19 PROCEDURE — 83735 ASSAY OF MAGNESIUM: CPT | Performed by: EMERGENCY MEDICINE

## 2017-11-19 PROCEDURE — 86140 C-REACTIVE PROTEIN: CPT | Performed by: NURSE PRACTITIONER

## 2017-11-19 PROCEDURE — 87040 BLOOD CULTURE FOR BACTERIA: CPT | Performed by: EMERGENCY MEDICINE

## 2017-11-19 PROCEDURE — 36600 WITHDRAWAL OF ARTERIAL BLOOD: CPT

## 2017-11-19 PROCEDURE — 93005 ELECTROCARDIOGRAM TRACING: CPT | Performed by: EMERGENCY MEDICINE

## 2017-11-19 PROCEDURE — 71010 HC CHEST PA OR AP: CPT

## 2017-11-19 PROCEDURE — 25010000002 HYDROMORPHONE PER 4 MG: Performed by: NURSE PRACTITIONER

## 2017-11-19 PROCEDURE — 83605 ASSAY OF LACTIC ACID: CPT | Performed by: EMERGENCY MEDICINE

## 2017-11-19 PROCEDURE — 85007 BL SMEAR W/DIFF WBC COUNT: CPT | Performed by: EMERGENCY MEDICINE

## 2017-11-19 PROCEDURE — 99223 1ST HOSP IP/OBS HIGH 75: CPT | Performed by: INTERNAL MEDICINE

## 2017-11-19 PROCEDURE — 25010000002 DIPHENHYDRAMINE PER 50 MG: Performed by: NURSE PRACTITIONER

## 2017-11-19 PROCEDURE — 84484 ASSAY OF TROPONIN QUANT: CPT | Performed by: EMERGENCY MEDICINE

## 2017-11-19 PROCEDURE — 82375 ASSAY CARBOXYHB QUANT: CPT

## 2017-11-19 PROCEDURE — 80053 COMPREHEN METABOLIC PANEL: CPT | Performed by: EMERGENCY MEDICINE

## 2017-11-19 PROCEDURE — 74176 CT ABD & PELVIS W/O CONTRAST: CPT

## 2017-11-19 PROCEDURE — 25010000002 PROMETHAZINE PER 50 MG: Performed by: NURSE PRACTITIONER

## 2017-11-19 PROCEDURE — 99285 EMERGENCY DEPT VISIT HI MDM: CPT

## 2017-11-19 PROCEDURE — 85025 COMPLETE CBC W/AUTO DIFF WBC: CPT | Performed by: EMERGENCY MEDICINE

## 2017-11-19 RX ORDER — SODIUM CHLORIDE 0.9 % (FLUSH) 0.9 %
1-10 SYRINGE (ML) INJECTION AS NEEDED
Status: DISCONTINUED | OUTPATIENT
Start: 2017-11-19 | End: 2017-11-23 | Stop reason: HOSPADM

## 2017-11-19 RX ORDER — PANTOPRAZOLE SODIUM 40 MG/1
40 TABLET, DELAYED RELEASE ORAL
Status: DISCONTINUED | OUTPATIENT
Start: 2017-11-20 | End: 2017-11-23 | Stop reason: HOSPADM

## 2017-11-19 RX ORDER — HYDRALAZINE HYDROCHLORIDE 25 MG/1
100 TABLET, FILM COATED ORAL EVERY 8 HOURS SCHEDULED
Status: DISCONTINUED | OUTPATIENT
Start: 2017-11-19 | End: 2017-11-23 | Stop reason: HOSPADM

## 2017-11-19 RX ORDER — FOLIC ACID/VIT B COMPLEX AND C 0.8 MG
1 TABLET ORAL DAILY
Status: DISCONTINUED | OUTPATIENT
Start: 2017-11-20 | End: 2017-11-23 | Stop reason: HOSPADM

## 2017-11-19 RX ORDER — CALCIUM ACETATE 667 MG/1
1334 CAPSULE ORAL
Status: DISCONTINUED | OUTPATIENT
Start: 2017-11-20 | End: 2017-11-23 | Stop reason: HOSPADM

## 2017-11-19 RX ORDER — NEBIVOLOL 10 MG/1
20 TABLET ORAL
Status: DISCONTINUED | OUTPATIENT
Start: 2017-11-20 | End: 2017-11-23 | Stop reason: HOSPADM

## 2017-11-19 RX ORDER — DIPHENHYDRAMINE HYDROCHLORIDE 50 MG/ML
25 INJECTION INTRAMUSCULAR; INTRAVENOUS ONCE
Status: COMPLETED | OUTPATIENT
Start: 2017-11-19 | End: 2017-11-19

## 2017-11-19 RX ORDER — HYDROXYCHLOROQUINE SULFATE 200 MG/1
200 TABLET, FILM COATED ORAL EVERY 12 HOURS SCHEDULED
Status: DISCONTINUED | OUTPATIENT
Start: 2017-11-19 | End: 2017-11-23 | Stop reason: HOSPADM

## 2017-11-19 RX ORDER — CALCITRIOL 0.25 UG/1
0.5 CAPSULE, LIQUID FILLED ORAL TAKE AS DIRECTED
Status: DISCONTINUED | OUTPATIENT
Start: 2017-11-19 | End: 2017-11-23 | Stop reason: HOSPADM

## 2017-11-19 RX ORDER — LEVETIRACETAM 500 MG/1
500 TABLET ORAL NIGHTLY
Status: DISCONTINUED | OUTPATIENT
Start: 2017-11-19 | End: 2017-11-23 | Stop reason: HOSPADM

## 2017-11-19 RX ORDER — NALOXONE HCL 0.4 MG/ML
0.4 VIAL (ML) INJECTION
Status: DISCONTINUED | OUTPATIENT
Start: 2017-11-19 | End: 2017-11-23 | Stop reason: HOSPADM

## 2017-11-19 RX ORDER — ONDANSETRON 2 MG/ML
4 INJECTION INTRAMUSCULAR; INTRAVENOUS EVERY 6 HOURS PRN
Status: DISCONTINUED | OUTPATIENT
Start: 2017-11-19 | End: 2017-11-23 | Stop reason: HOSPADM

## 2017-11-19 RX ORDER — PANTOPRAZOLE SODIUM 40 MG/10ML
80 INJECTION, POWDER, LYOPHILIZED, FOR SOLUTION INTRAVENOUS ONCE
Status: COMPLETED | OUTPATIENT
Start: 2017-11-19 | End: 2017-11-19

## 2017-11-19 RX ORDER — PROMETHAZINE HYDROCHLORIDE 25 MG/ML
6.25 INJECTION, SOLUTION INTRAMUSCULAR; INTRAVENOUS ONCE
Status: COMPLETED | OUTPATIENT
Start: 2017-11-19 | End: 2017-11-19

## 2017-11-19 RX ORDER — LOSARTAN POTASSIUM 50 MG/1
100 TABLET ORAL NIGHTLY
Status: DISCONTINUED | OUTPATIENT
Start: 2017-11-19 | End: 2017-11-23 | Stop reason: HOSPADM

## 2017-11-19 RX ORDER — TERAZOSIN 5 MG/1
5 CAPSULE ORAL 2 TIMES DAILY
Status: DISCONTINUED | OUTPATIENT
Start: 2017-11-20 | End: 2017-11-23 | Stop reason: HOSPADM

## 2017-11-19 RX ORDER — CALCIUM ACETATE 667 MG/1
667 CAPSULE ORAL
Status: DISCONTINUED | OUTPATIENT
Start: 2017-11-20 | End: 2017-11-19

## 2017-11-19 RX ORDER — HYDROCODONE BITARTRATE AND ACETAMINOPHEN 5; 325 MG/1; MG/1
1 TABLET ORAL EVERY 6 HOURS PRN
Status: DISCONTINUED | OUTPATIENT
Start: 2017-11-19 | End: 2017-11-23 | Stop reason: HOSPADM

## 2017-11-19 RX ORDER — ACETAMINOPHEN 325 MG/1
650 TABLET ORAL EVERY 4 HOURS PRN
Status: DISCONTINUED | OUTPATIENT
Start: 2017-11-19 | End: 2017-11-23 | Stop reason: HOSPADM

## 2017-11-19 RX ORDER — SODIUM CHLORIDE 9 MG/ML
INJECTION, SOLUTION INTRAVENOUS
Status: DISPENSED
Start: 2017-11-19 | End: 2017-11-20

## 2017-11-19 RX ORDER — HYDROXYZINE PAMOATE 25 MG/1
25 CAPSULE ORAL EVERY 4 HOURS PRN
Status: DISCONTINUED | OUTPATIENT
Start: 2017-11-19 | End: 2017-11-23 | Stop reason: HOSPADM

## 2017-11-19 RX ORDER — SODIUM CHLORIDE 0.9 % (FLUSH) 0.9 %
10 SYRINGE (ML) INJECTION AS NEEDED
Status: DISCONTINUED | OUTPATIENT
Start: 2017-11-19 | End: 2017-11-23 | Stop reason: HOSPADM

## 2017-11-19 RX ORDER — ONDANSETRON 4 MG/1
4 TABLET, ORALLY DISINTEGRATING ORAL EVERY 6 HOURS PRN
Status: DISCONTINUED | OUTPATIENT
Start: 2017-11-19 | End: 2017-11-23 | Stop reason: HOSPADM

## 2017-11-19 RX ORDER — CALCIUM ACETATE 667 MG/1
667 TABLET ORAL
Status: DISCONTINUED | OUTPATIENT
Start: 2017-11-20 | End: 2017-11-19

## 2017-11-19 RX ADMIN — LOSARTAN POTASSIUM 100 MG: 50 TABLET ORAL at 23:18

## 2017-11-19 RX ADMIN — HYDROMORPHONE HYDROCHLORIDE 1 MG: 1 INJECTION, SOLUTION INTRAMUSCULAR; INTRAVENOUS; SUBCUTANEOUS at 20:04

## 2017-11-19 RX ADMIN — PANTOPRAZOLE SODIUM 80 MG: 40 INJECTION, POWDER, FOR SOLUTION INTRAVENOUS at 18:30

## 2017-11-19 RX ADMIN — DIPHENHYDRAMINE HYDROCHLORIDE 25 MG: 50 INJECTION, SOLUTION INTRAMUSCULAR; INTRAVENOUS at 20:09

## 2017-11-19 RX ADMIN — LEVETIRACETAM 500 MG: 500 TABLET, FILM COATED ORAL at 23:18

## 2017-11-19 RX ADMIN — SODIUM CHLORIDE 5 MG/HR: 9 INJECTION, SOLUTION INTRAVENOUS at 21:01

## 2017-11-19 RX ADMIN — CALCIUM ACETATE 1334 MG: 667 CAPSULE ORAL at 23:24

## 2017-11-19 RX ADMIN — HYDROMORPHONE HYDROCHLORIDE 1 MG: 1 INJECTION, SOLUTION INTRAMUSCULAR; INTRAVENOUS; SUBCUTANEOUS at 18:30

## 2017-11-19 RX ADMIN — PROMETHAZINE HYDROCHLORIDE 6.25 MG: 25 INJECTION INTRAMUSCULAR; INTRAVENOUS at 18:30

## 2017-11-19 RX ADMIN — HYDRALAZINE HYDROCHLORIDE 100 MG: 25 TABLET ORAL at 23:18

## 2017-11-19 RX ADMIN — HYDROXYCHLOROQUINE SULFATE 200 MG: 200 TABLET, FILM COATED ORAL at 23:18

## 2017-11-20 LAB
ALBUMIN SERPL-MCNC: 2.2 G/DL (ref 3.5–5)
ALBUMIN/GLOB SERPL: 0.8 G/DL (ref 1–2)
ALP SERPL-CCNC: 48 U/L (ref 38–126)
ALT SERPL W P-5'-P-CCNC: 27 U/L (ref 13–69)
ANION GAP SERPL CALCULATED.3IONS-SCNC: 13.5 MMOL/L
ANISOCYTOSIS BLD QL: NORMAL
AST SERPL-CCNC: 42 U/L (ref 15–46)
BACTERIA UR QL AUTO: ABNORMAL /HPF
BASOPHILS # BLD AUTO: 0.01 10*3/MM3 (ref 0–0.2)
BASOPHILS NFR BLD AUTO: 0.3 % (ref 0–2.5)
BILIRUB SERPL-MCNC: 0.2 MG/DL (ref 0.2–1.3)
BILIRUB UR QL STRIP: NEGATIVE
BUN BLD-MCNC: 33 MG/DL (ref 7–20)
BUN/CREAT SERPL: 4.4 (ref 7.1–23.5)
CALCIUM SPEC-SCNC: 8.5 MG/DL (ref 8.4–10.2)
CHLORIDE SERPL-SCNC: 102 MMOL/L (ref 98–107)
CLARITY UR: CLEAR
CO2 SERPL-SCNC: 25 MMOL/L (ref 26–30)
COLOR UR: YELLOW
CREAT BLD-MCNC: 7.5 MG/DL (ref 0.6–1.3)
DEPRECATED RDW RBC AUTO: 51.1 FL (ref 37–54)
EOSINOPHIL # BLD AUTO: 0.49 10*3/MM3 (ref 0–0.7)
EOSINOPHIL NFR BLD AUTO: 12.8 % (ref 0–7)
ERYTHROCYTE [DISTWIDTH] IN BLOOD BY AUTOMATED COUNT: 15.6 % (ref 11.5–14.5)
GFR SERPL CREATININE-BSD FRML MDRD: 7 ML/MIN/1.73
GLOBULIN UR ELPH-MCNC: 2.6 GM/DL
GLUCOSE BLD-MCNC: 103 MG/DL (ref 74–98)
GLUCOSE UR STRIP-MCNC: ABNORMAL MG/DL
HCT VFR BLD AUTO: 27.1 % (ref 37–47)
HGB BLD-MCNC: 8.5 G/DL (ref 12–16)
HGB UR QL STRIP.AUTO: ABNORMAL
HYALINE CASTS UR QL AUTO: ABNORMAL /LPF
HYPOCHROMIA BLD QL: NORMAL
IMM GRANULOCYTES # BLD: 0.03 10*3/MM3 (ref 0–0.06)
IMM GRANULOCYTES NFR BLD: 0.8 % (ref 0–0.6)
KETONES UR QL STRIP: NEGATIVE
LEUKOCYTE ESTERASE UR QL STRIP.AUTO: NEGATIVE
LIPASE SERPL-CCNC: 85 U/L (ref 23–300)
LYMPHOCYTES # BLD AUTO: 0.55 10*3/MM3 (ref 0.6–3.4)
LYMPHOCYTES NFR BLD AUTO: 14.4 % (ref 10–50)
MCH RBC QN AUTO: 28.1 PG (ref 27–31)
MCHC RBC AUTO-ENTMCNC: 31.4 G/DL (ref 30–37)
MCV RBC AUTO: 89.7 FL (ref 81–99)
MONOCYTES # BLD AUTO: 0.22 10*3/MM3 (ref 0–0.9)
MONOCYTES NFR BLD AUTO: 5.8 % (ref 0–12)
NEUTROPHILS # BLD AUTO: 2.52 10*3/MM3 (ref 2–6.9)
NEUTROPHILS NFR BLD AUTO: 65.9 % (ref 37–80)
NITRITE UR QL STRIP: NEGATIVE
NRBC BLD MANUAL-RTO: 0 /100 WBC (ref 0–0)
PH UR STRIP.AUTO: 8.5 [PH] (ref 5–8)
PLATELET # BLD AUTO: 66 10*3/MM3 (ref 130–400)
PMV BLD AUTO: 12.1 FL (ref 6–12)
POIKILOCYTOSIS BLD QL SMEAR: NORMAL
POTASSIUM BLD-SCNC: 4.5 MMOL/L (ref 3.5–5.1)
PROT SERPL-MCNC: 4.8 G/DL (ref 6.3–8.2)
PROT UR QL STRIP: ABNORMAL
RBC # BLD AUTO: 3.02 10*6/MM3 (ref 4.2–5.4)
RBC # UR: ABNORMAL /HPF
REF LAB TEST METHOD: ABNORMAL
SMALL PLATELETS BLD QL SMEAR: NORMAL
SODIUM BLD-SCNC: 136 MMOL/L (ref 137–145)
SP GR UR STRIP: 1.02 (ref 1–1.03)
SQUAMOUS #/AREA URNS HPF: ABNORMAL /HPF
UROBILINOGEN UR QL STRIP: ABNORMAL
WBC MORPH BLD: NORMAL
WBC NRBC COR # BLD: 3.82 10*3/MM3 (ref 4.8–10.8)
WBC UR QL AUTO: ABNORMAL /HPF

## 2017-11-20 PROCEDURE — 83690 ASSAY OF LIPASE: CPT | Performed by: INTERNAL MEDICINE

## 2017-11-20 PROCEDURE — 25010000002 HYDROMORPHONE PER 4 MG: Performed by: INTERNAL MEDICINE

## 2017-11-20 PROCEDURE — 25010000002 PIPERACILLIN SOD-TAZOBACTAM PER 1 G: Performed by: INTERNAL MEDICINE

## 2017-11-20 PROCEDURE — 5A1D70Z PERFORMANCE OF URINARY FILTRATION, INTERMITTENT, LESS THAN 6 HOURS PER DAY: ICD-10-PCS | Performed by: INTERNAL MEDICINE

## 2017-11-20 PROCEDURE — 80053 COMPREHEN METABOLIC PANEL: CPT | Performed by: INTERNAL MEDICINE

## 2017-11-20 PROCEDURE — 99233 SBSQ HOSP IP/OBS HIGH 50: CPT | Performed by: INTERNAL MEDICINE

## 2017-11-20 PROCEDURE — 85025 COMPLETE CBC W/AUTO DIFF WBC: CPT | Performed by: INTERNAL MEDICINE

## 2017-11-20 PROCEDURE — 85007 BL SMEAR W/DIFF WBC COUNT: CPT | Performed by: INTERNAL MEDICINE

## 2017-11-20 PROCEDURE — 25010000002 ONDANSETRON PER 1 MG: Performed by: INTERNAL MEDICINE

## 2017-11-20 PROCEDURE — 90935 HEMODIALYSIS ONE EVALUATION: CPT

## 2017-11-20 PROCEDURE — 81001 URINALYSIS AUTO W/SCOPE: CPT | Performed by: EMERGENCY MEDICINE

## 2017-11-20 PROCEDURE — 25010000002 HEPARIN (PORCINE) PER 1000 UNITS: Performed by: INTERNAL MEDICINE

## 2017-11-20 RX ORDER — SUCRALFATE ORAL 1 G/10ML
1 SUSPENSION ORAL 2 TIMES DAILY
Status: DISCONTINUED | OUTPATIENT
Start: 2017-11-20 | End: 2017-11-20

## 2017-11-20 RX ORDER — NICARDIPINE HYDROCHLORIDE 20 MG/1
40 CAPSULE ORAL EVERY 12 HOURS SCHEDULED
Status: DISCONTINUED | OUTPATIENT
Start: 2017-11-20 | End: 2017-11-23 | Stop reason: HOSPADM

## 2017-11-20 RX ORDER — HEPARIN SODIUM 1000 [USP'U]/ML
4000 INJECTION, SOLUTION INTRAVENOUS; SUBCUTANEOUS ONCE
Status: COMPLETED | OUTPATIENT
Start: 2017-11-20 | End: 2017-11-20

## 2017-11-20 RX ORDER — CLONAZEPAM 0.5 MG/1
0.5 TABLET ORAL 2 TIMES DAILY
Status: DISCONTINUED | OUTPATIENT
Start: 2017-11-20 | End: 2017-11-23 | Stop reason: HOSPADM

## 2017-11-20 RX ORDER — SUCRALFATE ORAL 1 G/10ML
1 SUSPENSION ORAL
Status: DISCONTINUED | OUTPATIENT
Start: 2017-11-20 | End: 2017-11-23 | Stop reason: HOSPADM

## 2017-11-20 RX ADMIN — SUCRALFATE 1 G: 1 SUSPENSION ORAL at 18:33

## 2017-11-20 RX ADMIN — LOSARTAN POTASSIUM 100 MG: 50 TABLET ORAL at 21:28

## 2017-11-20 RX ADMIN — CLONAZEPAM 0.5 MG: 0.5 TABLET ORAL at 11:53

## 2017-11-20 RX ADMIN — HYDROMORPHONE HYDROCHLORIDE 0.5 MG: 1 INJECTION, SOLUTION INTRAMUSCULAR; INTRAVENOUS; SUBCUTANEOUS at 21:41

## 2017-11-20 RX ADMIN — NICARDIPINE HYDROCHLORIDE 40 MG: 20 CAPSULE ORAL at 21:28

## 2017-11-20 RX ADMIN — HYDRALAZINE HYDROCHLORIDE 100 MG: 25 TABLET ORAL at 21:28

## 2017-11-20 RX ADMIN — HEPARIN SODIUM 4000 UNITS: 1000 INJECTION, SOLUTION INTRAVENOUS; SUBCUTANEOUS at 16:04

## 2017-11-20 RX ADMIN — SUCRALFATE 1 G: 1 SUSPENSION ORAL at 21:31

## 2017-11-20 RX ADMIN — ONDANSETRON 4 MG: 2 INJECTION INTRAMUSCULAR; INTRAVENOUS at 09:07

## 2017-11-20 RX ADMIN — PANTOPRAZOLE SODIUM 40 MG: 40 TABLET, DELAYED RELEASE ORAL at 18:33

## 2017-11-20 RX ADMIN — PANTOPRAZOLE SODIUM 40 MG: 40 TABLET, DELAYED RELEASE ORAL at 08:18

## 2017-11-20 RX ADMIN — HYDROXYZINE PAMOATE 25 MG: 25 CAPSULE ORAL at 10:31

## 2017-11-20 RX ADMIN — NICARDIPINE HYDROCHLORIDE 40 MG: 20 CAPSULE ORAL at 09:50

## 2017-11-20 RX ADMIN — CALCIUM ACETATE 1334 MG: 667 CAPSULE ORAL at 18:33

## 2017-11-20 RX ADMIN — TERAZOSIN HYDROCHLORIDE 5 MG: 5 CAPSULE ORAL at 10:31

## 2017-11-20 RX ADMIN — ONDANSETRON 4 MG: 2 INJECTION INTRAMUSCULAR; INTRAVENOUS at 21:40

## 2017-11-20 RX ADMIN — HYDROMORPHONE HYDROCHLORIDE 0.5 MG: 1 INJECTION, SOLUTION INTRAMUSCULAR; INTRAVENOUS; SUBCUTANEOUS at 09:59

## 2017-11-20 RX ADMIN — CLONAZEPAM 0.5 MG: 0.5 TABLET ORAL at 21:28

## 2017-11-20 RX ADMIN — HYDROMORPHONE HYDROCHLORIDE 0.5 MG: 1 INJECTION, SOLUTION INTRAMUSCULAR; INTRAVENOUS; SUBCUTANEOUS at 02:34

## 2017-11-20 RX ADMIN — SODIUM CHLORIDE 15 MG/HR: 9 INJECTION, SOLUTION INTRAVENOUS at 08:19

## 2017-11-20 RX ADMIN — HYDRALAZINE HYDROCHLORIDE 100 MG: 25 TABLET ORAL at 16:00

## 2017-11-20 RX ADMIN — TERAZOSIN HYDROCHLORIDE 5 MG: 5 CAPSULE ORAL at 18:33

## 2017-11-20 RX ADMIN — HYDRALAZINE HYDROCHLORIDE 100 MG: 25 TABLET ORAL at 06:09

## 2017-11-20 RX ADMIN — SODIUM CHLORIDE 10 MG/HR: 9 INJECTION, SOLUTION INTRAVENOUS at 06:28

## 2017-11-20 RX ADMIN — HYDROMORPHONE HYDROCHLORIDE 0.5 MG: 1 INJECTION, SOLUTION INTRAMUSCULAR; INTRAVENOUS; SUBCUTANEOUS at 15:03

## 2017-11-20 RX ADMIN — HYDROXYCHLOROQUINE SULFATE 200 MG: 200 TABLET, FILM COATED ORAL at 21:28

## 2017-11-20 RX ADMIN — LEVETIRACETAM 500 MG: 500 TABLET, FILM COATED ORAL at 21:28

## 2017-11-20 RX ADMIN — HYDROXYCHLOROQUINE SULFATE 200 MG: 200 TABLET, FILM COATED ORAL at 10:32

## 2017-11-20 RX ADMIN — HYDROXYZINE PAMOATE 25 MG: 25 CAPSULE ORAL at 02:35

## 2017-11-20 RX ADMIN — TAZOBACTAM SODIUM AND PIPERACILLIN SODIUM 2.25 G: 250; 2 INJECTION, SOLUTION INTRAVENOUS at 18:33

## 2017-11-20 RX ADMIN — NEBIVOLOL HYDROCHLORIDE 20 MG: 10 TABLET ORAL at 10:32

## 2017-11-21 PROBLEM — E87.70 VOLUME OVERLOAD: Status: ACTIVE | Noted: 2017-11-21

## 2017-11-21 LAB
ALBUMIN SERPL-MCNC: 2.3 G/DL (ref 3.5–5)
ANION GAP SERPL CALCULATED.3IONS-SCNC: 11.2 MMOL/L
BASOPHILS # BLD AUTO: 0.01 10*3/MM3 (ref 0–0.2)
BASOPHILS NFR BLD AUTO: 0.3 % (ref 0–2.5)
BUN BLD-MCNC: 22 MG/DL (ref 7–20)
BUN/CREAT SERPL: 3.8 (ref 7.1–23.5)
CALCIUM SPEC-SCNC: 8.4 MG/DL (ref 8.4–10.2)
CHLORIDE SERPL-SCNC: 104 MMOL/L (ref 98–107)
CO2 SERPL-SCNC: 25 MMOL/L (ref 26–30)
CREAT BLD-MCNC: 5.8 MG/DL (ref 0.6–1.3)
DEPRECATED RDW RBC AUTO: 51.5 FL (ref 37–54)
EOSINOPHIL # BLD AUTO: 0.55 10*3/MM3 (ref 0–0.7)
EOSINOPHIL NFR BLD AUTO: 16.2 % (ref 0–7)
ERYTHROCYTE [DISTWIDTH] IN BLOOD BY AUTOMATED COUNT: 15.6 % (ref 11.5–14.5)
GFR SERPL CREATININE-BSD FRML MDRD: 9 ML/MIN/1.73
GLUCOSE BLD-MCNC: 111 MG/DL (ref 74–98)
HCT VFR BLD AUTO: 26.8 % (ref 37–47)
HGB BLD-MCNC: 8.3 G/DL (ref 12–16)
IMM GRANULOCYTES # BLD: 0.03 10*3/MM3 (ref 0–0.06)
IMM GRANULOCYTES NFR BLD: 0.9 % (ref 0–0.6)
LYMPHOCYTES # BLD AUTO: 0.59 10*3/MM3 (ref 0.6–3.4)
LYMPHOCYTES NFR BLD AUTO: 17.4 % (ref 10–50)
MAGNESIUM SERPL-MCNC: 1.9 MG/DL (ref 1.6–2.3)
MCH RBC QN AUTO: 28.2 PG (ref 27–31)
MCHC RBC AUTO-ENTMCNC: 31 G/DL (ref 30–37)
MCV RBC AUTO: 91.2 FL (ref 81–99)
MONOCYTES # BLD AUTO: 0.15 10*3/MM3 (ref 0–0.9)
MONOCYTES NFR BLD AUTO: 4.4 % (ref 0–12)
NEUTROPHILS # BLD AUTO: 2.06 10*3/MM3 (ref 2–6.9)
NEUTROPHILS NFR BLD AUTO: 60.8 % (ref 37–80)
NRBC BLD MANUAL-RTO: 0 /100 WBC (ref 0–0)
PHOSPHATE SERPL-MCNC: 3 MG/DL (ref 2.5–4.5)
PLAT MORPH BLD: NORMAL
PLATELET # BLD AUTO: 77 10*3/MM3 (ref 130–400)
PMV BLD AUTO: 11.6 FL (ref 6–12)
POTASSIUM BLD-SCNC: 4.2 MMOL/L (ref 3.5–5.1)
RBC # BLD AUTO: 2.94 10*6/MM3 (ref 4.2–5.4)
RBC MORPH BLD: NORMAL
SODIUM BLD-SCNC: 136 MMOL/L (ref 137–145)
WBC MORPH BLD: NORMAL
WBC NRBC COR # BLD: 3.39 10*3/MM3 (ref 4.8–10.8)

## 2017-11-21 PROCEDURE — 25010000002 PIPERACILLIN SOD-TAZOBACTAM PER 1 G: Performed by: INTERNAL MEDICINE

## 2017-11-21 PROCEDURE — 25010000002 HYDROMORPHONE PER 4 MG: Performed by: INTERNAL MEDICINE

## 2017-11-21 PROCEDURE — 85025 COMPLETE CBC W/AUTO DIFF WBC: CPT | Performed by: INTERNAL MEDICINE

## 2017-11-21 PROCEDURE — 90935 HEMODIALYSIS ONE EVALUATION: CPT

## 2017-11-21 PROCEDURE — 85007 BL SMEAR W/DIFF WBC COUNT: CPT | Performed by: INTERNAL MEDICINE

## 2017-11-21 PROCEDURE — 99232 SBSQ HOSP IP/OBS MODERATE 35: CPT | Performed by: INTERNAL MEDICINE

## 2017-11-21 PROCEDURE — 25010000002 ONDANSETRON PER 1 MG: Performed by: INTERNAL MEDICINE

## 2017-11-21 PROCEDURE — 83735 ASSAY OF MAGNESIUM: CPT | Performed by: INTERNAL MEDICINE

## 2017-11-21 PROCEDURE — 80069 RENAL FUNCTION PANEL: CPT | Performed by: INTERNAL MEDICINE

## 2017-11-21 RX ADMIN — LEVETIRACETAM 500 MG: 500 TABLET, FILM COATED ORAL at 20:26

## 2017-11-21 RX ADMIN — NICARDIPINE HYDROCHLORIDE 40 MG: 20 CAPSULE ORAL at 20:27

## 2017-11-21 RX ADMIN — CALCITRIOL 0.5 MCG: 0.25 CAPSULE ORAL at 08:44

## 2017-11-21 RX ADMIN — NEBIVOLOL HYDROCHLORIDE 20 MG: 10 TABLET ORAL at 08:43

## 2017-11-21 RX ADMIN — Medication 1 TABLET: at 08:44

## 2017-11-21 RX ADMIN — NICARDIPINE HYDROCHLORIDE 40 MG: 20 CAPSULE ORAL at 08:44

## 2017-11-21 RX ADMIN — CLONAZEPAM 0.5 MG: 0.5 TABLET ORAL at 20:27

## 2017-11-21 RX ADMIN — SUCRALFATE 1 G: 1 SUSPENSION ORAL at 22:48

## 2017-11-21 RX ADMIN — ONDANSETRON 4 MG: 2 INJECTION INTRAMUSCULAR; INTRAVENOUS at 09:39

## 2017-11-21 RX ADMIN — CALCIUM ACETATE 1334 MG: 667 CAPSULE ORAL at 08:43

## 2017-11-21 RX ADMIN — HYDRALAZINE HYDROCHLORIDE 100 MG: 25 TABLET ORAL at 15:32

## 2017-11-21 RX ADMIN — ACETAMINOPHEN 650 MG: 325 TABLET, FILM COATED ORAL at 20:26

## 2017-11-21 RX ADMIN — SUCRALFATE 1 G: 1 SUSPENSION ORAL at 08:44

## 2017-11-21 RX ADMIN — PANTOPRAZOLE SODIUM 40 MG: 40 TABLET, DELAYED RELEASE ORAL at 06:43

## 2017-11-21 RX ADMIN — CLONAZEPAM 0.5 MG: 0.5 TABLET ORAL at 08:43

## 2017-11-21 RX ADMIN — PANTOPRAZOLE SODIUM 40 MG: 40 TABLET, DELAYED RELEASE ORAL at 18:48

## 2017-11-21 RX ADMIN — CALCIUM ACETATE 1334 MG: 667 CAPSULE ORAL at 18:49

## 2017-11-21 RX ADMIN — HYDROXYCHLOROQUINE SULFATE 200 MG: 200 TABLET, FILM COATED ORAL at 08:43

## 2017-11-21 RX ADMIN — TERAZOSIN HYDROCHLORIDE 5 MG: 5 CAPSULE ORAL at 08:44

## 2017-11-21 RX ADMIN — Medication 10 ML: at 04:15

## 2017-11-21 RX ADMIN — HYDRALAZINE HYDROCHLORIDE 100 MG: 25 TABLET ORAL at 06:43

## 2017-11-21 RX ADMIN — TERAZOSIN HYDROCHLORIDE 5 MG: 5 CAPSULE ORAL at 18:48

## 2017-11-21 RX ADMIN — SUCRALFATE 1 G: 1 SUSPENSION ORAL at 18:48

## 2017-11-21 RX ADMIN — LOSARTAN POTASSIUM 100 MG: 50 TABLET ORAL at 20:26

## 2017-11-21 RX ADMIN — HYDRALAZINE HYDROCHLORIDE 100 MG: 25 TABLET ORAL at 22:49

## 2017-11-21 RX ADMIN — HYDROMORPHONE HYDROCHLORIDE 0.5 MG: 1 INJECTION, SOLUTION INTRAMUSCULAR; INTRAVENOUS; SUBCUTANEOUS at 13:37

## 2017-11-21 RX ADMIN — HYDROMORPHONE HYDROCHLORIDE 0.5 MG: 1 INJECTION, SOLUTION INTRAMUSCULAR; INTRAVENOUS; SUBCUTANEOUS at 09:46

## 2017-11-21 RX ADMIN — HYDROXYCHLOROQUINE SULFATE 200 MG: 200 TABLET, FILM COATED ORAL at 20:27

## 2017-11-21 RX ADMIN — TAZOBACTAM SODIUM AND PIPERACILLIN SODIUM 2.25 G: 250; 2 INJECTION, SOLUTION INTRAVENOUS at 04:15

## 2017-11-22 LAB
ACINETOBACTER SCREEN CX: NORMAL
VRE SPEC QL CULT: NORMAL

## 2017-11-22 PROCEDURE — 25010000002 PIPERACILLIN SOD-TAZOBACTAM PER 1 G: Performed by: INTERNAL MEDICINE

## 2017-11-22 PROCEDURE — 25010000002 VANCOMYCIN PER 500 MG: Performed by: INTERNAL MEDICINE

## 2017-11-22 PROCEDURE — 87040 BLOOD CULTURE FOR BACTERIA: CPT | Performed by: INTERNAL MEDICINE

## 2017-11-22 PROCEDURE — 25010000002 HYDROMORPHONE PER 4 MG: Performed by: INTERNAL MEDICINE

## 2017-11-22 PROCEDURE — 99225 PR SBSQ OBSERVATION CARE/DAY 25 MINUTES: CPT | Performed by: INTERNAL MEDICINE

## 2017-11-22 PROCEDURE — 25010000002 HEPARIN (PORCINE) PER 1000 UNITS: Performed by: INTERNAL MEDICINE

## 2017-11-22 PROCEDURE — 25010000002 CEFTAZIDIME PER 500 MG: Performed by: INTERNAL MEDICINE

## 2017-11-22 PROCEDURE — 90935 HEMODIALYSIS ONE EVALUATION: CPT

## 2017-11-22 PROCEDURE — 36591 DRAW BLOOD OFF VENOUS DEVICE: CPT

## 2017-11-22 PROCEDURE — 87070 CULTURE OTHR SPECIMN AEROBIC: CPT | Performed by: INTERNAL MEDICINE

## 2017-11-22 RX ORDER — HEPARIN SODIUM 1000 [USP'U]/ML
4000 INJECTION, SOLUTION INTRAVENOUS; SUBCUTANEOUS ONCE
Status: COMPLETED | OUTPATIENT
Start: 2017-11-22 | End: 2017-11-22

## 2017-11-22 RX ORDER — QUETIAPINE FUMARATE 25 MG/1
25 TABLET, FILM COATED ORAL NIGHTLY
Status: DISCONTINUED | OUTPATIENT
Start: 2017-11-22 | End: 2017-11-23 | Stop reason: HOSPADM

## 2017-11-22 RX ADMIN — QUETIAPINE FUMARATE 25 MG: 25 TABLET, FILM COATED ORAL at 21:31

## 2017-11-22 RX ADMIN — CALCITRIOL 0.5 MCG: 0.25 CAPSULE ORAL at 06:52

## 2017-11-22 RX ADMIN — CALCIUM ACETATE 1334 MG: 667 CAPSULE ORAL at 09:24

## 2017-11-22 RX ADMIN — Medication 1 TABLET: at 09:24

## 2017-11-22 RX ADMIN — HYDROXYCHLOROQUINE SULFATE 200 MG: 200 TABLET, FILM COATED ORAL at 21:30

## 2017-11-22 RX ADMIN — NICARDIPINE HYDROCHLORIDE 40 MG: 20 CAPSULE ORAL at 21:30

## 2017-11-22 RX ADMIN — HEPARIN SODIUM 3200 UNITS: 1000 INJECTION, SOLUTION INTRAVENOUS; SUBCUTANEOUS at 14:55

## 2017-11-22 RX ADMIN — HYDROMORPHONE HYDROCHLORIDE 0.5 MG: 1 INJECTION, SOLUTION INTRAMUSCULAR; INTRAVENOUS; SUBCUTANEOUS at 16:09

## 2017-11-22 RX ADMIN — CEFTAZIDIME 1 G: 1 INJECTION, POWDER, FOR SOLUTION INTRAMUSCULAR; INTRAVENOUS at 16:26

## 2017-11-22 RX ADMIN — LOSARTAN POTASSIUM 100 MG: 50 TABLET ORAL at 21:31

## 2017-11-22 RX ADMIN — TAZOBACTAM SODIUM AND PIPERACILLIN SODIUM 2.25 G: 250; 2 INJECTION, SOLUTION INTRAVENOUS at 00:07

## 2017-11-22 RX ADMIN — SUCRALFATE 1 G: 1 SUSPENSION ORAL at 09:25

## 2017-11-22 RX ADMIN — HYDRALAZINE HYDROCHLORIDE 100 MG: 25 TABLET ORAL at 16:09

## 2017-11-22 RX ADMIN — HYDRALAZINE HYDROCHLORIDE 100 MG: 25 TABLET ORAL at 06:53

## 2017-11-22 RX ADMIN — TERAZOSIN HYDROCHLORIDE 5 MG: 5 CAPSULE ORAL at 18:57

## 2017-11-22 RX ADMIN — PANTOPRAZOLE SODIUM 40 MG: 40 TABLET, DELAYED RELEASE ORAL at 06:52

## 2017-11-22 RX ADMIN — LEVETIRACETAM 500 MG: 500 TABLET, FILM COATED ORAL at 21:30

## 2017-11-22 RX ADMIN — HYDROXYZINE PAMOATE 25 MG: 25 CAPSULE ORAL at 16:35

## 2017-11-22 RX ADMIN — SUCRALFATE 1 G: 1 SUSPENSION ORAL at 21:35

## 2017-11-22 RX ADMIN — SUCRALFATE 1 G: 1 SUSPENSION ORAL at 18:57

## 2017-11-22 RX ADMIN — NEBIVOLOL HYDROCHLORIDE 20 MG: 10 TABLET ORAL at 09:24

## 2017-11-22 RX ADMIN — PANTOPRAZOLE SODIUM 40 MG: 40 TABLET, DELAYED RELEASE ORAL at 16:35

## 2017-11-22 RX ADMIN — CLONAZEPAM 0.5 MG: 0.5 TABLET ORAL at 21:30

## 2017-11-22 RX ADMIN — HYDRALAZINE HYDROCHLORIDE 100 MG: 25 TABLET ORAL at 21:30

## 2017-11-22 RX ADMIN — HYDROMORPHONE HYDROCHLORIDE 0.5 MG: 1 INJECTION, SOLUTION INTRAMUSCULAR; INTRAVENOUS; SUBCUTANEOUS at 20:16

## 2017-11-22 RX ADMIN — CALCIUM ACETATE 1334 MG: 667 CAPSULE ORAL at 18:57

## 2017-11-22 RX ADMIN — CLONAZEPAM 0.5 MG: 0.5 TABLET ORAL at 09:24

## 2017-11-22 RX ADMIN — VANCOMYCIN HYDROCHLORIDE 1000 MG: 1 INJECTION, POWDER, LYOPHILIZED, FOR SOLUTION INTRAVENOUS at 17:29

## 2017-11-22 RX ADMIN — TERAZOSIN HYDROCHLORIDE 5 MG: 5 CAPSULE ORAL at 09:24

## 2017-11-22 RX ADMIN — HYDROMORPHONE HYDROCHLORIDE 0.5 MG: 1 INJECTION, SOLUTION INTRAMUSCULAR; INTRAVENOUS; SUBCUTANEOUS at 09:56

## 2017-11-22 RX ADMIN — NICARDIPINE HYDROCHLORIDE 40 MG: 20 CAPSULE ORAL at 09:24

## 2017-11-23 VITALS
RESPIRATION RATE: 18 BRPM | BODY MASS INDEX: 22.51 KG/M2 | HEIGHT: 67 IN | DIASTOLIC BLOOD PRESSURE: 104 MMHG | OXYGEN SATURATION: 98 % | WEIGHT: 143.38 LBS | HEART RATE: 89 BPM | TEMPERATURE: 99 F | SYSTOLIC BLOOD PRESSURE: 166 MMHG

## 2017-11-23 LAB
ALBUMIN SERPL-MCNC: 2.2 G/DL (ref 3.5–5)
ALBUMIN/GLOB SERPL: 0.8 G/DL (ref 1–2)
ALP SERPL-CCNC: 44 U/L (ref 38–126)
ALT SERPL W P-5'-P-CCNC: 30 U/L (ref 13–69)
ANION GAP SERPL CALCULATED.3IONS-SCNC: 10.8 MMOL/L
ANISOCYTOSIS BLD QL: ABNORMAL
AST SERPL-CCNC: 31 U/L (ref 15–46)
BILIRUB SERPL-MCNC: 0.2 MG/DL (ref 0.2–1.3)
BUN BLD-MCNC: 14 MG/DL (ref 7–20)
BUN/CREAT SERPL: 3.1 (ref 7.1–23.5)
CALCIUM SPEC-SCNC: 8.5 MG/DL (ref 8.4–10.2)
CHLORIDE SERPL-SCNC: 105 MMOL/L (ref 98–107)
CO2 SERPL-SCNC: 23 MMOL/L (ref 26–30)
CREAT BLD-MCNC: 4.5 MG/DL (ref 0.6–1.3)
DACRYOCYTES BLD QL SMEAR: ABNORMAL
DEPRECATED RDW RBC AUTO: 50.2 FL (ref 37–54)
EOSINOPHIL # BLD MANUAL: 0.66 10*3/MM3 (ref 0–0.7)
EOSINOPHIL NFR BLD MANUAL: 23 % (ref 0–7)
ERYTHROCYTE [DISTWIDTH] IN BLOOD BY AUTOMATED COUNT: 15.2 % (ref 11.5–14.5)
GFR SERPL CREATININE-BSD FRML MDRD: 12 ML/MIN/1.73
GLOBULIN UR ELPH-MCNC: 2.6 GM/DL
GLUCOSE BLD-MCNC: 94 MG/DL (ref 74–98)
HCT VFR BLD AUTO: 24.7 % (ref 37–47)
HGB BLD-MCNC: 7.8 G/DL (ref 12–16)
HYPOCHROMIA BLD QL: ABNORMAL
LYMPHOCYTES # BLD MANUAL: 0.72 10*3/MM3 (ref 0.6–3.4)
LYMPHOCYTES NFR BLD MANUAL: 25 % (ref 10–50)
LYMPHOCYTES NFR BLD MANUAL: 3 % (ref 0–12)
MCH RBC QN AUTO: 28.8 PG (ref 27–31)
MCHC RBC AUTO-ENTMCNC: 31.6 G/DL (ref 30–37)
MCV RBC AUTO: 91.1 FL (ref 81–99)
MONOCYTES # BLD AUTO: 0.09 10*3/MM3 (ref 0–0.9)
MRSA SPEC QL CULT: NORMAL
NEUTROPHILS # BLD AUTO: 1.4 10*3/MM3 (ref 2–6.9)
NEUTROPHILS NFR BLD MANUAL: 44 % (ref 37–80)
NEUTS BAND NFR BLD MANUAL: 5 % (ref 0–6)
OVALOCYTES BLD QL SMEAR: ABNORMAL
PLATELET # BLD AUTO: 84 10*3/MM3 (ref 130–400)
PMV BLD AUTO: 11.8 FL (ref 6–12)
POIKILOCYTOSIS BLD QL SMEAR: ABNORMAL
POTASSIUM BLD-SCNC: 3.8 MMOL/L (ref 3.5–5.1)
PROT SERPL-MCNC: 4.8 G/DL (ref 6.3–8.2)
RBC # BLD AUTO: 2.71 10*6/MM3 (ref 4.2–5.4)
SCAN SLIDE: NORMAL
SMALL PLATELETS BLD QL SMEAR: ABNORMAL
SODIUM BLD-SCNC: 135 MMOL/L (ref 137–145)
WBC MORPH BLD: NORMAL
WBC NRBC COR # BLD: 2.86 10*3/MM3 (ref 4.8–10.8)

## 2017-11-23 PROCEDURE — 80053 COMPREHEN METABOLIC PANEL: CPT | Performed by: INTERNAL MEDICINE

## 2017-11-23 PROCEDURE — 85007 BL SMEAR W/DIFF WBC COUNT: CPT | Performed by: INTERNAL MEDICINE

## 2017-11-23 PROCEDURE — 25010000002 HYDROMORPHONE PER 4 MG: Performed by: INTERNAL MEDICINE

## 2017-11-23 PROCEDURE — 0JPT0XZ REMOVAL OF TUNNELED VASCULAR ACCESS DEVICE FROM TRUNK SUBCUTANEOUS TISSUE AND FASCIA, OPEN APPROACH: ICD-10-PCS | Performed by: INTERNAL MEDICINE

## 2017-11-23 PROCEDURE — 85025 COMPLETE CBC W/AUTO DIFF WBC: CPT | Performed by: INTERNAL MEDICINE

## 2017-11-23 PROCEDURE — 99238 HOSP IP/OBS DSCHRG MGMT 30/<: CPT | Performed by: INTERNAL MEDICINE

## 2017-11-23 RX ORDER — PROMETHAZINE HYDROCHLORIDE 12.5 MG/1
12.5 TABLET ORAL EVERY 6 HOURS PRN
Qty: 30 TABLET | Refills: 0 | Status: SHIPPED | OUTPATIENT
Start: 2017-11-23 | End: 2018-02-01 | Stop reason: HOSPADM

## 2017-11-23 RX ORDER — PANTOPRAZOLE SODIUM 40 MG/1
40 TABLET, DELAYED RELEASE ORAL
Qty: 60 TABLET | Refills: 0 | Status: SHIPPED | OUTPATIENT
Start: 2017-11-23 | End: 2017-12-11 | Stop reason: SDUPTHER

## 2017-11-23 RX ORDER — SUCRALFATE ORAL 1 G/10ML
1 SUSPENSION ORAL
Qty: 420 ML | Refills: 0 | Status: SHIPPED | OUTPATIENT
Start: 2017-11-23 | End: 2017-11-27 | Stop reason: SDUPTHER

## 2017-11-23 RX ORDER — QUETIAPINE FUMARATE 25 MG/1
25 TABLET, FILM COATED ORAL NIGHTLY
Qty: 30 TABLET | Refills: 0 | Status: SHIPPED | OUTPATIENT
Start: 2017-11-23 | End: 2018-03-21 | Stop reason: HOSPADM

## 2017-11-23 RX ORDER — HYDROMORPHONE HYDROCHLORIDE 2 MG/1
2 TABLET ORAL EVERY 4 HOURS PRN
Qty: 20 TABLET | Refills: 0 | Status: SHIPPED | OUTPATIENT
Start: 2017-11-23 | End: 2018-01-24

## 2017-11-23 RX ADMIN — PANTOPRAZOLE SODIUM 40 MG: 40 TABLET, DELAYED RELEASE ORAL at 06:39

## 2017-11-23 RX ADMIN — CALCIUM ACETATE 1334 MG: 667 CAPSULE ORAL at 09:00

## 2017-11-23 RX ADMIN — TERAZOSIN HYDROCHLORIDE 5 MG: 5 CAPSULE ORAL at 09:00

## 2017-11-23 RX ADMIN — NEBIVOLOL HYDROCHLORIDE 20 MG: 10 TABLET ORAL at 09:00

## 2017-11-23 RX ADMIN — Medication 1 TABLET: at 09:00

## 2017-11-23 RX ADMIN — HYDROMORPHONE HYDROCHLORIDE 0.5 MG: 1 INJECTION, SOLUTION INTRAMUSCULAR; INTRAVENOUS; SUBCUTANEOUS at 09:00

## 2017-11-23 RX ADMIN — HYDROXYCHLOROQUINE SULFATE 200 MG: 200 TABLET, FILM COATED ORAL at 09:00

## 2017-11-23 RX ADMIN — SUCRALFATE 1 G: 1 SUSPENSION ORAL at 09:00

## 2017-11-23 RX ADMIN — HYDRALAZINE HYDROCHLORIDE 100 MG: 25 TABLET ORAL at 06:39

## 2017-11-23 RX ADMIN — ACETAMINOPHEN 650 MG: 325 TABLET, FILM COATED ORAL at 00:17

## 2017-11-23 RX ADMIN — NICARDIPINE HYDROCHLORIDE 40 MG: 20 CAPSULE ORAL at 09:00

## 2017-11-23 RX ADMIN — CLONAZEPAM 0.5 MG: 0.5 TABLET ORAL at 09:00

## 2017-11-24 LAB — BACTERIA SPEC AEROBE CULT: NORMAL

## 2017-11-26 LAB — CATHETER CULTURE: NO GROWTH

## 2017-11-27 ENCOUNTER — TRANSITIONAL CARE MANAGEMENT TELEPHONE ENCOUNTER (OUTPATIENT)
Dept: INTERNAL MEDICINE | Facility: CLINIC | Age: 27
End: 2017-11-27

## 2017-11-27 LAB
BACTERIA SPEC AEROBE CULT: NORMAL
BACTERIA SPEC AEROBE CULT: NORMAL

## 2017-11-28 RX ORDER — SUCRALFATE ORAL 1 G/10ML
1 SUSPENSION ORAL
Qty: 420 ML | Refills: 0 | Status: SHIPPED | OUTPATIENT
Start: 2017-11-28 | End: 2017-12-11

## 2017-11-29 ENCOUNTER — OFFICE VISIT (OUTPATIENT)
Dept: FAMILY MEDICINE CLINIC | Facility: CLINIC | Age: 27
End: 2017-11-29

## 2017-11-29 VITALS
BODY MASS INDEX: 24.01 KG/M2 | HEIGHT: 67 IN | TEMPERATURE: 98.7 F | SYSTOLIC BLOOD PRESSURE: 145 MMHG | DIASTOLIC BLOOD PRESSURE: 94 MMHG | OXYGEN SATURATION: 96 % | HEART RATE: 86 BPM | WEIGHT: 153 LBS

## 2017-11-29 DIAGNOSIS — B00.1 FEVER BLISTER: Primary | ICD-10-CM

## 2017-11-29 DIAGNOSIS — H10.32 ACUTE BACTERIAL CONJUNCTIVITIS OF LEFT EYE: ICD-10-CM

## 2017-11-29 DIAGNOSIS — N18.6 ESRD (END-STAGE RENAL DISEASE) DUE TO SLE (HCC): ICD-10-CM

## 2017-11-29 DIAGNOSIS — M32.14 ESRD (END-STAGE RENAL DISEASE) DUE TO SLE (HCC): ICD-10-CM

## 2017-11-29 DIAGNOSIS — M32.14 SYSTEMIC LUPUS ERYTHEMATOSUS WITH GLOMERULAR DISEASE, UNSPECIFIED SLE TYPE (HCC): ICD-10-CM

## 2017-11-29 DIAGNOSIS — E87.79 OTHER HYPERVOLEMIA: ICD-10-CM

## 2017-11-29 PROCEDURE — 99496 TRANSJ CARE MGMT HIGH F2F 7D: CPT | Performed by: INTERNAL MEDICINE

## 2017-11-29 RX ORDER — ACYCLOVIR 400 MG/1
400 TABLET ORAL 2 TIMES DAILY
Qty: 14 TABLET | Refills: 0 | Status: SHIPPED | OUTPATIENT
Start: 2017-11-29 | End: 2018-02-01 | Stop reason: HOSPADM

## 2017-11-29 RX ORDER — ERYTHROMYCIN 5 MG/G
OINTMENT OPHTHALMIC NIGHTLY
Qty: 1 G | Refills: 0 | Status: SHIPPED | OUTPATIENT
Start: 2017-11-29 | End: 2018-01-24

## 2017-12-11 ENCOUNTER — PREP FOR SURGERY (OUTPATIENT)
Dept: OTHER | Facility: HOSPITAL | Age: 27
End: 2017-12-11

## 2017-12-11 ENCOUNTER — OFFICE VISIT (OUTPATIENT)
Dept: GASTROENTEROLOGY | Facility: CLINIC | Age: 27
End: 2017-12-11

## 2017-12-11 VITALS — WEIGHT: 151 LBS | RESPIRATION RATE: 18 BRPM | HEIGHT: 67 IN | BODY MASS INDEX: 23.7 KG/M2 | TEMPERATURE: 98.4 F

## 2017-12-11 DIAGNOSIS — E16.4 ELEVATED GASTRIN LEVEL: ICD-10-CM

## 2017-12-11 DIAGNOSIS — K52.9 JEJUNITIS: ICD-10-CM

## 2017-12-11 DIAGNOSIS — R13.10 DYSPHAGIA: ICD-10-CM

## 2017-12-11 DIAGNOSIS — K29.80 DUODENITIS: Primary | ICD-10-CM

## 2017-12-11 DIAGNOSIS — R11.0 NAUSEA: ICD-10-CM

## 2017-12-11 DIAGNOSIS — R10.13 EPIGASTRIC PAIN: ICD-10-CM

## 2017-12-11 DIAGNOSIS — R13.19 OTHER DYSPHAGIA: ICD-10-CM

## 2017-12-11 PROCEDURE — 99215 OFFICE O/P EST HI 40 MIN: CPT | Performed by: INTERNAL MEDICINE

## 2017-12-11 RX ORDER — ONDANSETRON 4 MG/1
4 TABLET, FILM COATED ORAL EVERY 8 HOURS PRN
Qty: 30 TABLET | Refills: 2 | Status: ON HOLD | OUTPATIENT
Start: 2017-12-11 | End: 2018-01-28

## 2017-12-11 RX ORDER — PANTOPRAZOLE SODIUM 40 MG/1
40 TABLET, DELAYED RELEASE ORAL
Qty: 60 TABLET | Refills: 0 | Status: SHIPPED | OUTPATIENT
Start: 2017-12-11 | End: 2018-02-07 | Stop reason: SDUPTHER

## 2017-12-12 ENCOUNTER — DOCUMENTATION (OUTPATIENT)
Dept: GASTROENTEROLOGY | Facility: CLINIC | Age: 27
End: 2017-12-12

## 2017-12-12 ENCOUNTER — TELEPHONE (OUTPATIENT)
Dept: GASTROENTEROLOGY | Facility: CLINIC | Age: 27
End: 2017-12-12

## 2017-12-19 LAB
LAB AP CASE REPORT: NORMAL
Lab: NORMAL
PATH REPORT.FINAL DX SPEC: NORMAL

## 2018-01-02 RX ORDER — SODIUM CHLORIDE 9 MG/ML
70 INJECTION, SOLUTION INTRAVENOUS CONTINUOUS PRN
Status: CANCELLED | OUTPATIENT
Start: 2018-01-02

## 2018-01-03 ENCOUNTER — HOSPITAL ENCOUNTER (OUTPATIENT)
Facility: HOSPITAL | Age: 28
Setting detail: HOSPITAL OUTPATIENT SURGERY
End: 2018-01-03
Attending: INTERNAL MEDICINE | Admitting: INTERNAL MEDICINE

## 2018-01-03 PROBLEM — R10.13 EPIGASTRIC PAIN: Status: ACTIVE | Noted: 2018-01-03

## 2018-01-03 PROBLEM — K52.9 JEJUNITIS: Status: ACTIVE | Noted: 2018-01-03

## 2018-01-03 PROBLEM — R13.10 DYSPHAGIA: Status: ACTIVE | Noted: 2018-01-03

## 2018-01-03 PROBLEM — K29.80 DUODENITIS: Status: ACTIVE | Noted: 2018-01-03

## 2018-01-04 ENCOUNTER — OFFICE VISIT (OUTPATIENT)
Dept: INTERNAL MEDICINE | Facility: CLINIC | Age: 28
End: 2018-01-04

## 2018-01-04 ENCOUNTER — HOSPITAL ENCOUNTER (OUTPATIENT)
Dept: GENERAL RADIOLOGY | Facility: HOSPITAL | Age: 28
Discharge: HOME | End: 2018-01-04
Admitting: INTERNAL MEDICINE

## 2018-01-04 VITALS
WEIGHT: 147 LBS | OXYGEN SATURATION: 97 % | TEMPERATURE: 97.4 F | BODY MASS INDEX: 23.07 KG/M2 | HEIGHT: 67 IN | DIASTOLIC BLOOD PRESSURE: 112 MMHG | SYSTOLIC BLOOD PRESSURE: 184 MMHG | HEART RATE: 83 BPM

## 2018-01-04 DIAGNOSIS — J01.90 ACUTE BACTERIAL SINUSITIS: ICD-10-CM

## 2018-01-04 DIAGNOSIS — R05.9 COUGH: Primary | ICD-10-CM

## 2018-01-04 DIAGNOSIS — J18.9 PNEUMONIA OF RIGHT LUNG DUE TO INFECTIOUS ORGANISM, UNSPECIFIED PART OF LUNG: ICD-10-CM

## 2018-01-04 DIAGNOSIS — B96.89 ACUTE BACTERIAL SINUSITIS: ICD-10-CM

## 2018-01-04 LAB
EXPIRATION DATE: NORMAL
FLUAV AG NPH QL: NORMAL
FLUBV AG NPH QL: NORMAL
INTERNAL CONTROL: NORMAL
Lab: NORMAL

## 2018-01-04 PROCEDURE — 71046 X-RAY EXAM CHEST 2 VIEWS: CPT

## 2018-01-04 PROCEDURE — 87804 INFLUENZA ASSAY W/OPTIC: CPT | Performed by: INTERNAL MEDICINE

## 2018-01-04 PROCEDURE — 99214 OFFICE O/P EST MOD 30 MIN: CPT | Performed by: INTERNAL MEDICINE

## 2018-01-04 PROCEDURE — 96372 THER/PROPH/DIAG INJ SC/IM: CPT | Performed by: INTERNAL MEDICINE

## 2018-01-04 RX ORDER — PREDNISONE 20 MG/1
20 TABLET ORAL DAILY
COMMUNITY
End: 2018-01-24

## 2018-01-04 RX ORDER — DOXYCYCLINE HYCLATE 100 MG
100 TABLET ORAL 2 TIMES DAILY
COMMUNITY
End: 2018-01-24

## 2018-01-04 RX ORDER — CEFTRIAXONE 1 G/1
1 INJECTION, POWDER, FOR SOLUTION INTRAMUSCULAR; INTRAVENOUS ONCE
Status: COMPLETED | OUTPATIENT
Start: 2018-01-04 | End: 2018-01-04

## 2018-01-04 RX ORDER — AMOXICILLIN AND CLAVULANATE POTASSIUM 875; 125 MG/1; MG/1
1 TABLET, FILM COATED ORAL
Qty: 10 TABLET | Refills: 0 | Status: SHIPPED | OUTPATIENT
Start: 2018-01-04 | End: 2018-01-24

## 2018-01-04 RX ADMIN — CEFTRIAXONE 1 G: 1 INJECTION, POWDER, FOR SOLUTION INTRAMUSCULAR; INTRAVENOUS at 15:25

## 2018-01-28 ENCOUNTER — APPOINTMENT (OUTPATIENT)
Dept: CT IMAGING | Facility: HOSPITAL | Age: 28
End: 2018-01-28

## 2018-01-28 ENCOUNTER — HOSPITAL ENCOUNTER (INPATIENT)
Facility: HOSPITAL | Age: 28
LOS: 4 days | Discharge: HOME | End: 2018-02-01
Attending: EMERGENCY MEDICINE | Admitting: INTERNAL MEDICINE

## 2018-01-28 ENCOUNTER — APPOINTMENT (OUTPATIENT)
Dept: GENERAL RADIOLOGY | Facility: HOSPITAL | Age: 28
End: 2018-01-28

## 2018-01-28 VITALS
RESPIRATION RATE: 31 BRPM | TEMPERATURE: 100.7 F | HEART RATE: 101 BPM | BODY MASS INDEX: 24.28 KG/M2 | OXYGEN SATURATION: 94 % | WEIGHT: 155 LBS

## 2018-01-28 DIAGNOSIS — I16.9 HYPERTENSIVE CRISIS: Primary | ICD-10-CM

## 2018-01-28 LAB
ALBUMIN SERPL-MCNC: 4.6 G/DL (ref 3.5–5)
ALBUMIN/GLOB SERPL: 1.7 G/DL (ref 1–2)
ALP SERPL-CCNC: 56 U/L (ref 38–126)
ALT SERPL W P-5'-P-CCNC: 25 U/L (ref 13–69)
ANION GAP SERPL CALCULATED.3IONS-SCNC: 19.5 MMOL/L
AST SERPL-CCNC: 41 U/L (ref 15–46)
BASOPHILS # BLD AUTO: 0.01 10*3/MM3 (ref 0–0.2)
BASOPHILS NFR BLD AUTO: 0.3 % (ref 0–2.5)
BILIRUB SERPL-MCNC: 1 MG/DL (ref 0.2–1.3)
BUN BLD-MCNC: 20 MG/DL (ref 7–20)
BUN/CREAT SERPL: 3.3 (ref 7.1–23.5)
CALCIUM SPEC-SCNC: 10.8 MG/DL (ref 8.4–10.2)
CHLORIDE SERPL-SCNC: 104 MMOL/L (ref 98–107)
CO2 SERPL-SCNC: 20 MMOL/L (ref 26–30)
CREAT BLD-MCNC: 6.1 MG/DL (ref 0.6–1.3)
DEPRECATED RDW RBC AUTO: 51.1 FL (ref 37–54)
EOSINOPHIL # BLD AUTO: 0.12 10*3/MM3 (ref 0–0.7)
EOSINOPHIL NFR BLD AUTO: 4 % (ref 0–7)
ERYTHROCYTE [DISTWIDTH] IN BLOOD BY AUTOMATED COUNT: 14.1 % (ref 11.5–14.5)
GFR SERPL CREATININE-BSD FRML MDRD: 8 ML/MIN/1.73
GLOBULIN UR ELPH-MCNC: 2.7 GM/DL
GLUCOSE BLD-MCNC: 138 MG/DL (ref 74–98)
HCT VFR BLD AUTO: 51.5 % (ref 37–47)
HGB BLD-MCNC: 16.5 G/DL (ref 12–16)
IMM GRANULOCYTES # BLD: 0.01 10*3/MM3 (ref 0–0.06)
IMM GRANULOCYTES NFR BLD: 0.3 % (ref 0–0.6)
LYMPHOCYTES # BLD AUTO: 0.54 10*3/MM3 (ref 0.6–3.4)
LYMPHOCYTES NFR BLD AUTO: 17.9 % (ref 10–50)
MCH RBC QN AUTO: 31.1 PG (ref 27–31)
MCHC RBC AUTO-ENTMCNC: 32 G/DL (ref 30–37)
MCV RBC AUTO: 97.2 FL (ref 81–99)
MONOCYTES # BLD AUTO: 0.35 10*3/MM3 (ref 0–0.9)
MONOCYTES NFR BLD AUTO: 11.6 % (ref 0–12)
NEUTROPHILS # BLD AUTO: 1.99 10*3/MM3 (ref 2–6.9)
NEUTROPHILS NFR BLD AUTO: 65.9 % (ref 37–80)
NRBC BLD MANUAL-RTO: 0 /100 WBC (ref 0–0)
PLATELET # BLD AUTO: 70 10*3/MM3 (ref 130–400)
PMV BLD AUTO: 12.6 FL (ref 6–12)
POTASSIUM BLD-SCNC: 5.5 MMOL/L (ref 3.5–5.1)
PROT SERPL-MCNC: 7.3 G/DL (ref 6.3–8.2)
RBC # BLD AUTO: 5.3 10*6/MM3 (ref 4.2–5.4)
RBC MORPH BLD: NORMAL
SMALL PLATELETS BLD QL SMEAR: NORMAL
SODIUM BLD-SCNC: 138 MMOL/L (ref 137–145)
TROPONIN I SERPL-MCNC: 0.04 NG/ML (ref 0–0.03)
WBC MORPH BLD: NORMAL
WBC NRBC COR # BLD: 3.02 10*3/MM3 (ref 4.8–10.8)

## 2018-01-28 PROCEDURE — 71046 X-RAY EXAM CHEST 2 VIEWS: CPT

## 2018-01-28 PROCEDURE — 25010000002 ONDANSETRON PER 1 MG: Performed by: INTERNAL MEDICINE

## 2018-01-28 PROCEDURE — 25010000002 HALOPERIDOL LACTATE PER 5 MG: Performed by: EMERGENCY MEDICINE

## 2018-01-28 PROCEDURE — 93005 ELECTROCARDIOGRAM TRACING: CPT | Performed by: INTERNAL MEDICINE

## 2018-01-28 PROCEDURE — 25010000002 HYDRALAZINE PER 20 MG: Performed by: EMERGENCY MEDICINE

## 2018-01-28 PROCEDURE — 85007 BL SMEAR W/DIFF WBC COUNT: CPT | Performed by: EMERGENCY MEDICINE

## 2018-01-28 PROCEDURE — 70450 CT HEAD/BRAIN W/O DYE: CPT

## 2018-01-28 PROCEDURE — 87081 CULTURE SCREEN ONLY: CPT | Performed by: INTERNAL MEDICINE

## 2018-01-28 PROCEDURE — 25010000002 HALOPERIDOL LACTATE PER 5 MG: Performed by: FAMILY MEDICINE

## 2018-01-28 PROCEDURE — 99284 EMERGENCY DEPT VISIT MOD MDM: CPT

## 2018-01-28 PROCEDURE — 84484 ASSAY OF TROPONIN QUANT: CPT | Performed by: EMERGENCY MEDICINE

## 2018-01-28 PROCEDURE — 25010000002 FUROSEMIDE PER 20 MG: Performed by: FAMILY MEDICINE

## 2018-01-28 PROCEDURE — 25010000002 HYDRALAZINE PER 20 MG: Performed by: FAMILY MEDICINE

## 2018-01-28 PROCEDURE — 25010000002 ZIPRASIDONE MESYLATE PER 10 MG: Performed by: EMERGENCY MEDICINE

## 2018-01-28 PROCEDURE — 80053 COMPREHEN METABOLIC PANEL: CPT | Performed by: EMERGENCY MEDICINE

## 2018-01-28 PROCEDURE — 87077 CULTURE AEROBIC IDENTIFY: CPT | Performed by: INTERNAL MEDICINE

## 2018-01-28 PROCEDURE — 25010000002 HEPARIN (PORCINE) PER 1000 UNITS: Performed by: INTERNAL MEDICINE

## 2018-01-28 PROCEDURE — 25010000002 DEXAMETHASONE PER 1 MG: Performed by: FAMILY MEDICINE

## 2018-01-28 PROCEDURE — 93005 ELECTROCARDIOGRAM TRACING: CPT | Performed by: EMERGENCY MEDICINE

## 2018-01-28 PROCEDURE — 85025 COMPLETE CBC W/AUTO DIFF WBC: CPT | Performed by: EMERGENCY MEDICINE

## 2018-01-28 RX ORDER — FOLIC ACID/VIT B COMPLEX AND C 0.8 MG
1 TABLET ORAL DAILY
Status: DISCONTINUED | OUTPATIENT
Start: 2018-01-28 | End: 2018-02-01 | Stop reason: HOSPADM

## 2018-01-28 RX ORDER — NICARDIPINE HYDROCHLORIDE 20 MG/1
40 CAPSULE ORAL EVERY 12 HOURS SCHEDULED
Status: DISCONTINUED | OUTPATIENT
Start: 2018-01-28 | End: 2018-01-28

## 2018-01-28 RX ORDER — CALCITRIOL 0.25 UG/1
0.5 CAPSULE, LIQUID FILLED ORAL DAILY
Status: DISCONTINUED | OUTPATIENT
Start: 2018-01-28 | End: 2018-02-01 | Stop reason: HOSPADM

## 2018-01-28 RX ORDER — HALOPERIDOL 5 MG/ML
2 INJECTION INTRAMUSCULAR ONCE
Status: DISCONTINUED | OUTPATIENT
Start: 2018-01-29 | End: 2018-01-29

## 2018-01-28 RX ORDER — TERAZOSIN 5 MG/1
10 CAPSULE ORAL NIGHTLY
Status: DISCONTINUED | OUTPATIENT
Start: 2018-01-28 | End: 2018-01-31

## 2018-01-28 RX ORDER — HALOPERIDOL 5 MG/ML
2 INJECTION INTRAMUSCULAR ONCE
Status: COMPLETED | OUTPATIENT
Start: 2018-01-28 | End: 2018-01-28

## 2018-01-28 RX ORDER — HALOPERIDOL 5 MG/ML
3 INJECTION INTRAMUSCULAR ONCE
Status: COMPLETED | OUTPATIENT
Start: 2018-01-28 | End: 2018-01-28

## 2018-01-28 RX ORDER — ENALAPRILAT 2.5 MG/2ML
1.25 INJECTION INTRAVENOUS EVERY 6 HOURS
Status: DISCONTINUED | OUTPATIENT
Start: 2018-01-28 | End: 2018-01-29

## 2018-01-28 RX ORDER — QUETIAPINE FUMARATE 100 MG/1
100 TABLET, FILM COATED ORAL ONCE
Status: DISCONTINUED | OUTPATIENT
Start: 2018-01-28 | End: 2018-01-28

## 2018-01-28 RX ORDER — SODIUM CHLORIDE 0.9 % (FLUSH) 0.9 %
1-10 SYRINGE (ML) INJECTION AS NEEDED
Status: DISCONTINUED | OUTPATIENT
Start: 2018-01-28 | End: 2018-02-01 | Stop reason: HOSPADM

## 2018-01-28 RX ORDER — ACETAMINOPHEN 325 MG/1
650 TABLET ORAL EVERY 6 HOURS PRN
Status: DISCONTINUED | OUTPATIENT
Start: 2018-01-28 | End: 2018-01-29 | Stop reason: SDUPTHER

## 2018-01-28 RX ORDER — METOPROLOL TARTRATE 5 MG/5ML
5 INJECTION INTRAVENOUS EVERY 6 HOURS
Status: DISCONTINUED | OUTPATIENT
Start: 2018-01-28 | End: 2018-01-29

## 2018-01-28 RX ORDER — PANTOPRAZOLE SODIUM 40 MG/1
40 TABLET, DELAYED RELEASE ORAL
Status: DISCONTINUED | OUTPATIENT
Start: 2018-01-28 | End: 2018-01-29

## 2018-01-28 RX ORDER — FUROSEMIDE 10 MG/ML
20 INJECTION INTRAMUSCULAR; INTRAVENOUS ONCE
Status: COMPLETED | OUTPATIENT
Start: 2018-01-28 | End: 2018-01-28

## 2018-01-28 RX ORDER — HYDROXYCHLOROQUINE SULFATE 200 MG/1
200 TABLET, FILM COATED ORAL EVERY 12 HOURS SCHEDULED
Status: DISCONTINUED | OUTPATIENT
Start: 2018-01-28 | End: 2018-02-01 | Stop reason: HOSPADM

## 2018-01-28 RX ORDER — QUETIAPINE FUMARATE 25 MG/1
25 TABLET, FILM COATED ORAL ONCE
Status: DISCONTINUED | OUTPATIENT
Start: 2018-01-28 | End: 2018-02-01 | Stop reason: HOSPADM

## 2018-01-28 RX ORDER — ONDANSETRON 2 MG/ML
4 INJECTION INTRAMUSCULAR; INTRAVENOUS EVERY 6 HOURS PRN
Status: DISCONTINUED | OUTPATIENT
Start: 2018-01-28 | End: 2018-02-01 | Stop reason: HOSPADM

## 2018-01-28 RX ORDER — NEBIVOLOL 10 MG/1
20 TABLET ORAL
Status: DISCONTINUED | OUTPATIENT
Start: 2018-01-28 | End: 2018-02-01 | Stop reason: HOSPADM

## 2018-01-28 RX ORDER — QUETIAPINE FUMARATE 25 MG/1
25 TABLET, FILM COATED ORAL NIGHTLY
Status: DISCONTINUED | OUTPATIENT
Start: 2018-01-28 | End: 2018-02-01 | Stop reason: HOSPADM

## 2018-01-28 RX ORDER — LOSARTAN POTASSIUM 50 MG/1
100 TABLET ORAL NIGHTLY
Status: DISCONTINUED | OUTPATIENT
Start: 2018-01-28 | End: 2018-01-28

## 2018-01-28 RX ORDER — ZIPRASIDONE MESYLATE 20 MG/ML
10 INJECTION, POWDER, LYOPHILIZED, FOR SOLUTION INTRAMUSCULAR ONCE
Status: COMPLETED | OUTPATIENT
Start: 2018-01-28 | End: 2018-01-28

## 2018-01-28 RX ORDER — HALOPERIDOL 5 MG/ML
INJECTION INTRAMUSCULAR
Status: COMPLETED
Start: 2018-01-28 | End: 2018-01-29

## 2018-01-28 RX ORDER — HYDROXYZINE PAMOATE 25 MG/1
25 CAPSULE ORAL 3 TIMES DAILY PRN
Status: DISCONTINUED | OUTPATIENT
Start: 2018-01-28 | End: 2018-02-01 | Stop reason: HOSPADM

## 2018-01-28 RX ORDER — HEPARIN SODIUM 5000 [USP'U]/ML
5000 INJECTION, SOLUTION INTRAVENOUS; SUBCUTANEOUS EVERY 8 HOURS
Status: DISCONTINUED | OUTPATIENT
Start: 2018-01-28 | End: 2018-02-01 | Stop reason: HOSPADM

## 2018-01-28 RX ORDER — CLONAZEPAM 0.5 MG/1
0.5 TABLET ORAL DAILY PRN
Status: DISCONTINUED | OUTPATIENT
Start: 2018-01-28 | End: 2018-01-30

## 2018-01-28 RX ORDER — HYDRALAZINE HYDROCHLORIDE 20 MG/ML
20 INJECTION INTRAMUSCULAR; INTRAVENOUS ONCE
Status: COMPLETED | OUTPATIENT
Start: 2018-01-28 | End: 2018-01-28

## 2018-01-28 RX ORDER — LEVETIRACETAM 500 MG/1
500 TABLET ORAL NIGHTLY
Status: DISCONTINUED | OUTPATIENT
Start: 2018-01-28 | End: 2018-01-29 | Stop reason: SDUPTHER

## 2018-01-28 RX ORDER — HYDRALAZINE HYDROCHLORIDE 25 MG/1
100 TABLET, FILM COATED ORAL EVERY 8 HOURS SCHEDULED
Status: DISCONTINUED | OUTPATIENT
Start: 2018-01-28 | End: 2018-01-28

## 2018-01-28 RX ORDER — DEXAMETHASONE SODIUM PHOSPHATE 4 MG/ML
4 INJECTION, SOLUTION INTRA-ARTICULAR; INTRALESIONAL; INTRAMUSCULAR; INTRAVENOUS; SOFT TISSUE ONCE
Status: COMPLETED | OUTPATIENT
Start: 2018-01-28 | End: 2018-01-28

## 2018-01-28 RX ORDER — HYDROXYCHLOROQUINE SULFATE 200 MG/1
200 TABLET, FILM COATED ORAL
Status: DISCONTINUED | OUTPATIENT
Start: 2018-01-28 | End: 2018-01-28

## 2018-01-28 RX ORDER — CALCIUM ACETATE 667 MG/1
667 CAPSULE ORAL
Status: DISCONTINUED | OUTPATIENT
Start: 2018-01-28 | End: 2018-02-01 | Stop reason: HOSPADM

## 2018-01-28 RX ORDER — HYDRALAZINE HYDROCHLORIDE 20 MG/ML
20 INJECTION INTRAMUSCULAR; INTRAVENOUS EVERY 8 HOURS
Status: DISCONTINUED | OUTPATIENT
Start: 2018-01-28 | End: 2018-01-29

## 2018-01-28 RX ADMIN — Medication 10 ML: at 21:25

## 2018-01-28 RX ADMIN — HEPARIN SODIUM 5000 UNITS: 5000 INJECTION, SOLUTION INTRAVENOUS; SUBCUTANEOUS at 17:07

## 2018-01-28 RX ADMIN — HYDROXYZINE PAMOATE 25 MG: 25 CAPSULE ORAL at 19:37

## 2018-01-28 RX ADMIN — NEBIVOLOL HYDROCHLORIDE 20 MG: 10 TABLET ORAL at 17:07

## 2018-01-28 RX ADMIN — HALOPERIDOL LACTATE 3 MG: 5 INJECTION, SOLUTION INTRAMUSCULAR at 09:07

## 2018-01-28 RX ADMIN — METOPROLOL TARTRATE 5 MG: 5 INJECTION, SOLUTION INTRAVENOUS at 22:47

## 2018-01-28 RX ADMIN — ENALAPRILAT 1.25 MG: 1.25 INJECTION INTRAVENOUS at 20:41

## 2018-01-28 RX ADMIN — HALOPERIDOL LACTATE 2 MG: 5 INJECTION, SOLUTION INTRAMUSCULAR at 19:30

## 2018-01-28 RX ADMIN — ONDANSETRON 4 MG: 2 INJECTION INTRAMUSCULAR; INTRAVENOUS at 14:21

## 2018-01-28 RX ADMIN — QUETIAPINE FUMARATE 25 MG: 25 TABLET ORAL at 19:37

## 2018-01-28 RX ADMIN — DEXAMETHASONE SODIUM PHOSPHATE 4 MG: 4 INJECTION, SOLUTION INTRAMUSCULAR; INTRAVENOUS at 21:25

## 2018-01-28 RX ADMIN — HYDRALAZINE HYDROCHLORIDE 20 MG: 20 INJECTION INTRAMUSCULAR; INTRAVENOUS at 08:41

## 2018-01-28 RX ADMIN — Medication 10 ML: at 22:47

## 2018-01-28 RX ADMIN — CALCITRIOL 0.5 MCG: 0.25 CAPSULE ORAL at 17:07

## 2018-01-28 RX ADMIN — ZIPRASIDONE MESYLATE 10 MG: 20 INJECTION, POWDER, LYOPHILIZED, FOR SOLUTION INTRAMUSCULAR at 09:22

## 2018-01-28 RX ADMIN — ACETAMINOPHEN 650 MG: 325 TABLET, FILM COATED ORAL at 15:31

## 2018-01-28 RX ADMIN — SODIUM CHLORIDE 5 MG/HR: 9 INJECTION, SOLUTION INTRAVENOUS at 11:04

## 2018-01-28 RX ADMIN — SODIUM CHLORIDE 12.5 MG/HR: 9 INJECTION, SOLUTION INTRAVENOUS at 14:48

## 2018-01-28 RX ADMIN — HALOPERIDOL LACTATE 2 MG: 5 INJECTION, SOLUTION INTRAMUSCULAR at 08:38

## 2018-01-28 RX ADMIN — HYDRALAZINE HYDROCHLORIDE 20 MG: 20 INJECTION INTRAMUSCULAR; INTRAVENOUS at 19:02

## 2018-01-28 RX ADMIN — Medication 10 ML: at 20:41

## 2018-01-28 RX ADMIN — FUROSEMIDE 20 MG: 10 INJECTION, SOLUTION INTRAMUSCULAR; INTRAVENOUS at 18:57

## 2018-01-29 ENCOUNTER — APPOINTMENT (OUTPATIENT)
Dept: GENERAL RADIOLOGY | Facility: HOSPITAL | Age: 28
End: 2018-01-29

## 2018-01-29 PROBLEM — I67.4 HYPERTENSIVE ENCEPHALOPATHY: Status: ACTIVE | Noted: 2018-01-29

## 2018-01-29 LAB
ANION GAP SERPL CALCULATED.3IONS-SCNC: 23.5 MMOL/L
ARTERIAL PATENCY WRIST A: POSITIVE
BACTERIA SPEC RESP CULT: NORMAL
BACTERIA UR QL AUTO: NORMAL /HPF
BASE EXCESS BLDA CALC-SCNC: -1.3 MMOL/L
BASOPHILS # BLD AUTO: 0 10*3/MM3 (ref 0–0.2)
BASOPHILS NFR BLD AUTO: 0 % (ref 0–2.5)
BDY SITE: ABNORMAL
BILIRUB UR QL STRIP: NEGATIVE
BUN BLD-MCNC: 36 MG/DL (ref 7–20)
BUN/CREAT SERPL: 4.2 (ref 7.1–23.5)
CALCIUM SPEC-SCNC: 9.7 MG/DL (ref 8.4–10.2)
CHLORIDE SERPL-SCNC: 105 MMOL/L (ref 98–107)
CLARITY UR: CLEAR
CO2 SERPL-SCNC: 19 MMOL/L (ref 26–30)
COHGB MFR BLD: <0.7 %
COLOR UR: ABNORMAL
CREAT BLD-MCNC: 8.5 MG/DL (ref 0.6–1.3)
DEPRECATED RDW RBC AUTO: 55.7 FL (ref 37–54)
EOSINOPHIL # BLD AUTO: 0 10*3/MM3 (ref 0–0.7)
EOSINOPHIL NFR BLD AUTO: 0 % (ref 0–7)
ERYTHROCYTE [DISTWIDTH] IN BLOOD BY AUTOMATED COUNT: 14.6 % (ref 11.5–14.5)
FLUAV AG NPH QL: NEGATIVE
FLUBV AG NPH QL IA: NEGATIVE
GFR SERPL CREATININE-BSD FRML MDRD: 6 ML/MIN/1.73
GLUCOSE BLD-MCNC: 116 MG/DL (ref 74–98)
GLUCOSE UR STRIP-MCNC: ABNORMAL MG/DL
HCO3 BLDA-SCNC: 23.5 MMOL/L (ref 22–28)
HCT VFR BLD AUTO: 44.7 % (ref 37–47)
HGB BLD-MCNC: 13.5 G/DL (ref 12–16)
HGB BLDA-MCNC: 14.6 G/DL (ref 12–18)
HGB UR QL STRIP.AUTO: NEGATIVE
HOROWITZ INDEX BLD+IHG-RTO: 50 %
HYALINE CASTS UR QL AUTO: NORMAL /LPF
IMM GRANULOCYTES # BLD: 0.03 10*3/MM3 (ref 0–0.06)
IMM GRANULOCYTES NFR BLD: 0.8 % (ref 0–0.6)
KETONES UR QL STRIP: NEGATIVE
LEUKOCYTE ESTERASE UR QL STRIP.AUTO: NEGATIVE
LYMPHOCYTES # BLD AUTO: 0.4 10*3/MM3 (ref 0.6–3.4)
LYMPHOCYTES NFR BLD AUTO: 10.3 % (ref 10–50)
MCH RBC QN AUTO: 30.9 PG (ref 27–31)
MCHC RBC AUTO-ENTMCNC: 30.2 G/DL (ref 30–37)
MCV RBC AUTO: 102.3 FL (ref 81–99)
METHGB BLD QL: 1 %
MODALITY: ABNORMAL
MONOCYTES # BLD AUTO: 0.1 10*3/MM3 (ref 0–0.9)
MONOCYTES NFR BLD AUTO: 2.6 % (ref 0–12)
NEUTROPHILS # BLD AUTO: 3.36 10*3/MM3 (ref 2–6.9)
NEUTROPHILS NFR BLD AUTO: 86.3 % (ref 37–80)
NITRITE UR QL STRIP: NEGATIVE
NRBC BLD MANUAL-RTO: 0 /100 WBC (ref 0–0)
OXYHGB MFR BLDV: 97 % (ref 94–99)
PCO2 BLDA: 37.9 MM HG (ref 35–45)
PH BLDA: 7.4 PH UNITS (ref 7.3–7.5)
PH UR STRIP.AUTO: 8.5 [PH] (ref 5–8)
PLATELET # BLD AUTO: 85 10*3/MM3 (ref 130–400)
PMV BLD AUTO: 10.9 FL (ref 6–12)
PO2 BLDA: 175 MM HG (ref 75–100)
POTASSIUM BLD-SCNC: 7.5 MMOL/L (ref 3.5–5.1)
PROT UR QL STRIP: ABNORMAL
RBC # BLD AUTO: 4.37 10*6/MM3 (ref 4.2–5.4)
RBC # UR: NORMAL /HPF
REF LAB TEST METHOD: NORMAL
SAO2 % BLDCOA: 98.4 %
SODIUM BLD-SCNC: 140 MMOL/L (ref 137–145)
SP GR UR STRIP: 1.02 (ref 1–1.03)
SQUAMOUS #/AREA URNS HPF: NORMAL /HPF
TROPONIN I SERPL-MCNC: 0.11 NG/ML (ref 0–0.03)
TROPONIN I SERPL-MCNC: 0.14 NG/ML (ref 0–0.03)
UROBILINOGEN UR QL STRIP: ABNORMAL
WBC NRBC COR # BLD: 3.89 10*3/MM3 (ref 4.8–10.8)
WBC UR QL AUTO: NORMAL /HPF

## 2018-01-29 PROCEDURE — 74018 RADEX ABDOMEN 1 VIEW: CPT

## 2018-01-29 PROCEDURE — 25010000002 METHYLPREDNISOLONE PER 125 MG: Performed by: FAMILY MEDICINE

## 2018-01-29 PROCEDURE — 81001 URINALYSIS AUTO W/SCOPE: CPT | Performed by: FAMILY MEDICINE

## 2018-01-29 PROCEDURE — 80048 BASIC METABOLIC PNL TOTAL CA: CPT | Performed by: INTERNAL MEDICINE

## 2018-01-29 PROCEDURE — 71045 X-RAY EXAM CHEST 1 VIEW: CPT

## 2018-01-29 PROCEDURE — 5A1D70Z PERFORMANCE OF URINARY FILTRATION, INTERMITTENT, LESS THAN 6 HOURS PER DAY: ICD-10-PCS | Performed by: INTERNAL MEDICINE

## 2018-01-29 PROCEDURE — 25010000002 PROPOFOL 1000 MG/ML EMULSION: Performed by: FAMILY MEDICINE

## 2018-01-29 PROCEDURE — 25010000002 HYDRALAZINE PER 20 MG: Performed by: FAMILY MEDICINE

## 2018-01-29 PROCEDURE — 83050 HGB METHEMOGLOBIN QUAN: CPT

## 2018-01-29 PROCEDURE — 94799 UNLISTED PULMONARY SVC/PX: CPT

## 2018-01-29 PROCEDURE — 84484 ASSAY OF TROPONIN QUANT: CPT | Performed by: FAMILY MEDICINE

## 2018-01-29 PROCEDURE — 90935 HEMODIALYSIS ONE EVALUATION: CPT

## 2018-01-29 PROCEDURE — 85025 COMPLETE CBC W/AUTO DIFF WBC: CPT | Performed by: INTERNAL MEDICINE

## 2018-01-29 PROCEDURE — 84484 ASSAY OF TROPONIN QUANT: CPT | Performed by: INTERNAL MEDICINE

## 2018-01-29 PROCEDURE — 87070 CULTURE OTHR SPECIMN AEROBIC: CPT | Performed by: FAMILY MEDICINE

## 2018-01-29 PROCEDURE — 99223 1ST HOSP IP/OBS HIGH 75: CPT | Performed by: INTERNAL MEDICINE

## 2018-01-29 PROCEDURE — 82805 BLOOD GASES W/O2 SATURATION: CPT

## 2018-01-29 PROCEDURE — 0BH17EZ INSERTION OF ENDOTRACHEAL AIRWAY INTO TRACHEA, VIA NATURAL OR ARTIFICIAL OPENING: ICD-10-PCS | Performed by: FAMILY MEDICINE

## 2018-01-29 PROCEDURE — 36591 DRAW BLOOD OFF VENOUS DEVICE: CPT

## 2018-01-29 PROCEDURE — 36600 WITHDRAWAL OF ARTERIAL BLOOD: CPT

## 2018-01-29 PROCEDURE — 25010000002 HYDRALAZINE PER 20 MG: Performed by: INTERNAL MEDICINE

## 2018-01-29 PROCEDURE — 5A1935Z RESPIRATORY VENTILATION, LESS THAN 24 CONSECUTIVE HOURS: ICD-10-PCS | Performed by: FAMILY MEDICINE

## 2018-01-29 PROCEDURE — 82375 ASSAY CARBOXYHB QUANT: CPT

## 2018-01-29 PROCEDURE — 99291 CRITICAL CARE FIRST HOUR: CPT | Performed by: FAMILY MEDICINE

## 2018-01-29 PROCEDURE — 25010000002 HEPARIN (PORCINE) PER 1000 UNITS: Performed by: INTERNAL MEDICINE

## 2018-01-29 PROCEDURE — 87040 BLOOD CULTURE FOR BACTERIA: CPT | Performed by: INTERNAL MEDICINE

## 2018-01-29 PROCEDURE — 94002 VENT MGMT INPAT INIT DAY: CPT

## 2018-01-29 PROCEDURE — 25010000002 HYDROMORPHONE PER 4 MG: Performed by: FAMILY MEDICINE

## 2018-01-29 PROCEDURE — 25010000002 HALOPERIDOL LACTATE PER 5 MG

## 2018-01-29 PROCEDURE — 25010000002 DIAZEPAM PER 5 MG: Performed by: FAMILY MEDICINE

## 2018-01-29 PROCEDURE — 87804 INFLUENZA ASSAY W/OPTIC: CPT | Performed by: INTERNAL MEDICINE

## 2018-01-29 PROCEDURE — 99292 CRITICAL CARE ADDL 30 MIN: CPT | Performed by: FAMILY MEDICINE

## 2018-01-29 PROCEDURE — 25010000002 HALOPERIDOL LACTATE PER 5 MG: Performed by: FAMILY MEDICINE

## 2018-01-29 RX ORDER — ACETAMINOPHEN 160 MG/5ML
650 SOLUTION ORAL EVERY 6 HOURS PRN
Status: DISCONTINUED | OUTPATIENT
Start: 2018-01-29 | End: 2018-02-01 | Stop reason: HOSPADM

## 2018-01-29 RX ORDER — HEPARIN SODIUM 1000 [USP'U]/ML
4000 INJECTION, SOLUTION INTRAVENOUS; SUBCUTANEOUS ONCE
Status: COMPLETED | OUTPATIENT
Start: 2018-01-29 | End: 2018-01-29

## 2018-01-29 RX ORDER — HYDRALAZINE HYDROCHLORIDE 20 MG/ML
20 INJECTION INTRAMUSCULAR; INTRAVENOUS EVERY 6 HOURS PRN
Status: DISCONTINUED | OUTPATIENT
Start: 2018-01-29 | End: 2018-01-30

## 2018-01-29 RX ORDER — CHLORHEXIDINE GLUCONATE 0.12 MG/ML
15 RINSE ORAL EVERY 12 HOURS SCHEDULED
Status: DISCONTINUED | OUTPATIENT
Start: 2018-01-29 | End: 2018-02-01

## 2018-01-29 RX ORDER — LEVETIRACETAM 100 MG/ML
500 SOLUTION ORAL NIGHTLY
Status: DISCONTINUED | OUTPATIENT
Start: 2018-01-29 | End: 2018-01-30

## 2018-01-29 RX ORDER — DIAZEPAM 5 MG/ML
5 INJECTION, SOLUTION INTRAMUSCULAR; INTRAVENOUS ONCE
Status: COMPLETED | OUTPATIENT
Start: 2018-01-29 | End: 2018-01-29

## 2018-01-29 RX ORDER — QUETIAPINE FUMARATE 100 MG/1
100 TABLET, FILM COATED ORAL ONCE
Status: CANCELLED | OUTPATIENT
Start: 2018-01-29

## 2018-01-29 RX ORDER — CLONIDINE HYDROCHLORIDE 0.1 MG/1
0.1 TABLET ORAL EVERY 8 HOURS SCHEDULED
Status: DISCONTINUED | OUTPATIENT
Start: 2018-01-29 | End: 2018-02-01 | Stop reason: HOSPADM

## 2018-01-29 RX ORDER — PANTOPRAZOLE SODIUM 40 MG/1
40 TABLET, DELAYED RELEASE ORAL
Status: DISCONTINUED | OUTPATIENT
Start: 2018-01-29 | End: 2018-01-29 | Stop reason: ALTCHOICE

## 2018-01-29 RX ORDER — METHYLPREDNISOLONE SODIUM SUCCINATE 125 MG/2ML
60 INJECTION, POWDER, LYOPHILIZED, FOR SOLUTION INTRAMUSCULAR; INTRAVENOUS EVERY 8 HOURS
Status: DISCONTINUED | OUTPATIENT
Start: 2018-01-29 | End: 2018-02-01 | Stop reason: HOSPADM

## 2018-01-29 RX ORDER — LANSOPRAZOLE
30 KIT
Status: DISCONTINUED | OUTPATIENT
Start: 2018-01-29 | End: 2018-01-31 | Stop reason: ALTCHOICE

## 2018-01-29 RX ORDER — QUETIAPINE FUMARATE 100 MG/1
100 TABLET, FILM COATED ORAL ONCE
Status: COMPLETED | OUTPATIENT
Start: 2018-01-29 | End: 2018-01-29

## 2018-01-29 RX ORDER — LOSARTAN POTASSIUM 50 MG/1
100 TABLET ORAL
Status: DISCONTINUED | OUTPATIENT
Start: 2018-01-29 | End: 2018-02-01 | Stop reason: HOSPADM

## 2018-01-29 RX ORDER — ACETAMINOPHEN 325 MG/1
650 TABLET ORAL EVERY 6 HOURS PRN
Status: DISCONTINUED | OUTPATIENT
Start: 2018-01-29 | End: 2018-02-01 | Stop reason: HOSPADM

## 2018-01-29 RX ORDER — DIAZEPAM 5 MG/ML
5 INJECTION, SOLUTION INTRAMUSCULAR; INTRAVENOUS EVERY 4 HOURS PRN
Status: DISCONTINUED | OUTPATIENT
Start: 2018-01-29 | End: 2018-01-29

## 2018-01-29 RX ORDER — HALOPERIDOL 5 MG/ML
2 INJECTION INTRAMUSCULAR ONCE
Status: DISCONTINUED | OUTPATIENT
Start: 2018-01-29 | End: 2018-01-29

## 2018-01-29 RX ORDER — FAMOTIDINE 10 MG/ML
20 INJECTION, SOLUTION INTRAVENOUS 2 TIMES DAILY
Status: DISCONTINUED | OUTPATIENT
Start: 2018-01-29 | End: 2018-01-29

## 2018-01-29 RX ORDER — NICARDIPINE HYDROCHLORIDE 20 MG/1
20 CAPSULE ORAL EVERY 8 HOURS SCHEDULED
Status: DISCONTINUED | OUTPATIENT
Start: 2018-01-29 | End: 2018-02-01 | Stop reason: HOSPADM

## 2018-01-29 RX ADMIN — SODIUM CHLORIDE 5 MG/HR: 9 INJECTION, SOLUTION INTRAVENOUS at 00:15

## 2018-01-29 RX ADMIN — NEBIVOLOL HYDROCHLORIDE 20 MG: 10 TABLET ORAL at 09:34

## 2018-01-29 RX ADMIN — METOPROLOL TARTRATE 5 MG: 5 INJECTION, SOLUTION INTRAVENOUS at 05:09

## 2018-01-29 RX ADMIN — HYDROMORPHONE HYDROCHLORIDE 0.5 MG: 1 INJECTION, SOLUTION INTRAMUSCULAR; INTRAVENOUS; SUBCUTANEOUS at 01:41

## 2018-01-29 RX ADMIN — CALCIUM ACETATE 667 MG: 667 CAPSULE ORAL at 09:33

## 2018-01-29 RX ADMIN — ACETAMINOPHEN 650 MG: 650 SOLUTION ORAL at 10:36

## 2018-01-29 RX ADMIN — FAMOTIDINE 20 MG: 10 INJECTION, SOLUTION INTRAVENOUS at 09:34

## 2018-01-29 RX ADMIN — ENALAPRILAT 1.25 MG: 1.25 INJECTION INTRAVENOUS at 09:34

## 2018-01-29 RX ADMIN — NICARDIPINE HYDROCHLORIDE 20 MG: 20 CAPSULE ORAL at 21:02

## 2018-01-29 RX ADMIN — CLONIDINE HYDROCHLORIDE 0.1 MG: 0.1 TABLET ORAL at 21:02

## 2018-01-29 RX ADMIN — ACETAMINOPHEN 650 MG: 325 TABLET, FILM COATED ORAL at 02:41

## 2018-01-29 RX ADMIN — ENALAPRILAT 1.25 MG: 1.25 INJECTION INTRAVENOUS at 01:08

## 2018-01-29 RX ADMIN — HEPARIN SODIUM 4000 UNITS: 1000 INJECTION, SOLUTION INTRAVENOUS; SUBCUTANEOUS at 11:59

## 2018-01-29 RX ADMIN — HYDRALAZINE HYDROCHLORIDE 20 MG: 20 INJECTION INTRAMUSCULAR; INTRAVENOUS at 23:57

## 2018-01-29 RX ADMIN — PROPOFOL 5 MCG/KG/MIN: 10 INJECTION, EMULSION INTRAVENOUS at 18:49

## 2018-01-29 RX ADMIN — QUETIAPINE FUMARATE 100 MG: 100 TABLET ORAL at 10:37

## 2018-01-29 RX ADMIN — LANSOPRAZOLE 30 MG: KIT at 18:49

## 2018-01-29 RX ADMIN — PROPOFOL 45 MCG/KG/MIN: 10 INJECTION, EMULSION INTRAVENOUS at 05:10

## 2018-01-29 RX ADMIN — DIAZEPAM 5 MG: 5 INJECTION, SOLUTION INTRAMUSCULAR; INTRAVENOUS at 00:59

## 2018-01-29 RX ADMIN — METHYLPREDNISOLONE SODIUM SUCCINATE 60 MG: 125 INJECTION, POWDER, FOR SOLUTION INTRAMUSCULAR; INTRAVENOUS at 03:12

## 2018-01-29 RX ADMIN — PROPOFOL 50 MCG/KG/MIN: 10 INJECTION, EMULSION INTRAVENOUS at 12:02

## 2018-01-29 RX ADMIN — HYDROXYCHLOROQUINE SULFATE 200 MG: 200 TABLET, FILM COATED ORAL at 02:41

## 2018-01-29 RX ADMIN — CALCITRIOL 0.5 MCG: 0.25 CAPSULE ORAL at 09:34

## 2018-01-29 RX ADMIN — PROPOFOL 5 MCG/KG/MIN: 10 INJECTION, EMULSION INTRAVENOUS at 01:56

## 2018-01-29 RX ADMIN — Medication 1 TABLET: at 09:33

## 2018-01-29 RX ADMIN — CLONIDINE HYDROCHLORIDE 0.1 MG: 0.1 TABLET ORAL at 13:09

## 2018-01-29 RX ADMIN — HEPARIN SODIUM 5000 UNITS: 5000 INJECTION, SOLUTION INTRAVENOUS; SUBCUTANEOUS at 01:08

## 2018-01-29 RX ADMIN — HYDROXYCHLOROQUINE SULFATE 200 MG: 200 TABLET, FILM COATED ORAL at 20:56

## 2018-01-29 RX ADMIN — HEPARIN SODIUM 5000 UNITS: 5000 INJECTION, SOLUTION INTRAVENOUS; SUBCUTANEOUS at 18:50

## 2018-01-29 RX ADMIN — DIAZEPAM 5 MG: 5 INJECTION, SOLUTION INTRAMUSCULAR; INTRAVENOUS at 01:36

## 2018-01-29 RX ADMIN — HYDRALAZINE HYDROCHLORIDE 20 MG: 20 INJECTION INTRAMUSCULAR; INTRAVENOUS at 04:13

## 2018-01-29 RX ADMIN — METHYLPREDNISOLONE SODIUM SUCCINATE 60 MG: 125 INJECTION, POWDER, FOR SOLUTION INTRAMUSCULAR; INTRAVENOUS at 21:01

## 2018-01-29 RX ADMIN — TERAZOSIN HYDROCHLORIDE 10 MG: 5 CAPSULE ORAL at 20:55

## 2018-01-29 RX ADMIN — LEVETIRACETAM 500 MG: 100 SOLUTION ORAL at 21:03

## 2018-01-29 RX ADMIN — CHLORHEXIDINE GLUCONATE 15 ML: 1.2 RINSE ORAL at 21:03

## 2018-01-29 RX ADMIN — HALOPERIDOL LACTATE 2 MG: 5 INJECTION, SOLUTION INTRAMUSCULAR at 00:07

## 2018-01-29 RX ADMIN — SODIUM CHLORIDE 15 MG/HR: 9 INJECTION, SOLUTION INTRAVENOUS at 03:01

## 2018-01-29 RX ADMIN — HYDROXYCHLOROQUINE SULFATE 200 MG: 200 TABLET, FILM COATED ORAL at 09:33

## 2018-01-29 RX ADMIN — HEPARIN SODIUM 5000 UNITS: 5000 INJECTION, SOLUTION INTRAVENOUS; SUBCUTANEOUS at 09:36

## 2018-01-29 RX ADMIN — QUETIAPINE FUMARATE 25 MG: 25 TABLET ORAL at 20:56

## 2018-01-29 RX ADMIN — LOSARTAN POTASSIUM 100 MG: 50 TABLET, FILM COATED ORAL at 12:00

## 2018-01-29 RX ADMIN — CALCIUM ACETATE 667 MG: 667 CAPSULE ORAL at 21:02

## 2018-01-29 RX ADMIN — SODIUM CHLORIDE 10 MG/HR: 9 INJECTION, SOLUTION INTRAVENOUS at 05:11

## 2018-01-29 RX ADMIN — NICARDIPINE HYDROCHLORIDE 20 MG: 20 CAPSULE ORAL at 13:08

## 2018-01-29 RX ADMIN — CHLORHEXIDINE GLUCONATE 15 ML: 1.2 RINSE ORAL at 02:47

## 2018-01-29 RX ADMIN — CHLORHEXIDINE GLUCONATE 15 ML: 1.2 RINSE ORAL at 09:36

## 2018-01-29 RX ADMIN — METHYLPREDNISOLONE SODIUM SUCCINATE 60 MG: 125 INJECTION, POWDER, FOR SOLUTION INTRAMUSCULAR; INTRAVENOUS at 12:00

## 2018-01-30 ENCOUNTER — APPOINTMENT (OUTPATIENT)
Dept: GENERAL RADIOLOGY | Facility: HOSPITAL | Age: 28
End: 2018-01-30

## 2018-01-30 LAB
ALBUMIN SERPL-MCNC: 4.8 G/DL (ref 3.5–5)
ALBUMIN/GLOB SERPL: 1.5 G/DL (ref 1–2)
ALP SERPL-CCNC: 57 U/L (ref 38–126)
ALT SERPL W P-5'-P-CCNC: 25 U/L (ref 13–69)
ANION GAP SERPL CALCULATED.3IONS-SCNC: 28.4 MMOL/L
ARTERIAL PATENCY WRIST A: POSITIVE
AST SERPL-CCNC: 23 U/L (ref 15–46)
ATMOSPHERIC PRESS: 743 MMHG
ATMOSPHERIC PRESS: 744 MMHG
BASE EXCESS BLDA CALC-SCNC: -0.5 MMOL/L
BASE EXCESS BLDA CALC-SCNC: 5.9 MMOL/L
BASE EXCESS BLDA CALC-SCNC: 5.9 MMOL/L
BASOPHILS # BLD AUTO: 0.01 10*3/MM3 (ref 0–0.2)
BASOPHILS NFR BLD AUTO: 0.2 % (ref 0–2.5)
BDY SITE: ABNORMAL
BILIRUB SERPL-MCNC: 0.6 MG/DL (ref 0.2–1.3)
BUN BLD-MCNC: 43 MG/DL (ref 7–20)
BUN/CREAT SERPL: 6.2 (ref 7.1–23.5)
CALCIUM SPEC-SCNC: 10.6 MG/DL (ref 8.4–10.2)
CHLORIDE SERPL-SCNC: 99 MMOL/L (ref 98–107)
CO2 SERPL-SCNC: 22 MMOL/L (ref 26–30)
COHGB MFR BLD: <0.7 %
CREAT BLD-MCNC: 6.9 MG/DL (ref 0.6–1.3)
DEPRECATED RDW RBC AUTO: 52.2 FL (ref 37–54)
EOSINOPHIL # BLD AUTO: 0 10*3/MM3 (ref 0–0.7)
EOSINOPHIL NFR BLD AUTO: 0 % (ref 0–7)
ERYTHROCYTE [DISTWIDTH] IN BLOOD BY AUTOMATED COUNT: 14.4 % (ref 11.5–14.5)
GFR SERPL CREATININE-BSD FRML MDRD: 7 ML/MIN/1.73
GLOBULIN UR ELPH-MCNC: 3.2 GM/DL
GLUCOSE BLD-MCNC: 127 MG/DL (ref 74–98)
HCO3 BLDA-SCNC: 22.9 MMOL/L (ref 22–28)
HCO3 BLDA-SCNC: 29.1 MMOL/L (ref 22–28)
HCO3 BLDA-SCNC: 29.1 MMOL/L (ref 22–28)
HCT VFR BLD AUTO: 51.6 % (ref 37–47)
HGB BLD-MCNC: 16.6 G/DL (ref 12–16)
HGB BLDA-MCNC: 16.1 G/DL (ref 12–18)
HGB BLDA-MCNC: 16.5 G/DL (ref 12–18)
HGB BLDA-MCNC: 16.5 G/DL (ref 12–18)
HOROWITZ INDEX BLD+IHG-RTO: 21 %
HOROWITZ INDEX BLD+IHG-RTO: 30 %
HOROWITZ INDEX BLD+IHG-RTO: 50 %
IMM GRANULOCYTES # BLD: 0.02 10*3/MM3 (ref 0–0.06)
IMM GRANULOCYTES NFR BLD: 0.5 % (ref 0–0.6)
LYMPHOCYTES # BLD AUTO: 0.5 10*3/MM3 (ref 0.6–3.4)
LYMPHOCYTES NFR BLD AUTO: 12.3 % (ref 10–50)
MCH RBC QN AUTO: 31.4 PG (ref 27–31)
MCHC RBC AUTO-ENTMCNC: 32.2 G/DL (ref 30–37)
MCV RBC AUTO: 97.5 FL (ref 81–99)
METHGB BLD QL: 0.6 %
MODALITY: ABNORMAL
MONOCYTES # BLD AUTO: 0.31 10*3/MM3 (ref 0–0.9)
MONOCYTES NFR BLD AUTO: 7.6 % (ref 0–12)
NEUTROPHILS # BLD AUTO: 3.24 10*3/MM3 (ref 2–6.9)
NEUTROPHILS NFR BLD AUTO: 79.4 % (ref 37–80)
NRBC BLD MANUAL-RTO: 0 /100 WBC (ref 0–0)
OXYHGB MFR BLDV: 97.2 % (ref 94–99)
PCO2 BLDA: 30.3 MM HG (ref 35–45)
PCO2 BLDA: 37.4 MM HG (ref 35–45)
PCO2 BLDA: 37.4 MM HG (ref 35–45)
PH BLDA: 7.49 PH UNITS (ref 7.3–7.5)
PH BLDA: 7.5 PH UNITS (ref 7.3–7.5)
PH BLDA: 7.5 PH UNITS (ref 7.3–7.5)
PLATELET # BLD AUTO: 149 10*3/MM3 (ref 130–400)
PMV BLD AUTO: 12 FL (ref 6–12)
PO2 BLDA: 137 MM HG (ref 75–100)
PO2 BLDA: 137 MM HG (ref 75–100)
PO2 BLDA: 196 MM HG (ref 75–100)
POTASSIUM BLD-SCNC: 4.1 MMOL/L (ref 3.5–5.1)
POTASSIUM BLD-SCNC: 6.4 MMOL/L (ref 3.5–5.1)
PROT SERPL-MCNC: 8 G/DL (ref 6.3–8.2)
RBC # BLD AUTO: 5.29 10*6/MM3 (ref 4.2–5.4)
SAO2 % BLDCOA: 98 %
SAO2 % BLDCOA: 98 %
SAO2 % BLDCOA: 98.6 %
SODIUM BLD-SCNC: 143 MMOL/L (ref 137–145)
TROPONIN I SERPL-MCNC: 0.1 NG/ML (ref 0–0.03)
VANCOMYCIN SERPL-MCNC: <5 MCG/ML (ref 5–40)
VRE SPEC QL CULT: NORMAL
WBC NRBC COR # BLD: 4.08 10*3/MM3 (ref 4.8–10.8)

## 2018-01-30 PROCEDURE — 83050 HGB METHEMOGLOBIN QUAN: CPT

## 2018-01-30 PROCEDURE — 90935 HEMODIALYSIS ONE EVALUATION: CPT

## 2018-01-30 PROCEDURE — 94799 UNLISTED PULMONARY SVC/PX: CPT

## 2018-01-30 PROCEDURE — 25010000002 HYDRALAZINE PER 20 MG: Performed by: INTERNAL MEDICINE

## 2018-01-30 PROCEDURE — 71045 X-RAY EXAM CHEST 1 VIEW: CPT

## 2018-01-30 PROCEDURE — 36600 WITHDRAWAL OF ARTERIAL BLOOD: CPT

## 2018-01-30 PROCEDURE — 84484 ASSAY OF TROPONIN QUANT: CPT | Performed by: FAMILY MEDICINE

## 2018-01-30 PROCEDURE — 99232 SBSQ HOSP IP/OBS MODERATE 35: CPT | Performed by: FAMILY MEDICINE

## 2018-01-30 PROCEDURE — 94003 VENT MGMT INPAT SUBQ DAY: CPT

## 2018-01-30 PROCEDURE — 82375 ASSAY CARBOXYHB QUANT: CPT

## 2018-01-30 PROCEDURE — 25010000002 HEPARIN (PORCINE) PER 1000 UNITS: Performed by: INTERNAL MEDICINE

## 2018-01-30 PROCEDURE — 80202 ASSAY OF VANCOMYCIN: CPT | Performed by: FAMILY MEDICINE

## 2018-01-30 PROCEDURE — 25010000002 METHYLPREDNISOLONE PER 125 MG: Performed by: FAMILY MEDICINE

## 2018-01-30 PROCEDURE — 85025 COMPLETE CBC W/AUTO DIFF WBC: CPT | Performed by: FAMILY MEDICINE

## 2018-01-30 PROCEDURE — 82805 BLOOD GASES W/O2 SATURATION: CPT

## 2018-01-30 PROCEDURE — 25010000002 PROPOFOL 1000 MG/ML EMULSION: Performed by: FAMILY MEDICINE

## 2018-01-30 PROCEDURE — 5A1D70Z PERFORMANCE OF URINARY FILTRATION, INTERMITTENT, LESS THAN 6 HOURS PER DAY: ICD-10-PCS | Performed by: INTERNAL MEDICINE

## 2018-01-30 PROCEDURE — 80053 COMPREHEN METABOLIC PANEL: CPT | Performed by: FAMILY MEDICINE

## 2018-01-30 PROCEDURE — 84132 ASSAY OF SERUM POTASSIUM: CPT | Performed by: INTERNAL MEDICINE

## 2018-01-30 PROCEDURE — 36591 DRAW BLOOD OFF VENOUS DEVICE: CPT

## 2018-01-30 RX ORDER — HYDRALAZINE HYDROCHLORIDE 20 MG/ML
20 INJECTION INTRAMUSCULAR; INTRAVENOUS EVERY 4 HOURS PRN
Status: DISCONTINUED | OUTPATIENT
Start: 2018-01-30 | End: 2018-02-01 | Stop reason: HOSPADM

## 2018-01-30 RX ORDER — HYDRALAZINE HYDROCHLORIDE 25 MG/1
100 TABLET, FILM COATED ORAL EVERY 8 HOURS SCHEDULED
Status: DISCONTINUED | OUTPATIENT
Start: 2018-01-30 | End: 2018-02-01 | Stop reason: HOSPADM

## 2018-01-30 RX ORDER — CLONAZEPAM 0.5 MG/1
0.5 TABLET ORAL EVERY 8 HOURS PRN
Status: DISCONTINUED | OUTPATIENT
Start: 2018-01-30 | End: 2018-02-01 | Stop reason: HOSPADM

## 2018-01-30 RX ORDER — LEVETIRACETAM 500 MG/1
500 TABLET ORAL NIGHTLY
Status: DISCONTINUED | OUTPATIENT
Start: 2018-01-30 | End: 2018-02-01 | Stop reason: HOSPADM

## 2018-01-30 RX ADMIN — PROPOFOL 40 MCG/KG/MIN: 10 INJECTION, EMULSION INTRAVENOUS at 05:11

## 2018-01-30 RX ADMIN — HEPARIN SODIUM 5000 UNITS: 5000 INJECTION, SOLUTION INTRAVENOUS; SUBCUTANEOUS at 17:54

## 2018-01-30 RX ADMIN — CLONIDINE HYDROCHLORIDE 0.1 MG: 0.1 TABLET ORAL at 15:20

## 2018-01-30 RX ADMIN — HYDRALAZINE HYDROCHLORIDE 20 MG: 20 INJECTION INTRAMUSCULAR; INTRAVENOUS at 07:31

## 2018-01-30 RX ADMIN — CALCIUM ACETATE 667 MG: 667 CAPSULE ORAL at 19:47

## 2018-01-30 RX ADMIN — CHLORHEXIDINE GLUCONATE 15 ML: 1.2 RINSE ORAL at 08:59

## 2018-01-30 RX ADMIN — QUETIAPINE FUMARATE 25 MG: 25 TABLET ORAL at 21:44

## 2018-01-30 RX ADMIN — LANSOPRAZOLE 30 MG: KIT at 06:56

## 2018-01-30 RX ADMIN — HYDROXYCHLOROQUINE SULFATE 200 MG: 200 TABLET, FILM COATED ORAL at 08:59

## 2018-01-30 RX ADMIN — HYDRALAZINE HYDROCHLORIDE 20 MG: 20 INJECTION INTRAMUSCULAR; INTRAVENOUS at 13:46

## 2018-01-30 RX ADMIN — NEBIVOLOL HYDROCHLORIDE 20 MG: 10 TABLET ORAL at 08:58

## 2018-01-30 RX ADMIN — LANSOPRAZOLE 30 MG: KIT at 17:54

## 2018-01-30 RX ADMIN — METHYLPREDNISOLONE SODIUM SUCCINATE 60 MG: 125 INJECTION, POWDER, FOR SOLUTION INTRAMUSCULAR; INTRAVENOUS at 17:55

## 2018-01-30 RX ADMIN — METHYLPREDNISOLONE SODIUM SUCCINATE 60 MG: 125 INJECTION, POWDER, FOR SOLUTION INTRAMUSCULAR; INTRAVENOUS at 11:42

## 2018-01-30 RX ADMIN — METHYLPREDNISOLONE SODIUM SUCCINATE 60 MG: 125 INJECTION, POWDER, FOR SOLUTION INTRAMUSCULAR; INTRAVENOUS at 18:15

## 2018-01-30 RX ADMIN — CLONIDINE HYDROCHLORIDE 0.1 MG: 0.1 TABLET ORAL at 05:11

## 2018-01-30 RX ADMIN — NICARDIPINE HYDROCHLORIDE 20 MG: 20 CAPSULE ORAL at 15:20

## 2018-01-30 RX ADMIN — CLONIDINE HYDROCHLORIDE 0.1 MG: 0.1 TABLET ORAL at 21:44

## 2018-01-30 RX ADMIN — NICARDIPINE HYDROCHLORIDE 20 MG: 20 CAPSULE ORAL at 21:44

## 2018-01-30 RX ADMIN — LOSARTAN POTASSIUM 100 MG: 50 TABLET, FILM COATED ORAL at 08:59

## 2018-01-30 RX ADMIN — Medication 1 TABLET: at 08:58

## 2018-01-30 RX ADMIN — CALCITRIOL 0.5 MCG: 0.25 CAPSULE ORAL at 08:58

## 2018-01-30 RX ADMIN — HEPARIN SODIUM 5000 UNITS: 5000 INJECTION, SOLUTION INTRAVENOUS; SUBCUTANEOUS at 08:58

## 2018-01-30 RX ADMIN — NICARDIPINE HYDROCHLORIDE 20 MG: 20 CAPSULE ORAL at 05:11

## 2018-01-30 RX ADMIN — HEPARIN SODIUM 5000 UNITS: 5000 INJECTION, SOLUTION INTRAVENOUS; SUBCUTANEOUS at 02:07

## 2018-01-30 RX ADMIN — HYDROXYCHLOROQUINE SULFATE 200 MG: 200 TABLET, FILM COATED ORAL at 21:44

## 2018-01-30 RX ADMIN — HYDRALAZINE HYDROCHLORIDE 100 MG: 25 TABLET ORAL at 21:44

## 2018-01-30 RX ADMIN — METHYLPREDNISOLONE SODIUM SUCCINATE 60 MG: 125 INJECTION, POWDER, FOR SOLUTION INTRAMUSCULAR; INTRAVENOUS at 02:07

## 2018-01-30 RX ADMIN — TERAZOSIN HYDROCHLORIDE 10 MG: 5 CAPSULE ORAL at 21:44

## 2018-01-30 RX ADMIN — PROPOFOL 40 MCG/KG/MIN: 10 INJECTION, EMULSION INTRAVENOUS at 00:02

## 2018-01-30 RX ADMIN — LEVETIRACETAM 500 MG: 100 SOLUTION ORAL at 21:44

## 2018-01-31 LAB
ACINETOBACTER SCREEN CX: NO GROWTH
ALBUMIN SERPL-MCNC: 4.1 G/DL (ref 3.5–5)
ANION GAP SERPL CALCULATED.3IONS-SCNC: 29.1 MMOL/L
BASOPHILS # BLD AUTO: 0 10*3/MM3 (ref 0–0.2)
BASOPHILS NFR BLD AUTO: 0 % (ref 0–2.5)
BUN BLD-MCNC: 61 MG/DL (ref 7–20)
BUN/CREAT SERPL: 10.2 (ref 7.1–23.5)
CALCIUM SPEC-SCNC: 9.2 MG/DL (ref 8.4–10.2)
CHLORIDE SERPL-SCNC: 96 MMOL/L (ref 98–107)
CO2 SERPL-SCNC: 24 MMOL/L (ref 26–30)
CREAT BLD-MCNC: 6 MG/DL (ref 0.6–1.3)
DEPRECATED RDW RBC AUTO: 52.2 FL (ref 37–54)
EOSINOPHIL # BLD AUTO: 0 10*3/MM3 (ref 0–0.7)
EOSINOPHIL NFR BLD AUTO: 0 % (ref 0–7)
ERYTHROCYTE [DISTWIDTH] IN BLOOD BY AUTOMATED COUNT: 14.5 % (ref 11.5–14.5)
GFR SERPL CREATININE-BSD FRML MDRD: 8 ML/MIN/1.73
GLUCOSE BLD-MCNC: 111 MG/DL (ref 74–98)
HCT VFR BLD AUTO: 52.6 % (ref 37–47)
HGB BLD-MCNC: 16.4 G/DL (ref 12–16)
IMM GRANULOCYTES # BLD: 0.02 10*3/MM3 (ref 0–0.06)
IMM GRANULOCYTES NFR BLD: 0.3 % (ref 0–0.6)
LYMPHOCYTES # BLD AUTO: 0.82 10*3/MM3 (ref 0.6–3.4)
LYMPHOCYTES NFR BLD AUTO: 13 % (ref 10–50)
MCH RBC QN AUTO: 30.4 PG (ref 27–31)
MCHC RBC AUTO-ENTMCNC: 31.2 G/DL (ref 30–37)
MCV RBC AUTO: 97.4 FL (ref 81–99)
MONOCYTES # BLD AUTO: 0.66 10*3/MM3 (ref 0–0.9)
MONOCYTES NFR BLD AUTO: 10.5 % (ref 0–12)
NEUTROPHILS # BLD AUTO: 4.79 10*3/MM3 (ref 2–6.9)
NEUTROPHILS NFR BLD AUTO: 76.2 % (ref 37–80)
NRBC BLD MANUAL-RTO: 0 /100 WBC (ref 0–0)
PHOSPHATE SERPL-MCNC: 9.8 MG/DL (ref 2.5–4.5)
PLATELET # BLD AUTO: 164 10*3/MM3 (ref 130–400)
PMV BLD AUTO: 11.8 FL (ref 6–12)
POTASSIUM BLD-SCNC: 5.1 MMOL/L (ref 3.5–5.1)
RBC # BLD AUTO: 5.4 10*6/MM3 (ref 4.2–5.4)
SODIUM BLD-SCNC: 144 MMOL/L (ref 137–145)
WBC NRBC COR # BLD: 6.29 10*3/MM3 (ref 4.8–10.8)

## 2018-01-31 PROCEDURE — 25010000002 METHYLPREDNISOLONE PER 125 MG: Performed by: FAMILY MEDICINE

## 2018-01-31 PROCEDURE — 80069 RENAL FUNCTION PANEL: CPT | Performed by: FAMILY MEDICINE

## 2018-01-31 PROCEDURE — 85025 COMPLETE CBC W/AUTO DIFF WBC: CPT | Performed by: FAMILY MEDICINE

## 2018-01-31 PROCEDURE — 99232 SBSQ HOSP IP/OBS MODERATE 35: CPT | Performed by: FAMILY MEDICINE

## 2018-01-31 RX ORDER — SPIRONOLACTONE 25 MG/1
25 TABLET ORAL DAILY
Status: DISCONTINUED | OUTPATIENT
Start: 2018-02-01 | End: 2018-02-01 | Stop reason: HOSPADM

## 2018-01-31 RX ORDER — PANTOPRAZOLE SODIUM 40 MG/1
40 TABLET, DELAYED RELEASE ORAL
Status: DISCONTINUED | OUTPATIENT
Start: 2018-01-31 | End: 2018-02-01 | Stop reason: HOSPADM

## 2018-01-31 RX ORDER — TERAZOSIN 5 MG/1
20 CAPSULE ORAL NIGHTLY
Status: DISCONTINUED | OUTPATIENT
Start: 2018-01-31 | End: 2018-02-01 | Stop reason: HOSPADM

## 2018-01-31 RX ADMIN — CLONIDINE HYDROCHLORIDE 0.1 MG: 0.1 TABLET ORAL at 05:07

## 2018-01-31 RX ADMIN — Medication 1 TABLET: at 09:11

## 2018-01-31 RX ADMIN — CLONIDINE HYDROCHLORIDE 0.1 MG: 0.1 TABLET ORAL at 14:10

## 2018-01-31 RX ADMIN — CALCIUM ACETATE 667 MG: 667 CAPSULE ORAL at 09:11

## 2018-01-31 RX ADMIN — HYDRALAZINE HYDROCHLORIDE 100 MG: 25 TABLET ORAL at 20:23

## 2018-01-31 RX ADMIN — METHYLPREDNISOLONE SODIUM SUCCINATE 60 MG: 125 INJECTION, POWDER, FOR SOLUTION INTRAMUSCULAR; INTRAVENOUS at 17:31

## 2018-01-31 RX ADMIN — METHYLPREDNISOLONE SODIUM SUCCINATE 60 MG: 125 INJECTION, POWDER, FOR SOLUTION INTRAMUSCULAR; INTRAVENOUS at 03:54

## 2018-01-31 RX ADMIN — HYDRALAZINE HYDROCHLORIDE 100 MG: 25 TABLET ORAL at 05:07

## 2018-01-31 RX ADMIN — HYDROXYCHLOROQUINE SULFATE 200 MG: 200 TABLET, FILM COATED ORAL at 09:11

## 2018-01-31 RX ADMIN — NICARDIPINE HYDROCHLORIDE 20 MG: 20 CAPSULE ORAL at 20:23

## 2018-01-31 RX ADMIN — METHYLPREDNISOLONE SODIUM SUCCINATE 60 MG: 125 INJECTION, POWDER, FOR SOLUTION INTRAMUSCULAR; INTRAVENOUS at 11:59

## 2018-01-31 RX ADMIN — NEBIVOLOL HYDROCHLORIDE 20 MG: 10 TABLET ORAL at 06:18

## 2018-01-31 RX ADMIN — CALCIUM ACETATE 667 MG: 667 CAPSULE ORAL at 14:10

## 2018-01-31 RX ADMIN — NICARDIPINE HYDROCHLORIDE 20 MG: 20 CAPSULE ORAL at 14:14

## 2018-01-31 RX ADMIN — CALCIUM ACETATE 667 MG: 667 CAPSULE ORAL at 17:30

## 2018-01-31 RX ADMIN — TERAZOSIN HYDROCHLORIDE 20 MG: 5 CAPSULE ORAL at 20:23

## 2018-01-31 RX ADMIN — LEVETIRACETAM 500 MG: 500 TABLET, FILM COATED ORAL at 20:24

## 2018-01-31 RX ADMIN — CLONIDINE HYDROCHLORIDE 0.1 MG: 0.1 TABLET ORAL at 20:24

## 2018-01-31 RX ADMIN — CALCITRIOL 0.5 MCG: 0.25 CAPSULE ORAL at 09:11

## 2018-01-31 RX ADMIN — PANTOPRAZOLE SODIUM 40 MG: 40 TABLET, DELAYED RELEASE ORAL at 07:30

## 2018-01-31 RX ADMIN — LOSARTAN POTASSIUM 100 MG: 50 TABLET, FILM COATED ORAL at 06:12

## 2018-01-31 RX ADMIN — NICARDIPINE HYDROCHLORIDE 20 MG: 20 CAPSULE ORAL at 05:07

## 2018-01-31 RX ADMIN — HYDRALAZINE HYDROCHLORIDE 100 MG: 25 TABLET ORAL at 14:10

## 2018-01-31 RX ADMIN — PANTOPRAZOLE SODIUM 40 MG: 40 TABLET, DELAYED RELEASE ORAL at 17:29

## 2018-02-01 VITALS
HEIGHT: 67 IN | SYSTOLIC BLOOD PRESSURE: 154 MMHG | OXYGEN SATURATION: 99 % | WEIGHT: 129.1 LBS | BODY MASS INDEX: 20.26 KG/M2 | RESPIRATION RATE: 20 BRPM | HEART RATE: 66 BPM | DIASTOLIC BLOOD PRESSURE: 92 MMHG | TEMPERATURE: 98.6 F

## 2018-02-01 LAB
ALBUMIN SERPL-MCNC: 3.7 G/DL (ref 3.5–5)
ANION GAP SERPL CALCULATED.3IONS-SCNC: 26.9 MMOL/L
BASOPHILS # BLD AUTO: 0.01 10*3/MM3 (ref 0–0.2)
BASOPHILS NFR BLD AUTO: 0.1 % (ref 0–2.5)
BUN BLD-MCNC: 109 MG/DL (ref 7–20)
BUN/CREAT SERPL: 13.8 (ref 7.1–23.5)
CALCIUM SPEC-SCNC: 9.3 MG/DL (ref 8.4–10.2)
CHLORIDE SERPL-SCNC: 94 MMOL/L (ref 98–107)
CO2 SERPL-SCNC: 21 MMOL/L (ref 26–30)
CREAT BLD-MCNC: 7.9 MG/DL (ref 0.6–1.3)
DEPRECATED RDW RBC AUTO: 49.5 FL (ref 37–54)
EOSINOPHIL # BLD AUTO: 0 10*3/MM3 (ref 0–0.7)
EOSINOPHIL NFR BLD AUTO: 0 % (ref 0–7)
ERYTHROCYTE [DISTWIDTH] IN BLOOD BY AUTOMATED COUNT: 14.1 % (ref 11.5–14.5)
GFR SERPL CREATININE-BSD FRML MDRD: 6 ML/MIN/1.73
GLUCOSE BLD-MCNC: 105 MG/DL (ref 74–98)
HCT VFR BLD AUTO: 49.9 % (ref 37–47)
HGB BLD-MCNC: 16.3 G/DL (ref 12–16)
IMM GRANULOCYTES # BLD: 0.03 10*3/MM3 (ref 0–0.06)
IMM GRANULOCYTES NFR BLD: 0.4 % (ref 0–0.6)
LYMPHOCYTES # BLD AUTO: 1.07 10*3/MM3 (ref 0.6–3.4)
LYMPHOCYTES NFR BLD AUTO: 15.5 % (ref 10–50)
MCH RBC QN AUTO: 31.2 PG (ref 27–31)
MCHC RBC AUTO-ENTMCNC: 32.7 G/DL (ref 30–37)
MCV RBC AUTO: 95.6 FL (ref 81–99)
MONOCYTES # BLD AUTO: 0.68 10*3/MM3 (ref 0–0.9)
MONOCYTES NFR BLD AUTO: 9.8 % (ref 0–12)
MRSA SPEC QL CULT: ABNORMAL
NEUTROPHILS # BLD AUTO: 5.13 10*3/MM3 (ref 2–6.9)
NEUTROPHILS NFR BLD AUTO: 74.2 % (ref 37–80)
NRBC BLD MANUAL-RTO: 0 /100 WBC (ref 0–0)
PHOSPHATE SERPL-MCNC: 9.4 MG/DL (ref 2.5–4.5)
PLATELET # BLD AUTO: 140 10*3/MM3 (ref 130–400)
PMV BLD AUTO: 11.5 FL (ref 6–12)
POTASSIUM BLD-SCNC: 4.9 MMOL/L (ref 3.5–5.1)
RBC # BLD AUTO: 5.22 10*6/MM3 (ref 4.2–5.4)
SODIUM BLD-SCNC: 137 MMOL/L (ref 137–145)
WBC NRBC COR # BLD: 6.92 10*3/MM3 (ref 4.8–10.8)

## 2018-02-01 PROCEDURE — 85025 COMPLETE CBC W/AUTO DIFF WBC: CPT | Performed by: FAMILY MEDICINE

## 2018-02-01 PROCEDURE — 25010000002 HYDROMORPHONE PER 4 MG: Performed by: FAMILY MEDICINE

## 2018-02-01 PROCEDURE — 63710000001 MYCOPHENOLATE MOFETIL PER 250 MG: Performed by: INTERNAL MEDICINE

## 2018-02-01 PROCEDURE — 90935 HEMODIALYSIS ONE EVALUATION: CPT

## 2018-02-01 PROCEDURE — 99239 HOSP IP/OBS DSCHRG MGMT >30: CPT | Performed by: FAMILY MEDICINE

## 2018-02-01 PROCEDURE — 80069 RENAL FUNCTION PANEL: CPT | Performed by: FAMILY MEDICINE

## 2018-02-01 PROCEDURE — 25010000002 METHYLPREDNISOLONE PER 125 MG: Performed by: FAMILY MEDICINE

## 2018-02-01 PROCEDURE — 5A1D70Z PERFORMANCE OF URINARY FILTRATION, INTERMITTENT, LESS THAN 6 HOURS PER DAY: ICD-10-PCS | Performed by: INTERNAL MEDICINE

## 2018-02-01 RX ORDER — MYCOPHENOLATE MOFETIL 250 MG/1
250 CAPSULE ORAL EVERY 12 HOURS SCHEDULED
Qty: 60 CAPSULE | Refills: 0 | Status: SHIPPED | OUTPATIENT
Start: 2018-02-01 | End: 2018-03-21 | Stop reason: HOSPADM

## 2018-02-01 RX ORDER — MYCOPHENOLATE MOFETIL 250 MG/1
250 CAPSULE ORAL EVERY 12 HOURS SCHEDULED
Status: DISCONTINUED | OUTPATIENT
Start: 2018-02-01 | End: 2018-02-01 | Stop reason: HOSPADM

## 2018-02-01 RX ORDER — DOXAZOSIN 8 MG/1
16 TABLET ORAL NIGHTLY
Qty: 60 TABLET | Refills: 0 | Status: SHIPPED | OUTPATIENT
Start: 2018-02-01 | End: 2018-03-03

## 2018-02-01 RX ORDER — NICARDIPINE HYDROCHLORIDE 20 MG/1
20 CAPSULE ORAL EVERY 8 HOURS SCHEDULED
Qty: 90 CAPSULE | Refills: 0 | Status: ON HOLD | OUTPATIENT
Start: 2018-02-01 | End: 2018-05-27

## 2018-02-01 RX ORDER — CLONIDINE HYDROCHLORIDE 0.1 MG/1
0.1 TABLET ORAL EVERY 8 HOURS SCHEDULED
Qty: 90 TABLET | Refills: 0 | Status: SHIPPED | OUTPATIENT
Start: 2018-02-01 | End: 2018-03-03

## 2018-02-01 RX ORDER — SPIRONOLACTONE 25 MG/1
TABLET ORAL
Qty: 15 TABLET | Refills: 0 | Status: SHIPPED | OUTPATIENT
Start: 2018-02-01 | End: 2018-03-21 | Stop reason: HOSPADM

## 2018-02-01 RX ADMIN — HYDRALAZINE HYDROCHLORIDE 100 MG: 25 TABLET ORAL at 13:38

## 2018-02-01 RX ADMIN — CLONIDINE HYDROCHLORIDE 0.1 MG: 0.1 TABLET ORAL at 13:38

## 2018-02-01 RX ADMIN — SPIRONOLACTONE 25 MG: 25 TABLET, FILM COATED ORAL at 08:59

## 2018-02-01 RX ADMIN — Medication 10 ML: at 05:13

## 2018-02-01 RX ADMIN — CLONAZEPAM 0.5 MG: 0.5 TABLET ORAL at 07:51

## 2018-02-01 RX ADMIN — NICARDIPINE HYDROCHLORIDE 20 MG: 20 CAPSULE ORAL at 13:38

## 2018-02-01 RX ADMIN — Medication 1 TABLET: at 09:00

## 2018-02-01 RX ADMIN — NICARDIPINE HYDROCHLORIDE 20 MG: 20 CAPSULE ORAL at 05:12

## 2018-02-01 RX ADMIN — METHYLPREDNISOLONE SODIUM SUCCINATE 60 MG: 125 INJECTION, POWDER, FOR SOLUTION INTRAMUSCULAR; INTRAVENOUS at 05:13

## 2018-02-01 RX ADMIN — NEBIVOLOL HYDROCHLORIDE 20 MG: 10 TABLET ORAL at 08:58

## 2018-02-01 RX ADMIN — CALCIUM ACETATE 667 MG: 667 CAPSULE ORAL at 09:00

## 2018-02-01 RX ADMIN — CALCITRIOL 0.5 MCG: 0.25 CAPSULE ORAL at 09:00

## 2018-02-01 RX ADMIN — HYDROMORPHONE HYDROCHLORIDE 0.5 MG: 1 INJECTION, SOLUTION INTRAMUSCULAR; INTRAVENOUS; SUBCUTANEOUS at 11:13

## 2018-02-01 RX ADMIN — HYDROXYCHLOROQUINE SULFATE 200 MG: 200 TABLET, FILM COATED ORAL at 08:58

## 2018-02-01 RX ADMIN — CLONIDINE HYDROCHLORIDE 0.1 MG: 0.1 TABLET ORAL at 05:12

## 2018-02-01 RX ADMIN — HYDRALAZINE HYDROCHLORIDE 100 MG: 25 TABLET ORAL at 05:12

## 2018-02-01 RX ADMIN — CALCIUM ACETATE 667 MG: 667 CAPSULE ORAL at 13:39

## 2018-02-01 RX ADMIN — MYCOPHENOLATE MOFETIL 250 MG: 250 CAPSULE ORAL at 13:43

## 2018-02-01 RX ADMIN — LOSARTAN POTASSIUM 100 MG: 50 TABLET, FILM COATED ORAL at 09:00

## 2018-02-01 RX ADMIN — PANTOPRAZOLE SODIUM 40 MG: 40 TABLET, DELAYED RELEASE ORAL at 05:12

## 2018-02-01 RX ADMIN — MUPIROCIN: 20 OINTMENT TOPICAL at 08:56

## 2018-02-01 RX ADMIN — METHYLPREDNISOLONE SODIUM SUCCINATE 60 MG: 125 INJECTION, POWDER, FOR SOLUTION INTRAMUSCULAR; INTRAVENOUS at 13:38

## 2018-02-02 ENCOUNTER — EPISODE CHANGES (OUTPATIENT)
Dept: CASE MANAGEMENT | Facility: OTHER | Age: 28
End: 2018-02-02

## 2018-02-03 LAB
BACTERIA SPEC AEROBE CULT: NORMAL
BACTERIA SPEC AEROBE CULT: NORMAL

## 2018-02-05 ENCOUNTER — TRANSITIONAL CARE MANAGEMENT TELEPHONE ENCOUNTER (OUTPATIENT)
Dept: INTERNAL MEDICINE | Facility: CLINIC | Age: 28
End: 2018-02-05

## 2018-02-05 ENCOUNTER — PROCEDURE VISIT (OUTPATIENT)
Dept: NEUROLOGY | Facility: CLINIC | Age: 28
End: 2018-02-05

## 2018-02-05 VITALS
HEART RATE: 71 BPM | HEIGHT: 67 IN | SYSTOLIC BLOOD PRESSURE: 162 MMHG | OXYGEN SATURATION: 97 % | DIASTOLIC BLOOD PRESSURE: 78 MMHG | BODY MASS INDEX: 20.25 KG/M2 | WEIGHT: 129 LBS

## 2018-02-05 DIAGNOSIS — G43.011 INTRACTABLE MIGRAINE WITHOUT AURA AND WITH STATUS MIGRAINOSUS: Primary | ICD-10-CM

## 2018-02-05 DIAGNOSIS — M79.18 MYOFASCIAL MUSCLE PAIN: ICD-10-CM

## 2018-02-05 DIAGNOSIS — M54.2 NECK PAIN: ICD-10-CM

## 2018-02-05 PROCEDURE — 20553 NJX 1/MLT TRIGGER POINTS 3/>: CPT | Performed by: PSYCHIATRY & NEUROLOGY

## 2018-02-05 RX ORDER — BUPIVACAINE HYDROCHLORIDE 7.5 MG/ML
5 INJECTION, SOLUTION EPIDURAL; RETROBULBAR ONCE
Status: DISCONTINUED | OUTPATIENT
Start: 2018-02-05 | End: 2018-05-27 | Stop reason: HOSPADM

## 2018-02-05 RX ORDER — METHYLPREDNISOLONE ACETATE 40 MG/ML
200 INJECTION, SUSPENSION INTRA-ARTICULAR; INTRALESIONAL; INTRAMUSCULAR; SOFT TISSUE ONCE
Status: COMPLETED | OUTPATIENT
Start: 2018-02-05 | End: 2018-02-05

## 2018-02-05 RX ADMIN — METHYLPREDNISOLONE ACETATE 200 MG: 40 INJECTION, SUSPENSION INTRA-ARTICULAR; INTRALESIONAL; INTRAMUSCULAR; SOFT TISSUE at 20:42

## 2018-02-06 ENCOUNTER — HOSPITAL ENCOUNTER (EMERGENCY)
Facility: HOSPITAL | Age: 28
Discharge: HOME | End: 2018-02-06
Attending: EMERGENCY MEDICINE | Admitting: EMERGENCY MEDICINE

## 2018-02-06 VITALS
WEIGHT: 140 LBS | BODY MASS INDEX: 21.97 KG/M2 | HEART RATE: 85 BPM | OXYGEN SATURATION: 97 % | DIASTOLIC BLOOD PRESSURE: 101 MMHG | HEIGHT: 67 IN | TEMPERATURE: 98 F | RESPIRATION RATE: 18 BRPM | SYSTOLIC BLOOD PRESSURE: 166 MMHG

## 2018-02-06 DIAGNOSIS — I10 POORLY-CONTROLLED HYPERTENSION: ICD-10-CM

## 2018-02-06 DIAGNOSIS — R51.9 ACUTE NONINTRACTABLE HEADACHE, UNSPECIFIED HEADACHE TYPE: Primary | ICD-10-CM

## 2018-02-06 LAB
ALBUMIN SERPL-MCNC: 4.2 G/DL (ref 3.5–5)
ALBUMIN/GLOB SERPL: 1.4 G/DL (ref 1–2)
ALP SERPL-CCNC: 59 U/L (ref 38–126)
ALT SERPL W P-5'-P-CCNC: 23 U/L (ref 13–69)
ANION GAP SERPL CALCULATED.3IONS-SCNC: 17.8 MMOL/L
AST SERPL-CCNC: 25 U/L (ref 15–46)
BASOPHILS # BLD AUTO: 0.03 10*3/MM3 (ref 0–0.2)
BASOPHILS NFR BLD AUTO: 0.6 % (ref 0–2.5)
BILIRUB SERPL-MCNC: 0.5 MG/DL (ref 0.2–1.3)
BUN BLD-MCNC: 27 MG/DL (ref 7–20)
BUN/CREAT SERPL: 5.1 (ref 7.1–23.5)
CALCIUM SPEC-SCNC: 10.1 MG/DL (ref 8.4–10.2)
CHLORIDE SERPL-SCNC: 98 MMOL/L (ref 98–107)
CO2 SERPL-SCNC: 32 MMOL/L (ref 26–30)
CREAT BLD-MCNC: 5.3 MG/DL (ref 0.6–1.3)
DEPRECATED RDW RBC AUTO: 51 FL (ref 37–54)
EOSINOPHIL # BLD AUTO: 0.3 10*3/MM3 (ref 0–0.7)
EOSINOPHIL NFR BLD AUTO: 5.7 % (ref 0–7)
ERYTHROCYTE [DISTWIDTH] IN BLOOD BY AUTOMATED COUNT: 14.5 % (ref 11.5–14.5)
GFR SERPL CREATININE-BSD FRML MDRD: 10 ML/MIN/1.73
GLOBULIN UR ELPH-MCNC: 2.9 GM/DL
GLUCOSE BLD-MCNC: 111 MG/DL (ref 74–98)
HCT VFR BLD AUTO: 44.7 % (ref 37–47)
HGB BLD-MCNC: 14.5 G/DL (ref 12–16)
IMM GRANULOCYTES # BLD: 0.07 10*3/MM3 (ref 0–0.06)
IMM GRANULOCYTES NFR BLD: 1.3 % (ref 0–0.6)
LYMPHOCYTES # BLD AUTO: 0.45 10*3/MM3 (ref 0.6–3.4)
LYMPHOCYTES NFR BLD AUTO: 8.6 % (ref 10–50)
MCH RBC QN AUTO: 31.2 PG (ref 27–31)
MCHC RBC AUTO-ENTMCNC: 32.4 G/DL (ref 30–37)
MCV RBC AUTO: 96.1 FL (ref 81–99)
MONOCYTES # BLD AUTO: 0.59 10*3/MM3 (ref 0–0.9)
MONOCYTES NFR BLD AUTO: 11.3 % (ref 0–12)
NEUTROPHILS # BLD AUTO: 3.78 10*3/MM3 (ref 2–6.9)
NEUTROPHILS NFR BLD AUTO: 72.5 % (ref 37–80)
NRBC BLD MANUAL-RTO: 0 /100 WBC (ref 0–0)
PLATELET # BLD AUTO: 134 10*3/MM3 (ref 130–400)
PMV BLD AUTO: 11.1 FL (ref 6–12)
POTASSIUM BLD-SCNC: 4.8 MMOL/L (ref 3.5–5.1)
PROT SERPL-MCNC: 7.1 G/DL (ref 6.3–8.2)
RBC # BLD AUTO: 4.65 10*6/MM3 (ref 4.2–5.4)
SODIUM BLD-SCNC: 143 MMOL/L (ref 137–145)
WBC NRBC COR # BLD: 5.22 10*3/MM3 (ref 4.8–10.8)

## 2018-02-06 PROCEDURE — 96372 THER/PROPH/DIAG INJ SC/IM: CPT

## 2018-02-06 PROCEDURE — 85025 COMPLETE CBC W/AUTO DIFF WBC: CPT | Performed by: PHYSICIAN ASSISTANT

## 2018-02-06 PROCEDURE — 25010000002 HALOPERIDOL LACTATE PER 5 MG: Performed by: EMERGENCY MEDICINE

## 2018-02-06 PROCEDURE — 99284 EMERGENCY DEPT VISIT MOD MDM: CPT

## 2018-02-06 PROCEDURE — 80053 COMPREHEN METABOLIC PANEL: CPT | Performed by: PHYSICIAN ASSISTANT

## 2018-02-06 RX ORDER — HYDRALAZINE HYDROCHLORIDE 20 MG/ML
20 INJECTION INTRAMUSCULAR; INTRAVENOUS ONCE
Status: DISCONTINUED | OUTPATIENT
Start: 2018-02-06 | End: 2018-02-06

## 2018-02-06 RX ORDER — CLONIDINE HYDROCHLORIDE 0.2 MG/1
0.2 TABLET ORAL ONCE
Status: COMPLETED | OUTPATIENT
Start: 2018-02-06 | End: 2018-02-06

## 2018-02-06 RX ORDER — NICARDIPINE HYDROCHLORIDE 20 MG/1
40 CAPSULE ORAL ONCE
Status: COMPLETED | OUTPATIENT
Start: 2018-02-06 | End: 2018-02-06

## 2018-02-06 RX ORDER — HALOPERIDOL 5 MG/ML
5 INJECTION INTRAMUSCULAR ONCE
Status: COMPLETED | OUTPATIENT
Start: 2018-02-06 | End: 2018-02-06

## 2018-02-06 RX ADMIN — CLONIDINE HYDROCHLORIDE 0.2 MG: 0.2 TABLET ORAL at 15:17

## 2018-02-06 RX ADMIN — HALOPERIDOL LACTATE 5 MG: 5 INJECTION, SOLUTION INTRAMUSCULAR at 15:27

## 2018-02-06 RX ADMIN — NICARDIPINE HYDROCHLORIDE 40 MG: 20 CAPSULE ORAL at 17:13

## 2018-02-07 ENCOUNTER — OFFICE VISIT (OUTPATIENT)
Dept: GASTROENTEROLOGY | Facility: CLINIC | Age: 28
End: 2018-02-07

## 2018-02-07 VITALS
DIASTOLIC BLOOD PRESSURE: 91 MMHG | HEART RATE: 75 BPM | RESPIRATION RATE: 18 BRPM | SYSTOLIC BLOOD PRESSURE: 161 MMHG | HEIGHT: 67 IN | BODY MASS INDEX: 22.76 KG/M2 | TEMPERATURE: 98.9 F | WEIGHT: 145 LBS

## 2018-02-07 DIAGNOSIS — R11.0 NAUSEA: ICD-10-CM

## 2018-02-07 DIAGNOSIS — R10.13 EPIGASTRIC PAIN: Primary | ICD-10-CM

## 2018-02-07 DIAGNOSIS — K52.9 JEJUNITIS: ICD-10-CM

## 2018-02-07 DIAGNOSIS — K29.80 DUODENITIS: ICD-10-CM

## 2018-02-07 PROCEDURE — 99214 OFFICE O/P EST MOD 30 MIN: CPT | Performed by: INTERNAL MEDICINE

## 2018-02-07 RX ORDER — PANTOPRAZOLE SODIUM 40 MG/1
40 TABLET, DELAYED RELEASE ORAL
Qty: 60 TABLET | Refills: 3 | Status: SHIPPED | OUTPATIENT
Start: 2018-02-07 | End: 2018-04-03 | Stop reason: SDUPTHER

## 2018-02-07 RX ORDER — ONDANSETRON 4 MG/1
4 TABLET, FILM COATED ORAL EVERY 8 HOURS PRN
Qty: 30 TABLET | Refills: 3 | Status: SHIPPED | OUTPATIENT
Start: 2018-02-07 | End: 2020-04-28 | Stop reason: ALTCHOICE

## 2018-02-14 ENCOUNTER — OFFICE VISIT (OUTPATIENT)
Dept: INTERNAL MEDICINE | Facility: CLINIC | Age: 28
End: 2018-02-14

## 2018-02-14 VITALS
DIASTOLIC BLOOD PRESSURE: 110 MMHG | TEMPERATURE: 97.8 F | BODY MASS INDEX: 22.76 KG/M2 | WEIGHT: 145 LBS | HEIGHT: 67 IN | SYSTOLIC BLOOD PRESSURE: 170 MMHG | HEART RATE: 74 BPM | OXYGEN SATURATION: 100 %

## 2018-02-14 DIAGNOSIS — I10 ESSENTIAL HYPERTENSION: ICD-10-CM

## 2018-02-14 DIAGNOSIS — N18.6 ESRD (END-STAGE RENAL DISEASE) DUE TO SLE (HCC): ICD-10-CM

## 2018-02-14 DIAGNOSIS — K52.9 ENTERITIS: ICD-10-CM

## 2018-02-14 DIAGNOSIS — H16.139 ACTINIC KERATITIS, UNSPECIFIED LATERALITY: Primary | ICD-10-CM

## 2018-02-14 DIAGNOSIS — I16.9 HYPERTENSIVE CRISIS: ICD-10-CM

## 2018-02-14 DIAGNOSIS — M32.14 SYSTEMIC LUPUS ERYTHEMATOSUS WITH GLOMERULAR DISEASE, UNSPECIFIED SLE TYPE (HCC): ICD-10-CM

## 2018-02-14 DIAGNOSIS — M32.14 ESRD (END-STAGE RENAL DISEASE) DUE TO SLE (HCC): ICD-10-CM

## 2018-02-14 PROCEDURE — 99214 OFFICE O/P EST MOD 30 MIN: CPT | Performed by: INTERNAL MEDICINE

## 2018-02-14 RX ORDER — FLUOROURACIL 50 MG/G
CREAM TOPICAL 2 TIMES DAILY
Qty: 15 G | Refills: 0 | Status: SHIPPED | OUTPATIENT
Start: 2018-02-14 | End: 2018-03-21 | Stop reason: HOSPADM

## 2018-02-21 ENCOUNTER — EPISODE CHANGES (OUTPATIENT)
Dept: CASE MANAGEMENT | Facility: OTHER | Age: 28
End: 2018-02-21

## 2018-03-07 ENCOUNTER — OFFICE VISIT (OUTPATIENT)
Dept: GASTROENTEROLOGY | Facility: CLINIC | Age: 28
End: 2018-03-07

## 2018-03-07 ENCOUNTER — PREP FOR SURGERY (OUTPATIENT)
Dept: OTHER | Facility: HOSPITAL | Age: 28
End: 2018-03-07

## 2018-03-07 VITALS
HEIGHT: 67 IN | SYSTOLIC BLOOD PRESSURE: 167 MMHG | DIASTOLIC BLOOD PRESSURE: 98 MMHG | HEART RATE: 80 BPM | BODY MASS INDEX: 22.6 KG/M2 | RESPIRATION RATE: 16 BRPM | WEIGHT: 144 LBS | TEMPERATURE: 98.6 F

## 2018-03-07 DIAGNOSIS — K29.80 DUODENITIS: ICD-10-CM

## 2018-03-07 DIAGNOSIS — R13.19 OTHER DYSPHAGIA: ICD-10-CM

## 2018-03-07 DIAGNOSIS — K52.9 JEJUNITIS: ICD-10-CM

## 2018-03-07 DIAGNOSIS — R11.0 NAUSEA: Primary | ICD-10-CM

## 2018-03-07 DIAGNOSIS — E16.4 ELEVATED GASTRIN LEVEL: ICD-10-CM

## 2018-03-07 DIAGNOSIS — R11.0 NAUSEA: ICD-10-CM

## 2018-03-07 DIAGNOSIS — R10.13 EPIGASTRIC PAIN: Primary | ICD-10-CM

## 2018-03-07 PROCEDURE — 99214 OFFICE O/P EST MOD 30 MIN: CPT | Performed by: INTERNAL MEDICINE

## 2018-03-08 RX ORDER — SODIUM CHLORIDE 9 MG/ML
70 INJECTION, SOLUTION INTRAVENOUS CONTINUOUS PRN
Status: CANCELLED | OUTPATIENT
Start: 2018-03-08

## 2018-03-14 RX ORDER — DOXAZOSIN 8 MG/1
16 TABLET ORAL NIGHTLY
COMMUNITY
End: 2018-07-25 | Stop reason: HOSPADM

## 2018-03-21 ENCOUNTER — ANESTHESIA (OUTPATIENT)
Dept: GASTROENTEROLOGY | Facility: HOSPITAL | Age: 28
End: 2018-03-21

## 2018-03-21 ENCOUNTER — HOSPITAL ENCOUNTER (OUTPATIENT)
Facility: HOSPITAL | Age: 28
Setting detail: HOSPITAL OUTPATIENT SURGERY
Discharge: HOME | End: 2018-03-21
Attending: INTERNAL MEDICINE | Admitting: INTERNAL MEDICINE

## 2018-03-21 ENCOUNTER — ANESTHESIA EVENT (OUTPATIENT)
Dept: GASTROENTEROLOGY | Facility: HOSPITAL | Age: 28
End: 2018-03-21

## 2018-03-21 VITALS
RESPIRATION RATE: 18 BRPM | BODY MASS INDEX: 22.6 KG/M2 | TEMPERATURE: 98 F | OXYGEN SATURATION: 97 % | DIASTOLIC BLOOD PRESSURE: 79 MMHG | HEART RATE: 78 BPM | HEIGHT: 67 IN | SYSTOLIC BLOOD PRESSURE: 148 MMHG | WEIGHT: 144 LBS

## 2018-03-21 DIAGNOSIS — K29.80 DUODENITIS: ICD-10-CM

## 2018-03-21 DIAGNOSIS — R11.0 NAUSEA: ICD-10-CM

## 2018-03-21 LAB
B-HCG UR QL: NEGATIVE
INTERNAL NEGATIVE CONTROL: NEGATIVE
INTERNAL POSITIVE CONTROL: POSITIVE
Lab: NORMAL

## 2018-03-21 PROCEDURE — 44361 SMALL BOWEL ENDOSCOPY/BIOPSY: CPT | Performed by: INTERNAL MEDICINE

## 2018-03-21 PROCEDURE — 25010000002 PROPOFOL 200 MG/20ML EMULSION: Performed by: NURSE ANESTHETIST, CERTIFIED REGISTERED

## 2018-03-21 PROCEDURE — 88305 TISSUE EXAM BY PATHOLOGIST: CPT | Performed by: INTERNAL MEDICINE

## 2018-03-21 PROCEDURE — S0260 H&P FOR SURGERY: HCPCS | Performed by: INTERNAL MEDICINE

## 2018-03-21 PROCEDURE — 43239 EGD BIOPSY SINGLE/MULTIPLE: CPT | Performed by: INTERNAL MEDICINE

## 2018-03-21 RX ORDER — SODIUM CHLORIDE 9 MG/ML
70 INJECTION, SOLUTION INTRAVENOUS CONTINUOUS PRN
Status: DISCONTINUED | OUTPATIENT
Start: 2018-03-21 | End: 2018-03-21 | Stop reason: HOSPADM

## 2018-03-21 RX ORDER — PROPOFOL 10 MG/ML
INJECTION, EMULSION INTRAVENOUS AS NEEDED
Status: DISCONTINUED | OUTPATIENT
Start: 2018-03-21 | End: 2018-03-21 | Stop reason: SURG

## 2018-03-21 RX ADMIN — PROPOFOL 100 MG: 10 INJECTION, EMULSION INTRAVENOUS at 07:44

## 2018-03-21 RX ADMIN — PROPOFOL 50 MG: 10 INJECTION, EMULSION INTRAVENOUS at 07:25

## 2018-03-21 RX ADMIN — PROPOFOL 100 MG: 10 INJECTION, EMULSION INTRAVENOUS at 07:30

## 2018-03-21 RX ADMIN — SODIUM CHLORIDE 70 ML/HR: 9 INJECTION, SOLUTION INTRAVENOUS at 06:53

## 2018-03-21 RX ADMIN — PROPOFOL 100 MG: 10 INJECTION, EMULSION INTRAVENOUS at 07:37

## 2018-03-23 ENCOUNTER — EPISODE CHANGES (OUTPATIENT)
Dept: CASE MANAGEMENT | Facility: OTHER | Age: 28
End: 2018-03-23

## 2018-03-25 LAB
LAB AP CASE REPORT: NORMAL
Lab: NORMAL
PATH REPORT.FINAL DX SPEC: NORMAL

## 2018-03-30 ENCOUNTER — OFFICE VISIT (OUTPATIENT)
Dept: INTERNAL MEDICINE | Facility: CLINIC | Age: 28
End: 2018-03-30

## 2018-03-30 VITALS
WEIGHT: 145 LBS | SYSTOLIC BLOOD PRESSURE: 143 MMHG | HEIGHT: 67 IN | DIASTOLIC BLOOD PRESSURE: 79 MMHG | HEART RATE: 81 BPM | TEMPERATURE: 97.9 F | OXYGEN SATURATION: 97 % | BODY MASS INDEX: 22.76 KG/M2

## 2018-03-30 DIAGNOSIS — R50.9 FEVER OF UNDETERMINED ORIGIN: Primary | ICD-10-CM

## 2018-03-30 LAB — CRP SERPL-MCNC: 3.3 MG/DL (ref 0–1)

## 2018-03-30 PROCEDURE — 99214 OFFICE O/P EST MOD 30 MIN: CPT | Performed by: INTERNAL MEDICINE

## 2018-03-30 RX ORDER — CIPROFLOXACIN 500 MG/1
500 TABLET, FILM COATED ORAL 2 TIMES DAILY
Qty: 14 TABLET | Refills: 0 | Status: SHIPPED | OUTPATIENT
Start: 2018-03-30 | End: 2018-04-18

## 2018-04-03 DIAGNOSIS — K29.80 DUODENITIS: ICD-10-CM

## 2018-04-03 RX ORDER — PANTOPRAZOLE SODIUM 40 MG/1
TABLET, DELAYED RELEASE ORAL
Qty: 60 TABLET | Refills: 0 | Status: SHIPPED | OUTPATIENT
Start: 2018-04-03 | End: 2018-05-15 | Stop reason: SDUPTHER

## 2018-04-04 RX ORDER — LEVETIRACETAM 500 MG/1
TABLET ORAL
Qty: 30 TABLET | Refills: 3 | Status: SHIPPED | OUTPATIENT
Start: 2018-04-04 | End: 2018-07-13

## 2018-04-12 ENCOUNTER — HOSPITAL ENCOUNTER (OUTPATIENT)
Dept: GENERAL RADIOLOGY | Facility: HOSPITAL | Age: 28
Discharge: HOME | End: 2018-04-12
Admitting: INTERNAL MEDICINE

## 2018-04-12 DIAGNOSIS — R07.89 OTHER CHEST PAIN: Primary | ICD-10-CM

## 2018-04-12 DIAGNOSIS — R07.89 OTHER CHEST PAIN: ICD-10-CM

## 2018-04-12 PROCEDURE — 71046 X-RAY EXAM CHEST 2 VIEWS: CPT

## 2018-04-18 ENCOUNTER — OFFICE VISIT (OUTPATIENT)
Dept: GASTROENTEROLOGY | Facility: CLINIC | Age: 28
End: 2018-04-18

## 2018-04-18 VITALS
DIASTOLIC BLOOD PRESSURE: 102 MMHG | SYSTOLIC BLOOD PRESSURE: 175 MMHG | RESPIRATION RATE: 18 BRPM | WEIGHT: 145 LBS | HEIGHT: 67 IN | TEMPERATURE: 98.4 F | HEART RATE: 76 BPM | BODY MASS INDEX: 22.76 KG/M2

## 2018-04-18 DIAGNOSIS — R10.33 PERIUMBILICAL ABDOMINAL PAIN: Primary | ICD-10-CM

## 2018-04-18 DIAGNOSIS — K52.9 JEJUNITIS: ICD-10-CM

## 2018-04-18 DIAGNOSIS — R11.0 NAUSEA: ICD-10-CM

## 2018-04-18 DIAGNOSIS — E16.4 ELEVATED GASTRIN LEVEL: ICD-10-CM

## 2018-04-18 DIAGNOSIS — R19.7 DIARRHEA, UNSPECIFIED TYPE: ICD-10-CM

## 2018-04-18 DIAGNOSIS — R13.19 OTHER DYSPHAGIA: ICD-10-CM

## 2018-04-18 DIAGNOSIS — K29.80 DUODENITIS: ICD-10-CM

## 2018-04-18 DIAGNOSIS — R63.4 WEIGHT LOSS: ICD-10-CM

## 2018-04-18 PROCEDURE — 99214 OFFICE O/P EST MOD 30 MIN: CPT | Performed by: INTERNAL MEDICINE

## 2018-05-15 ENCOUNTER — OFFICE VISIT (OUTPATIENT)
Dept: INTERNAL MEDICINE | Facility: CLINIC | Age: 28
End: 2018-05-15

## 2018-05-15 VITALS
TEMPERATURE: 98.3 F | HEART RATE: 85 BPM | HEIGHT: 67 IN | BODY MASS INDEX: 22.29 KG/M2 | DIASTOLIC BLOOD PRESSURE: 85 MMHG | OXYGEN SATURATION: 100 % | SYSTOLIC BLOOD PRESSURE: 146 MMHG | WEIGHT: 142 LBS

## 2018-05-15 DIAGNOSIS — R10.13 EPIGASTRIC PAIN: Primary | ICD-10-CM

## 2018-05-15 DIAGNOSIS — K29.80 DUODENITIS: ICD-10-CM

## 2018-05-15 PROCEDURE — 99214 OFFICE O/P EST MOD 30 MIN: CPT | Performed by: INTERNAL MEDICINE

## 2018-05-15 RX ORDER — PANTOPRAZOLE SODIUM 40 MG/1
40 TABLET, DELAYED RELEASE ORAL
Qty: 60 TABLET | Refills: 0 | Status: SHIPPED | OUTPATIENT
Start: 2018-05-15 | End: 2018-08-30 | Stop reason: SDUPTHER

## 2018-05-15 RX ORDER — SUCRALFATE 1 G/1
1 TABLET ORAL 4 TIMES DAILY
Qty: 40 TABLET | Refills: 0 | Status: SHIPPED | OUTPATIENT
Start: 2018-05-15 | End: 2018-06-13 | Stop reason: ALTCHOICE

## 2018-05-24 ENCOUNTER — HOSPITAL ENCOUNTER (INPATIENT)
Facility: HOSPITAL | Age: 28
LOS: 1 days | Discharge: HOME | End: 2018-05-27
Attending: EMERGENCY MEDICINE | Admitting: INTERNAL MEDICINE

## 2018-05-24 ENCOUNTER — APPOINTMENT (OUTPATIENT)
Dept: CT IMAGING | Facility: HOSPITAL | Age: 28
End: 2018-05-24

## 2018-05-24 DIAGNOSIS — R10.13 EPIGASTRIC ABDOMINAL PAIN: Primary | ICD-10-CM

## 2018-05-24 LAB
ALBUMIN SERPL-MCNC: 3.3 G/DL (ref 3.5–5)
ALBUMIN/GLOB SERPL: 1 G/DL (ref 1–2)
ALP SERPL-CCNC: 56 U/L (ref 38–126)
ALT SERPL W P-5'-P-CCNC: 19 U/L (ref 13–69)
ANION GAP SERPL CALCULATED.3IONS-SCNC: 16 MMOL/L (ref 10–20)
AST SERPL-CCNC: 27 U/L (ref 15–46)
BASOPHILS # BLD AUTO: 0.01 10*3/MM3 (ref 0–0.2)
BASOPHILS NFR BLD AUTO: 0.3 % (ref 0–2.5)
BILIRUB SERPL-MCNC: 0.7 MG/DL (ref 0.2–1.3)
BUN BLD-MCNC: 41 MG/DL (ref 7–20)
BUN/CREAT SERPL: 5.1 (ref 7.1–23.5)
CALCIUM SPEC-SCNC: 9.6 MG/DL (ref 8.4–10.2)
CHLORIDE SERPL-SCNC: 102 MMOL/L (ref 98–107)
CO2 SERPL-SCNC: 24 MMOL/L (ref 26–30)
CREAT BLD-MCNC: 8.1 MG/DL (ref 0.6–1.3)
DEPRECATED RDW RBC AUTO: 49.5 FL (ref 37–54)
EOSINOPHIL # BLD AUTO: 0.11 10*3/MM3 (ref 0–0.7)
EOSINOPHIL NFR BLD AUTO: 3.4 % (ref 0–7)
ERYTHROCYTE [DISTWIDTH] IN BLOOD BY AUTOMATED COUNT: 14.9 % (ref 11.5–14.5)
GFR SERPL CREATININE-BSD FRML MDRD: 6 ML/MIN/1.73
GLOBULIN UR ELPH-MCNC: 3.4 GM/DL
GLUCOSE BLD-MCNC: 102 MG/DL (ref 74–98)
HCG SERPL QL: NEGATIVE
HCT VFR BLD AUTO: 35.6 % (ref 37–47)
HGB BLD-MCNC: 11.3 G/DL (ref 12–16)
IMM GRANULOCYTES # BLD: 0.02 10*3/MM3 (ref 0–0.06)
IMM GRANULOCYTES NFR BLD: 0.6 % (ref 0–0.6)
LIPASE SERPL-CCNC: 139 U/L (ref 23–300)
LYMPHOCYTES # BLD AUTO: 0.47 10*3/MM3 (ref 0.6–3.4)
LYMPHOCYTES NFR BLD AUTO: 14.4 % (ref 10–50)
MCH RBC QN AUTO: 28.8 PG (ref 27–31)
MCHC RBC AUTO-ENTMCNC: 31.7 G/DL (ref 30–37)
MCV RBC AUTO: 90.8 FL (ref 81–99)
MONOCYTES # BLD AUTO: 0.16 10*3/MM3 (ref 0–0.9)
MONOCYTES NFR BLD AUTO: 4.9 % (ref 0–12)
NEUTROPHILS # BLD AUTO: 2.5 10*3/MM3 (ref 2–6.9)
NEUTROPHILS NFR BLD AUTO: 76.4 % (ref 37–80)
NRBC BLD MANUAL-RTO: 0 /100 WBC (ref 0–0)
PLATELET # BLD AUTO: 111 10*3/MM3 (ref 130–400)
PMV BLD AUTO: 12.4 FL (ref 6–12)
POTASSIUM BLD-SCNC: 5 MMOL/L (ref 3.5–5.1)
PROT SERPL-MCNC: 6.7 G/DL (ref 6.3–8.2)
RBC # BLD AUTO: 3.92 10*6/MM3 (ref 4.2–5.4)
SODIUM BLD-SCNC: 137 MMOL/L (ref 137–145)
WBC NRBC COR # BLD: 3.27 10*3/MM3 (ref 4.8–10.8)

## 2018-05-24 PROCEDURE — 83690 ASSAY OF LIPASE: CPT | Performed by: EMERGENCY MEDICINE

## 2018-05-24 PROCEDURE — 25010000002 FENTANYL CITRATE (PF) 100 MCG/2ML SOLUTION: Performed by: EMERGENCY MEDICINE

## 2018-05-24 PROCEDURE — 99285 EMERGENCY DEPT VISIT HI MDM: CPT

## 2018-05-24 PROCEDURE — 25010000002 HYDRALAZINE PER 20 MG: Performed by: INTERNAL MEDICINE

## 2018-05-24 PROCEDURE — 99222 1ST HOSP IP/OBS MODERATE 55: CPT | Performed by: INTERNAL MEDICINE

## 2018-05-24 PROCEDURE — 80053 COMPREHEN METABOLIC PANEL: CPT | Performed by: EMERGENCY MEDICINE

## 2018-05-24 PROCEDURE — 85025 COMPLETE CBC W/AUTO DIFF WBC: CPT | Performed by: EMERGENCY MEDICINE

## 2018-05-24 PROCEDURE — 5A1D70Z PERFORMANCE OF URINARY FILTRATION, INTERMITTENT, LESS THAN 6 HOURS PER DAY: ICD-10-PCS | Performed by: INTERNAL MEDICINE

## 2018-05-24 PROCEDURE — 25010000002 DIPHENHYDRAMINE PER 50 MG: Performed by: INTERNAL MEDICINE

## 2018-05-24 PROCEDURE — 25010000002 ONDANSETRON PER 1 MG: Performed by: INTERNAL MEDICINE

## 2018-05-24 PROCEDURE — 25010000002 HYDROMORPHONE PER 4 MG: Performed by: INTERNAL MEDICINE

## 2018-05-24 PROCEDURE — 25010000002 ENOXAPARIN PER 10 MG: Performed by: INTERNAL MEDICINE

## 2018-05-24 PROCEDURE — 90935 HEMODIALYSIS ONE EVALUATION: CPT

## 2018-05-24 PROCEDURE — G0378 HOSPITAL OBSERVATION PER HR: HCPCS

## 2018-05-24 PROCEDURE — 87340 HEPATITIS B SURFACE AG IA: CPT | Performed by: INTERNAL MEDICINE

## 2018-05-24 PROCEDURE — 84703 CHORIONIC GONADOTROPIN ASSAY: CPT | Performed by: EMERGENCY MEDICINE

## 2018-05-24 PROCEDURE — 74176 CT ABD & PELVIS W/O CONTRAST: CPT

## 2018-05-24 PROCEDURE — 25010000002 HYDRALAZINE PER 20 MG: Performed by: EMERGENCY MEDICINE

## 2018-05-24 PROCEDURE — 25010000002 ONDANSETRON PER 1 MG: Performed by: EMERGENCY MEDICINE

## 2018-05-24 PROCEDURE — 25010000002 LEVOFLOXACIN PER 250 MG: Performed by: INTERNAL MEDICINE

## 2018-05-24 PROCEDURE — 93005 ELECTROCARDIOGRAM TRACING: CPT | Performed by: EMERGENCY MEDICINE

## 2018-05-24 RX ORDER — LEVETIRACETAM 500 MG/1
500 TABLET ORAL DAILY
Status: DISCONTINUED | OUTPATIENT
Start: 2018-05-24 | End: 2018-05-24 | Stop reason: SDUPTHER

## 2018-05-24 RX ORDER — CALCITRIOL 0.25 UG/1
0.5 CAPSULE, LIQUID FILLED ORAL DAILY
Status: DISCONTINUED | OUTPATIENT
Start: 2018-05-24 | End: 2018-05-27 | Stop reason: HOSPADM

## 2018-05-24 RX ORDER — FENTANYL CITRATE 50 UG/ML
100 INJECTION, SOLUTION INTRAMUSCULAR; INTRAVENOUS ONCE
Status: COMPLETED | OUTPATIENT
Start: 2018-05-24 | End: 2018-05-24

## 2018-05-24 RX ORDER — PANTOPRAZOLE SODIUM 40 MG/1
40 TABLET, DELAYED RELEASE ORAL
Status: DISCONTINUED | OUTPATIENT
Start: 2018-05-24 | End: 2018-05-27 | Stop reason: HOSPADM

## 2018-05-24 RX ORDER — CLONIDINE HYDROCHLORIDE 0.1 MG/1
0.1 TABLET ORAL ONCE
Status: COMPLETED | OUTPATIENT
Start: 2018-05-24 | End: 2018-05-24

## 2018-05-24 RX ORDER — WHITE PETROLATUM 450 MG/G
1 STICK TOPICAL
Status: DISCONTINUED | OUTPATIENT
Start: 2018-05-24 | End: 2018-05-24

## 2018-05-24 RX ORDER — LEVOFLOXACIN 5 MG/ML
250 INJECTION, SOLUTION INTRAVENOUS
Status: DISCONTINUED | OUTPATIENT
Start: 2018-05-26 | End: 2018-05-27 | Stop reason: HOSPADM

## 2018-05-24 RX ORDER — DIPHENHYDRAMINE HYDROCHLORIDE 50 MG/ML
25 INJECTION INTRAMUSCULAR; INTRAVENOUS EVERY 6 HOURS PRN
Status: DISCONTINUED | OUTPATIENT
Start: 2018-05-24 | End: 2018-05-27 | Stop reason: HOSPADM

## 2018-05-24 RX ORDER — HYDROXYCHLOROQUINE SULFATE 200 MG/1
200 TABLET, FILM COATED ORAL
Status: DISCONTINUED | OUTPATIENT
Start: 2018-05-24 | End: 2018-05-27 | Stop reason: HOSPADM

## 2018-05-24 RX ORDER — CLONAZEPAM 0.5 MG/1
0.5 TABLET ORAL 3 TIMES DAILY PRN
Status: DISCONTINUED | OUTPATIENT
Start: 2018-05-24 | End: 2018-05-27 | Stop reason: HOSPADM

## 2018-05-24 RX ORDER — LOSARTAN POTASSIUM 50 MG/1
100 TABLET ORAL EVERY MORNING
Status: DISCONTINUED | OUTPATIENT
Start: 2018-05-24 | End: 2018-05-27 | Stop reason: HOSPADM

## 2018-05-24 RX ORDER — LEVETIRACETAM 500 MG/1
500 TABLET ORAL NIGHTLY
Status: DISCONTINUED | OUTPATIENT
Start: 2018-05-24 | End: 2018-05-27 | Stop reason: HOSPADM

## 2018-05-24 RX ORDER — CLONIDINE HYDROCHLORIDE 0.1 MG/1
0.1 TABLET ORAL ONCE
Status: DISCONTINUED | OUTPATIENT
Start: 2018-05-24 | End: 2018-05-24

## 2018-05-24 RX ORDER — CLONAZEPAM 0.5 MG/1
0.5 TABLET ORAL DAILY PRN
Status: DISCONTINUED | OUTPATIENT
Start: 2018-05-24 | End: 2018-05-24

## 2018-05-24 RX ORDER — LEVOFLOXACIN 5 MG/ML
500 INJECTION, SOLUTION INTRAVENOUS ONCE
Status: COMPLETED | OUTPATIENT
Start: 2018-05-24 | End: 2018-05-24

## 2018-05-24 RX ORDER — SODIUM CHLORIDE 0.9 % (FLUSH) 0.9 %
1-10 SYRINGE (ML) INJECTION AS NEEDED
Status: DISCONTINUED | OUTPATIENT
Start: 2018-05-24 | End: 2018-05-27 | Stop reason: HOSPADM

## 2018-05-24 RX ORDER — NEBIVOLOL 10 MG/1
20 TABLET ORAL
Status: DISCONTINUED | OUTPATIENT
Start: 2018-05-24 | End: 2018-05-27 | Stop reason: HOSPADM

## 2018-05-24 RX ORDER — ALUMINA, MAGNESIA, AND SIMETHICONE 2400; 2400; 240 MG/30ML; MG/30ML; MG/30ML
15 SUSPENSION ORAL ONCE
Status: COMPLETED | OUTPATIENT
Start: 2018-05-24 | End: 2018-05-24

## 2018-05-24 RX ORDER — DIPHENHYDRAMINE HYDROCHLORIDE 50 MG/ML
50 INJECTION INTRAMUSCULAR; INTRAVENOUS ONCE
Status: COMPLETED | OUTPATIENT
Start: 2018-05-24 | End: 2018-05-24

## 2018-05-24 RX ORDER — SUCRALFATE 1 G/1
1 TABLET ORAL 4 TIMES DAILY
Status: DISCONTINUED | OUTPATIENT
Start: 2018-05-24 | End: 2018-05-27 | Stop reason: HOSPADM

## 2018-05-24 RX ORDER — HYDRALAZINE HYDROCHLORIDE 20 MG/ML
25 INJECTION INTRAMUSCULAR; INTRAVENOUS EVERY 4 HOURS PRN
Status: DISCONTINUED | OUTPATIENT
Start: 2018-05-24 | End: 2018-05-26

## 2018-05-24 RX ORDER — ONDANSETRON 2 MG/ML
4 INJECTION INTRAMUSCULAR; INTRAVENOUS EVERY 6 HOURS PRN
Status: DISCONTINUED | OUTPATIENT
Start: 2018-05-24 | End: 2018-05-27 | Stop reason: HOSPADM

## 2018-05-24 RX ORDER — FOLIC ACID/VIT B COMPLEX AND C 0.8 MG
1 TABLET ORAL DAILY
Status: DISCONTINUED | OUTPATIENT
Start: 2018-05-24 | End: 2018-05-27 | Stop reason: HOSPADM

## 2018-05-24 RX ORDER — ONDANSETRON 2 MG/ML
4 INJECTION INTRAMUSCULAR; INTRAVENOUS ONCE
Status: COMPLETED | OUTPATIENT
Start: 2018-05-24 | End: 2018-05-24

## 2018-05-24 RX ORDER — FAMOTIDINE 10 MG/ML
20 INJECTION, SOLUTION INTRAVENOUS ONCE
Status: COMPLETED | OUTPATIENT
Start: 2018-05-24 | End: 2018-05-24

## 2018-05-24 RX ORDER — HYDRALAZINE HYDROCHLORIDE 20 MG/ML
20 INJECTION INTRAMUSCULAR; INTRAVENOUS ONCE
Status: COMPLETED | OUTPATIENT
Start: 2018-05-24 | End: 2018-05-24

## 2018-05-24 RX ORDER — NICARDIPINE HYDROCHLORIDE 20 MG/1
20 CAPSULE ORAL EVERY 8 HOURS SCHEDULED
Status: DISCONTINUED | OUTPATIENT
Start: 2018-05-24 | End: 2018-05-25

## 2018-05-24 RX ORDER — CLONAZEPAM 0.5 MG/1
0.5 TABLET ORAL ONCE
Status: COMPLETED | OUTPATIENT
Start: 2018-05-24 | End: 2018-05-24

## 2018-05-24 RX ADMIN — HYDROMORPHONE HYDROCHLORIDE 0.5 MG: 1 INJECTION, SOLUTION INTRAMUSCULAR; INTRAVENOUS; SUBCUTANEOUS at 17:50

## 2018-05-24 RX ADMIN — NEBIVOLOL HYDROCHLORIDE 20 MG: 10 TABLET ORAL at 10:45

## 2018-05-24 RX ADMIN — NICARDIPINE HYDROCHLORIDE 20 MG: 20 CAPSULE ORAL at 13:43

## 2018-05-24 RX ADMIN — METRONIDAZOLE 500 MG: 500 INJECTION, SOLUTION INTRAVENOUS at 20:28

## 2018-05-24 RX ADMIN — Medication 25 MG: at 16:56

## 2018-05-24 RX ADMIN — CLONAZEPAM 0.5 MG: 0.5 TABLET ORAL at 06:32

## 2018-05-24 RX ADMIN — LOSARTAN POTASSIUM 100 MG: 50 TABLET, FILM COATED ORAL at 10:49

## 2018-05-24 RX ADMIN — CLONAZEPAM 0.5 MG: 0.5 TABLET ORAL at 13:43

## 2018-05-24 RX ADMIN — SUCRALFATE 1 G: 1 TABLET ORAL at 17:51

## 2018-05-24 RX ADMIN — DIPHENHYDRAMINE HYDROCHLORIDE 50 MG: 50 INJECTION, SOLUTION INTRAMUSCULAR; INTRAVENOUS at 14:16

## 2018-05-24 RX ADMIN — CLONIDINE HYDROCHLORIDE 0.1 MG: 0.1 TABLET ORAL at 06:32

## 2018-05-24 RX ADMIN — FENTANYL CITRATE 100 MCG: 50 INJECTION INTRAMUSCULAR; INTRAVENOUS at 04:17

## 2018-05-24 RX ADMIN — ONDANSETRON 4 MG: 2 INJECTION INTRAMUSCULAR; INTRAVENOUS at 18:20

## 2018-05-24 RX ADMIN — HYDROMORPHONE HYDROCHLORIDE 0.5 MG: 1 INJECTION, SOLUTION INTRAMUSCULAR; INTRAVENOUS; SUBCUTANEOUS at 13:42

## 2018-05-24 RX ADMIN — ALUMINUM HYDROXIDE, MAGNESIUM HYDROXIDE, AND DIMETHICONE 15 ML: 400; 400; 40 SUSPENSION ORAL at 03:39

## 2018-05-24 RX ADMIN — HYDROMORPHONE HYDROCHLORIDE 0.5 MG: 1 INJECTION, SOLUTION INTRAMUSCULAR; INTRAVENOUS; SUBCUTANEOUS at 09:53

## 2018-05-24 RX ADMIN — FAMOTIDINE 20 MG: 10 INJECTION, SOLUTION INTRAVENOUS at 04:21

## 2018-05-24 RX ADMIN — Medication 1 TABLET: at 10:49

## 2018-05-24 RX ADMIN — DIPHENHYDRAMINE HYDROCHLORIDE 25 MG: 50 INJECTION, SOLUTION INTRAMUSCULAR; INTRAVENOUS at 20:28

## 2018-05-24 RX ADMIN — PANTOPRAZOLE SODIUM 40 MG: 40 TABLET, DELAYED RELEASE ORAL at 17:51

## 2018-05-24 RX ADMIN — LIDOCAINE HYDROCHLORIDE 15 ML: 20 SOLUTION ORAL; TOPICAL at 03:40

## 2018-05-24 RX ADMIN — CALCITRIOL 0.5 MCG: 0.25 CAPSULE, LIQUID FILLED ORAL at 10:48

## 2018-05-24 RX ADMIN — NICARDIPINE HYDROCHLORIDE 20 MG: 20 CAPSULE ORAL at 20:36

## 2018-05-24 RX ADMIN — FENTANYL CITRATE 100 MCG: 50 INJECTION, SOLUTION INTRAMUSCULAR; INTRAVENOUS at 06:37

## 2018-05-24 RX ADMIN — HYDROXYCHLOROQUINE SULFATE 200 MG: 200 TABLET, FILM COATED ORAL at 10:48

## 2018-05-24 RX ADMIN — ONDANSETRON 4 MG: 2 INJECTION INTRAMUSCULAR; INTRAVENOUS at 03:39

## 2018-05-24 RX ADMIN — ONDANSETRON 4 MG: 2 INJECTION INTRAMUSCULAR; INTRAVENOUS at 09:53

## 2018-05-24 RX ADMIN — CLONIDINE HYDROCHLORIDE 0.1 MG: 0.1 TABLET ORAL at 04:29

## 2018-05-24 RX ADMIN — SUCRALFATE 1 G: 1 TABLET ORAL at 11:17

## 2018-05-24 RX ADMIN — Medication 20 MG: at 03:45

## 2018-05-24 RX ADMIN — LEVETIRACETAM 500 MG: 500 TABLET, FILM COATED ORAL at 20:27

## 2018-05-24 RX ADMIN — LEVOFLOXACIN 500 MG: 5 INJECTION, SOLUTION INTRAVENOUS at 18:20

## 2018-05-24 RX ADMIN — SUCRALFATE 1 G: 1 TABLET ORAL at 20:27

## 2018-05-25 ENCOUNTER — EPISODE CHANGES (OUTPATIENT)
Dept: CASE MANAGEMENT | Facility: OTHER | Age: 28
End: 2018-05-25

## 2018-05-25 ENCOUNTER — TELEPHONE (OUTPATIENT)
Dept: INTERNAL MEDICINE | Facility: CLINIC | Age: 28
End: 2018-05-25

## 2018-05-25 LAB
ALBUMIN SERPL-MCNC: 2.4 G/DL (ref 3.5–5)
ALBUMIN/GLOB SERPL: 0.8 G/DL (ref 1–2)
ALP SERPL-CCNC: 40 U/L (ref 38–126)
ALT SERPL W P-5'-P-CCNC: 23 U/L (ref 13–69)
ANION GAP SERPL CALCULATED.3IONS-SCNC: 13.3 MMOL/L (ref 10–20)
AST SERPL-CCNC: 33 U/L (ref 15–46)
BASOPHILS # BLD AUTO: 0 10*3/MM3 (ref 0–0.2)
BASOPHILS NFR BLD AUTO: 0 % (ref 0–2.5)
BILIRUB SERPL-MCNC: 0.4 MG/DL (ref 0.2–1.3)
BUN BLD-MCNC: 30 MG/DL (ref 7–20)
BUN/CREAT SERPL: 5.3 (ref 7.1–23.5)
CALCIUM SPEC-SCNC: 8.5 MG/DL (ref 8.4–10.2)
CHLORIDE SERPL-SCNC: 103 MMOL/L (ref 98–107)
CO2 SERPL-SCNC: 24 MMOL/L (ref 26–30)
CREAT BLD-MCNC: 5.7 MG/DL (ref 0.6–1.3)
DEPRECATED RDW RBC AUTO: 51.5 FL (ref 37–54)
EOSINOPHIL # BLD AUTO: 0.15 10*3/MM3 (ref 0–0.7)
EOSINOPHIL NFR BLD AUTO: 5.8 % (ref 0–7)
ERYTHROCYTE [DISTWIDTH] IN BLOOD BY AUTOMATED COUNT: 15 % (ref 11.5–14.5)
GFR SERPL CREATININE-BSD FRML MDRD: 9 ML/MIN/1.73
GLOBULIN UR ELPH-MCNC: 3.2 GM/DL
GLUCOSE BLD-MCNC: 84 MG/DL (ref 74–98)
HBV SURFACE AG SERPL QL IA: NEGATIVE
HCT VFR BLD AUTO: 32.4 % (ref 37–47)
HGB BLD-MCNC: 10 G/DL (ref 12–16)
IMM GRANULOCYTES # BLD: 0.01 10*3/MM3 (ref 0–0.06)
IMM GRANULOCYTES NFR BLD: 0.4 % (ref 0–0.6)
LYMPHOCYTES # BLD AUTO: 0.35 10*3/MM3 (ref 0.6–3.4)
LYMPHOCYTES NFR BLD AUTO: 13.6 % (ref 10–50)
MCH RBC QN AUTO: 28.8 PG (ref 27–31)
MCHC RBC AUTO-ENTMCNC: 30.9 G/DL (ref 30–37)
MCV RBC AUTO: 93.4 FL (ref 81–99)
MONOCYTES # BLD AUTO: 0.09 10*3/MM3 (ref 0–0.9)
MONOCYTES NFR BLD AUTO: 3.5 % (ref 0–12)
NEUTROPHILS # BLD AUTO: 1.97 10*3/MM3 (ref 2–6.9)
NEUTROPHILS NFR BLD AUTO: 76.7 % (ref 37–80)
NRBC BLD MANUAL-RTO: 0 /100 WBC (ref 0–0)
PLATELET # BLD AUTO: 98 10*3/MM3 (ref 130–400)
PMV BLD AUTO: 12.5 FL (ref 6–12)
POTASSIUM BLD-SCNC: 5.3 MMOL/L (ref 3.5–5.1)
PROT SERPL-MCNC: 5.6 G/DL (ref 6.3–8.2)
RBC # BLD AUTO: 3.47 10*6/MM3 (ref 4.2–5.4)
SODIUM BLD-SCNC: 135 MMOL/L (ref 137–145)
WBC NRBC COR # BLD: 2.57 10*3/MM3 (ref 4.8–10.8)

## 2018-05-25 PROCEDURE — 25010000002 DIPHENHYDRAMINE PER 50 MG: Performed by: INTERNAL MEDICINE

## 2018-05-25 PROCEDURE — 99232 SBSQ HOSP IP/OBS MODERATE 35: CPT | Performed by: INTERNAL MEDICINE

## 2018-05-25 PROCEDURE — G0378 HOSPITAL OBSERVATION PER HR: HCPCS

## 2018-05-25 PROCEDURE — 25010000002 HYDRALAZINE PER 20 MG: Performed by: INTERNAL MEDICINE

## 2018-05-25 PROCEDURE — 25010000002 ONDANSETRON PER 1 MG: Performed by: INTERNAL MEDICINE

## 2018-05-25 PROCEDURE — 63510000001 EPOETIN ALFA PER 1000 UNITS: Performed by: INTERNAL MEDICINE

## 2018-05-25 PROCEDURE — 80053 COMPREHEN METABOLIC PANEL: CPT | Performed by: INTERNAL MEDICINE

## 2018-05-25 PROCEDURE — 25010000002 HYDROMORPHONE PER 4 MG: Performed by: INTERNAL MEDICINE

## 2018-05-25 PROCEDURE — 85025 COMPLETE CBC W/AUTO DIFF WBC: CPT | Performed by: INTERNAL MEDICINE

## 2018-05-25 RX ORDER — LABETALOL HYDROCHLORIDE 5 MG/ML
20 INJECTION, SOLUTION INTRAVENOUS EVERY 6 HOURS PRN
Status: DISCONTINUED | OUTPATIENT
Start: 2018-05-25 | End: 2018-05-27 | Stop reason: HOSPADM

## 2018-05-25 RX ORDER — NICARDIPINE HYDROCHLORIDE 20 MG/1
40 CAPSULE ORAL EVERY 8 HOURS SCHEDULED
Status: DISCONTINUED | OUTPATIENT
Start: 2018-05-25 | End: 2018-05-27 | Stop reason: HOSPADM

## 2018-05-25 RX ADMIN — LEVETIRACETAM 500 MG: 500 TABLET, FILM COATED ORAL at 21:10

## 2018-05-25 RX ADMIN — NICARDIPINE HYDROCHLORIDE 20 MG: 20 CAPSULE ORAL at 06:59

## 2018-05-25 RX ADMIN — PANTOPRAZOLE SODIUM 40 MG: 40 TABLET, DELAYED RELEASE ORAL at 06:59

## 2018-05-25 RX ADMIN — HYDROXYCHLOROQUINE SULFATE 200 MG: 200 TABLET, FILM COATED ORAL at 08:35

## 2018-05-25 RX ADMIN — METRONIDAZOLE 500 MG: 500 INJECTION, SOLUTION INTRAVENOUS at 02:05

## 2018-05-25 RX ADMIN — ONDANSETRON 4 MG: 2 INJECTION INTRAMUSCULAR; INTRAVENOUS at 21:10

## 2018-05-25 RX ADMIN — ONDANSETRON 4 MG: 2 INJECTION INTRAMUSCULAR; INTRAVENOUS at 13:55

## 2018-05-25 RX ADMIN — SUCRALFATE 1 G: 1 TABLET ORAL at 21:10

## 2018-05-25 RX ADMIN — ERYTHROPOIETIN 5000 UNITS: 20000 INJECTION, SOLUTION INTRAVENOUS; SUBCUTANEOUS at 12:42

## 2018-05-25 RX ADMIN — HYDROMORPHONE HYDROCHLORIDE 0.5 MG: 1 INJECTION, SOLUTION INTRAMUSCULAR; INTRAVENOUS; SUBCUTANEOUS at 20:34

## 2018-05-25 RX ADMIN — Medication 1 TABLET: at 08:35

## 2018-05-25 RX ADMIN — METRONIDAZOLE 500 MG: 500 INJECTION, SOLUTION INTRAVENOUS at 18:06

## 2018-05-25 RX ADMIN — Medication 25 MG: at 01:10

## 2018-05-25 RX ADMIN — CALCITRIOL 0.5 MCG: 0.25 CAPSULE, LIQUID FILLED ORAL at 08:36

## 2018-05-25 RX ADMIN — SUCRALFATE 1 G: 1 TABLET ORAL at 12:42

## 2018-05-25 RX ADMIN — Medication 25 MG: at 07:33

## 2018-05-25 RX ADMIN — HYDROMORPHONE HYDROCHLORIDE 0.5 MG: 1 INJECTION, SOLUTION INTRAMUSCULAR; INTRAVENOUS; SUBCUTANEOUS at 02:05

## 2018-05-25 RX ADMIN — DIPHENHYDRAMINE HYDROCHLORIDE 25 MG: 50 INJECTION, SOLUTION INTRAMUSCULAR; INTRAVENOUS at 20:34

## 2018-05-25 RX ADMIN — CLONAZEPAM 0.5 MG: 0.5 TABLET ORAL at 18:25

## 2018-05-25 RX ADMIN — HYDROMORPHONE HYDROCHLORIDE 0.5 MG: 1 INJECTION, SOLUTION INTRAMUSCULAR; INTRAVENOUS; SUBCUTANEOUS at 13:46

## 2018-05-25 RX ADMIN — CLONAZEPAM 0.5 MG: 0.5 TABLET ORAL at 01:03

## 2018-05-25 RX ADMIN — ONDANSETRON 4 MG: 2 INJECTION INTRAMUSCULAR; INTRAVENOUS at 01:04

## 2018-05-25 RX ADMIN — DIPHENHYDRAMINE HYDROCHLORIDE 25 MG: 50 INJECTION, SOLUTION INTRAMUSCULAR; INTRAVENOUS at 14:33

## 2018-05-25 RX ADMIN — DIPHENHYDRAMINE HYDROCHLORIDE 25 MG: 50 INJECTION, SOLUTION INTRAMUSCULAR; INTRAVENOUS at 02:04

## 2018-05-25 RX ADMIN — NICARDIPINE HYDROCHLORIDE 40 MG: 20 CAPSULE ORAL at 21:10

## 2018-05-25 RX ADMIN — NICARDIPINE HYDROCHLORIDE 40 MG: 20 CAPSULE ORAL at 16:25

## 2018-05-25 RX ADMIN — DIPHENHYDRAMINE HYDROCHLORIDE 25 MG: 50 INJECTION, SOLUTION INTRAMUSCULAR; INTRAVENOUS at 08:35

## 2018-05-25 RX ADMIN — HYDROMORPHONE HYDROCHLORIDE 0.5 MG: 1 INJECTION, SOLUTION INTRAMUSCULAR; INTRAVENOUS; SUBCUTANEOUS at 08:35

## 2018-05-25 RX ADMIN — NEBIVOLOL HYDROCHLORIDE 20 MG: 10 TABLET ORAL at 08:35

## 2018-05-25 RX ADMIN — METRONIDAZOLE 500 MG: 500 INJECTION, SOLUTION INTRAVENOUS at 10:18

## 2018-05-25 RX ADMIN — Medication 25 MG: at 20:05

## 2018-05-25 RX ADMIN — Medication 25 MG: at 14:32

## 2018-05-25 RX ADMIN — LOSARTAN POTASSIUM 100 MG: 50 TABLET, FILM COATED ORAL at 06:58

## 2018-05-25 RX ADMIN — SUCRALFATE 1 G: 1 TABLET ORAL at 08:35

## 2018-05-25 RX ADMIN — PANTOPRAZOLE SODIUM 40 MG: 40 TABLET, DELAYED RELEASE ORAL at 18:06

## 2018-05-26 LAB
ALBUMIN SERPL-MCNC: 2.3 G/DL (ref 3.5–5)
ANION GAP SERPL CALCULATED.3IONS-SCNC: 13.6 MMOL/L (ref 10–20)
BASOPHILS # BLD AUTO: 0.01 10*3/MM3 (ref 0–0.2)
BASOPHILS NFR BLD AUTO: 0.4 % (ref 0–2.5)
BUN BLD-MCNC: 41 MG/DL (ref 7–20)
BUN/CREAT SERPL: 6.4 (ref 7.1–23.5)
CALCIUM SPEC-SCNC: 8.4 MG/DL (ref 8.4–10.2)
CHLORIDE SERPL-SCNC: 103 MMOL/L (ref 98–107)
CO2 SERPL-SCNC: 22 MMOL/L (ref 26–30)
CREAT BLD-MCNC: 6.4 MG/DL (ref 0.6–1.3)
DEPRECATED RDW RBC AUTO: 50.2 FL (ref 37–54)
EOSINOPHIL # BLD AUTO: 0.16 10*3/MM3 (ref 0–0.7)
EOSINOPHIL NFR BLD AUTO: 6.6 % (ref 0–7)
ERYTHROCYTE [DISTWIDTH] IN BLOOD BY AUTOMATED COUNT: 15.1 % (ref 11.5–14.5)
GFR SERPL CREATININE-BSD FRML MDRD: 8 ML/MIN/1.73
GLUCOSE BLD-MCNC: 86 MG/DL (ref 74–98)
HCT VFR BLD AUTO: 32.3 % (ref 37–47)
HGB BLD-MCNC: 10.2 G/DL (ref 12–16)
IMM GRANULOCYTES # BLD: 0.02 10*3/MM3 (ref 0–0.06)
IMM GRANULOCYTES NFR BLD: 0.8 % (ref 0–0.6)
LARGE PLATELETS: NORMAL
LYMPHOCYTES # BLD AUTO: 0.44 10*3/MM3 (ref 0.6–3.4)
LYMPHOCYTES NFR BLD AUTO: 18 % (ref 10–50)
MAGNESIUM SERPL-MCNC: 2 MG/DL (ref 1.6–2.3)
MCH RBC QN AUTO: 28.5 PG (ref 27–31)
MCHC RBC AUTO-ENTMCNC: 31.6 G/DL (ref 30–37)
MCV RBC AUTO: 90.2 FL (ref 81–99)
MONOCYTES # BLD AUTO: 0.11 10*3/MM3 (ref 0–0.9)
MONOCYTES NFR BLD AUTO: 4.5 % (ref 0–12)
NEUTROPHILS # BLD AUTO: 1.7 10*3/MM3 (ref 2–6.9)
NEUTROPHILS NFR BLD AUTO: 69.7 % (ref 37–80)
NRBC BLD MANUAL-RTO: 0 /100 WBC (ref 0–0)
PHOSPHATE SERPL-MCNC: 3.8 MG/DL (ref 2.5–4.5)
PLATELET # BLD AUTO: 73 10*3/MM3 (ref 130–400)
PMV BLD AUTO: 12.8 FL (ref 6–12)
POTASSIUM BLD-SCNC: 4.6 MMOL/L (ref 3.5–5.1)
RBC # BLD AUTO: 3.58 10*6/MM3 (ref 4.2–5.4)
RBC MORPH BLD: NORMAL
SMALL PLATELETS BLD QL SMEAR: NORMAL
SODIUM BLD-SCNC: 134 MMOL/L (ref 137–145)
WBC MORPH BLD: NORMAL
WBC NRBC COR # BLD: 2.44 10*3/MM3 (ref 4.8–10.8)

## 2018-05-26 PROCEDURE — 25010000002 HYDRALAZINE PER 20 MG: Performed by: INTERNAL MEDICINE

## 2018-05-26 PROCEDURE — 90935 HEMODIALYSIS ONE EVALUATION: CPT

## 2018-05-26 PROCEDURE — 25010000002 LEVOFLOXACIN PER 250 MG: Performed by: INTERNAL MEDICINE

## 2018-05-26 PROCEDURE — 25010000002 METHYLPREDNISOLONE PER 40 MG: Performed by: INTERNAL MEDICINE

## 2018-05-26 PROCEDURE — 25010000002 ENOXAPARIN PER 10 MG: Performed by: INTERNAL MEDICINE

## 2018-05-26 PROCEDURE — 25010000002 HYDROMORPHONE PER 4 MG: Performed by: INTERNAL MEDICINE

## 2018-05-26 PROCEDURE — 99233 SBSQ HOSP IP/OBS HIGH 50: CPT | Performed by: INTERNAL MEDICINE

## 2018-05-26 PROCEDURE — 36591 DRAW BLOOD OFF VENOUS DEVICE: CPT

## 2018-05-26 PROCEDURE — 85025 COMPLETE CBC W/AUTO DIFF WBC: CPT | Performed by: INTERNAL MEDICINE

## 2018-05-26 PROCEDURE — 99232 SBSQ HOSP IP/OBS MODERATE 35: CPT | Performed by: INTERNAL MEDICINE

## 2018-05-26 PROCEDURE — 85007 BL SMEAR W/DIFF WBC COUNT: CPT | Performed by: INTERNAL MEDICINE

## 2018-05-26 PROCEDURE — 25010000002 ONDANSETRON PER 1 MG: Performed by: INTERNAL MEDICINE

## 2018-05-26 PROCEDURE — 5A1D70Z PERFORMANCE OF URINARY FILTRATION, INTERMITTENT, LESS THAN 6 HOURS PER DAY: ICD-10-PCS | Performed by: INTERNAL MEDICINE

## 2018-05-26 PROCEDURE — 83735 ASSAY OF MAGNESIUM: CPT | Performed by: INTERNAL MEDICINE

## 2018-05-26 PROCEDURE — 25010000002 DIPHENHYDRAMINE PER 50 MG: Performed by: INTERNAL MEDICINE

## 2018-05-26 PROCEDURE — 80069 RENAL FUNCTION PANEL: CPT | Performed by: INTERNAL MEDICINE

## 2018-05-26 RX ORDER — HYDRALAZINE HYDROCHLORIDE 20 MG/ML
20 INJECTION INTRAMUSCULAR; INTRAVENOUS EVERY 4 HOURS PRN
Status: DISCONTINUED | OUTPATIENT
Start: 2018-05-26 | End: 2018-05-27 | Stop reason: HOSPADM

## 2018-05-26 RX ORDER — METHYLPREDNISOLONE SODIUM SUCCINATE 40 MG/ML
40 INJECTION, POWDER, LYOPHILIZED, FOR SOLUTION INTRAMUSCULAR; INTRAVENOUS EVERY 12 HOURS
Status: DISCONTINUED | OUTPATIENT
Start: 2018-05-26 | End: 2018-05-27 | Stop reason: HOSPADM

## 2018-05-26 RX ADMIN — CLONAZEPAM 0.5 MG: 0.5 TABLET ORAL at 14:28

## 2018-05-26 RX ADMIN — LEVOFLOXACIN 250 MG: 5 INJECTION, SOLUTION INTRAVENOUS at 13:07

## 2018-05-26 RX ADMIN — METRONIDAZOLE 500 MG: 500 INJECTION, SOLUTION INTRAVENOUS at 02:06

## 2018-05-26 RX ADMIN — HYDROXYCHLOROQUINE SULFATE 200 MG: 200 TABLET, FILM COATED ORAL at 09:41

## 2018-05-26 RX ADMIN — DIPHENHYDRAMINE HYDROCHLORIDE 25 MG: 50 INJECTION, SOLUTION INTRAMUSCULAR; INTRAVENOUS at 17:33

## 2018-05-26 RX ADMIN — DIPHENHYDRAMINE HYDROCHLORIDE 25 MG: 50 INJECTION, SOLUTION INTRAMUSCULAR; INTRAVENOUS at 11:39

## 2018-05-26 RX ADMIN — Medication 10 ML: at 22:20

## 2018-05-26 RX ADMIN — LOSARTAN POTASSIUM 100 MG: 50 TABLET, FILM COATED ORAL at 06:02

## 2018-05-26 RX ADMIN — METRONIDAZOLE 500 MG: 500 INJECTION, SOLUTION INTRAVENOUS at 17:34

## 2018-05-26 RX ADMIN — ENOXAPARIN SODIUM 30 MG: 30 INJECTION SUBCUTANEOUS at 10:37

## 2018-05-26 RX ADMIN — ONDANSETRON 4 MG: 2 INJECTION INTRAMUSCULAR; INTRAVENOUS at 05:07

## 2018-05-26 RX ADMIN — Medication 1 TABLET: at 09:41

## 2018-05-26 RX ADMIN — SUCRALFATE 1 G: 1 TABLET ORAL at 13:06

## 2018-05-26 RX ADMIN — SUCRALFATE 1 G: 1 TABLET ORAL at 09:41

## 2018-05-26 RX ADMIN — LEVETIRACETAM 500 MG: 500 TABLET, FILM COATED ORAL at 22:15

## 2018-05-26 RX ADMIN — HYDROMORPHONE HYDROCHLORIDE 0.5 MG: 1 INJECTION, SOLUTION INTRAMUSCULAR; INTRAVENOUS; SUBCUTANEOUS at 17:34

## 2018-05-26 RX ADMIN — NICARDIPINE HYDROCHLORIDE 40 MG: 20 CAPSULE ORAL at 05:49

## 2018-05-26 RX ADMIN — ONDANSETRON 4 MG: 2 INJECTION INTRAMUSCULAR; INTRAVENOUS at 13:16

## 2018-05-26 RX ADMIN — SUCRALFATE 1 G: 1 TABLET ORAL at 22:15

## 2018-05-26 RX ADMIN — NICARDIPINE HYDROCHLORIDE 40 MG: 20 CAPSULE ORAL at 14:17

## 2018-05-26 RX ADMIN — PANTOPRAZOLE SODIUM 40 MG: 40 TABLET, DELAYED RELEASE ORAL at 06:30

## 2018-05-26 RX ADMIN — LABETALOL 20 MG/4 ML (5 MG/ML) INTRAVENOUS SYRINGE 20 MG: at 00:17

## 2018-05-26 RX ADMIN — PANTOPRAZOLE SODIUM 40 MG: 40 TABLET, DELAYED RELEASE ORAL at 17:33

## 2018-05-26 RX ADMIN — Medication 20 MG: at 22:23

## 2018-05-26 RX ADMIN — NEBIVOLOL HYDROCHLORIDE 20 MG: 10 TABLET ORAL at 09:41

## 2018-05-26 RX ADMIN — CLONAZEPAM 0.5 MG: 0.5 TABLET ORAL at 06:13

## 2018-05-26 RX ADMIN — METHYLPREDNISOLONE SODIUM SUCCINATE 40 MG: 40 INJECTION, POWDER, FOR SOLUTION INTRAMUSCULAR; INTRAVENOUS at 14:19

## 2018-05-26 RX ADMIN — Medication 25 MG: at 05:07

## 2018-05-26 RX ADMIN — CLONAZEPAM 0.5 MG: 0.5 TABLET ORAL at 00:20

## 2018-05-26 RX ADMIN — SUCRALFATE 1 G: 1 TABLET ORAL at 17:33

## 2018-05-26 RX ADMIN — HYDROMORPHONE HYDROCHLORIDE 0.5 MG: 1 INJECTION, SOLUTION INTRAMUSCULAR; INTRAVENOUS; SUBCUTANEOUS at 05:12

## 2018-05-26 RX ADMIN — NICARDIPINE HYDROCHLORIDE 40 MG: 20 CAPSULE ORAL at 22:15

## 2018-05-26 RX ADMIN — HYDROMORPHONE HYDROCHLORIDE 0.5 MG: 1 INJECTION, SOLUTION INTRAMUSCULAR; INTRAVENOUS; SUBCUTANEOUS at 10:37

## 2018-05-26 RX ADMIN — DIPHENHYDRAMINE HYDROCHLORIDE 25 MG: 50 INJECTION, SOLUTION INTRAMUSCULAR; INTRAVENOUS at 05:12

## 2018-05-26 RX ADMIN — METRONIDAZOLE 500 MG: 500 INJECTION, SOLUTION INTRAVENOUS at 10:23

## 2018-05-26 RX ADMIN — CALCITRIOL 0.5 MCG: 0.25 CAPSULE, LIQUID FILLED ORAL at 09:41

## 2018-05-27 VITALS
BODY MASS INDEX: 22.99 KG/M2 | OXYGEN SATURATION: 99 % | SYSTOLIC BLOOD PRESSURE: 162 MMHG | RESPIRATION RATE: 17 BRPM | HEIGHT: 67 IN | HEART RATE: 87 BPM | DIASTOLIC BLOOD PRESSURE: 92 MMHG | WEIGHT: 146.5 LBS | TEMPERATURE: 97.4 F

## 2018-05-27 PROCEDURE — 99238 HOSP IP/OBS DSCHRG MGMT 30/<: CPT | Performed by: INTERNAL MEDICINE

## 2018-05-27 PROCEDURE — 25010000002 HYDRALAZINE PER 20 MG: Performed by: INTERNAL MEDICINE

## 2018-05-27 PROCEDURE — 25010000002 METHYLPREDNISOLONE PER 40 MG: Performed by: INTERNAL MEDICINE

## 2018-05-27 PROCEDURE — 25010000002 ENOXAPARIN PER 10 MG: Performed by: INTERNAL MEDICINE

## 2018-05-27 PROCEDURE — 25010000002 DIPHENHYDRAMINE PER 50 MG: Performed by: INTERNAL MEDICINE

## 2018-05-27 RX ORDER — PREDNISONE 20 MG/1
40 TABLET ORAL DAILY
Qty: 30 TABLET | Refills: 0 | Status: SHIPPED | OUTPATIENT
Start: 2018-05-27 | End: 2018-06-28 | Stop reason: SDUPTHER

## 2018-05-27 RX ORDER — LOSARTAN POTASSIUM 100 MG/1
100 TABLET ORAL EVERY MORNING
Qty: 30 TABLET | Refills: 0
Start: 2018-05-27 | End: 2018-07-25 | Stop reason: HOSPADM

## 2018-05-27 RX ORDER — NICARDIPINE HYDROCHLORIDE 20 MG/1
40 CAPSULE ORAL 2 TIMES DAILY
Qty: 60 CAPSULE | Refills: 0
Start: 2018-05-27 | End: 2018-09-07 | Stop reason: HOSPADM

## 2018-05-27 RX ORDER — HYDRALAZINE HYDROCHLORIDE 25 MG/1
100 TABLET, FILM COATED ORAL EVERY 12 HOURS SCHEDULED
Status: DISCONTINUED | OUTPATIENT
Start: 2018-05-27 | End: 2018-05-27 | Stop reason: HOSPADM

## 2018-05-27 RX ORDER — METRONIDAZOLE 500 MG/1
500 TABLET ORAL 3 TIMES DAILY
Qty: 15 TABLET | Refills: 0 | Status: SHIPPED | OUTPATIENT
Start: 2018-05-27 | End: 2018-06-13

## 2018-05-27 RX ORDER — HYDROMORPHONE HYDROCHLORIDE 2 MG/1
1 TABLET ORAL EVERY 4 HOURS PRN
Qty: 10 TABLET | Refills: 0 | Status: SHIPPED | OUTPATIENT
Start: 2018-05-27 | End: 2018-06-01

## 2018-05-27 RX ORDER — LEVOFLOXACIN 250 MG/1
250 TABLET ORAL
Qty: 3 TABLET | Refills: 0 | Status: SHIPPED | OUTPATIENT
Start: 2018-05-27 | End: 2018-05-30

## 2018-05-27 RX ORDER — CLONIDINE HYDROCHLORIDE 0.1 MG/1
0.1 TABLET ORAL EVERY 12 HOURS SCHEDULED
Status: DISCONTINUED | OUTPATIENT
Start: 2018-05-27 | End: 2018-05-27 | Stop reason: HOSPADM

## 2018-05-27 RX ADMIN — LABETALOL 20 MG/4 ML (5 MG/ML) INTRAVENOUS SYRINGE 20 MG: at 00:05

## 2018-05-27 RX ADMIN — METRONIDAZOLE 500 MG: 500 INJECTION, SOLUTION INTRAVENOUS at 10:17

## 2018-05-27 RX ADMIN — CLONAZEPAM 0.5 MG: 0.5 TABLET ORAL at 00:05

## 2018-05-27 RX ADMIN — Medication 10 ML: at 00:05

## 2018-05-27 RX ADMIN — Medication 10 ML: at 06:45

## 2018-05-27 RX ADMIN — CLONIDINE 1 PATCH: 0.3 PATCH, EXTENDED RELEASE TRANSDERMAL at 10:15

## 2018-05-27 RX ADMIN — NEBIVOLOL HYDROCHLORIDE 20 MG: 10 TABLET ORAL at 08:20

## 2018-05-27 RX ADMIN — Medication 1 TABLET: at 08:22

## 2018-05-27 RX ADMIN — CALCITRIOL 0.5 MCG: 0.25 CAPSULE, LIQUID FILLED ORAL at 08:17

## 2018-05-27 RX ADMIN — ENOXAPARIN SODIUM 30 MG: 30 INJECTION SUBCUTANEOUS at 10:17

## 2018-05-27 RX ADMIN — DIPHENHYDRAMINE HYDROCHLORIDE 25 MG: 50 INJECTION, SOLUTION INTRAMUSCULAR; INTRAVENOUS at 00:05

## 2018-05-27 RX ADMIN — HYDROXYCHLOROQUINE SULFATE 200 MG: 200 TABLET, FILM COATED ORAL at 08:22

## 2018-05-27 RX ADMIN — SUCRALFATE 1 G: 1 TABLET ORAL at 08:17

## 2018-05-27 RX ADMIN — NICARDIPINE HYDROCHLORIDE 40 MG: 20 CAPSULE ORAL at 06:15

## 2018-05-27 RX ADMIN — METHYLPREDNISOLONE SODIUM SUCCINATE 40 MG: 40 INJECTION, POWDER, FOR SOLUTION INTRAMUSCULAR; INTRAVENOUS at 03:30

## 2018-05-27 RX ADMIN — PANTOPRAZOLE SODIUM 40 MG: 40 TABLET, DELAYED RELEASE ORAL at 06:45

## 2018-05-27 RX ADMIN — Medication 10 ML: at 02:00

## 2018-05-27 RX ADMIN — Medication 20 MG: at 06:45

## 2018-05-27 RX ADMIN — METRONIDAZOLE 500 MG: 500 INJECTION, SOLUTION INTRAVENOUS at 02:00

## 2018-05-27 RX ADMIN — HYDRALAZINE HYDROCHLORIDE 100 MG: 25 TABLET, FILM COATED ORAL at 10:14

## 2018-05-27 RX ADMIN — Medication 10 ML: at 03:30

## 2018-05-27 RX ADMIN — LOSARTAN POTASSIUM 100 MG: 50 TABLET, FILM COATED ORAL at 06:45

## 2018-05-30 ENCOUNTER — TRANSITIONAL CARE MANAGEMENT TELEPHONE ENCOUNTER (OUTPATIENT)
Dept: FAMILY MEDICINE CLINIC | Facility: CLINIC | Age: 28
End: 2018-05-30

## 2018-05-30 ENCOUNTER — EPISODE CHANGES (OUTPATIENT)
Dept: CASE MANAGEMENT | Facility: OTHER | Age: 28
End: 2018-05-30

## 2018-06-01 ENCOUNTER — OFFICE VISIT (OUTPATIENT)
Dept: INTERNAL MEDICINE | Facility: CLINIC | Age: 28
End: 2018-06-01

## 2018-06-01 VITALS
TEMPERATURE: 98 F | BODY MASS INDEX: 22.6 KG/M2 | HEIGHT: 67 IN | OXYGEN SATURATION: 97 % | SYSTOLIC BLOOD PRESSURE: 120 MMHG | DIASTOLIC BLOOD PRESSURE: 70 MMHG | HEART RATE: 75 BPM | WEIGHT: 144 LBS

## 2018-06-01 DIAGNOSIS — M32.19 OTHER SYSTEMIC LUPUS ERYTHEMATOSUS WITH OTHER ORGAN INVOLVEMENT (HCC): Primary | ICD-10-CM

## 2018-06-01 PROCEDURE — 99496 TRANSJ CARE MGMT HIGH F2F 7D: CPT | Performed by: INTERNAL MEDICINE

## 2018-06-13 ENCOUNTER — OFFICE VISIT (OUTPATIENT)
Dept: GASTROENTEROLOGY | Facility: CLINIC | Age: 28
End: 2018-06-13

## 2018-06-13 VITALS
DIASTOLIC BLOOD PRESSURE: 99 MMHG | HEIGHT: 67 IN | SYSTOLIC BLOOD PRESSURE: 161 MMHG | BODY MASS INDEX: 22.91 KG/M2 | HEART RATE: 75 BPM | WEIGHT: 146 LBS | TEMPERATURE: 98.3 F | RESPIRATION RATE: 17 BRPM

## 2018-06-13 DIAGNOSIS — R11.0 NAUSEA: ICD-10-CM

## 2018-06-13 DIAGNOSIS — R10.13 EPIGASTRIC PAIN: Primary | ICD-10-CM

## 2018-06-13 DIAGNOSIS — D64.9 ANEMIA, UNSPECIFIED TYPE: ICD-10-CM

## 2018-06-13 PROCEDURE — 99214 OFFICE O/P EST MOD 30 MIN: CPT | Performed by: INTERNAL MEDICINE

## 2018-06-19 ENCOUNTER — EPISODE CHANGES (OUTPATIENT)
Dept: CASE MANAGEMENT | Facility: OTHER | Age: 28
End: 2018-06-19

## 2018-06-27 ENCOUNTER — OFFICE VISIT (OUTPATIENT)
Dept: NEUROLOGY | Facility: CLINIC | Age: 28
End: 2018-06-27

## 2018-06-27 VITALS
WEIGHT: 146 LBS | BODY MASS INDEX: 22.91 KG/M2 | HEIGHT: 67 IN | OXYGEN SATURATION: 99 % | SYSTOLIC BLOOD PRESSURE: 178 MMHG | HEART RATE: 71 BPM | DIASTOLIC BLOOD PRESSURE: 112 MMHG

## 2018-06-27 DIAGNOSIS — G40.209 PARTIAL SYMPTOMATIC EPILEPSY WITH COMPLEX PARTIAL SEIZURES, NOT INTRACTABLE, WITHOUT STATUS EPILEPTICUS (HCC): Primary | ICD-10-CM

## 2018-06-27 DIAGNOSIS — M32.19 LUPUS CEREBRITIS (HCC): ICD-10-CM

## 2018-06-27 DIAGNOSIS — I69.30 SEQUELAE, POST-STROKE: ICD-10-CM

## 2018-06-27 DIAGNOSIS — F05 CONFUSION AFTER A SEIZURE: ICD-10-CM

## 2018-06-27 PROCEDURE — 99213 OFFICE O/P EST LOW 20 MIN: CPT | Performed by: PSYCHIATRY & NEUROLOGY

## 2018-06-27 RX ORDER — PHENYTOIN SODIUM 100 MG/1
100 CAPSULE, EXTENDED RELEASE ORAL 3 TIMES DAILY
Qty: 90 CAPSULE | Refills: 5 | Status: SHIPPED | OUTPATIENT
Start: 2018-06-27 | End: 2018-07-25 | Stop reason: HOSPADM

## 2018-06-28 ENCOUNTER — OFFICE VISIT (OUTPATIENT)
Dept: INTERNAL MEDICINE | Facility: CLINIC | Age: 28
End: 2018-06-28

## 2018-06-28 VITALS
RESPIRATION RATE: 18 BRPM | HEART RATE: 71 BPM | WEIGHT: 142.25 LBS | OXYGEN SATURATION: 99 % | BODY MASS INDEX: 22.28 KG/M2 | TEMPERATURE: 98.7 F | DIASTOLIC BLOOD PRESSURE: 92 MMHG | SYSTOLIC BLOOD PRESSURE: 180 MMHG

## 2018-06-28 DIAGNOSIS — R10.13 EPIGASTRIC ABDOMINAL PAIN: ICD-10-CM

## 2018-06-28 DIAGNOSIS — M32.14 ESRD (END-STAGE RENAL DISEASE) DUE TO SLE (HCC): ICD-10-CM

## 2018-06-28 DIAGNOSIS — I15.0 RENOVASCULAR HYPERTENSION: ICD-10-CM

## 2018-06-28 DIAGNOSIS — K52.9 ENTERITIS: Primary | ICD-10-CM

## 2018-06-28 DIAGNOSIS — M32.14 SYSTEMIC LUPUS ERYTHEMATOSUS WITH GLOMERULAR DISEASE, UNSPECIFIED SLE TYPE (HCC): ICD-10-CM

## 2018-06-28 DIAGNOSIS — N18.6 ESRD (END-STAGE RENAL DISEASE) DUE TO SLE (HCC): ICD-10-CM

## 2018-06-28 LAB
ALBUMIN SERPL-MCNC: 3.1 G/DL (ref 3.5–5)
ALBUMIN/GLOB SERPL: 1.2 G/DL (ref 1–2)
ALP SERPL-CCNC: 47 U/L (ref 38–126)
ALT SERPL-CCNC: 20 U/L (ref 13–69)
AST SERPL-CCNC: 19 U/L (ref 15–46)
BASOPHILS # BLD AUTO: 0.02 10*3/MM3 (ref 0–0.2)
BASOPHILS NFR BLD AUTO: 0.5 % (ref 0–2.5)
BILIRUB SERPL-MCNC: 0.7 MG/DL (ref 0.2–1.3)
BUN SERPL-MCNC: 27 MG/DL (ref 7–20)
BUN/CREAT SERPL: 4.8 (ref 7.1–23.5)
CALCIUM SERPL-MCNC: 9.1 MG/DL (ref 8.4–10.2)
CHLORIDE SERPL-SCNC: 98 MMOL/L (ref 98–107)
CO2 SERPL-SCNC: 29 MMOL/L (ref 26–30)
CREAT SERPL-MCNC: 5.6 MG/DL (ref 0.6–1.3)
EOSINOPHIL # BLD AUTO: 0.12 10*3/MM3 (ref 0–0.7)
EOSINOPHIL NFR BLD AUTO: 2.7 % (ref 0–7)
ERYTHROCYTE [DISTWIDTH] IN BLOOD BY AUTOMATED COUNT: 17.4 % (ref 11.5–14.5)
GLOBULIN SER CALC-MCNC: 2.5 GM/DL
GLUCOSE SERPL-MCNC: 98 MG/DL (ref 74–98)
HCT VFR BLD AUTO: 37.1 % (ref 37–47)
HGB BLD-MCNC: 11.9 G/DL (ref 12–16)
IMM GRANULOCYTES # BLD: 0.06 10*3/MM3 (ref 0–0.06)
IMM GRANULOCYTES NFR BLD: 1.4 % (ref 0–0.6)
LYMPHOCYTES # BLD AUTO: 0.56 10*3/MM3 (ref 0.6–3.4)
LYMPHOCYTES NFR BLD AUTO: 12.6 % (ref 10–50)
MCH RBC QN AUTO: 29.2 PG (ref 27–31)
MCHC RBC AUTO-ENTMCNC: 32.1 G/DL (ref 30–37)
MCV RBC AUTO: 91.2 FL (ref 81–99)
MONOCYTES # BLD AUTO: 0.39 10*3/MM3 (ref 0–0.9)
MONOCYTES NFR BLD AUTO: 8.8 % (ref 0–12)
NEUTROPHILS # BLD AUTO: 3.29 10*3/MM3 (ref 2–6.9)
NEUTROPHILS NFR BLD AUTO: 74 % (ref 37–80)
NRBC BLD AUTO-RTO: 0 /100 WBC (ref 0–0)
PLATELET # BLD AUTO: 101 10*3/MM3 (ref 130–400)
POTASSIUM SERPL-SCNC: 4.3 MMOL/L (ref 3.5–5.1)
PROT SERPL-MCNC: 5.6 G/DL (ref 6.3–8.2)
RBC # BLD AUTO: 4.07 10*6/MM3 (ref 4.2–5.4)
SODIUM SERPL-SCNC: 136 MMOL/L (ref 137–145)
WBC # BLD AUTO: 4.44 10*3/MM3 (ref 4.8–10.8)

## 2018-06-28 PROCEDURE — 99214 OFFICE O/P EST MOD 30 MIN: CPT | Performed by: PHYSICIAN ASSISTANT

## 2018-06-28 RX ORDER — METRONIDAZOLE 500 MG/1
500 TABLET ORAL 3 TIMES DAILY
Qty: 21 TABLET | Refills: 0 | Status: SHIPPED | OUTPATIENT
Start: 2018-06-28 | End: 2018-07-05

## 2018-06-28 RX ORDER — LEVOFLOXACIN 250 MG/1
250 TABLET ORAL
Qty: 4 TABLET | Refills: 0 | Status: SHIPPED | OUTPATIENT
Start: 2018-06-28 | End: 2018-07-05

## 2018-06-28 RX ORDER — PREDNISONE 20 MG/1
40 TABLET ORAL DAILY
Qty: 30 TABLET | Refills: 0
Start: 2018-06-28 | End: 2018-07-13

## 2018-07-11 ENCOUNTER — EPISODE CHANGES (OUTPATIENT)
Dept: CASE MANAGEMENT | Facility: OTHER | Age: 28
End: 2018-07-11

## 2018-07-13 ENCOUNTER — OFFICE VISIT (OUTPATIENT)
Dept: INTERNAL MEDICINE | Facility: CLINIC | Age: 28
End: 2018-07-13

## 2018-07-13 VITALS
HEIGHT: 67 IN | TEMPERATURE: 99.7 F | SYSTOLIC BLOOD PRESSURE: 158 MMHG | BODY MASS INDEX: 22.56 KG/M2 | OXYGEN SATURATION: 98 % | WEIGHT: 143.75 LBS | DIASTOLIC BLOOD PRESSURE: 90 MMHG | RESPIRATION RATE: 16 BRPM | HEART RATE: 100 BPM

## 2018-07-13 DIAGNOSIS — H65.93 BILATERAL OTITIS MEDIA WITH EFFUSION: Primary | ICD-10-CM

## 2018-07-13 DIAGNOSIS — J01.40 ACUTE PANSINUSITIS, RECURRENCE NOT SPECIFIED: ICD-10-CM

## 2018-07-13 DIAGNOSIS — I10 ESSENTIAL HYPERTENSION: ICD-10-CM

## 2018-07-13 PROBLEM — G43.909 MIGRAINES: Status: ACTIVE | Noted: 2018-07-13

## 2018-07-13 PROBLEM — M32.9 LUPUS ARTHRITIS (HCC): Status: ACTIVE | Noted: 2018-07-13

## 2018-07-13 PROCEDURE — 99214 OFFICE O/P EST MOD 30 MIN: CPT | Performed by: NURSE PRACTITIONER

## 2018-07-13 RX ORDER — DOXYCYCLINE 100 MG/1
100 TABLET ORAL 2 TIMES DAILY
Qty: 20 TABLET | Refills: 0 | Status: SHIPPED | OUTPATIENT
Start: 2018-07-13 | End: 2018-07-25 | Stop reason: HOSPADM

## 2018-07-20 ENCOUNTER — APPOINTMENT (OUTPATIENT)
Dept: CT IMAGING | Facility: HOSPITAL | Age: 28
End: 2018-07-20

## 2018-07-20 ENCOUNTER — HOSPITAL ENCOUNTER (EMERGENCY)
Facility: HOSPITAL | Age: 28
Discharge: ANOTHER HOSPITAL | End: 2018-07-20
Attending: EMERGENCY MEDICINE | Admitting: EMERGENCY MEDICINE

## 2018-07-20 VITALS
RESPIRATION RATE: 18 BRPM | HEART RATE: 98 BPM | DIASTOLIC BLOOD PRESSURE: 111 MMHG | HEIGHT: 66 IN | OXYGEN SATURATION: 99 % | TEMPERATURE: 98.2 F | SYSTOLIC BLOOD PRESSURE: 172 MMHG | BODY MASS INDEX: 23.18 KG/M2 | WEIGHT: 144.2 LBS

## 2018-07-20 DIAGNOSIS — R29.90 STROKE-LIKE SYMPTOMS: Primary | ICD-10-CM

## 2018-07-20 LAB
ALBUMIN SERPL-MCNC: 3.6 G/DL (ref 3.5–5)
ALBUMIN/GLOB SERPL: 1.3 G/DL (ref 1–2)
ALP SERPL-CCNC: 43 U/L (ref 38–126)
ALT SERPL W P-5'-P-CCNC: 21 U/L (ref 13–69)
ANION GAP SERPL CALCULATED.3IONS-SCNC: 13.1 MMOL/L (ref 10–20)
AST SERPL-CCNC: 21 U/L (ref 15–46)
B-HCG UR QL: NEGATIVE
BASOPHILS # BLD AUTO: 0.01 10*3/MM3 (ref 0–0.2)
BASOPHILS NFR BLD AUTO: 0.4 % (ref 0–2.5)
BILIRUB SERPL-MCNC: 0.6 MG/DL (ref 0.2–1.3)
BUN BLD-MCNC: 42 MG/DL (ref 7–20)
BUN/CREAT SERPL: 6.7 (ref 7.1–23.5)
CALCIUM SPEC-SCNC: 9.9 MG/DL (ref 8.4–10.2)
CHLORIDE SERPL-SCNC: 102 MMOL/L (ref 98–107)
CO2 SERPL-SCNC: 25 MMOL/L (ref 26–30)
CREAT BLD-MCNC: 6.3 MG/DL (ref 0.6–1.3)
DEPRECATED RDW RBC AUTO: 51.5 FL (ref 37–54)
EOSINOPHIL # BLD AUTO: 0.02 10*3/MM3 (ref 0–0.7)
EOSINOPHIL NFR BLD AUTO: 0.7 % (ref 0–7)
ERYTHROCYTE [DISTWIDTH] IN BLOOD BY AUTOMATED COUNT: 14.6 % (ref 11.5–14.5)
GFR SERPL CREATININE-BSD FRML MDRD: 8 ML/MIN/1.73
GLOBULIN UR ELPH-MCNC: 2.8 GM/DL
GLUCOSE BLD-MCNC: 138 MG/DL (ref 74–98)
HCT VFR BLD AUTO: 35.1 % (ref 37–47)
HGB BLD-MCNC: 10.7 G/DL (ref 12–16)
HOLD SPECIMEN: NORMAL
HOLD SPECIMEN: NORMAL
IMM GRANULOCYTES # BLD: 0.04 10*3/MM3 (ref 0–0.06)
IMM GRANULOCYTES NFR BLD: 1.5 % (ref 0–0.6)
INR PPP: 0.94 (ref 0.9–1.1)
LYMPHOCYTES # BLD AUTO: 0.42 10*3/MM3 (ref 0.6–3.4)
LYMPHOCYTES NFR BLD AUTO: 15.4 % (ref 10–50)
MCH RBC QN AUTO: 29.4 PG (ref 27–31)
MCHC RBC AUTO-ENTMCNC: 30.5 G/DL (ref 30–37)
MCV RBC AUTO: 96.4 FL (ref 81–99)
MONOCYTES # BLD AUTO: 0.22 10*3/MM3 (ref 0–0.9)
MONOCYTES NFR BLD AUTO: 8.1 % (ref 0–12)
NEUTROPHILS # BLD AUTO: 2.01 10*3/MM3 (ref 2–6.9)
NEUTROPHILS NFR BLD AUTO: 73.9 % (ref 37–80)
NRBC BLD MANUAL-RTO: 0 /100 WBC (ref 0–0)
PLATELET # BLD AUTO: 73 10*3/MM3 (ref 130–400)
PMV BLD AUTO: 10.1 FL (ref 6–12)
POTASSIUM BLD-SCNC: 5.1 MMOL/L (ref 3.5–5.1)
PROT SERPL-MCNC: 6.4 G/DL (ref 6.3–8.2)
PROTHROMBIN TIME: 10.5 SECONDS (ref 9.3–12.1)
RBC # BLD AUTO: 3.64 10*6/MM3 (ref 4.2–5.4)
SODIUM BLD-SCNC: 135 MMOL/L (ref 137–145)
TROPONIN I SERPL-MCNC: 0.05 NG/ML (ref 0–0.03)
WBC NRBC COR # BLD: 2.72 10*3/MM3 (ref 4.8–10.8)
WHOLE BLOOD HOLD SPECIMEN: NORMAL
WHOLE BLOOD HOLD SPECIMEN: NORMAL

## 2018-07-20 PROCEDURE — 96376 TX/PRO/DX INJ SAME DRUG ADON: CPT

## 2018-07-20 PROCEDURE — 84484 ASSAY OF TROPONIN QUANT: CPT | Performed by: EMERGENCY MEDICINE

## 2018-07-20 PROCEDURE — 99284 EMERGENCY DEPT VISIT MOD MDM: CPT

## 2018-07-20 PROCEDURE — 93005 ELECTROCARDIOGRAM TRACING: CPT | Performed by: EMERGENCY MEDICINE

## 2018-07-20 PROCEDURE — 70450 CT HEAD/BRAIN W/O DYE: CPT

## 2018-07-20 PROCEDURE — 85610 PROTHROMBIN TIME: CPT | Performed by: EMERGENCY MEDICINE

## 2018-07-20 PROCEDURE — 80053 COMPREHEN METABOLIC PANEL: CPT | Performed by: EMERGENCY MEDICINE

## 2018-07-20 PROCEDURE — 81025 URINE PREGNANCY TEST: CPT | Performed by: EMERGENCY MEDICINE

## 2018-07-20 PROCEDURE — 96374 THER/PROPH/DIAG INJ IV PUSH: CPT

## 2018-07-20 PROCEDURE — 85025 COMPLETE CBC W/AUTO DIFF WBC: CPT | Performed by: EMERGENCY MEDICINE

## 2018-07-20 RX ORDER — ASPIRIN 325 MG
325 TABLET ORAL ONCE
Status: COMPLETED | OUTPATIENT
Start: 2018-07-20 | End: 2018-07-20

## 2018-07-20 RX ORDER — LABETALOL HYDROCHLORIDE 5 MG/ML
10 INJECTION, SOLUTION INTRAVENOUS ONCE
Status: COMPLETED | OUTPATIENT
Start: 2018-07-20 | End: 2018-07-20

## 2018-07-20 RX ORDER — LABETALOL HYDROCHLORIDE 5 MG/ML
20 INJECTION, SOLUTION INTRAVENOUS ONCE
Status: COMPLETED | OUTPATIENT
Start: 2018-07-20 | End: 2018-07-20

## 2018-07-20 RX ADMIN — ASPIRIN 325 MG ORAL TABLET 325 MG: 325 PILL ORAL at 21:04

## 2018-07-20 RX ADMIN — LABETALOL 20 MG/4 ML (5 MG/ML) INTRAVENOUS SYRINGE 20 MG: at 21:32

## 2018-07-20 RX ADMIN — LABETALOL 20 MG/4 ML (5 MG/ML) INTRAVENOUS SYRINGE 10 MG: at 20:53

## 2018-07-21 ENCOUNTER — APPOINTMENT (OUTPATIENT)
Dept: MRI IMAGING | Facility: HOSPITAL | Age: 28
End: 2018-07-21

## 2018-07-21 ENCOUNTER — HOSPITAL ENCOUNTER (INPATIENT)
Facility: HOSPITAL | Age: 28
LOS: 4 days | Discharge: HOME | End: 2018-07-25
Attending: INTERNAL MEDICINE | Admitting: INTERNAL MEDICINE

## 2018-07-21 ENCOUNTER — APPOINTMENT (OUTPATIENT)
Dept: NEPHROLOGY | Facility: HOSPITAL | Age: 28
End: 2018-07-21
Attending: INTERNAL MEDICINE

## 2018-07-21 DIAGNOSIS — Z74.09 IMPAIRED FUNCTIONAL MOBILITY, BALANCE, GAIT, AND ENDURANCE: Primary | ICD-10-CM

## 2018-07-21 PROBLEM — Z86.73 H/O: CVA (CEREBROVASCULAR ACCIDENT): Status: ACTIVE | Noted: 2018-07-21

## 2018-07-21 PROBLEM — G45.9 TIA (TRANSIENT ISCHEMIC ATTACK): Status: ACTIVE | Noted: 2018-07-21

## 2018-07-21 PROBLEM — M06.9 RHEUMATOID ARTHRITIS (HCC): Status: ACTIVE | Noted: 2018-07-21

## 2018-07-21 PROBLEM — IMO0002: Status: ACTIVE | Noted: 2018-07-21

## 2018-07-21 LAB
ALBUMIN SERPL-MCNC: 3.17 G/DL (ref 3.2–4.8)
ANION GAP SERPL CALCULATED.3IONS-SCNC: 11 MMOL/L (ref 3–11)
ARTICHOKE IGE QN: 79 MG/DL (ref 0–130)
BASOPHILS # BLD AUTO: 0.01 10*3/MM3 (ref 0–0.2)
BASOPHILS NFR BLD AUTO: 0.2 % (ref 0–1)
BUN BLD-MCNC: 32 MG/DL (ref 9–23)
BUN/CREAT SERPL: 4.3 (ref 7–25)
CALCIUM SPEC-SCNC: 9.8 MG/DL (ref 8.7–10.4)
CHLORIDE SERPL-SCNC: 104 MMOL/L (ref 99–109)
CHOLEST SERPL-MCNC: 131 MG/DL (ref 0–200)
CO2 SERPL-SCNC: 21 MMOL/L (ref 20–31)
CREAT BLD-MCNC: 7.41 MG/DL (ref 0.6–1.3)
CRP SERPL-MCNC: 1.11 MG/DL (ref 0–1)
DEPRECATED RDW RBC AUTO: 53.1 FL (ref 37–54)
EOSINOPHIL # BLD AUTO: 0.18 10*3/MM3 (ref 0–0.3)
EOSINOPHIL NFR BLD AUTO: 4.3 % (ref 0–3)
ERYTHROCYTE [DISTWIDTH] IN BLOOD BY AUTOMATED COUNT: 15.3 % (ref 11.3–14.5)
GFR SERPL CREATININE-BSD FRML MDRD: 7 ML/MIN/1.73
GLUCOSE BLD-MCNC: 98 MG/DL (ref 70–100)
GLUCOSE BLDC GLUCOMTR-MCNC: 96 MG/DL (ref 70–130)
GLUCOSE BLDC GLUCOMTR-MCNC: 98 MG/DL (ref 70–130)
GLUCOSE BLDC GLUCOMTR-MCNC: 99 MG/DL (ref 70–130)
HBA1C MFR BLD: 4.4 % (ref 4.8–5.6)
HCT VFR BLD AUTO: 36.2 % (ref 34.5–44)
HDLC SERPL-MCNC: 40 MG/DL (ref 40–60)
HGB BLD-MCNC: 11 G/DL (ref 11.5–15.5)
IMM GRANULOCYTES # BLD: 0.03 10*3/MM3 (ref 0–0.03)
IMM GRANULOCYTES NFR BLD: 0.7 % (ref 0–0.6)
LYMPHOCYTES # BLD AUTO: 0.76 10*3/MM3 (ref 0.6–4.8)
LYMPHOCYTES NFR BLD AUTO: 18.1 % (ref 24–44)
MCH RBC QN AUTO: 28.8 PG (ref 27–31)
MCHC RBC AUTO-ENTMCNC: 30.4 G/DL (ref 32–36)
MCV RBC AUTO: 94.8 FL (ref 80–99)
MONOCYTES # BLD AUTO: 0.45 10*3/MM3 (ref 0–1)
MONOCYTES NFR BLD AUTO: 10.7 % (ref 0–12)
NEUTROPHILS # BLD AUTO: 2.81 10*3/MM3 (ref 1.5–8.3)
NEUTROPHILS NFR BLD AUTO: 66.7 % (ref 41–71)
PHENYTOIN SERPL-MCNC: <0.5 MCG/ML (ref 10–20)
PHOSPHATE SERPL-MCNC: 5.8 MG/DL (ref 2.4–5.1)
PLATELET # BLD AUTO: 81 10*3/MM3 (ref 150–450)
PMV BLD AUTO: 10.8 FL (ref 6–12)
POTASSIUM BLD-SCNC: 4.4 MMOL/L (ref 3.5–5.5)
RBC # BLD AUTO: 3.82 10*6/MM3 (ref 3.89–5.14)
SODIUM BLD-SCNC: 136 MMOL/L (ref 132–146)
TRIGL SERPL-MCNC: 92 MG/DL (ref 0–150)
WBC NRBC COR # BLD: 4.21 10*3/MM3 (ref 3.5–10.8)

## 2018-07-21 PROCEDURE — 99221 1ST HOSP IP/OBS SF/LOW 40: CPT | Performed by: PSYCHIATRY & NEUROLOGY

## 2018-07-21 PROCEDURE — 25010000002 HEPARIN (PORCINE) PER 1000 UNITS: Performed by: NURSE PRACTITIONER

## 2018-07-21 PROCEDURE — 85025 COMPLETE CBC W/AUTO DIFF WBC: CPT | Performed by: INTERNAL MEDICINE

## 2018-07-21 PROCEDURE — 82962 GLUCOSE BLOOD TEST: CPT

## 2018-07-21 PROCEDURE — 80069 RENAL FUNCTION PANEL: CPT | Performed by: INTERNAL MEDICINE

## 2018-07-21 PROCEDURE — 86140 C-REACTIVE PROTEIN: CPT | Performed by: PSYCHIATRY & NEUROLOGY

## 2018-07-21 PROCEDURE — 92523 SPEECH SOUND LANG COMPREHEN: CPT

## 2018-07-21 PROCEDURE — 80061 LIPID PANEL: CPT | Performed by: NURSE PRACTITIONER

## 2018-07-21 PROCEDURE — 97162 PT EVAL MOD COMPLEX 30 MIN: CPT

## 2018-07-21 PROCEDURE — 25010000003 PHENYTOIN PER 50 MG: Performed by: PSYCHIATRY & NEUROLOGY

## 2018-07-21 PROCEDURE — 80185 ASSAY OF PHENYTOIN TOTAL: CPT | Performed by: NURSE PRACTITIONER

## 2018-07-21 PROCEDURE — 92610 EVALUATE SWALLOWING FUNCTION: CPT

## 2018-07-21 PROCEDURE — 83036 HEMOGLOBIN GLYCOSYLATED A1C: CPT | Performed by: NURSE PRACTITIONER

## 2018-07-21 PROCEDURE — 70551 MRI BRAIN STEM W/O DYE: CPT

## 2018-07-21 PROCEDURE — 99291 CRITICAL CARE FIRST HOUR: CPT | Performed by: INTERNAL MEDICINE

## 2018-07-21 RX ORDER — CLONAZEPAM 0.5 MG/1
0.5 TABLET ORAL ONCE
Status: DISCONTINUED | OUTPATIENT
Start: 2018-07-21 | End: 2018-07-21

## 2018-07-21 RX ORDER — LOSARTAN POTASSIUM 50 MG/1
100 TABLET ORAL EVERY MORNING
Status: DISCONTINUED | OUTPATIENT
Start: 2018-07-21 | End: 2018-07-21

## 2018-07-21 RX ORDER — HYDROXYCHLOROQUINE SULFATE 200 MG/1
200 TABLET, FILM COATED ORAL EVERY 12 HOURS SCHEDULED
Status: DISCONTINUED | OUTPATIENT
Start: 2018-07-21 | End: 2018-07-25 | Stop reason: HOSPADM

## 2018-07-21 RX ORDER — PANTOPRAZOLE SODIUM 40 MG/1
40 TABLET, DELAYED RELEASE ORAL
Status: DISCONTINUED | OUTPATIENT
Start: 2018-07-21 | End: 2018-07-25 | Stop reason: HOSPADM

## 2018-07-21 RX ORDER — ACETAMINOPHEN 650 MG/1
650 SUPPOSITORY RECTAL EVERY 4 HOURS PRN
Status: DISCONTINUED | OUTPATIENT
Start: 2018-07-21 | End: 2018-07-21

## 2018-07-21 RX ORDER — ACETAMINOPHEN 325 MG/1
650 TABLET ORAL EVERY 4 HOURS PRN
Status: DISCONTINUED | OUTPATIENT
Start: 2018-07-21 | End: 2018-07-25 | Stop reason: HOSPADM

## 2018-07-21 RX ORDER — LOSARTAN POTASSIUM 50 MG/1
100 TABLET ORAL NIGHTLY
Status: DISCONTINUED | OUTPATIENT
Start: 2018-07-21 | End: 2018-07-24

## 2018-07-21 RX ORDER — CLONAZEPAM 0.5 MG/1
0.5 TABLET ORAL ONCE
Status: COMPLETED | OUTPATIENT
Start: 2018-07-21 | End: 2018-07-21

## 2018-07-21 RX ORDER — ATORVASTATIN CALCIUM 40 MG/1
80 TABLET, FILM COATED ORAL NIGHTLY
Status: DISCONTINUED | OUTPATIENT
Start: 2018-07-22 | End: 2018-07-25 | Stop reason: HOSPADM

## 2018-07-21 RX ORDER — TERAZOSIN 5 MG/1
20 CAPSULE ORAL NIGHTLY
Status: DISCONTINUED | OUTPATIENT
Start: 2018-07-21 | End: 2018-07-25 | Stop reason: HOSPADM

## 2018-07-21 RX ORDER — ONDANSETRON 2 MG/ML
4 INJECTION INTRAMUSCULAR; INTRAVENOUS EVERY 6 HOURS PRN
Status: DISCONTINUED | OUTPATIENT
Start: 2018-07-21 | End: 2018-07-25 | Stop reason: HOSPADM

## 2018-07-21 RX ORDER — NEBIVOLOL 10 MG/1
20 TABLET ORAL DAILY
Status: DISCONTINUED | OUTPATIENT
Start: 2018-07-21 | End: 2018-07-25 | Stop reason: HOSPADM

## 2018-07-21 RX ORDER — ALBUMIN (HUMAN) 12.5 G/50ML
25 SOLUTION INTRAVENOUS AS NEEDED
Status: DISCONTINUED | OUTPATIENT
Start: 2018-07-21 | End: 2018-07-25 | Stop reason: HOSPADM

## 2018-07-21 RX ORDER — CALCITRIOL 0.25 UG/1
0.5 CAPSULE, LIQUID FILLED ORAL 3 TIMES WEEKLY
Status: DISCONTINUED | OUTPATIENT
Start: 2018-07-24 | End: 2018-07-25 | Stop reason: HOSPADM

## 2018-07-21 RX ORDER — SODIUM CHLORIDE 0.9 % (FLUSH) 0.9 %
1-10 SYRINGE (ML) INJECTION AS NEEDED
Status: DISCONTINUED | OUTPATIENT
Start: 2018-07-21 | End: 2018-07-25 | Stop reason: HOSPADM

## 2018-07-21 RX ORDER — ACETAMINOPHEN 650 MG/1
650 SUPPOSITORY RECTAL EVERY 4 HOURS PRN
Status: DISCONTINUED | OUTPATIENT
Start: 2018-07-21 | End: 2018-07-25 | Stop reason: HOSPADM

## 2018-07-21 RX ORDER — HYDRALAZINE HYDROCHLORIDE 50 MG/1
100 TABLET, FILM COATED ORAL 2 TIMES DAILY
Status: DISCONTINUED | OUTPATIENT
Start: 2018-07-21 | End: 2018-07-23

## 2018-07-21 RX ORDER — CALCITRIOL 0.25 UG/1
0.5 CAPSULE, LIQUID FILLED ORAL 3 TIMES WEEKLY
Status: DISCONTINUED | OUTPATIENT
Start: 2018-07-23 | End: 2018-07-21

## 2018-07-21 RX ORDER — PHENYTOIN SODIUM 100 MG/1
100 CAPSULE, EXTENDED RELEASE ORAL 3 TIMES DAILY
Status: DISCONTINUED | OUTPATIENT
Start: 2018-07-21 | End: 2018-07-21 | Stop reason: DRUGHIGH

## 2018-07-21 RX ORDER — NICARDIPINE HYDROCHLORIDE 20 MG/1
40 CAPSULE ORAL 2 TIMES DAILY
Status: DISCONTINUED | OUTPATIENT
Start: 2018-07-21 | End: 2018-07-25 | Stop reason: HOSPADM

## 2018-07-21 RX ORDER — PHENYTOIN SODIUM 100 MG/1
200 CAPSULE, EXTENDED RELEASE ORAL NIGHTLY
Status: DISCONTINUED | OUTPATIENT
Start: 2018-07-21 | End: 2018-07-23

## 2018-07-21 RX ORDER — CLONAZEPAM 0.5 MG/1
0.5 TABLET ORAL DAILY PRN
Status: DISCONTINUED | OUTPATIENT
Start: 2018-07-21 | End: 2018-07-22

## 2018-07-21 RX ORDER — HEPARIN SODIUM 5000 [USP'U]/ML
5000 INJECTION, SOLUTION INTRAVENOUS; SUBCUTANEOUS EVERY 12 HOURS SCHEDULED
Status: DISCONTINUED | OUTPATIENT
Start: 2018-07-21 | End: 2018-07-25 | Stop reason: HOSPADM

## 2018-07-21 RX ORDER — FOLIC ACID/VIT B COMPLEX AND C 0.8 MG
1 TABLET ORAL DAILY
Status: DISCONTINUED | OUTPATIENT
Start: 2018-07-21 | End: 2018-07-25 | Stop reason: HOSPADM

## 2018-07-21 RX ORDER — ASPIRIN 81 MG/1
81 TABLET ORAL DAILY
Status: DISCONTINUED | OUTPATIENT
Start: 2018-07-21 | End: 2018-07-25 | Stop reason: HOSPADM

## 2018-07-21 RX ADMIN — CLONAZEPAM 0.5 MG: 0.5 TABLET ORAL at 09:46

## 2018-07-21 RX ADMIN — LOSARTAN POTASSIUM 100 MG: 50 TABLET ORAL at 20:18

## 2018-07-21 RX ADMIN — ACETAMINOPHEN 650 MG: 325 TABLET, FILM COATED ORAL at 12:24

## 2018-07-21 RX ADMIN — NEBIVOLOL HYDROCHLORIDE 20 MG: 10 TABLET ORAL at 12:32

## 2018-07-21 RX ADMIN — HYDROXYCHLOROQUINE SULFATE 200 MG: 200 TABLET, FILM COATED ORAL at 16:06

## 2018-07-21 RX ADMIN — ASPIRIN 81 MG: 81 TABLET, COATED ORAL at 16:06

## 2018-07-21 RX ADMIN — HEPARIN SODIUM 5000 UNITS: 5000 INJECTION, SOLUTION INTRAVENOUS; SUBCUTANEOUS at 12:25

## 2018-07-21 RX ADMIN — HYDRALAZINE HYDROCHLORIDE 100 MG: 50 TABLET, FILM COATED ORAL at 20:18

## 2018-07-21 RX ADMIN — NICARDIPINE HYDROCHLORIDE 5 MG/HR: 2.5 INJECTION INTRAVENOUS at 12:25

## 2018-07-21 RX ADMIN — NICARDIPINE HYDROCHLORIDE 40 MG: 20 CAPSULE ORAL at 12:26

## 2018-07-21 RX ADMIN — ACETAMINOPHEN 650 MG: 325 TABLET, FILM COATED ORAL at 16:38

## 2018-07-21 RX ADMIN — PANTOPRAZOLE SODIUM 40 MG: 40 TABLET, DELAYED RELEASE ORAL at 16:37

## 2018-07-21 RX ADMIN — CLONAZEPAM 0.5 MG: 0.5 TABLET ORAL at 16:22

## 2018-07-21 RX ADMIN — TERAZOSIN HYDROCHLORIDE ANHYDROUS 20 MG: 5 CAPSULE ORAL at 20:19

## 2018-07-21 RX ADMIN — CLONAZEPAM 0.5 MG: 0.5 TABLET ORAL at 18:10

## 2018-07-21 RX ADMIN — NICARDIPINE HYDROCHLORIDE 40 MG: 20 CAPSULE ORAL at 20:25

## 2018-07-21 RX ADMIN — Medication 1 TABLET: at 09:04

## 2018-07-21 RX ADMIN — PANTOPRAZOLE SODIUM 40 MG: 40 TABLET, DELAYED RELEASE ORAL at 09:04

## 2018-07-21 RX ADMIN — HYDRALAZINE HYDROCHLORIDE 100 MG: 50 TABLET, FILM COATED ORAL at 12:24

## 2018-07-21 RX ADMIN — NICARDIPINE HYDROCHLORIDE 5 MG/HR: 2.5 INJECTION INTRAVENOUS at 06:11

## 2018-07-21 RX ADMIN — HYDROXYCHLOROQUINE SULFATE 200 MG: 200 TABLET, FILM COATED ORAL at 20:19

## 2018-07-21 RX ADMIN — PHENYTOIN SODIUM 1000 MG: 50 INJECTION INTRAMUSCULAR; INTRAVENOUS at 16:38

## 2018-07-21 RX ADMIN — CLONIDINE 1 PATCH: 0.3 PATCH TRANSDERMAL at 12:24

## 2018-07-22 LAB
ALBUMIN SERPL-MCNC: 3.07 G/DL (ref 3.2–4.8)
ALBUMIN/GLOB SERPL: 1.4 G/DL (ref 1.5–2.5)
ALP SERPL-CCNC: 39 U/L (ref 25–100)
ALT SERPL W P-5'-P-CCNC: 10 U/L (ref 7–40)
ANION GAP SERPL CALCULATED.3IONS-SCNC: 3 MMOL/L (ref 3–11)
AST SERPL-CCNC: 16 U/L (ref 0–33)
BASOPHILS # BLD AUTO: 0 10*3/MM3 (ref 0–0.2)
BASOPHILS NFR BLD AUTO: 0 % (ref 0–1)
BILIRUB SERPL-MCNC: 0.2 MG/DL (ref 0.3–1.2)
BUN BLD-MCNC: 22 MG/DL (ref 9–23)
BUN/CREAT SERPL: 4.9 (ref 7–25)
CALCIUM SPEC-SCNC: 8.4 MG/DL (ref 8.7–10.4)
CHLORIDE SERPL-SCNC: 110 MMOL/L (ref 99–109)
CO2 SERPL-SCNC: 25 MMOL/L (ref 20–31)
CREAT BLD-MCNC: 4.53 MG/DL (ref 0.6–1.3)
DEPRECATED RDW RBC AUTO: 52 FL (ref 37–54)
EOSINOPHIL # BLD AUTO: 0.19 10*3/MM3 (ref 0–0.3)
EOSINOPHIL NFR BLD AUTO: 5.6 % (ref 0–3)
ERYTHROCYTE [DISTWIDTH] IN BLOOD BY AUTOMATED COUNT: 15.2 % (ref 11.3–14.5)
GFR SERPL CREATININE-BSD FRML MDRD: 12 ML/MIN/1.73
GLOBULIN UR ELPH-MCNC: 2.1 GM/DL
GLUCOSE BLD-MCNC: 100 MG/DL (ref 70–100)
HCT VFR BLD AUTO: 33.8 % (ref 34.5–44)
HGB BLD-MCNC: 10.3 G/DL (ref 11.5–15.5)
IMM GRANULOCYTES # BLD: 0.01 10*3/MM3 (ref 0–0.03)
IMM GRANULOCYTES NFR BLD: 0.3 % (ref 0–0.6)
LYMPHOCYTES # BLD AUTO: 0.53 10*3/MM3 (ref 0.6–4.8)
LYMPHOCYTES NFR BLD AUTO: 15.7 % (ref 24–44)
MAGNESIUM SERPL-MCNC: 2 MG/DL (ref 1.3–2.7)
MCH RBC QN AUTO: 28.4 PG (ref 27–31)
MCHC RBC AUTO-ENTMCNC: 30.5 G/DL (ref 32–36)
MCV RBC AUTO: 93.1 FL (ref 80–99)
MONOCYTES # BLD AUTO: 0.15 10*3/MM3 (ref 0–1)
MONOCYTES NFR BLD AUTO: 4.4 % (ref 0–12)
NEUTROPHILS # BLD AUTO: 2.5 10*3/MM3 (ref 1.5–8.3)
NEUTROPHILS NFR BLD AUTO: 74 % (ref 41–71)
PHOSPHATE SERPL-MCNC: 4 MG/DL (ref 2.4–5.1)
PLATELET # BLD AUTO: 107 10*3/MM3 (ref 150–450)
PMV BLD AUTO: 11.9 FL (ref 6–12)
POTASSIUM BLD-SCNC: 4.8 MMOL/L (ref 3.5–5.5)
PROT SERPL-MCNC: 5.2 G/DL (ref 5.7–8.2)
RBC # BLD AUTO: 3.63 10*6/MM3 (ref 3.89–5.14)
SODIUM BLD-SCNC: 138 MMOL/L (ref 132–146)
WBC NRBC COR # BLD: 3.38 10*3/MM3 (ref 3.5–10.8)

## 2018-07-22 PROCEDURE — 80053 COMPREHEN METABOLIC PANEL: CPT | Performed by: INTERNAL MEDICINE

## 2018-07-22 PROCEDURE — 85025 COMPLETE CBC W/AUTO DIFF WBC: CPT | Performed by: INTERNAL MEDICINE

## 2018-07-22 PROCEDURE — 83735 ASSAY OF MAGNESIUM: CPT | Performed by: INTERNAL MEDICINE

## 2018-07-22 PROCEDURE — 84100 ASSAY OF PHOSPHORUS: CPT | Performed by: INTERNAL MEDICINE

## 2018-07-22 PROCEDURE — 99232 SBSQ HOSP IP/OBS MODERATE 35: CPT | Performed by: PSYCHIATRY & NEUROLOGY

## 2018-07-22 PROCEDURE — 99233 SBSQ HOSP IP/OBS HIGH 50: CPT | Performed by: INTERNAL MEDICINE

## 2018-07-22 RX ORDER — CLONIDINE HYDROCHLORIDE 0.1 MG/1
0.1 TABLET ORAL EVERY 12 HOURS PRN
Status: DISCONTINUED | OUTPATIENT
Start: 2018-07-22 | End: 2018-07-25 | Stop reason: HOSPADM

## 2018-07-22 RX ORDER — CLONAZEPAM 0.5 MG/1
0.5 TABLET ORAL DAILY
Status: DISCONTINUED | OUTPATIENT
Start: 2018-07-23 | End: 2018-07-25 | Stop reason: HOSPADM

## 2018-07-22 RX ORDER — BUMETANIDE 1 MG/1
2 TABLET ORAL DAILY
Status: DISCONTINUED | OUTPATIENT
Start: 2018-07-22 | End: 2018-07-25 | Stop reason: HOSPADM

## 2018-07-22 RX ORDER — LABETALOL HYDROCHLORIDE 5 MG/ML
20 INJECTION, SOLUTION INTRAVENOUS EVERY 4 HOURS PRN
Status: DISCONTINUED | OUTPATIENT
Start: 2018-07-22 | End: 2018-07-24

## 2018-07-22 RX ORDER — CLONAZEPAM 0.5 MG/1
0.5 TABLET ORAL 3 TIMES DAILY PRN
Status: DISCONTINUED | OUTPATIENT
Start: 2018-07-22 | End: 2018-07-25 | Stop reason: HOSPADM

## 2018-07-22 RX ADMIN — ASPIRIN 81 MG: 81 TABLET, COATED ORAL at 08:06

## 2018-07-22 RX ADMIN — PANTOPRAZOLE SODIUM 40 MG: 40 TABLET, DELAYED RELEASE ORAL at 06:30

## 2018-07-22 RX ADMIN — LABETALOL HYDROCHLORIDE 20 MG: 5 INJECTION INTRAVENOUS at 18:42

## 2018-07-22 RX ADMIN — LOSARTAN POTASSIUM 100 MG: 50 TABLET ORAL at 20:14

## 2018-07-22 RX ADMIN — BUMETANIDE 2 MG: 1 TABLET ORAL at 12:07

## 2018-07-22 RX ADMIN — HYDRALAZINE HYDROCHLORIDE 100 MG: 50 TABLET, FILM COATED ORAL at 20:15

## 2018-07-22 RX ADMIN — NICARDIPINE HYDROCHLORIDE 40 MG: 20 CAPSULE ORAL at 08:05

## 2018-07-22 RX ADMIN — CLONIDINE HYDROCHLORIDE 0.1 MG: 0.1 TABLET ORAL at 17:41

## 2018-07-22 RX ADMIN — NEBIVOLOL HYDROCHLORIDE 20 MG: 10 TABLET ORAL at 08:06

## 2018-07-22 RX ADMIN — LABETALOL HYDROCHLORIDE 20 MG: 5 INJECTION INTRAVENOUS at 13:30

## 2018-07-22 RX ADMIN — NICARDIPINE HYDROCHLORIDE 40 MG: 20 CAPSULE ORAL at 20:14

## 2018-07-22 RX ADMIN — HYDRALAZINE HYDROCHLORIDE 100 MG: 50 TABLET, FILM COATED ORAL at 08:06

## 2018-07-22 RX ADMIN — HYDROXYCHLOROQUINE SULFATE 200 MG: 200 TABLET, FILM COATED ORAL at 20:14

## 2018-07-22 RX ADMIN — HYDROXYCHLOROQUINE SULFATE 200 MG: 200 TABLET, FILM COATED ORAL at 08:05

## 2018-07-22 RX ADMIN — NICARDIPINE HYDROCHLORIDE 5 MG/HR: 2.5 INJECTION INTRAVENOUS at 08:06

## 2018-07-22 RX ADMIN — Medication 1 TABLET: at 08:05

## 2018-07-22 RX ADMIN — TERAZOSIN HYDROCHLORIDE ANHYDROUS 20 MG: 5 CAPSULE ORAL at 20:12

## 2018-07-22 RX ADMIN — CLONAZEPAM 0.5 MG: 0.5 TABLET ORAL at 08:43

## 2018-07-22 RX ADMIN — PHENYTOIN SODIUM 100 MG: 100 CAPSULE, EXTENDED RELEASE ORAL at 20:15

## 2018-07-22 RX ADMIN — PANTOPRAZOLE SODIUM 40 MG: 40 TABLET, DELAYED RELEASE ORAL at 17:41

## 2018-07-23 ENCOUNTER — APPOINTMENT (OUTPATIENT)
Dept: NEUROLOGY | Facility: HOSPITAL | Age: 28
End: 2018-07-23
Attending: PSYCHIATRY & NEUROLOGY

## 2018-07-23 ENCOUNTER — EPISODE CHANGES (OUTPATIENT)
Dept: CASE MANAGEMENT | Facility: OTHER | Age: 28
End: 2018-07-23

## 2018-07-23 LAB
ALBUMIN SERPL-MCNC: 2.99 G/DL (ref 3.2–4.8)
ALBUMIN/GLOB SERPL: 1.3 G/DL (ref 1.5–2.5)
ALP SERPL-CCNC: 41 U/L (ref 25–100)
ALT SERPL W P-5'-P-CCNC: 4 U/L (ref 7–40)
ANION GAP SERPL CALCULATED.3IONS-SCNC: 8 MMOL/L (ref 3–11)
AST SERPL-CCNC: 10 U/L (ref 0–33)
BASOPHILS # BLD AUTO: 0.01 10*3/MM3 (ref 0–0.2)
BASOPHILS NFR BLD AUTO: 0.3 % (ref 0–1)
BILIRUB SERPL-MCNC: 0.2 MG/DL (ref 0.3–1.2)
BUN BLD-MCNC: 31 MG/DL (ref 9–23)
BUN/CREAT SERPL: 4.7 (ref 7–25)
CALCIUM SPEC-SCNC: 8.8 MG/DL (ref 8.7–10.4)
CHLORIDE SERPL-SCNC: 100 MMOL/L (ref 99–109)
CO2 SERPL-SCNC: 25 MMOL/L (ref 20–31)
CREAT BLD-MCNC: 6.66 MG/DL (ref 0.6–1.3)
DEPRECATED RDW RBC AUTO: 50.9 FL (ref 37–54)
EOSINOPHIL # BLD AUTO: 0.23 10*3/MM3 (ref 0–0.3)
EOSINOPHIL NFR BLD AUTO: 6.4 % (ref 0–3)
ERYTHROCYTE [DISTWIDTH] IN BLOOD BY AUTOMATED COUNT: 15.2 % (ref 11.3–14.5)
GFR SERPL CREATININE-BSD FRML MDRD: 7 ML/MIN/1.73
GLOBULIN UR ELPH-MCNC: 2.3 GM/DL
GLUCOSE BLD-MCNC: 92 MG/DL (ref 70–100)
HCT VFR BLD AUTO: 30 % (ref 34.5–44)
HGB BLD-MCNC: 9.3 G/DL (ref 11.5–15.5)
IMM GRANULOCYTES # BLD: 0 10*3/MM3 (ref 0–0.03)
IMM GRANULOCYTES NFR BLD: 0 % (ref 0–0.6)
LYMPHOCYTES # BLD AUTO: 0.56 10*3/MM3 (ref 0.6–4.8)
LYMPHOCYTES NFR BLD AUTO: 15.7 % (ref 24–44)
MAGNESIUM SERPL-MCNC: 2.2 MG/DL (ref 1.3–2.7)
MCH RBC QN AUTO: 28.4 PG (ref 27–31)
MCHC RBC AUTO-ENTMCNC: 31 G/DL (ref 32–36)
MCV RBC AUTO: 91.7 FL (ref 80–99)
MONOCYTES # BLD AUTO: 0.18 10*3/MM3 (ref 0–1)
MONOCYTES NFR BLD AUTO: 5 % (ref 0–12)
NEUTROPHILS # BLD AUTO: 2.59 10*3/MM3 (ref 1.5–8.3)
NEUTROPHILS NFR BLD AUTO: 72.6 % (ref 41–71)
PHENYTOIN SERPL-MCNC: 2 MCG/ML (ref 10–20)
PHOSPHATE SERPL-MCNC: 4.9 MG/DL (ref 2.4–5.1)
PLATELET # BLD AUTO: 113 10*3/MM3 (ref 150–450)
PMV BLD AUTO: 11.3 FL (ref 6–12)
POTASSIUM BLD-SCNC: 4.8 MMOL/L (ref 3.5–5.5)
PROT SERPL-MCNC: 5.3 G/DL (ref 5.7–8.2)
RBC # BLD AUTO: 3.27 10*6/MM3 (ref 3.89–5.14)
SODIUM BLD-SCNC: 133 MMOL/L (ref 132–146)
WBC NRBC COR # BLD: 3.57 10*3/MM3 (ref 3.5–10.8)

## 2018-07-23 PROCEDURE — 85025 COMPLETE CBC W/AUTO DIFF WBC: CPT | Performed by: INTERNAL MEDICINE

## 2018-07-23 PROCEDURE — 84100 ASSAY OF PHOSPHORUS: CPT | Performed by: INTERNAL MEDICINE

## 2018-07-23 PROCEDURE — 80185 ASSAY OF PHENYTOIN TOTAL: CPT | Performed by: PSYCHIATRY & NEUROLOGY

## 2018-07-23 PROCEDURE — 99232 SBSQ HOSP IP/OBS MODERATE 35: CPT | Performed by: PSYCHIATRY & NEUROLOGY

## 2018-07-23 PROCEDURE — 83735 ASSAY OF MAGNESIUM: CPT | Performed by: INTERNAL MEDICINE

## 2018-07-23 PROCEDURE — 80053 COMPREHEN METABOLIC PANEL: CPT | Performed by: INTERNAL MEDICINE

## 2018-07-23 PROCEDURE — 99233 SBSQ HOSP IP/OBS HIGH 50: CPT | Performed by: INTERNAL MEDICINE

## 2018-07-23 PROCEDURE — 95819 EEG AWAKE AND ASLEEP: CPT

## 2018-07-23 RX ORDER — CLONIDINE HYDROCHLORIDE 0.2 MG/1
0.2 TABLET ORAL EVERY 4 HOURS PRN
Status: DISCONTINUED | OUTPATIENT
Start: 2018-07-23 | End: 2018-07-25 | Stop reason: HOSPADM

## 2018-07-23 RX ORDER — METHYLDOPA 250 MG/1
250 TABLET, FILM COATED ORAL EVERY 12 HOURS SCHEDULED
Status: DISCONTINUED | OUTPATIENT
Start: 2018-07-23 | End: 2018-07-24

## 2018-07-23 RX ORDER — HYDRALAZINE HYDROCHLORIDE 50 MG/1
100 TABLET, FILM COATED ORAL EVERY 8 HOURS SCHEDULED
Status: DISCONTINUED | OUTPATIENT
Start: 2018-07-23 | End: 2018-07-23

## 2018-07-23 RX ORDER — CLONIDINE HYDROCHLORIDE 0.2 MG/1
0.2 TABLET ORAL ONCE
Status: COMPLETED | OUTPATIENT
Start: 2018-07-23 | End: 2018-07-23

## 2018-07-23 RX ORDER — ALBUMIN (HUMAN) 12.5 G/50ML
12.5 SOLUTION INTRAVENOUS AS NEEDED
Status: ACTIVE | OUTPATIENT
Start: 2018-07-23 | End: 2018-07-24

## 2018-07-23 RX ORDER — ENALAPRILAT 2.5 MG/2ML
1.25 INJECTION INTRAVENOUS EVERY 6 HOURS PRN
Status: DISCONTINUED | OUTPATIENT
Start: 2018-07-23 | End: 2018-07-24

## 2018-07-23 RX ORDER — NICARDIPINE HYDROCHLORIDE 20 MG/1
40 CAPSULE ORAL ONCE
Status: COMPLETED | OUTPATIENT
Start: 2018-07-23 | End: 2018-07-23

## 2018-07-23 RX ORDER — MINOXIDIL 2.5 MG/1
2.5 TABLET ORAL EVERY 12 HOURS SCHEDULED
Status: DISCONTINUED | OUTPATIENT
Start: 2018-07-23 | End: 2018-07-24

## 2018-07-23 RX ADMIN — NICARDIPINE HYDROCHLORIDE 40 MG: 20 CAPSULE ORAL at 08:05

## 2018-07-23 RX ADMIN — ENALAPRILAT 1.25 MG: 1.25 INJECTION INTRAVENOUS at 12:55

## 2018-07-23 RX ADMIN — PANTOPRAZOLE SODIUM 40 MG: 40 TABLET, DELAYED RELEASE ORAL at 08:05

## 2018-07-23 RX ADMIN — MINOXIDIL 2.5 MG: 2.5 TABLET ORAL at 22:23

## 2018-07-23 RX ADMIN — LABETALOL HYDROCHLORIDE 20 MG: 5 INJECTION INTRAVENOUS at 01:19

## 2018-07-23 RX ADMIN — HYDROXYCHLOROQUINE SULFATE 200 MG: 200 TABLET, FILM COATED ORAL at 21:03

## 2018-07-23 RX ADMIN — CLONIDINE HYDROCHLORIDE 0.2 MG: 0.2 TABLET ORAL at 02:18

## 2018-07-23 RX ADMIN — HYDROXYCHLOROQUINE SULFATE 200 MG: 200 TABLET, FILM COATED ORAL at 08:05

## 2018-07-23 RX ADMIN — PANTOPRAZOLE SODIUM 40 MG: 40 TABLET, DELAYED RELEASE ORAL at 18:09

## 2018-07-23 RX ADMIN — TERAZOSIN HYDROCHLORIDE ANHYDROUS 20 MG: 5 CAPSULE ORAL at 21:04

## 2018-07-23 RX ADMIN — METHYLDOPA 250 MG: 250 TABLET ORAL at 22:23

## 2018-07-23 RX ADMIN — NICARDIPINE HYDROCHLORIDE 40 MG: 20 CAPSULE ORAL at 14:43

## 2018-07-23 RX ADMIN — POLYETHYLENE GLYCOL 3350 17 G: 17 POWDER, FOR SOLUTION ORAL at 11:24

## 2018-07-23 RX ADMIN — CLONAZEPAM 0.5 MG: 0.5 TABLET ORAL at 11:24

## 2018-07-23 RX ADMIN — LOSARTAN POTASSIUM 100 MG: 50 TABLET ORAL at 21:04

## 2018-07-23 RX ADMIN — CLONAZEPAM 0.5 MG: 0.5 TABLET ORAL at 01:16

## 2018-07-23 RX ADMIN — MINOXIDIL 2.5 MG: 2.5 TABLET ORAL at 13:35

## 2018-07-23 RX ADMIN — ENALAPRILAT 1.25 MG: 1.25 INJECTION INTRAVENOUS at 04:57

## 2018-07-23 RX ADMIN — Medication 1 TABLET: at 08:05

## 2018-07-23 RX ADMIN — NICARDIPINE HYDROCHLORIDE 40 MG: 20 CAPSULE ORAL at 15:59

## 2018-07-23 RX ADMIN — METHYLDOPA 250 MG: 250 TABLET ORAL at 13:35

## 2018-07-23 RX ADMIN — ASPIRIN 81 MG: 81 TABLET, COATED ORAL at 08:05

## 2018-07-23 RX ADMIN — CLONIDINE HYDROCHLORIDE 0.2 MG: 0.2 TABLET ORAL at 06:48

## 2018-07-23 RX ADMIN — CLONIDINE HYDROCHLORIDE 0.2 MG: 0.2 TABLET ORAL at 11:23

## 2018-07-23 RX ADMIN — BUMETANIDE 2 MG: 1 TABLET ORAL at 08:05

## 2018-07-23 RX ADMIN — CLONIDINE HYDROCHLORIDE 0.2 MG: 0.2 TABLET ORAL at 16:00

## 2018-07-23 RX ADMIN — HYDRALAZINE HYDROCHLORIDE 100 MG: 50 TABLET, FILM COATED ORAL at 07:10

## 2018-07-23 RX ADMIN — NICARDIPINE HYDROCHLORIDE 40 MG: 20 CAPSULE ORAL at 21:03

## 2018-07-23 RX ADMIN — NEBIVOLOL HYDROCHLORIDE 20 MG: 10 TABLET ORAL at 08:05

## 2018-07-23 RX ADMIN — LABETALOL HYDROCHLORIDE 20 MG: 5 INJECTION INTRAVENOUS at 12:18

## 2018-07-24 ENCOUNTER — APPOINTMENT (OUTPATIENT)
Dept: NEPHROLOGY | Facility: HOSPITAL | Age: 28
End: 2018-07-24
Attending: INTERNAL MEDICINE

## 2018-07-24 PROCEDURE — 99233 SBSQ HOSP IP/OBS HIGH 50: CPT | Performed by: INTERNAL MEDICINE

## 2018-07-24 PROCEDURE — 97116 GAIT TRAINING THERAPY: CPT

## 2018-07-24 PROCEDURE — 97530 THERAPEUTIC ACTIVITIES: CPT

## 2018-07-24 RX ORDER — MINOXIDIL 2.5 MG/1
5 TABLET ORAL EVERY 4 HOURS PRN
Status: DISCONTINUED | OUTPATIENT
Start: 2018-07-24 | End: 2018-07-25 | Stop reason: HOSPADM

## 2018-07-24 RX ORDER — METHYLDOPA 250 MG/1
500 TABLET, FILM COATED ORAL EVERY 12 HOURS SCHEDULED
Status: DISCONTINUED | OUTPATIENT
Start: 2018-07-24 | End: 2018-07-25 | Stop reason: HOSPADM

## 2018-07-24 RX ORDER — LISINOPRIL 40 MG/1
40 TABLET ORAL EVERY 12 HOURS SCHEDULED
Status: DISCONTINUED | OUTPATIENT
Start: 2018-07-24 | End: 2018-07-25 | Stop reason: HOSPADM

## 2018-07-24 RX ORDER — MINOXIDIL 2.5 MG/1
2.5 TABLET ORAL EVERY 4 HOURS PRN
Status: DISCONTINUED | OUTPATIENT
Start: 2018-07-24 | End: 2018-07-24

## 2018-07-24 RX ADMIN — METHYLDOPA 500 MG: 250 TABLET ORAL at 20:55

## 2018-07-24 RX ADMIN — ENALAPRILAT 1.25 MG: 1.25 INJECTION INTRAVENOUS at 02:56

## 2018-07-24 RX ADMIN — PANTOPRAZOLE SODIUM 40 MG: 40 TABLET, DELAYED RELEASE ORAL at 09:32

## 2018-07-24 RX ADMIN — ASPIRIN 81 MG: 81 TABLET, COATED ORAL at 09:33

## 2018-07-24 RX ADMIN — CLONIDINE HYDROCHLORIDE 0.2 MG: 0.2 TABLET ORAL at 19:29

## 2018-07-24 RX ADMIN — TERAZOSIN HYDROCHLORIDE ANHYDROUS 20 MG: 5 CAPSULE ORAL at 20:55

## 2018-07-24 RX ADMIN — NICARDIPINE HYDROCHLORIDE 40 MG: 20 CAPSULE ORAL at 11:12

## 2018-07-24 RX ADMIN — LISINOPRIL 40 MG: 40 TABLET ORAL at 09:46

## 2018-07-24 RX ADMIN — CLONAZEPAM 0.5 MG: 0.5 TABLET ORAL at 08:35

## 2018-07-24 RX ADMIN — LISINOPRIL 40 MG: 40 TABLET ORAL at 20:55

## 2018-07-24 RX ADMIN — Medication 1 TABLET: at 09:33

## 2018-07-24 RX ADMIN — LABETALOL HYDROCHLORIDE 20 MG: 5 INJECTION INTRAVENOUS at 00:03

## 2018-07-24 RX ADMIN — METHYLDOPA 250 MG: 250 TABLET ORAL at 09:39

## 2018-07-24 RX ADMIN — CALCITRIOL 0.5 MCG: 0.25 CAPSULE, LIQUID FILLED ORAL at 14:56

## 2018-07-24 RX ADMIN — CLONAZEPAM 0.5 MG: 0.5 TABLET ORAL at 00:02

## 2018-07-24 RX ADMIN — CLONAZEPAM 0.5 MG: 0.5 TABLET ORAL at 12:08

## 2018-07-24 RX ADMIN — CLONAZEPAM 0.5 MG: 0.5 TABLET ORAL at 20:55

## 2018-07-24 RX ADMIN — BUMETANIDE 2 MG: 1 TABLET ORAL at 09:32

## 2018-07-24 RX ADMIN — NEBIVOLOL HYDROCHLORIDE 20 MG: 10 TABLET ORAL at 10:05

## 2018-07-24 RX ADMIN — HYDROXYCHLOROQUINE SULFATE 200 MG: 200 TABLET, FILM COATED ORAL at 09:37

## 2018-07-24 RX ADMIN — MINOXIDIL 5 MG: 2.5 TABLET ORAL at 18:27

## 2018-07-24 RX ADMIN — MINOXIDIL 2.5 MG: 2.5 TABLET ORAL at 11:16

## 2018-07-24 RX ADMIN — CLONIDINE HYDROCHLORIDE 0.2 MG: 0.2 TABLET ORAL at 12:08

## 2018-07-24 RX ADMIN — CLONIDINE HYDROCHLORIDE 0.2 MG: 0.2 TABLET ORAL at 02:43

## 2018-07-24 RX ADMIN — HYDROXYCHLOROQUINE SULFATE 200 MG: 200 TABLET, FILM COATED ORAL at 20:55

## 2018-07-24 RX ADMIN — PANTOPRAZOLE SODIUM 40 MG: 40 TABLET, DELAYED RELEASE ORAL at 18:34

## 2018-07-24 RX ADMIN — NICARDIPINE HYDROCHLORIDE 40 MG: 20 CAPSULE ORAL at 20:55

## 2018-07-25 ENCOUNTER — TELEPHONE (OUTPATIENT)
Dept: INTERNAL MEDICINE | Facility: CLINIC | Age: 28
End: 2018-07-25

## 2018-07-25 VITALS
WEIGHT: 125 LBS | TEMPERATURE: 98 F | DIASTOLIC BLOOD PRESSURE: 112 MMHG | HEART RATE: 77 BPM | BODY MASS INDEX: 19.62 KG/M2 | HEIGHT: 67 IN | OXYGEN SATURATION: 96 % | SYSTOLIC BLOOD PRESSURE: 166 MMHG | RESPIRATION RATE: 18 BRPM

## 2018-07-25 PROCEDURE — 99231 SBSQ HOSP IP/OBS SF/LOW 25: CPT | Performed by: PSYCHIATRY & NEUROLOGY

## 2018-07-25 PROCEDURE — 99238 HOSP IP/OBS DSCHRG MGMT 30/<: CPT | Performed by: NURSE PRACTITIONER

## 2018-07-25 RX ORDER — TERAZOSIN 2 MG/1
20 CAPSULE ORAL NIGHTLY
Qty: 300 CAPSULE | Refills: 0 | Status: SHIPPED | OUTPATIENT
Start: 2018-07-25 | End: 2018-08-06

## 2018-07-25 RX ORDER — ATORVASTATIN CALCIUM 80 MG/1
80 TABLET, FILM COATED ORAL NIGHTLY
Qty: 30 TABLET | Refills: 0 | Status: SHIPPED | OUTPATIENT
Start: 2018-07-25 | End: 2018-08-06

## 2018-07-25 RX ORDER — BUMETANIDE 2 MG/1
2 TABLET ORAL DAILY
Qty: 30 TABLET | Refills: 0 | Status: SHIPPED | OUTPATIENT
Start: 2018-07-26 | End: 2018-08-06

## 2018-07-25 RX ORDER — CLONIDINE HYDROCHLORIDE 0.2 MG/1
0.2 TABLET ORAL 4 TIMES DAILY PRN
Qty: 120 TABLET | Refills: 0 | Status: SHIPPED | OUTPATIENT
Start: 2018-07-25 | End: 2018-08-24

## 2018-07-25 RX ORDER — MINOXIDIL 2.5 MG/1
TABLET ORAL
Qty: 360 TABLET | Refills: 0 | Status: SHIPPED | OUTPATIENT
Start: 2018-07-25 | End: 2018-08-06

## 2018-07-25 RX ADMIN — MINOXIDIL 5 MG: 2.5 TABLET ORAL at 16:46

## 2018-07-25 RX ADMIN — LISINOPRIL 40 MG: 40 TABLET ORAL at 08:06

## 2018-07-25 RX ADMIN — HYDROXYCHLOROQUINE SULFATE 200 MG: 200 TABLET, FILM COATED ORAL at 08:07

## 2018-07-25 RX ADMIN — PANTOPRAZOLE SODIUM 40 MG: 40 TABLET, DELAYED RELEASE ORAL at 16:45

## 2018-07-25 RX ADMIN — NICARDIPINE HYDROCHLORIDE 40 MG: 20 CAPSULE ORAL at 08:34

## 2018-07-25 RX ADMIN — Medication 1 TABLET: at 08:07

## 2018-07-25 RX ADMIN — MINOXIDIL 5 MG: 2.5 TABLET ORAL at 05:01

## 2018-07-25 RX ADMIN — ASPIRIN 81 MG: 81 TABLET, COATED ORAL at 08:07

## 2018-07-25 RX ADMIN — NEBIVOLOL HYDROCHLORIDE 20 MG: 10 TABLET ORAL at 08:08

## 2018-07-25 RX ADMIN — METHYLDOPA 500 MG: 250 TABLET ORAL at 08:07

## 2018-07-25 RX ADMIN — BUMETANIDE 2 MG: 1 TABLET ORAL at 08:07

## 2018-07-25 RX ADMIN — MINOXIDIL 5 MG: 2.5 TABLET ORAL at 00:07

## 2018-07-25 RX ADMIN — CLONIDINE HYDROCHLORIDE 0.2 MG: 0.2 TABLET ORAL at 04:05

## 2018-07-25 RX ADMIN — PANTOPRAZOLE SODIUM 40 MG: 40 TABLET, DELAYED RELEASE ORAL at 08:07

## 2018-07-26 ENCOUNTER — TRANSITIONAL CARE MANAGEMENT TELEPHONE ENCOUNTER (OUTPATIENT)
Dept: INTERNAL MEDICINE | Facility: CLINIC | Age: 28
End: 2018-07-26

## 2018-08-06 ENCOUNTER — OFFICE VISIT (OUTPATIENT)
Dept: INTERNAL MEDICINE | Facility: CLINIC | Age: 28
End: 2018-08-06

## 2018-08-06 VITALS
DIASTOLIC BLOOD PRESSURE: 120 MMHG | BODY MASS INDEX: 22.37 KG/M2 | RESPIRATION RATE: 16 BRPM | OXYGEN SATURATION: 97 % | SYSTOLIC BLOOD PRESSURE: 212 MMHG | WEIGHT: 142.5 LBS | HEIGHT: 67 IN | HEART RATE: 78 BPM | TEMPERATURE: 100.9 F

## 2018-08-06 DIAGNOSIS — I10 ESSENTIAL HYPERTENSION: Primary | ICD-10-CM

## 2018-08-06 DIAGNOSIS — M32.14 SYSTEMIC LUPUS ERYTHEMATOSUS WITH GLOMERULAR DISEASE, UNSPECIFIED SLE TYPE (HCC): ICD-10-CM

## 2018-08-06 PROCEDURE — 99495 TRANSJ CARE MGMT MOD F2F 14D: CPT | Performed by: NURSE PRACTITIONER

## 2018-08-06 RX ORDER — MINOXIDIL 2.5 MG/1
10 TABLET ORAL DAILY
Qty: 120 TABLET | Refills: 1 | Status: SHIPPED | OUTPATIENT
Start: 2018-08-06 | End: 2018-09-07 | Stop reason: HOSPADM

## 2018-08-06 RX ORDER — HYDRALAZINE HYDROCHLORIDE 100 MG/1
100 TABLET, FILM COATED ORAL 2 TIMES DAILY
Status: ON HOLD
Start: 2018-08-06 | End: 2018-09-04

## 2018-08-08 ENCOUNTER — OFFICE VISIT (OUTPATIENT)
Dept: INTERNAL MEDICINE | Facility: CLINIC | Age: 28
End: 2018-08-08

## 2018-08-08 VITALS
WEIGHT: 137 LBS | HEIGHT: 67 IN | OXYGEN SATURATION: 98 % | DIASTOLIC BLOOD PRESSURE: 66 MMHG | HEART RATE: 85 BPM | TEMPERATURE: 98.8 F | BODY MASS INDEX: 21.5 KG/M2 | SYSTOLIC BLOOD PRESSURE: 128 MMHG

## 2018-08-08 DIAGNOSIS — M32.14 LUPUS NEPHRITIS (HCC): ICD-10-CM

## 2018-08-08 DIAGNOSIS — G51.0 BELL PALSY: Primary | ICD-10-CM

## 2018-08-08 DIAGNOSIS — N18.6 ESRD (END-STAGE RENAL DISEASE) DUE TO SLE (HCC): ICD-10-CM

## 2018-08-08 DIAGNOSIS — I16.0 HYPERTENSIVE URGENCY: ICD-10-CM

## 2018-08-08 DIAGNOSIS — M32.14 SYSTEMIC LUPUS ERYTHEMATOSUS WITH GLOMERULAR DISEASE, UNSPECIFIED SLE TYPE (HCC): ICD-10-CM

## 2018-08-08 DIAGNOSIS — M32.14 ESRD (END-STAGE RENAL DISEASE) DUE TO SLE (HCC): ICD-10-CM

## 2018-08-08 PROCEDURE — 99495 TRANSJ CARE MGMT MOD F2F 14D: CPT | Performed by: INTERNAL MEDICINE

## 2018-08-08 RX ORDER — PREDNISOLONE SODIUM PHOSPHATE 15 MG/5ML
25 SOLUTION ORAL DAILY
Qty: 200 ML | Refills: 0 | Status: SHIPPED | OUTPATIENT
Start: 2018-08-08 | End: 2018-09-07 | Stop reason: HOSPADM

## 2018-08-30 ENCOUNTER — OFFICE VISIT (OUTPATIENT)
Dept: INTERNAL MEDICINE | Facility: CLINIC | Age: 28
End: 2018-08-30

## 2018-08-30 VITALS
HEART RATE: 80 BPM | OXYGEN SATURATION: 99 % | SYSTOLIC BLOOD PRESSURE: 176 MMHG | HEIGHT: 67 IN | TEMPERATURE: 98.5 F | WEIGHT: 140 LBS | DIASTOLIC BLOOD PRESSURE: 106 MMHG | BODY MASS INDEX: 21.97 KG/M2

## 2018-08-30 DIAGNOSIS — M54.50 ACUTE BILATERAL LOW BACK PAIN WITHOUT SCIATICA: ICD-10-CM

## 2018-08-30 DIAGNOSIS — K29.80 DUODENITIS: ICD-10-CM

## 2018-08-30 DIAGNOSIS — R10.13 EPIGASTRIC PAIN: ICD-10-CM

## 2018-08-30 DIAGNOSIS — M62.830 BACK MUSCLE SPASM: Primary | ICD-10-CM

## 2018-08-30 DIAGNOSIS — M32.9 SLE EXACERBATION (HCC): ICD-10-CM

## 2018-08-30 PROCEDURE — 99214 OFFICE O/P EST MOD 30 MIN: CPT | Performed by: INTERNAL MEDICINE

## 2018-08-30 RX ORDER — PREDNISONE 1 MG/1
5 TABLET ORAL DAILY
Qty: 14 TABLET | Refills: 0 | Status: SHIPPED | OUTPATIENT
Start: 2018-08-30 | End: 2018-09-07 | Stop reason: HOSPADM

## 2018-08-30 RX ORDER — PANTOPRAZOLE SODIUM 40 MG/1
40 TABLET, DELAYED RELEASE ORAL DAILY
Qty: 30 TABLET | Refills: 0 | Status: SHIPPED | OUTPATIENT
Start: 2018-08-30 | End: 2018-09-07 | Stop reason: HOSPADM

## 2018-08-30 RX ORDER — CYCLOBENZAPRINE HCL 5 MG
5 TABLET ORAL 3 TIMES DAILY PRN
Qty: 20 TABLET | Refills: 0 | Status: SHIPPED | OUTPATIENT
Start: 2018-08-30 | End: 2018-11-28 | Stop reason: SDUPTHER

## 2018-09-04 ENCOUNTER — APPOINTMENT (OUTPATIENT)
Dept: CT IMAGING | Facility: HOSPITAL | Age: 28
End: 2018-09-04

## 2018-09-04 ENCOUNTER — HOSPITAL ENCOUNTER (INPATIENT)
Facility: HOSPITAL | Age: 28
LOS: 3 days | Discharge: HOME | End: 2018-09-07
Attending: EMERGENCY MEDICINE | Admitting: INTERNAL MEDICINE

## 2018-09-04 ENCOUNTER — APPOINTMENT (OUTPATIENT)
Dept: GENERAL RADIOLOGY | Facility: HOSPITAL | Age: 28
End: 2018-09-04
Attending: EMERGENCY MEDICINE

## 2018-09-04 ENCOUNTER — APPOINTMENT (OUTPATIENT)
Dept: ULTRASOUND IMAGING | Facility: HOSPITAL | Age: 28
End: 2018-09-04

## 2018-09-04 DIAGNOSIS — K52.9 ENTERITIS: ICD-10-CM

## 2018-09-04 DIAGNOSIS — J18.9 PNEUMONIA OF RIGHT MIDDLE LOBE DUE TO INFECTIOUS ORGANISM: Primary | ICD-10-CM

## 2018-09-04 DIAGNOSIS — I10 HYPERTENSION, UNSPECIFIED TYPE: ICD-10-CM

## 2018-09-04 LAB
ALBUMIN SERPL-MCNC: 3.4 G/DL (ref 3.5–5)
ALBUMIN/GLOB SERPL: 1.5 G/DL (ref 1–2)
ALP SERPL-CCNC: 51 U/L (ref 38–126)
ALT SERPL W P-5'-P-CCNC: 34 U/L (ref 13–69)
ANION GAP SERPL CALCULATED.3IONS-SCNC: 18.6 MMOL/L (ref 10–20)
AST SERPL-CCNC: 25 U/L (ref 15–46)
BASOPHILS # BLD AUTO: 0.02 10*3/MM3 (ref 0–0.2)
BASOPHILS NFR BLD AUTO: 0.4 % (ref 0–2.5)
BILIRUB SERPL-MCNC: 0.9 MG/DL (ref 0.2–1.3)
BUN BLD-MCNC: 46 MG/DL (ref 7–20)
BUN/CREAT SERPL: 5.2 (ref 7.1–23.5)
CALCIUM SPEC-SCNC: 9.8 MG/DL (ref 8.4–10.2)
CHLORIDE SERPL-SCNC: 104 MMOL/L (ref 98–107)
CO2 SERPL-SCNC: 22 MMOL/L (ref 26–30)
CREAT BLD-MCNC: 8.9 MG/DL (ref 0.6–1.3)
DEPRECATED RDW RBC AUTO: 56.2 FL (ref 37–54)
EOSINOPHIL # BLD AUTO: 0.17 10*3/MM3 (ref 0–0.7)
EOSINOPHIL NFR BLD AUTO: 3.1 % (ref 0–7)
ERYTHROCYTE [DISTWIDTH] IN BLOOD BY AUTOMATED COUNT: 17 % (ref 11.5–14.5)
GFR SERPL CREATININE-BSD FRML MDRD: 5 ML/MIN/1.73
GLOBULIN UR ELPH-MCNC: 2.3 GM/DL
GLUCOSE BLD-MCNC: 102 MG/DL (ref 74–98)
HCT VFR BLD AUTO: 34.8 % (ref 37–47)
HGB BLD-MCNC: 10.8 G/DL (ref 12–16)
HOLD SPECIMEN: NORMAL
IMM GRANULOCYTES # BLD: 0.09 10*3/MM3 (ref 0–0.06)
IMM GRANULOCYTES NFR BLD: 1.6 % (ref 0–0.6)
LIPASE SERPL-CCNC: 48 U/L (ref 23–300)
LYMPHOCYTES # BLD AUTO: 0.64 10*3/MM3 (ref 0.6–3.4)
LYMPHOCYTES NFR BLD AUTO: 11.5 % (ref 10–50)
MCH RBC QN AUTO: 29.5 PG (ref 27–31)
MCHC RBC AUTO-ENTMCNC: 31 G/DL (ref 30–37)
MCV RBC AUTO: 95.1 FL (ref 81–99)
MONOCYTES # BLD AUTO: 0.39 10*3/MM3 (ref 0–0.9)
MONOCYTES NFR BLD AUTO: 7 % (ref 0–12)
NEUTROPHILS # BLD AUTO: 4.26 10*3/MM3 (ref 2–6.9)
NEUTROPHILS NFR BLD AUTO: 76.4 % (ref 37–80)
NRBC BLD MANUAL-RTO: 0.4 /100 WBC (ref 0–0)
PLATELET # BLD AUTO: 117 10*3/MM3 (ref 130–400)
PMV BLD AUTO: 11.2 FL (ref 6–12)
POTASSIUM BLD-SCNC: 4.6 MMOL/L (ref 3.5–5.1)
PROCALCITONIN SERPL-MCNC: 1.68 NG/ML
PROT SERPL-MCNC: 5.7 G/DL (ref 6.3–8.2)
RBC # BLD AUTO: 3.66 10*6/MM3 (ref 4.2–5.4)
SODIUM BLD-SCNC: 140 MMOL/L (ref 137–145)
TROPONIN I SERPL-MCNC: 0.06 NG/ML (ref 0–0.03)
TROPONIN I SERPL-MCNC: 0.07 NG/ML (ref 0–0.03)
WBC NRBC COR # BLD: 5.57 10*3/MM3 (ref 4.8–10.8)
WHOLE BLOOD HOLD SPECIMEN: NORMAL
WHOLE BLOOD HOLD SPECIMEN: NORMAL

## 2018-09-04 PROCEDURE — 25010000002 VANCOMYCIN PER 500 MG: Performed by: EMERGENCY MEDICINE

## 2018-09-04 PROCEDURE — 25010000002 PIPERACILLIN SOD-TAZOBACTAM PER 1 G: Performed by: EMERGENCY MEDICINE

## 2018-09-04 PROCEDURE — 25010000002 METHYLPREDNISOLONE PER 125 MG: Performed by: INTERNAL MEDICINE

## 2018-09-04 PROCEDURE — 87081 CULTURE SCREEN ONLY: CPT | Performed by: INTERNAL MEDICINE

## 2018-09-04 PROCEDURE — 84484 ASSAY OF TROPONIN QUANT: CPT | Performed by: INTERNAL MEDICINE

## 2018-09-04 PROCEDURE — 99223 1ST HOSP IP/OBS HIGH 75: CPT | Performed by: INTERNAL MEDICINE

## 2018-09-04 PROCEDURE — 84484 ASSAY OF TROPONIN QUANT: CPT | Performed by: EMERGENCY MEDICINE

## 2018-09-04 PROCEDURE — 99285 EMERGENCY DEPT VISIT HI MDM: CPT

## 2018-09-04 PROCEDURE — 5A1D70Z PERFORMANCE OF URINARY FILTRATION, INTERMITTENT, LESS THAN 6 HOURS PER DAY: ICD-10-PCS | Performed by: INTERNAL MEDICINE

## 2018-09-04 PROCEDURE — 93005 ELECTROCARDIOGRAM TRACING: CPT

## 2018-09-04 PROCEDURE — 25010000002 HYDROMORPHONE PER 4 MG: Performed by: INTERNAL MEDICINE

## 2018-09-04 PROCEDURE — 25010000002 PIPERACILLIN SOD-TAZOBACTAM PER 1 G: Performed by: INTERNAL MEDICINE

## 2018-09-04 PROCEDURE — 25010000002 METHYLPREDNISOLONE PER 125 MG: Performed by: EMERGENCY MEDICINE

## 2018-09-04 PROCEDURE — 84145 PROCALCITONIN (PCT): CPT | Performed by: INTERNAL MEDICINE

## 2018-09-04 PROCEDURE — 25010000002 DIPHENHYDRAMINE PER 50 MG: Performed by: INTERNAL MEDICINE

## 2018-09-04 PROCEDURE — 25010000002 ONDANSETRON PER 1 MG: Performed by: EMERGENCY MEDICINE

## 2018-09-04 PROCEDURE — 85025 COMPLETE CBC W/AUTO DIFF WBC: CPT | Performed by: EMERGENCY MEDICINE

## 2018-09-04 PROCEDURE — 90935 HEMODIALYSIS ONE EVALUATION: CPT

## 2018-09-04 PROCEDURE — 71046 X-RAY EXAM CHEST 2 VIEWS: CPT

## 2018-09-04 PROCEDURE — 25010000002 HEPARIN (PORCINE) PER 1000 UNITS: Performed by: INTERNAL MEDICINE

## 2018-09-04 PROCEDURE — 87040 BLOOD CULTURE FOR BACTERIA: CPT | Performed by: EMERGENCY MEDICINE

## 2018-09-04 PROCEDURE — 74176 CT ABD & PELVIS W/O CONTRAST: CPT

## 2018-09-04 PROCEDURE — 76705 ECHO EXAM OF ABDOMEN: CPT

## 2018-09-04 PROCEDURE — 87077 CULTURE AEROBIC IDENTIFY: CPT | Performed by: INTERNAL MEDICINE

## 2018-09-04 PROCEDURE — 25010000002 HYDROMORPHONE PER 4 MG: Performed by: EMERGENCY MEDICINE

## 2018-09-04 PROCEDURE — 83690 ASSAY OF LIPASE: CPT | Performed by: EMERGENCY MEDICINE

## 2018-09-04 PROCEDURE — 80053 COMPREHEN METABOLIC PANEL: CPT | Performed by: EMERGENCY MEDICINE

## 2018-09-04 PROCEDURE — 93005 ELECTROCARDIOGRAM TRACING: CPT | Performed by: EMERGENCY MEDICINE

## 2018-09-04 RX ORDER — LABETALOL HYDROCHLORIDE 5 MG/ML
10 INJECTION, SOLUTION INTRAVENOUS EVERY 4 HOURS PRN
Status: DISCONTINUED | OUTPATIENT
Start: 2018-09-04 | End: 2018-09-07 | Stop reason: HOSPADM

## 2018-09-04 RX ORDER — DIPHENHYDRAMINE HYDROCHLORIDE 50 MG/ML
25 INJECTION INTRAMUSCULAR; INTRAVENOUS ONCE
Status: COMPLETED | OUTPATIENT
Start: 2018-09-04 | End: 2018-09-04

## 2018-09-04 RX ORDER — DOXAZOSIN MESYLATE 4 MG/1
16 TABLET ORAL NIGHTLY
Status: DISCONTINUED | OUTPATIENT
Start: 2018-09-04 | End: 2018-09-04 | Stop reason: ALTCHOICE

## 2018-09-04 RX ORDER — SODIUM CHLORIDE 0.9 % (FLUSH) 0.9 %
10 SYRINGE (ML) INJECTION AS NEEDED
Status: DISCONTINUED | OUTPATIENT
Start: 2018-09-04 | End: 2018-09-07 | Stop reason: HOSPADM

## 2018-09-04 RX ORDER — ALUMINA, MAGNESIA, AND SIMETHICONE 2400; 2400; 240 MG/30ML; MG/30ML; MG/30ML
15 SUSPENSION ORAL ONCE
Status: COMPLETED | OUTPATIENT
Start: 2018-09-04 | End: 2018-09-04

## 2018-09-04 RX ORDER — CALCITRIOL 0.25 UG/1
0.5 CAPSULE, LIQUID FILLED ORAL
Status: DISCONTINUED | OUTPATIENT
Start: 2018-09-04 | End: 2018-09-07 | Stop reason: HOSPADM

## 2018-09-04 RX ORDER — PANTOPRAZOLE SODIUM 40 MG/1
40 TABLET, DELAYED RELEASE ORAL
Status: DISCONTINUED | OUTPATIENT
Start: 2018-09-04 | End: 2018-09-06

## 2018-09-04 RX ORDER — DOCUSATE SODIUM 100 MG/1
100 CAPSULE, LIQUID FILLED ORAL 2 TIMES DAILY PRN
Status: DISCONTINUED | OUTPATIENT
Start: 2018-09-04 | End: 2018-09-07 | Stop reason: HOSPADM

## 2018-09-04 RX ORDER — CLONIDINE HYDROCHLORIDE 0.1 MG/1
0.1 TABLET ORAL EVERY 6 HOURS PRN
Status: DISCONTINUED | OUTPATIENT
Start: 2018-09-04 | End: 2018-09-05

## 2018-09-04 RX ORDER — CYCLOBENZAPRINE HCL 10 MG
5 TABLET ORAL 3 TIMES DAILY PRN
Status: DISCONTINUED | OUTPATIENT
Start: 2018-09-04 | End: 2018-09-07 | Stop reason: HOSPADM

## 2018-09-04 RX ORDER — METHYLPREDNISOLONE SODIUM SUCCINATE 125 MG/2ML
20 INJECTION, POWDER, LYOPHILIZED, FOR SOLUTION INTRAMUSCULAR; INTRAVENOUS EVERY 12 HOURS
Status: DISCONTINUED | OUTPATIENT
Start: 2018-09-04 | End: 2018-09-05

## 2018-09-04 RX ORDER — DOXAZOSIN 8 MG/1
16 TABLET ORAL NIGHTLY
Status: DISCONTINUED | OUTPATIENT
Start: 2018-09-04 | End: 2018-09-07 | Stop reason: HOSPADM

## 2018-09-04 RX ORDER — MORPHINE SULFATE 4 MG/ML
4 INJECTION, SOLUTION INTRAMUSCULAR; INTRAVENOUS ONCE
Status: DISCONTINUED | OUTPATIENT
Start: 2018-09-04 | End: 2018-09-04

## 2018-09-04 RX ORDER — NICARDIPINE HYDROCHLORIDE 20 MG/1
40 CAPSULE ORAL 3 TIMES DAILY
Status: DISCONTINUED | OUTPATIENT
Start: 2018-09-04 | End: 2018-09-04

## 2018-09-04 RX ORDER — LABETALOL HYDROCHLORIDE 5 MG/ML
10 INJECTION, SOLUTION INTRAVENOUS ONCE
Status: COMPLETED | OUTPATIENT
Start: 2018-09-04 | End: 2018-09-04

## 2018-09-04 RX ORDER — METHYLPREDNISOLONE SODIUM SUCCINATE 125 MG/2ML
125 INJECTION, POWDER, LYOPHILIZED, FOR SOLUTION INTRAMUSCULAR; INTRAVENOUS ONCE
Status: COMPLETED | OUTPATIENT
Start: 2018-09-04 | End: 2018-09-04

## 2018-09-04 RX ORDER — ONDANSETRON 2 MG/ML
4 INJECTION INTRAMUSCULAR; INTRAVENOUS ONCE
Status: COMPLETED | OUTPATIENT
Start: 2018-09-04 | End: 2018-09-04

## 2018-09-04 RX ORDER — NITROGLYCERIN 20 MG/100ML
5-200 INJECTION INTRAVENOUS
Status: DISCONTINUED | OUTPATIENT
Start: 2018-09-04 | End: 2018-09-04

## 2018-09-04 RX ORDER — NICARDIPINE HYDROCHLORIDE 20 MG/1
40 CAPSULE ORAL 3 TIMES DAILY
Status: DISCONTINUED | OUTPATIENT
Start: 2018-09-04 | End: 2018-09-07 | Stop reason: HOSPADM

## 2018-09-04 RX ORDER — SODIUM CHLORIDE 0.9 % (FLUSH) 0.9 %
1-10 SYRINGE (ML) INJECTION AS NEEDED
Status: DISCONTINUED | OUTPATIENT
Start: 2018-09-04 | End: 2018-09-07 | Stop reason: HOSPADM

## 2018-09-04 RX ORDER — CLONAZEPAM 0.5 MG/1
0.5 TABLET ORAL DAILY PRN
Status: DISCONTINUED | OUTPATIENT
Start: 2018-09-04 | End: 2018-09-07 | Stop reason: HOSPADM

## 2018-09-04 RX ORDER — HEPARIN SODIUM 5000 [USP'U]/ML
5000 INJECTION, SOLUTION INTRAVENOUS; SUBCUTANEOUS EVERY 12 HOURS SCHEDULED
Status: DISCONTINUED | OUTPATIENT
Start: 2018-09-04 | End: 2018-09-07 | Stop reason: HOSPADM

## 2018-09-04 RX ORDER — FOLIC ACID/VIT B COMPLEX AND C 0.8 MG
1 TABLET ORAL DAILY
Status: DISCONTINUED | OUTPATIENT
Start: 2018-09-04 | End: 2018-09-07 | Stop reason: HOSPADM

## 2018-09-04 RX ORDER — NITROGLYCERIN 0.4 MG/1
0.4 TABLET SUBLINGUAL ONCE
Status: DISCONTINUED | OUTPATIENT
Start: 2018-09-04 | End: 2018-09-07 | Stop reason: HOSPADM

## 2018-09-04 RX ORDER — HEPARIN SODIUM 1000 [USP'U]/ML
4000 INJECTION, SOLUTION INTRAVENOUS; SUBCUTANEOUS AS NEEDED
Status: DISCONTINUED | OUTPATIENT
Start: 2018-09-04 | End: 2018-09-07 | Stop reason: HOSPADM

## 2018-09-04 RX ORDER — ONDANSETRON 4 MG/1
4 TABLET, FILM COATED ORAL EVERY 8 HOURS PRN
Status: DISCONTINUED | OUTPATIENT
Start: 2018-09-04 | End: 2018-09-07 | Stop reason: HOSPADM

## 2018-09-04 RX ORDER — HYDROXYCHLOROQUINE SULFATE 200 MG/1
200 TABLET, FILM COATED ORAL EVERY 12 HOURS SCHEDULED
Status: DISCONTINUED | OUTPATIENT
Start: 2018-09-04 | End: 2018-09-07 | Stop reason: HOSPADM

## 2018-09-04 RX ORDER — NEBIVOLOL 10 MG/1
20 TABLET ORAL DAILY
Status: DISCONTINUED | OUTPATIENT
Start: 2018-09-05 | End: 2018-09-07 | Stop reason: HOSPADM

## 2018-09-04 RX ORDER — DOXAZOSIN 8 MG/1
16 TABLET ORAL NIGHTLY
Status: ON HOLD | COMMUNITY
End: 2018-09-05

## 2018-09-04 RX ORDER — ALVIMOPAN 12 MG/1
CAPSULE ORAL
Status: CANCELLED | OUTPATIENT
Start: 2018-09-04

## 2018-09-04 RX ORDER — DIPHENHYDRAMINE HYDROCHLORIDE 50 MG/ML
25 INJECTION INTRAMUSCULAR; INTRAVENOUS EVERY 6 HOURS PRN
Status: DISCONTINUED | OUTPATIENT
Start: 2018-09-04 | End: 2018-09-07 | Stop reason: HOSPADM

## 2018-09-04 RX ORDER — ACETAMINOPHEN 325 MG/1
650 TABLET ORAL EVERY 4 HOURS PRN
Status: DISCONTINUED | OUTPATIENT
Start: 2018-09-04 | End: 2018-09-07 | Stop reason: HOSPADM

## 2018-09-04 RX ADMIN — CALCITRIOL 0.5 MCG: 0.25 CAPSULE ORAL at 13:09

## 2018-09-04 RX ADMIN — LABETALOL 20 MG/4 ML (5 MG/ML) INTRAVENOUS SYRINGE 10 MG: at 08:26

## 2018-09-04 RX ADMIN — LIDOCAINE HYDROCHLORIDE 15 ML: 20 SOLUTION ORAL; TOPICAL at 07:30

## 2018-09-04 RX ADMIN — DIPHENHYDRAMINE HYDROCHLORIDE 25 MG: 50 INJECTION, SOLUTION INTRAMUSCULAR; INTRAVENOUS at 11:43

## 2018-09-04 RX ADMIN — Medication 1 TABLET: at 13:09

## 2018-09-04 RX ADMIN — HYDROXYCHLOROQUINE SULFATE 200 MG: 200 TABLET, FILM COATED ORAL at 13:09

## 2018-09-04 RX ADMIN — VANCOMYCIN HYDROCHLORIDE 1250 MG: 500 INJECTION, POWDER, LYOPHILIZED, FOR SOLUTION INTRAVENOUS at 09:57

## 2018-09-04 RX ADMIN — DIPHENHYDRAMINE HYDROCHLORIDE 25 MG: 50 INJECTION, SOLUTION INTRAMUSCULAR; INTRAVENOUS at 21:42

## 2018-09-04 RX ADMIN — PIPERACILLIN SODIUM,TAZOBACTAM SODIUM 2.25 G: 2; .25 INJECTION, POWDER, FOR SOLUTION INTRAVENOUS at 21:30

## 2018-09-04 RX ADMIN — NICARDIPINE HYDROCHLORIDE 40 MG: 20 CAPSULE ORAL at 13:34

## 2018-09-04 RX ADMIN — HYDROMORPHONE HYDROCHLORIDE 0.5 MG: 1 INJECTION, SOLUTION INTRAMUSCULAR; INTRAVENOUS; SUBCUTANEOUS at 16:45

## 2018-09-04 RX ADMIN — ONDANSETRON 4 MG: 2 INJECTION INTRAMUSCULAR; INTRAVENOUS at 07:32

## 2018-09-04 RX ADMIN — ALUMINUM HYDROXIDE, MAGNESIUM HYDROXIDE, AND DIMETHICONE 15 ML: 400; 400; 40 SUSPENSION ORAL at 07:30

## 2018-09-04 RX ADMIN — PANTOPRAZOLE SODIUM 40 MG: 40 TABLET, DELAYED RELEASE ORAL at 13:09

## 2018-09-04 RX ADMIN — HYDROMORPHONE HYDROCHLORIDE 0.5 MG: 1 INJECTION, SOLUTION INTRAMUSCULAR; INTRAVENOUS; SUBCUTANEOUS at 11:43

## 2018-09-04 RX ADMIN — NICARDIPINE HYDROCHLORIDE 40 MG: 20 CAPSULE ORAL at 21:23

## 2018-09-04 RX ADMIN — TAZOBACTAM SODIUM AND PIPERACILLIN SODIUM 4.5 G: 500; 4 INJECTION, SOLUTION INTRAVENOUS at 09:09

## 2018-09-04 RX ADMIN — DOXAZOSIN 16 MG: 8 TABLET ORAL at 22:41

## 2018-09-04 RX ADMIN — LABETALOL 20 MG/4 ML (5 MG/ML) INTRAVENOUS SYRINGE 10 MG: at 07:28

## 2018-09-04 RX ADMIN — CLONAZEPAM 0.5 MG: 0.5 TABLET ORAL at 13:25

## 2018-09-04 RX ADMIN — HYDROXYCHLOROQUINE SULFATE 200 MG: 200 TABLET, FILM COATED ORAL at 21:24

## 2018-09-04 RX ADMIN — HEPARIN SODIUM 4500 UNITS: 1000 INJECTION, SOLUTION INTRAVENOUS; SUBCUTANEOUS at 17:35

## 2018-09-04 RX ADMIN — HYDROMORPHONE HYDROCHLORIDE 0.5 MG: 1 INJECTION, SOLUTION INTRAMUSCULAR; INTRAVENOUS; SUBCUTANEOUS at 07:58

## 2018-09-04 RX ADMIN — HYDROMORPHONE HYDROCHLORIDE 0.5 MG: 1 INJECTION, SOLUTION INTRAMUSCULAR; INTRAVENOUS; SUBCUTANEOUS at 21:42

## 2018-09-04 RX ADMIN — METHYLPREDNISOLONE SODIUM SUCCINATE 125 MG: 125 INJECTION, POWDER, FOR SOLUTION INTRAMUSCULAR; INTRAVENOUS at 09:05

## 2018-09-05 ENCOUNTER — EPISODE CHANGES (OUTPATIENT)
Dept: CASE MANAGEMENT | Facility: OTHER | Age: 28
End: 2018-09-05

## 2018-09-05 LAB
ADV 40+41 DNA STL QL NAA+NON-PROBE: NOT DETECTED
ALBUMIN SERPL-MCNC: 3.1 G/DL (ref 3.5–5)
ALBUMIN/GLOB SERPL: 1.3 G/DL (ref 1–2)
ALP SERPL-CCNC: 42 U/L (ref 38–126)
ALT SERPL W P-5'-P-CCNC: 17 U/L (ref 13–69)
ANION GAP SERPL CALCULATED.3IONS-SCNC: 15.8 MMOL/L (ref 10–20)
AST SERPL-CCNC: 19 U/L (ref 15–46)
ASTRO TYP 1-8 RNA STL QL NAA+NON-PROBE: NOT DETECTED
BASOPHILS # BLD AUTO: 0.01 10*3/MM3 (ref 0–0.2)
BASOPHILS NFR BLD AUTO: 0.2 % (ref 0–2.5)
BILIRUB SERPL-MCNC: 0.6 MG/DL (ref 0.2–1.3)
BUN BLD-MCNC: 30 MG/DL (ref 7–20)
BUN/CREAT SERPL: 5.1 (ref 7.1–23.5)
C CAYETANENSIS DNA STL QL NAA+NON-PROBE: NOT DETECTED
C DIFF TOX GENS STL QL NAA+PROBE: NOT DETECTED
CALCIUM SPEC-SCNC: 9.1 MG/DL (ref 8.4–10.2)
CAMPY SP DNA.DIARRHEA STL QL NAA+PROBE: NOT DETECTED
CHLORIDE SERPL-SCNC: 104 MMOL/L (ref 98–107)
CO2 SERPL-SCNC: 23 MMOL/L (ref 26–30)
CREAT BLD-MCNC: 5.9 MG/DL (ref 0.6–1.3)
CRYPTOSP STL CULT: NOT DETECTED
DEPRECATED RDW RBC AUTO: 58.4 FL (ref 37–54)
E COLI DNA SPEC QL NAA+PROBE: NOT DETECTED
E HISTOLYT AG STL-ACNC: NOT DETECTED
EAEC PAA PLAS AGGR+AATA ST NAA+NON-PRB: NOT DETECTED
EC STX1 + STX2 GENES STL NAA+PROBE: NOT DETECTED
EOSINOPHIL # BLD AUTO: 0 10*3/MM3 (ref 0–0.7)
EOSINOPHIL NFR BLD AUTO: 0 % (ref 0–7)
EPEC EAE GENE STL QL NAA+NON-PROBE: NOT DETECTED
ERYTHROCYTE [DISTWIDTH] IN BLOOD BY AUTOMATED COUNT: 17.6 % (ref 11.5–14.5)
ETEC LTA+ST1A+ST1B TOX ST NAA+NON-PROBE: NOT DETECTED
G LAMBLIA DNA SPEC QL NAA+PROBE: NOT DETECTED
GFR SERPL CREATININE-BSD FRML MDRD: 9 ML/MIN/1.73
GLOBULIN UR ELPH-MCNC: 2.3 GM/DL
GLUCOSE BLD-MCNC: 110 MG/DL (ref 74–98)
HCT VFR BLD AUTO: 29.1 % (ref 37–47)
HGB BLD-MCNC: 9 G/DL (ref 12–16)
IMM GRANULOCYTES # BLD: 0.11 10*3/MM3 (ref 0–0.06)
IMM GRANULOCYTES NFR BLD: 2.2 % (ref 0–0.6)
LYMPHOCYTES # BLD AUTO: 0.56 10*3/MM3 (ref 0.6–3.4)
LYMPHOCYTES NFR BLD AUTO: 11 % (ref 10–50)
MCH RBC QN AUTO: 29.5 PG (ref 27–31)
MCHC RBC AUTO-ENTMCNC: 30.9 G/DL (ref 30–37)
MCV RBC AUTO: 95.4 FL (ref 81–99)
MONOCYTES # BLD AUTO: 0.5 10*3/MM3 (ref 0–0.9)
MONOCYTES NFR BLD AUTO: 9.8 % (ref 0–12)
NEUTROPHILS # BLD AUTO: 3.93 10*3/MM3 (ref 2–6.9)
NEUTROPHILS NFR BLD AUTO: 76.8 % (ref 37–80)
NOROVIRUS GI+II RNA STL QL NAA+NON-PROBE: NOT DETECTED
NRBC BLD MANUAL-RTO: 0.6 /100 WBC (ref 0–0)
P SHIGELLOIDES DNA STL QL NAA+NON-PROBE: NOT DETECTED
PLATELET # BLD AUTO: 135 10*3/MM3 (ref 130–400)
PMV BLD AUTO: 11.3 FL (ref 6–12)
POTASSIUM BLD-SCNC: 4.8 MMOL/L (ref 3.5–5.1)
PROT SERPL-MCNC: 5.4 G/DL (ref 6.3–8.2)
RBC # BLD AUTO: 3.05 10*6/MM3 (ref 4.2–5.4)
RV RNA STL NAA+PROBE: NOT DETECTED
SALMONELLA DNA SPEC QL NAA+PROBE: NOT DETECTED
SAPO I+II+IV+V RNA STL QL NAA+NON-PROBE: NOT DETECTED
SHIGELLA SP+EIEC IPAH STL QL NAA+PROBE: NOT DETECTED
SODIUM BLD-SCNC: 138 MMOL/L (ref 137–145)
V CHOLERAE DNA SPEC QL NAA+PROBE: NOT DETECTED
VIBRIO DNA SPEC NAA+PROBE: NOT DETECTED
WBC NRBC COR # BLD: 5.11 10*3/MM3 (ref 4.8–10.8)
YERSINIA STL CULT: NOT DETECTED

## 2018-09-05 PROCEDURE — 25010000002 HYDROMORPHONE PER 4 MG: Performed by: INTERNAL MEDICINE

## 2018-09-05 PROCEDURE — 80053 COMPREHEN METABOLIC PANEL: CPT | Performed by: INTERNAL MEDICINE

## 2018-09-05 PROCEDURE — 99233 SBSQ HOSP IP/OBS HIGH 50: CPT | Performed by: INTERNAL MEDICINE

## 2018-09-05 PROCEDURE — 85025 COMPLETE CBC W/AUTO DIFF WBC: CPT | Performed by: INTERNAL MEDICINE

## 2018-09-05 PROCEDURE — 25010000002 DIPHENHYDRAMINE PER 50 MG: Performed by: INTERNAL MEDICINE

## 2018-09-05 PROCEDURE — 87999 UNLISTED MICROBIOLOGY PX: CPT | Performed by: INTERNAL MEDICINE

## 2018-09-05 PROCEDURE — 25010000002 PIPERACILLIN SOD-TAZOBACTAM PER 1 G: Performed by: INTERNAL MEDICINE

## 2018-09-05 RX ORDER — LOSARTAN POTASSIUM 50 MG/1
100 TABLET ORAL EVERY 12 HOURS
Status: DISCONTINUED | OUTPATIENT
Start: 2018-09-05 | End: 2018-09-07 | Stop reason: HOSPADM

## 2018-09-05 RX ORDER — LOSARTAN POTASSIUM 100 MG/1
100 TABLET ORAL DAILY
COMMUNITY
End: 2018-09-07 | Stop reason: HOSPADM

## 2018-09-05 RX ORDER — HYDRALAZINE HYDROCHLORIDE 25 MG/1
100 TABLET, FILM COATED ORAL EVERY 8 HOURS SCHEDULED
Status: DISCONTINUED | OUTPATIENT
Start: 2018-09-05 | End: 2018-09-07 | Stop reason: HOSPADM

## 2018-09-05 RX ORDER — CLONIDINE HYDROCHLORIDE 0.1 MG/1
0.3 TABLET ORAL EVERY 8 HOURS SCHEDULED
Status: DISCONTINUED | OUTPATIENT
Start: 2018-09-05 | End: 2018-09-07 | Stop reason: HOSPADM

## 2018-09-05 RX ORDER — LOSARTAN POTASSIUM 50 MG/1
100 TABLET ORAL DAILY
Status: DISCONTINUED | OUTPATIENT
Start: 2018-09-06 | End: 2018-09-05

## 2018-09-05 RX ORDER — LOSARTAN POTASSIUM AND HYDROCHLOROTHIAZIDE 25; 100 MG/1; MG/1
1 TABLET ORAL DAILY
Status: ON HOLD | COMMUNITY
End: 2018-09-05

## 2018-09-05 RX ADMIN — HYDRALAZINE HYDROCHLORIDE 100 MG: 25 TABLET ORAL at 22:57

## 2018-09-05 RX ADMIN — CLONAZEPAM 0.5 MG: 0.5 TABLET ORAL at 06:49

## 2018-09-05 RX ADMIN — DOXAZOSIN 16 MG: 8 TABLET ORAL at 20:00

## 2018-09-05 RX ADMIN — LOSARTAN POTASSIUM 100 MG: 50 TABLET, FILM COATED ORAL at 23:10

## 2018-09-05 RX ADMIN — NICARDIPINE HYDROCHLORIDE 40 MG: 20 CAPSULE ORAL at 16:56

## 2018-09-05 RX ADMIN — PIPERACILLIN SODIUM,TAZOBACTAM SODIUM 2.25 G: 2; .25 INJECTION, POWDER, FOR SOLUTION INTRAVENOUS at 08:53

## 2018-09-05 RX ADMIN — NEBIVOLOL HYDROCHLORIDE 20 MG: 10 TABLET ORAL at 08:55

## 2018-09-05 RX ADMIN — DIPHENHYDRAMINE HYDROCHLORIDE 25 MG: 50 INJECTION, SOLUTION INTRAMUSCULAR; INTRAVENOUS at 08:52

## 2018-09-05 RX ADMIN — CLONIDINE HYDROCHLORIDE 0.3 MG: 0.2 TABLET ORAL at 15:07

## 2018-09-05 RX ADMIN — HYDROXYCHLOROQUINE SULFATE 200 MG: 200 TABLET, FILM COATED ORAL at 08:55

## 2018-09-05 RX ADMIN — HYDROMORPHONE HYDROCHLORIDE 0.5 MG: 1 INJECTION, SOLUTION INTRAMUSCULAR; INTRAVENOUS; SUBCUTANEOUS at 08:54

## 2018-09-05 RX ADMIN — CLONIDINE HYDROCHLORIDE 0.3 MG: 0.2 TABLET ORAL at 22:58

## 2018-09-05 RX ADMIN — HYDROXYCHLOROQUINE SULFATE 200 MG: 200 TABLET, FILM COATED ORAL at 20:02

## 2018-09-05 RX ADMIN — ONDANSETRON 4 MG: 4 TABLET, FILM COATED ORAL at 08:56

## 2018-09-05 RX ADMIN — HYDRALAZINE HYDROCHLORIDE 100 MG: 25 TABLET ORAL at 15:08

## 2018-09-05 RX ADMIN — PIPERACILLIN SODIUM,TAZOBACTAM SODIUM 2.25 G: 2; .25 INJECTION, POWDER, FOR SOLUTION INTRAVENOUS at 20:02

## 2018-09-05 RX ADMIN — CLONIDINE HYDROCHLORIDE 0.1 MG: 0.1 TABLET ORAL at 02:43

## 2018-09-05 RX ADMIN — NICARDIPINE HYDROCHLORIDE 40 MG: 20 CAPSULE ORAL at 08:54

## 2018-09-05 RX ADMIN — PANTOPRAZOLE SODIUM 40 MG: 40 TABLET, DELAYED RELEASE ORAL at 06:49

## 2018-09-05 RX ADMIN — DIPHENHYDRAMINE HYDROCHLORIDE 25 MG: 50 INJECTION, SOLUTION INTRAMUSCULAR; INTRAVENOUS at 20:01

## 2018-09-05 RX ADMIN — HYDROMORPHONE HYDROCHLORIDE 0.5 MG: 1 INJECTION, SOLUTION INTRAMUSCULAR; INTRAVENOUS; SUBCUTANEOUS at 20:01

## 2018-09-05 RX ADMIN — HYDRALAZINE HYDROCHLORIDE 100 MG: 25 TABLET ORAL at 06:50

## 2018-09-05 RX ADMIN — NICARDIPINE HYDROCHLORIDE 40 MG: 20 CAPSULE ORAL at 20:02

## 2018-09-05 RX ADMIN — LABETALOL 20 MG/4 ML (5 MG/ML) INTRAVENOUS SYRINGE 10 MG: at 03:42

## 2018-09-05 RX ADMIN — Medication 1 TABLET: at 08:55

## 2018-09-06 LAB
ALBUMIN SERPL-MCNC: 2.6 G/DL (ref 3.5–5)
ANION GAP SERPL CALCULATED.3IONS-SCNC: 13.1 MMOL/L (ref 10–20)
BUN BLD-MCNC: 42 MG/DL (ref 7–20)
BUN/CREAT SERPL: 5.2 (ref 7.1–23.5)
CALCIUM SPEC-SCNC: 8.9 MG/DL (ref 8.4–10.2)
CHLORIDE SERPL-SCNC: 106 MMOL/L (ref 98–107)
CO2 SERPL-SCNC: 23 MMOL/L (ref 26–30)
CREAT BLD-MCNC: 8.1 MG/DL (ref 0.6–1.3)
DEPRECATED RDW RBC AUTO: 62.4 FL (ref 37–54)
ERYTHROCYTE [DISTWIDTH] IN BLOOD BY AUTOMATED COUNT: 18.2 % (ref 11.5–14.5)
GFR SERPL CREATININE-BSD FRML MDRD: 6 ML/MIN/1.73
GLUCOSE BLD-MCNC: 90 MG/DL (ref 74–98)
HCT VFR BLD AUTO: 26.9 % (ref 37–47)
HGB BLD-MCNC: 8.1 G/DL (ref 12–16)
MAGNESIUM SERPL-MCNC: 2.4 MG/DL (ref 1.6–2.3)
MCH RBC QN AUTO: 29.2 PG (ref 27–31)
MCHC RBC AUTO-ENTMCNC: 30.1 G/DL (ref 30–37)
MCV RBC AUTO: 97.1 FL (ref 81–99)
PHOSPHATE SERPL-MCNC: 5.2 MG/DL (ref 2.5–4.5)
PLATELET # BLD AUTO: 156 10*3/MM3 (ref 130–400)
PMV BLD AUTO: 10.9 FL (ref 6–12)
POTASSIUM BLD-SCNC: 4.1 MMOL/L (ref 3.5–5.1)
RBC # BLD AUTO: 2.77 10*6/MM3 (ref 4.2–5.4)
SODIUM BLD-SCNC: 138 MMOL/L (ref 137–145)
VANCOMYCIN SERPL-MCNC: 13.91 MCG/ML (ref 5–40)
WBC NRBC COR # BLD: 5.79 10*3/MM3 (ref 4.8–10.8)

## 2018-09-06 PROCEDURE — 25010000002 VANCOMYCIN PER 500 MG: Performed by: INTERNAL MEDICINE

## 2018-09-06 PROCEDURE — 87040 BLOOD CULTURE FOR BACTERIA: CPT | Performed by: INTERNAL MEDICINE

## 2018-09-06 PROCEDURE — 5A1D70Z PERFORMANCE OF URINARY FILTRATION, INTERMITTENT, LESS THAN 6 HOURS PER DAY: ICD-10-PCS | Performed by: INTERNAL MEDICINE

## 2018-09-06 PROCEDURE — 25010000002 HYDROMORPHONE PER 4 MG: Performed by: INTERNAL MEDICINE

## 2018-09-06 PROCEDURE — 25010000002 HEPARIN (PORCINE) PER 1000 UNITS: Performed by: INTERNAL MEDICINE

## 2018-09-06 PROCEDURE — 85027 COMPLETE CBC AUTOMATED: CPT | Performed by: INTERNAL MEDICINE

## 2018-09-06 PROCEDURE — 99232 SBSQ HOSP IP/OBS MODERATE 35: CPT | Performed by: INTERNAL MEDICINE

## 2018-09-06 PROCEDURE — 90935 HEMODIALYSIS ONE EVALUATION: CPT

## 2018-09-06 PROCEDURE — 83735 ASSAY OF MAGNESIUM: CPT | Performed by: INTERNAL MEDICINE

## 2018-09-06 PROCEDURE — 25010000002 PIPERACILLIN SOD-TAZOBACTAM PER 1 G: Performed by: INTERNAL MEDICINE

## 2018-09-06 PROCEDURE — 25010000002 DIPHENHYDRAMINE PER 50 MG: Performed by: INTERNAL MEDICINE

## 2018-09-06 PROCEDURE — 80202 ASSAY OF VANCOMYCIN: CPT | Performed by: INTERNAL MEDICINE

## 2018-09-06 PROCEDURE — 80069 RENAL FUNCTION PANEL: CPT | Performed by: INTERNAL MEDICINE

## 2018-09-06 RX ORDER — SUCRALFATE 1 G/1
1 TABLET ORAL
Status: DISCONTINUED | OUTPATIENT
Start: 2018-09-06 | End: 2018-09-07

## 2018-09-06 RX ORDER — PANTOPRAZOLE SODIUM 40 MG/1
40 TABLET, DELAYED RELEASE ORAL
Status: DISCONTINUED | OUTPATIENT
Start: 2018-09-06 | End: 2018-09-07 | Stop reason: HOSPADM

## 2018-09-06 RX ADMIN — DIPHENHYDRAMINE HYDROCHLORIDE 25 MG: 50 INJECTION, SOLUTION INTRAMUSCULAR; INTRAVENOUS at 09:14

## 2018-09-06 RX ADMIN — PIPERACILLIN SODIUM,TAZOBACTAM SODIUM 2.25 G: 2; .25 INJECTION, POWDER, FOR SOLUTION INTRAVENOUS at 21:11

## 2018-09-06 RX ADMIN — PANTOPRAZOLE SODIUM 40 MG: 40 TABLET, DELAYED RELEASE ORAL at 18:05

## 2018-09-06 RX ADMIN — CLONIDINE HYDROCHLORIDE 0.3 MG: 0.2 TABLET ORAL at 05:48

## 2018-09-06 RX ADMIN — PANTOPRAZOLE SODIUM 40 MG: 40 TABLET, DELAYED RELEASE ORAL at 06:56

## 2018-09-06 RX ADMIN — HYDRALAZINE HYDROCHLORIDE 100 MG: 25 TABLET ORAL at 14:09

## 2018-09-06 RX ADMIN — HYDROXYCHLOROQUINE SULFATE 200 MG: 200 TABLET, FILM COATED ORAL at 21:14

## 2018-09-06 RX ADMIN — NICARDIPINE HYDROCHLORIDE 40 MG: 20 CAPSULE ORAL at 21:14

## 2018-09-06 RX ADMIN — VANCOMYCIN HYDROCHLORIDE 750 MG: 750 INJECTION, SOLUTION INTRAVENOUS at 17:26

## 2018-09-06 RX ADMIN — HYDROMORPHONE HYDROCHLORIDE 0.5 MG: 1 INJECTION, SOLUTION INTRAMUSCULAR; INTRAVENOUS; SUBCUTANEOUS at 21:29

## 2018-09-06 RX ADMIN — DOXAZOSIN 16 MG: 8 TABLET ORAL at 21:15

## 2018-09-06 RX ADMIN — CALCITRIOL 0.5 MCG: 0.25 CAPSULE ORAL at 14:10

## 2018-09-06 RX ADMIN — HYDROMORPHONE HYDROCHLORIDE 0.5 MG: 1 INJECTION, SOLUTION INTRAMUSCULAR; INTRAVENOUS; SUBCUTANEOUS at 09:14

## 2018-09-06 RX ADMIN — NICARDIPINE HYDROCHLORIDE 40 MG: 20 CAPSULE ORAL at 08:37

## 2018-09-06 RX ADMIN — Medication 1 TABLET: at 08:38

## 2018-09-06 RX ADMIN — PIPERACILLIN SODIUM,TAZOBACTAM SODIUM 2.25 G: 2; .25 INJECTION, POWDER, FOR SOLUTION INTRAVENOUS at 08:38

## 2018-09-06 RX ADMIN — SUCRALFATE 1 G: 1 TABLET ORAL at 16:34

## 2018-09-06 RX ADMIN — HEPARIN SODIUM 4500 UNITS: 1000 INJECTION, SOLUTION INTRAVENOUS; SUBCUTANEOUS at 12:30

## 2018-09-06 RX ADMIN — HYDROXYCHLOROQUINE SULFATE 200 MG: 200 TABLET, FILM COATED ORAL at 08:38

## 2018-09-06 RX ADMIN — LOSARTAN POTASSIUM 100 MG: 50 TABLET, FILM COATED ORAL at 08:38

## 2018-09-06 RX ADMIN — SUCRALFATE 1 G: 1 TABLET ORAL at 11:27

## 2018-09-06 RX ADMIN — SUCRALFATE 1 G: 1 TABLET ORAL at 21:13

## 2018-09-06 RX ADMIN — CLONIDINE HYDROCHLORIDE 0.3 MG: 0.2 TABLET ORAL at 14:10

## 2018-09-06 RX ADMIN — HYDRALAZINE HYDROCHLORIDE 100 MG: 25 TABLET ORAL at 23:48

## 2018-09-06 RX ADMIN — CLONAZEPAM 0.5 MG: 0.5 TABLET ORAL at 23:12

## 2018-09-06 RX ADMIN — HYDRALAZINE HYDROCHLORIDE 100 MG: 25 TABLET ORAL at 05:48

## 2018-09-06 RX ADMIN — LOSARTAN POTASSIUM 100 MG: 50 TABLET, FILM COATED ORAL at 23:48

## 2018-09-06 RX ADMIN — NEBIVOLOL HYDROCHLORIDE 20 MG: 10 TABLET ORAL at 08:37

## 2018-09-06 RX ADMIN — ACETAMINOPHEN 650 MG: 325 TABLET, FILM COATED ORAL at 16:33

## 2018-09-06 RX ADMIN — DIPHENHYDRAMINE HYDROCHLORIDE 25 MG: 50 INJECTION, SOLUTION INTRAMUSCULAR; INTRAVENOUS at 21:29

## 2018-09-06 RX ADMIN — NICARDIPINE HYDROCHLORIDE 40 MG: 20 CAPSULE ORAL at 16:34

## 2018-09-06 RX ADMIN — CLONIDINE HYDROCHLORIDE 0.3 MG: 0.2 TABLET ORAL at 23:47

## 2018-09-07 VITALS
TEMPERATURE: 97.9 F | BODY MASS INDEX: 22.21 KG/M2 | DIASTOLIC BLOOD PRESSURE: 106 MMHG | HEART RATE: 78 BPM | WEIGHT: 138.2 LBS | HEIGHT: 66 IN | RESPIRATION RATE: 17 BRPM | SYSTOLIC BLOOD PRESSURE: 169 MMHG | OXYGEN SATURATION: 99 %

## 2018-09-07 LAB
ACINETOBACTER SCREEN CX: NORMAL
ALBUMIN SERPL-MCNC: 2.6 G/DL (ref 3.5–5)
ANION GAP SERPL CALCULATED.3IONS-SCNC: 12.8 MMOL/L (ref 10–20)
BUN BLD-MCNC: 21 MG/DL (ref 7–20)
BUN/CREAT SERPL: 3.3 (ref 7.1–23.5)
CALCIUM SPEC-SCNC: 8.5 MG/DL (ref 8.4–10.2)
CHLORIDE SERPL-SCNC: 104 MMOL/L (ref 98–107)
CO2 SERPL-SCNC: 22 MMOL/L (ref 26–30)
CREAT BLD-MCNC: 6.4 MG/DL (ref 0.6–1.3)
DEPRECATED RDW RBC AUTO: 60 FL (ref 37–54)
ERYTHROCYTE [DISTWIDTH] IN BLOOD BY AUTOMATED COUNT: 17.7 % (ref 11.5–14.5)
GFR SERPL CREATININE-BSD FRML MDRD: 8 ML/MIN/1.73
GLUCOSE BLD-MCNC: 87 MG/DL (ref 74–98)
HCT VFR BLD AUTO: 26.9 % (ref 37–47)
HGB BLD-MCNC: 8.4 G/DL (ref 12–16)
LIPASE SERPL-CCNC: 48 U/L (ref 23–300)
MAGNESIUM SERPL-MCNC: 2.1 MG/DL (ref 1.6–2.3)
MCH RBC QN AUTO: 29.5 PG (ref 27–31)
MCHC RBC AUTO-ENTMCNC: 31.2 G/DL (ref 30–37)
MCV RBC AUTO: 94.4 FL (ref 81–99)
PHOSPHATE SERPL-MCNC: 5 MG/DL (ref 2.5–4.5)
PLATELET # BLD AUTO: 136 10*3/MM3 (ref 130–400)
PMV BLD AUTO: 11.3 FL (ref 6–12)
POTASSIUM BLD-SCNC: 3.8 MMOL/L (ref 3.5–5.1)
RBC # BLD AUTO: 2.85 10*6/MM3 (ref 4.2–5.4)
SODIUM BLD-SCNC: 135 MMOL/L (ref 137–145)
VRE SPEC QL CULT: NORMAL
WBC NRBC COR # BLD: 3.34 10*3/MM3 (ref 4.8–10.8)

## 2018-09-07 PROCEDURE — 63710000001 MYCOPHENOLATE MOFETIL PER 250 MG: Performed by: INTERNAL MEDICINE

## 2018-09-07 PROCEDURE — 25010000002 PIPERACILLIN SOD-TAZOBACTAM PER 1 G: Performed by: INTERNAL MEDICINE

## 2018-09-07 PROCEDURE — 25010000002 DIPHENHYDRAMINE PER 50 MG: Performed by: INTERNAL MEDICINE

## 2018-09-07 PROCEDURE — 25010000002 HYDROMORPHONE PER 4 MG: Performed by: INTERNAL MEDICINE

## 2018-09-07 PROCEDURE — 83735 ASSAY OF MAGNESIUM: CPT | Performed by: INTERNAL MEDICINE

## 2018-09-07 PROCEDURE — 99239 HOSP IP/OBS DSCHRG MGMT >30: CPT | Performed by: INTERNAL MEDICINE

## 2018-09-07 PROCEDURE — 80069 RENAL FUNCTION PANEL: CPT | Performed by: INTERNAL MEDICINE

## 2018-09-07 PROCEDURE — 85027 COMPLETE CBC AUTOMATED: CPT | Performed by: INTERNAL MEDICINE

## 2018-09-07 PROCEDURE — 83690 ASSAY OF LIPASE: CPT | Performed by: INTERNAL MEDICINE

## 2018-09-07 RX ORDER — LOSARTAN POTASSIUM 100 MG/1
100 TABLET ORAL EVERY 12 HOURS
Qty: 60 TABLET | Refills: 5 | Status: SHIPPED | OUTPATIENT
Start: 2018-09-07 | End: 2019-08-21

## 2018-09-07 RX ORDER — MYCOPHENOLATE MOFETIL 250 MG/1
250 CAPSULE ORAL EVERY 12 HOURS SCHEDULED
Status: DISCONTINUED | OUTPATIENT
Start: 2018-09-07 | End: 2018-09-07 | Stop reason: HOSPADM

## 2018-09-07 RX ORDER — SUCRALFATE ORAL 1 G/10ML
1 SUSPENSION ORAL
Qty: 420 ML | Refills: 0 | Status: SHIPPED | OUTPATIENT
Start: 2018-09-07 | End: 2019-08-21

## 2018-09-07 RX ORDER — NICARDIPINE HYDROCHLORIDE 20 MG/1
40 CAPSULE ORAL 3 TIMES DAILY
Qty: 180 CAPSULE | Refills: 5 | Status: SHIPPED | OUTPATIENT
Start: 2018-09-07 | End: 2018-09-26 | Stop reason: SDUPTHER

## 2018-09-07 RX ORDER — DOXAZOSIN 8 MG/1
16 TABLET ORAL NIGHTLY
Qty: 60 TABLET | Refills: 5 | Status: SHIPPED | OUTPATIENT
Start: 2018-09-07 | End: 2020-04-28 | Stop reason: ALTCHOICE

## 2018-09-07 RX ORDER — MYCOPHENOLATE MOFETIL 250 MG/1
250 CAPSULE ORAL EVERY 12 HOURS SCHEDULED
Qty: 60 CAPSULE | Refills: 5 | Status: SHIPPED | OUTPATIENT
Start: 2018-09-07 | End: 2019-08-21

## 2018-09-07 RX ORDER — CALCITRIOL 0.5 UG/1
0.5 CAPSULE, LIQUID FILLED ORAL DAILY
Qty: 30 CAPSULE | Refills: 5 | Status: SHIPPED | OUTPATIENT
Start: 2018-09-07 | End: 2020-04-28 | Stop reason: ALTCHOICE

## 2018-09-07 RX ORDER — HYDROXYCHLOROQUINE SULFATE 200 MG/1
200 TABLET, FILM COATED ORAL EVERY 12 HOURS SCHEDULED
Qty: 60 TABLET | Refills: 3 | Status: SHIPPED | OUTPATIENT
Start: 2018-09-07

## 2018-09-07 RX ORDER — SUCRALFATE ORAL 1 G/10ML
1 SUSPENSION ORAL EVERY 6 HOURS SCHEDULED
Status: DISCONTINUED | OUTPATIENT
Start: 2018-09-07 | End: 2018-09-07 | Stop reason: HOSPADM

## 2018-09-07 RX ORDER — NEBIVOLOL 20 MG/1
20 TABLET ORAL DAILY
Qty: 60 TABLET | Refills: 5 | Status: SHIPPED | OUTPATIENT
Start: 2018-09-08 | End: 2020-04-28 | Stop reason: ALTCHOICE

## 2018-09-07 RX ORDER — HYDRALAZINE HYDROCHLORIDE 100 MG/1
100 TABLET, FILM COATED ORAL EVERY 8 HOURS SCHEDULED
Qty: 90 TABLET | Refills: 5 | Status: SHIPPED | OUTPATIENT
Start: 2018-09-07 | End: 2020-04-28 | Stop reason: ALTCHOICE

## 2018-09-07 RX ORDER — PANTOPRAZOLE SODIUM 40 MG/1
40 TABLET, DELAYED RELEASE ORAL
Qty: 60 TABLET | Refills: 2 | Status: SHIPPED | OUTPATIENT
Start: 2018-09-07 | End: 2018-11-21 | Stop reason: SDUPTHER

## 2018-09-07 RX ADMIN — NEBIVOLOL HYDROCHLORIDE 20 MG: 10 TABLET ORAL at 08:46

## 2018-09-07 RX ADMIN — CLONIDINE HYDROCHLORIDE 0.3 MG: 0.2 TABLET ORAL at 06:43

## 2018-09-07 RX ADMIN — DIPHENHYDRAMINE HYDROCHLORIDE 25 MG: 50 INJECTION, SOLUTION INTRAMUSCULAR; INTRAVENOUS at 10:48

## 2018-09-07 RX ADMIN — SUCRALFATE 1 G: 1 TABLET ORAL at 06:43

## 2018-09-07 RX ADMIN — HYDROXYCHLOROQUINE SULFATE 200 MG: 200 TABLET, FILM COATED ORAL at 08:47

## 2018-09-07 RX ADMIN — PANTOPRAZOLE SODIUM 40 MG: 40 TABLET, DELAYED RELEASE ORAL at 06:43

## 2018-09-07 RX ADMIN — HYDRALAZINE HYDROCHLORIDE 100 MG: 25 TABLET ORAL at 14:40

## 2018-09-07 RX ADMIN — SUCRALFATE 1 G: 1 SUSPENSION ORAL at 14:41

## 2018-09-07 RX ADMIN — HYDRALAZINE HYDROCHLORIDE 100 MG: 25 TABLET ORAL at 06:42

## 2018-09-07 RX ADMIN — HYDROMORPHONE HYDROCHLORIDE 0.5 MG: 1 INJECTION, SOLUTION INTRAMUSCULAR; INTRAVENOUS; SUBCUTANEOUS at 10:48

## 2018-09-07 RX ADMIN — NICARDIPINE HYDROCHLORIDE 40 MG: 20 CAPSULE ORAL at 08:46

## 2018-09-07 RX ADMIN — CLONAZEPAM 0.5 MG: 0.5 TABLET ORAL at 10:04

## 2018-09-07 RX ADMIN — LOSARTAN POTASSIUM 100 MG: 50 TABLET, FILM COATED ORAL at 08:46

## 2018-09-07 RX ADMIN — CLONIDINE HYDROCHLORIDE 0.3 MG: 0.2 TABLET ORAL at 14:41

## 2018-09-07 RX ADMIN — MYCOPHENOLATE MOFETIL 250 MG: 250 CAPSULE ORAL at 10:47

## 2018-09-07 RX ADMIN — Medication 1 TABLET: at 08:46

## 2018-09-07 RX ADMIN — PIPERACILLIN SODIUM,TAZOBACTAM SODIUM 2.25 G: 2; .25 INJECTION, POWDER, FOR SOLUTION INTRAVENOUS at 08:44

## 2018-09-08 ENCOUNTER — READMISSION MANAGEMENT (OUTPATIENT)
Dept: CALL CENTER | Facility: HOSPITAL | Age: 28
End: 2018-09-08

## 2018-09-09 LAB
BACTERIA SPEC AEROBE CULT: NORMAL
BACTERIA SPEC AEROBE CULT: NORMAL

## 2018-09-10 ENCOUNTER — TRANSITIONAL CARE MANAGEMENT TELEPHONE ENCOUNTER (OUTPATIENT)
Dept: INTERNAL MEDICINE | Facility: CLINIC | Age: 28
End: 2018-09-10

## 2018-09-10 ENCOUNTER — EPISODE CHANGES (OUTPATIENT)
Dept: CASE MANAGEMENT | Facility: OTHER | Age: 28
End: 2018-09-10

## 2018-09-10 LAB — MRSA SPEC QL CULT: ABNORMAL

## 2018-09-11 ENCOUNTER — HOSPITAL ENCOUNTER (OUTPATIENT)
Dept: GENERAL RADIOLOGY | Facility: HOSPITAL | Age: 28
Discharge: HOME | End: 2018-09-11
Admitting: PHYSICIAN ASSISTANT

## 2018-09-11 ENCOUNTER — TRANSCRIBE ORDERS (OUTPATIENT)
Dept: GENERAL RADIOLOGY | Facility: HOSPITAL | Age: 28
End: 2018-09-11

## 2018-09-11 DIAGNOSIS — R05.9 COUGH: Primary | ICD-10-CM

## 2018-09-11 LAB
BACTERIA SPEC AEROBE CULT: NORMAL
BACTERIA SPEC AEROBE CULT: NORMAL

## 2018-09-11 PROCEDURE — 71046 X-RAY EXAM CHEST 2 VIEWS: CPT

## 2018-09-12 ENCOUNTER — READMISSION MANAGEMENT (OUTPATIENT)
Dept: CALL CENTER | Facility: HOSPITAL | Age: 28
End: 2018-09-12

## 2018-09-12 ENCOUNTER — OFFICE VISIT (OUTPATIENT)
Dept: GASTROENTEROLOGY | Facility: CLINIC | Age: 28
End: 2018-09-12

## 2018-09-12 VITALS
BODY MASS INDEX: 22.66 KG/M2 | TEMPERATURE: 98.5 F | DIASTOLIC BLOOD PRESSURE: 99 MMHG | WEIGHT: 141 LBS | HEIGHT: 66 IN | SYSTOLIC BLOOD PRESSURE: 172 MMHG | HEART RATE: 75 BPM | RESPIRATION RATE: 16 BRPM

## 2018-09-12 DIAGNOSIS — R11.0 NAUSEA: ICD-10-CM

## 2018-09-12 DIAGNOSIS — R10.13 EPIGASTRIC PAIN: Primary | ICD-10-CM

## 2018-09-12 DIAGNOSIS — K52.9 ILEITIS: ICD-10-CM

## 2018-09-12 PROCEDURE — 99214 OFFICE O/P EST MOD 30 MIN: CPT | Performed by: INTERNAL MEDICINE

## 2018-09-13 ENCOUNTER — OFFICE VISIT (OUTPATIENT)
Dept: INTERNAL MEDICINE | Facility: CLINIC | Age: 28
End: 2018-09-13

## 2018-09-13 VITALS
OXYGEN SATURATION: 99 % | SYSTOLIC BLOOD PRESSURE: 200 MMHG | DIASTOLIC BLOOD PRESSURE: 110 MMHG | HEIGHT: 66 IN | BODY MASS INDEX: 22.5 KG/M2 | TEMPERATURE: 98.6 F | WEIGHT: 140 LBS

## 2018-09-13 DIAGNOSIS — M32.9 SLE (SYSTEMIC LUPUS ERYTHEMATOSUS RELATED SYNDROME) (HCC): Primary | ICD-10-CM

## 2018-09-13 DIAGNOSIS — I10 RESISTANT HYPERTENSION: ICD-10-CM

## 2018-09-13 DIAGNOSIS — F41.8 ANXIETY ABOUT HEALTH: ICD-10-CM

## 2018-09-13 DIAGNOSIS — R79.82 ELEVATED C-REACTIVE PROTEIN (CRP): ICD-10-CM

## 2018-09-13 PROCEDURE — 99496 TRANSJ CARE MGMT HIGH F2F 7D: CPT | Performed by: INTERNAL MEDICINE

## 2018-09-13 RX ORDER — PREDNISONE 1 MG/1
5 TABLET ORAL DAILY
Qty: 30 TABLET | Refills: 0 | Status: SHIPPED | OUTPATIENT
Start: 2018-09-13 | End: 2018-10-11 | Stop reason: HOSPADM

## 2018-09-13 RX ORDER — CLONAZEPAM 0.5 MG/1
0.5 TABLET ORAL 2 TIMES DAILY PRN
Qty: 45 TABLET | Refills: 0 | Status: SHIPPED | OUTPATIENT
Start: 2018-09-13

## 2018-09-17 ENCOUNTER — READMISSION MANAGEMENT (OUTPATIENT)
Dept: CALL CENTER | Facility: HOSPITAL | Age: 28
End: 2018-09-17

## 2018-09-19 ENCOUNTER — READMISSION MANAGEMENT (OUTPATIENT)
Dept: CALL CENTER | Facility: HOSPITAL | Age: 28
End: 2018-09-19

## 2018-09-20 ENCOUNTER — READMISSION MANAGEMENT (OUTPATIENT)
Dept: CALL CENTER | Facility: HOSPITAL | Age: 28
End: 2018-09-20

## 2018-09-25 ENCOUNTER — READMISSION MANAGEMENT (OUTPATIENT)
Dept: CALL CENTER | Facility: HOSPITAL | Age: 28
End: 2018-09-25

## 2018-09-26 ENCOUNTER — OFFICE VISIT (OUTPATIENT)
Dept: NEUROLOGY | Facility: CLINIC | Age: 28
End: 2018-09-26

## 2018-09-26 VITALS
HEART RATE: 80 BPM | OXYGEN SATURATION: 100 % | HEIGHT: 66 IN | BODY MASS INDEX: 22.5 KG/M2 | DIASTOLIC BLOOD PRESSURE: 102 MMHG | WEIGHT: 140 LBS | SYSTOLIC BLOOD PRESSURE: 164 MMHG

## 2018-09-26 DIAGNOSIS — M54.6 CHRONIC RIGHT-SIDED THORACIC BACK PAIN: ICD-10-CM

## 2018-09-26 DIAGNOSIS — R53.1 WEAKNESS: ICD-10-CM

## 2018-09-26 DIAGNOSIS — R47.02 DYSPHASIA: ICD-10-CM

## 2018-09-26 DIAGNOSIS — I69.30 SEQUELAE, POST-STROKE: Primary | ICD-10-CM

## 2018-09-26 DIAGNOSIS — G89.29 CHRONIC RIGHT-SIDED THORACIC BACK PAIN: ICD-10-CM

## 2018-09-26 DIAGNOSIS — M54.2 NECK PAIN: ICD-10-CM

## 2018-09-26 PROCEDURE — 99214 OFFICE O/P EST MOD 30 MIN: CPT | Performed by: PSYCHIATRY & NEUROLOGY

## 2018-09-26 RX ORDER — NICARDIPINE HYDROCHLORIDE 20 MG/1
40 CAPSULE ORAL 3 TIMES DAILY PRN
Qty: 200 CAPSULE | Refills: 5 | Status: SHIPPED | OUTPATIENT
Start: 2018-09-26 | End: 2020-04-28 | Stop reason: ALTCHOICE

## 2018-09-27 ENCOUNTER — READMISSION MANAGEMENT (OUTPATIENT)
Dept: CALL CENTER | Facility: HOSPITAL | Age: 28
End: 2018-09-27

## 2018-09-28 ENCOUNTER — READMISSION MANAGEMENT (OUTPATIENT)
Dept: CALL CENTER | Facility: HOSPITAL | Age: 28
End: 2018-09-28

## 2018-09-28 LAB
ALBUMIN SERPL-MCNC: 3.3 G/DL (ref 3.5–5)
ALBUMIN/GLOB SERPL: 1.4 G/DL (ref 1–2)
ALDOST SERPL-MCNC: <1 NG/DL (ref 0–30)
ALP SERPL-CCNC: 52 U/L (ref 38–126)
ALT SERPL-CCNC: 24 U/L (ref 13–69)
AST SERPL-CCNC: 24 U/L (ref 15–46)
BASOPHILS # BLD AUTO: 0.02 10*3/MM3 (ref 0–0.2)
BASOPHILS NFR BLD AUTO: 0.3 % (ref 0–2.5)
BILIRUB SERPL-MCNC: 0.7 MG/DL (ref 0.2–1.3)
BUN SERPL-MCNC: 8 MG/DL (ref 7–20)
BUN/CREAT SERPL: 2.8 (ref 7.1–23.5)
CALCIUM SERPL-MCNC: 9.5 MG/DL (ref 8.4–10.2)
CHLORIDE SERPL-SCNC: 102 MMOL/L (ref 98–107)
CO2 SERPL-SCNC: 29 MMOL/L (ref 26–30)
CREAT SERPL-MCNC: 2.9 MG/DL (ref 0.6–1.3)
CRP SERPL-MCNC: <0.5 MG/DL (ref 0–1)
EOSINOPHIL # BLD AUTO: 0.18 10*3/MM3 (ref 0–0.7)
EOSINOPHIL NFR BLD AUTO: 3 % (ref 0–7)
ERYTHROCYTE [DISTWIDTH] IN BLOOD BY AUTOMATED COUNT: 19.4 % (ref 11.5–14.5)
GLOBULIN SER CALC-MCNC: 2.4 GM/DL
GLUCOSE SERPL-MCNC: 99 MG/DL (ref 74–98)
HCT VFR BLD AUTO: 31.6 % (ref 37–47)
HGB BLD-MCNC: 9.7 G/DL (ref 12–16)
IMM GRANULOCYTES # BLD: 0.11 10*3/MM3 (ref 0–0.06)
IMM GRANULOCYTES NFR BLD: 1.8 % (ref 0–0.6)
LYMPHOCYTES # BLD AUTO: 1.06 10*3/MM3 (ref 0.6–3.4)
LYMPHOCYTES NFR BLD AUTO: 17.4 % (ref 10–50)
MCH RBC QN AUTO: 28.5 PG (ref 27–31)
MCHC RBC AUTO-ENTMCNC: 30.7 G/DL (ref 30–37)
MCV RBC AUTO: 92.9 FL (ref 81–99)
MONOCYTES # BLD AUTO: 0.46 10*3/MM3 (ref 0–0.9)
MONOCYTES NFR BLD AUTO: 7.6 % (ref 0–12)
NEUTROPHILS # BLD AUTO: 4.25 10*3/MM3 (ref 2–6.9)
NEUTROPHILS NFR BLD AUTO: 69.9 % (ref 37–80)
NRBC BLD AUTO-RTO: 0 /100 WBC (ref 0–0)
PLATELET # BLD AUTO: 174 10*3/MM3 (ref 130–400)
POTASSIUM SERPL-SCNC: 3.5 MMOL/L (ref 3.5–5.1)
PROT SERPL-MCNC: 5.7 G/DL (ref 6.3–8.2)
RBC # BLD AUTO: 3.4 10*6/MM3 (ref 4.2–5.4)
RENIN PLAS-CCNC: 0.91 NG/ML/HR (ref 0.17–5.38)
SODIUM SERPL-SCNC: 138 MMOL/L (ref 137–145)
WBC # BLD AUTO: 6.08 10*3/MM3 (ref 4.8–10.8)

## 2018-10-08 ENCOUNTER — READMISSION MANAGEMENT (OUTPATIENT)
Dept: CALL CENTER | Facility: HOSPITAL | Age: 28
End: 2018-10-08

## 2018-10-08 ENCOUNTER — HOSPITAL ENCOUNTER (INPATIENT)
Facility: HOSPITAL | Age: 28
LOS: 3 days | Discharge: HOME | End: 2018-10-11
Attending: EMERGENCY MEDICINE | Admitting: INTERNAL MEDICINE

## 2018-10-08 ENCOUNTER — APPOINTMENT (OUTPATIENT)
Dept: GENERAL RADIOLOGY | Facility: HOSPITAL | Age: 28
End: 2018-10-08

## 2018-10-08 ENCOUNTER — APPOINTMENT (OUTPATIENT)
Dept: CT IMAGING | Facility: HOSPITAL | Age: 28
End: 2018-10-08

## 2018-10-08 DIAGNOSIS — R50.9 FEVER OF UNKNOWN ORIGIN: Primary | ICD-10-CM

## 2018-10-08 LAB
ALBUMIN SERPL-MCNC: 3.2 G/DL (ref 3.5–5)
ALBUMIN/GLOB SERPL: 1.1 G/DL (ref 1–2)
ALP SERPL-CCNC: 55 U/L (ref 38–126)
ALT SERPL W P-5'-P-CCNC: 35 U/L (ref 13–69)
ANION GAP SERPL CALCULATED.3IONS-SCNC: 15.5 MMOL/L (ref 10–20)
AST SERPL-CCNC: 26 U/L (ref 15–46)
B-HCG UR QL: NEGATIVE
BACTERIA UR QL AUTO: ABNORMAL /HPF
BASOPHILS # BLD AUTO: 0.02 10*3/MM3 (ref 0–0.2)
BASOPHILS NFR BLD AUTO: 0.2 % (ref 0–2.5)
BILIRUB SERPL-MCNC: 1.2 MG/DL (ref 0.2–1.3)
BILIRUB UR QL STRIP: NEGATIVE
BUN BLD-MCNC: 45 MG/DL (ref 7–20)
BUN/CREAT SERPL: 5.2 (ref 7.1–23.5)
CALCIUM SPEC-SCNC: 9.4 MG/DL (ref 8.4–10.2)
CHLORIDE SERPL-SCNC: 101 MMOL/L (ref 98–107)
CLARITY UR: ABNORMAL
CO2 SERPL-SCNC: 23 MMOL/L (ref 26–30)
COLOR UR: ABNORMAL
CREAT BLD-MCNC: 8.7 MG/DL (ref 0.6–1.3)
CRP SERPL-MCNC: 6.6 MG/DL (ref 0–1)
D-LACTATE SERPL-SCNC: 0.8 MMOL/L (ref 0.5–2)
DEPRECATED RDW RBC AUTO: 55.3 FL (ref 37–54)
EOSINOPHIL # BLD AUTO: 0.02 10*3/MM3 (ref 0–0.7)
EOSINOPHIL NFR BLD AUTO: 0.2 % (ref 0–7)
ERYTHROCYTE [DISTWIDTH] IN BLOOD BY AUTOMATED COUNT: 16.6 % (ref 11.5–14.5)
ERYTHROCYTE [SEDIMENTATION RATE] IN BLOOD: 15 MM/HR (ref 0–20)
FLUAV AG NPH QL: NEGATIVE
FLUBV AG NPH QL IA: NEGATIVE
GFR SERPL CREATININE-BSD FRML MDRD: 5 ML/MIN/1.73
GFR SERPL CREATININE-BSD FRML MDRD: ABNORMAL ML/MIN/1.73
GLOBULIN UR ELPH-MCNC: 3 GM/DL
GLUCOSE BLD-MCNC: 105 MG/DL (ref 74–98)
GLUCOSE UR STRIP-MCNC: ABNORMAL MG/DL
HCT VFR BLD AUTO: 27.5 % (ref 37–47)
HGB BLD-MCNC: 8.4 G/DL (ref 12–16)
HGB UR QL STRIP.AUTO: ABNORMAL
HOLD SPECIMEN: NORMAL
HOLD SPECIMEN: NORMAL
HYALINE CASTS UR QL AUTO: ABNORMAL /LPF
IMM GRANULOCYTES # BLD: 0.08 10*3/MM3 (ref 0–0.06)
IMM GRANULOCYTES NFR BLD: 0.9 % (ref 0–0.6)
KETONES UR QL STRIP: NEGATIVE
LEUKOCYTE ESTERASE UR QL STRIP.AUTO: ABNORMAL
LIPASE SERPL-CCNC: 40 U/L (ref 23–300)
LYMPHOCYTES # BLD AUTO: 0.35 10*3/MM3 (ref 0.6–3.4)
LYMPHOCYTES NFR BLD AUTO: 3.7 % (ref 10–50)
MCH RBC QN AUTO: 28 PG (ref 27–31)
MCHC RBC AUTO-ENTMCNC: 30.5 G/DL (ref 30–37)
MCV RBC AUTO: 91.7 FL (ref 81–99)
MONOCYTES # BLD AUTO: 0.47 10*3/MM3 (ref 0–0.9)
MONOCYTES NFR BLD AUTO: 5 % (ref 0–12)
NEUTROPHILS # BLD AUTO: 8.45 10*3/MM3 (ref 2–6.9)
NEUTROPHILS NFR BLD AUTO: 90 % (ref 37–80)
NITRITE UR QL STRIP: NEGATIVE
NRBC BLD MANUAL-RTO: 0 /100 WBC (ref 0–0)
PH UR STRIP.AUTO: >=9 [PH] (ref 5–8)
PLATELET # BLD AUTO: 80 10*3/MM3 (ref 130–400)
PMV BLD AUTO: 10.8 FL (ref 6–12)
POTASSIUM BLD-SCNC: 4.5 MMOL/L (ref 3.5–5.1)
PROCALCITONIN SERPL-MCNC: 14.86 NG/ML
PROT SERPL-MCNC: 6.2 G/DL (ref 6.3–8.2)
PROT UR QL STRIP: ABNORMAL
RBC # BLD AUTO: 3 10*6/MM3 (ref 4.2–5.4)
RBC # UR: ABNORMAL /HPF
REF LAB TEST METHOD: ABNORMAL
SODIUM BLD-SCNC: 135 MMOL/L (ref 137–145)
SP GR UR STRIP: 1.02 (ref 1–1.03)
SQUAMOUS #/AREA URNS HPF: ABNORMAL /HPF
TROPONIN I SERPL-MCNC: 0.09 NG/ML (ref 0–0.03)
TROPONIN I SERPL-MCNC: 0.14 NG/ML (ref 0–0.03)
UROBILINOGEN UR QL STRIP: ABNORMAL
WBC NRBC COR # BLD: 9.39 10*3/MM3 (ref 4.8–10.8)
WBC UR QL AUTO: ABNORMAL /HPF
WHOLE BLOOD HOLD SPECIMEN: NORMAL
WHOLE BLOOD HOLD SPECIMEN: NORMAL

## 2018-10-08 PROCEDURE — 83605 ASSAY OF LACTIC ACID: CPT | Performed by: EMERGENCY MEDICINE

## 2018-10-08 PROCEDURE — 85651 RBC SED RATE NONAUTOMATED: CPT | Performed by: FAMILY MEDICINE

## 2018-10-08 PROCEDURE — 87186 SC STD MICRODIL/AGAR DIL: CPT | Performed by: INTERNAL MEDICINE

## 2018-10-08 PROCEDURE — 81025 URINE PREGNANCY TEST: CPT | Performed by: EMERGENCY MEDICINE

## 2018-10-08 PROCEDURE — 87804 INFLUENZA ASSAY W/OPTIC: CPT | Performed by: EMERGENCY MEDICINE

## 2018-10-08 PROCEDURE — 63710000001 MYCOPHENOLATE MOFETIL PER 250 MG: Performed by: FAMILY MEDICINE

## 2018-10-08 PROCEDURE — 87186 SC STD MICRODIL/AGAR DIL: CPT | Performed by: EMERGENCY MEDICINE

## 2018-10-08 PROCEDURE — 84145 PROCALCITONIN (PCT): CPT | Performed by: FAMILY MEDICINE

## 2018-10-08 PROCEDURE — 85025 COMPLETE CBC W/AUTO DIFF WBC: CPT | Performed by: EMERGENCY MEDICINE

## 2018-10-08 PROCEDURE — 80074 ACUTE HEPATITIS PANEL: CPT | Performed by: INTERNAL MEDICINE

## 2018-10-08 PROCEDURE — 94640 AIRWAY INHALATION TREATMENT: CPT

## 2018-10-08 PROCEDURE — 84484 ASSAY OF TROPONIN QUANT: CPT | Performed by: EMERGENCY MEDICINE

## 2018-10-08 PROCEDURE — 87147 CULTURE TYPE IMMUNOLOGIC: CPT | Performed by: EMERGENCY MEDICINE

## 2018-10-08 PROCEDURE — 25010000002 HEPARIN (PORCINE) PER 1000 UNITS: Performed by: FAMILY MEDICINE

## 2018-10-08 PROCEDURE — 25010000002 KETOROLAC TROMETHAMINE PER 15 MG: Performed by: EMERGENCY MEDICINE

## 2018-10-08 PROCEDURE — 84484 ASSAY OF TROPONIN QUANT: CPT | Performed by: FAMILY MEDICINE

## 2018-10-08 PROCEDURE — 25010000002 AZITHROMYCIN: Performed by: FAMILY MEDICINE

## 2018-10-08 PROCEDURE — 87077 CULTURE AEROBIC IDENTIFY: CPT | Performed by: EMERGENCY MEDICINE

## 2018-10-08 PROCEDURE — 25010000002 METHYLPREDNISOLONE PER 125 MG: Performed by: FAMILY MEDICINE

## 2018-10-08 PROCEDURE — 94799 UNLISTED PULMONARY SVC/PX: CPT

## 2018-10-08 PROCEDURE — 5A1D70Z PERFORMANCE OF URINARY FILTRATION, INTERMITTENT, LESS THAN 6 HOURS PER DAY: ICD-10-PCS | Performed by: INTERNAL MEDICINE

## 2018-10-08 PROCEDURE — 83690 ASSAY OF LIPASE: CPT | Performed by: EMERGENCY MEDICINE

## 2018-10-08 PROCEDURE — 87077 CULTURE AEROBIC IDENTIFY: CPT | Performed by: INTERNAL MEDICINE

## 2018-10-08 PROCEDURE — 87147 CULTURE TYPE IMMUNOLOGIC: CPT | Performed by: INTERNAL MEDICINE

## 2018-10-08 PROCEDURE — 80053 COMPREHEN METABOLIC PANEL: CPT | Performed by: EMERGENCY MEDICINE

## 2018-10-08 PROCEDURE — 93005 ELECTROCARDIOGRAM TRACING: CPT | Performed by: EMERGENCY MEDICINE

## 2018-10-08 PROCEDURE — 99285 EMERGENCY DEPT VISIT HI MDM: CPT

## 2018-10-08 PROCEDURE — 90935 HEMODIALYSIS ONE EVALUATION: CPT

## 2018-10-08 PROCEDURE — 87040 BLOOD CULTURE FOR BACTERIA: CPT | Performed by: INTERNAL MEDICINE

## 2018-10-08 PROCEDURE — 86140 C-REACTIVE PROTEIN: CPT | Performed by: FAMILY MEDICINE

## 2018-10-08 PROCEDURE — 74176 CT ABD & PELVIS W/O CONTRAST: CPT

## 2018-10-08 PROCEDURE — 71046 X-RAY EXAM CHEST 2 VIEWS: CPT

## 2018-10-08 PROCEDURE — 99222 1ST HOSP IP/OBS MODERATE 55: CPT | Performed by: FAMILY MEDICINE

## 2018-10-08 PROCEDURE — 25010000002 PIPERACILLIN SOD-TAZOBACTAM PER 1 G: Performed by: EMERGENCY MEDICINE

## 2018-10-08 PROCEDURE — 81001 URINALYSIS AUTO W/SCOPE: CPT | Performed by: EMERGENCY MEDICINE

## 2018-10-08 PROCEDURE — 25010000002 PIPERACILLIN SOD-TAZOBACTAM PER 1 G: Performed by: FAMILY MEDICINE

## 2018-10-08 PROCEDURE — 25010000002 ONDANSETRON PER 1 MG

## 2018-10-08 PROCEDURE — 87040 BLOOD CULTURE FOR BACTERIA: CPT | Performed by: EMERGENCY MEDICINE

## 2018-10-08 PROCEDURE — 36591 DRAW BLOOD OFF VENOUS DEVICE: CPT

## 2018-10-08 PROCEDURE — 25010000002 ONDANSETRON PER 1 MG: Performed by: EMERGENCY MEDICINE

## 2018-10-08 PROCEDURE — 87205 SMEAR GRAM STAIN: CPT | Performed by: INTERNAL MEDICINE

## 2018-10-08 PROCEDURE — 25010000002 VANCOMYCIN PER 500 MG: Performed by: EMERGENCY MEDICINE

## 2018-10-08 RX ORDER — SODIUM CHLORIDE 0.9 % (FLUSH) 0.9 %
10 SYRINGE (ML) INJECTION AS NEEDED
Status: DISCONTINUED | OUTPATIENT
Start: 2018-10-08 | End: 2018-10-08

## 2018-10-08 RX ORDER — IPRATROPIUM BROMIDE AND ALBUTEROL SULFATE 2.5; .5 MG/3ML; MG/3ML
3 SOLUTION RESPIRATORY (INHALATION)
Status: DISCONTINUED | OUTPATIENT
Start: 2018-10-08 | End: 2018-10-11 | Stop reason: HOSPADM

## 2018-10-08 RX ORDER — ONDANSETRON 2 MG/ML
4 INJECTION INTRAMUSCULAR; INTRAVENOUS ONCE
Status: COMPLETED | OUTPATIENT
Start: 2018-10-08 | End: 2018-10-08

## 2018-10-08 RX ORDER — KETOROLAC TROMETHAMINE 30 MG/ML
15 INJECTION, SOLUTION INTRAMUSCULAR; INTRAVENOUS ONCE
Status: COMPLETED | OUTPATIENT
Start: 2018-10-08 | End: 2018-10-08

## 2018-10-08 RX ORDER — ONDANSETRON 4 MG/1
4 TABLET, FILM COATED ORAL EVERY 8 HOURS PRN
Status: DISCONTINUED | OUTPATIENT
Start: 2018-10-08 | End: 2018-10-11 | Stop reason: HOSPADM

## 2018-10-08 RX ORDER — SODIUM CHLORIDE 0.9 % (FLUSH) 0.9 %
10 SYRINGE (ML) INJECTION AS NEEDED
Status: DISCONTINUED | OUTPATIENT
Start: 2018-10-08 | End: 2018-10-11 | Stop reason: HOSPADM

## 2018-10-08 RX ORDER — NICARDIPINE HYDROCHLORIDE 20 MG/1
40 CAPSULE ORAL EVERY 8 HOURS SCHEDULED
Status: DISCONTINUED | OUTPATIENT
Start: 2018-10-08 | End: 2018-10-11 | Stop reason: HOSPADM

## 2018-10-08 RX ORDER — ONDANSETRON 2 MG/ML
4 INJECTION INTRAMUSCULAR; INTRAVENOUS EVERY 6 HOURS PRN
Status: DISCONTINUED | OUTPATIENT
Start: 2018-10-08 | End: 2018-10-11 | Stop reason: HOSPADM

## 2018-10-08 RX ORDER — ONDANSETRON 2 MG/ML
INJECTION INTRAMUSCULAR; INTRAVENOUS
Status: COMPLETED
Start: 2018-10-08 | End: 2018-10-08

## 2018-10-08 RX ORDER — METHYLPREDNISOLONE SODIUM SUCCINATE 40 MG/ML
40 INJECTION, POWDER, LYOPHILIZED, FOR SOLUTION INTRAMUSCULAR; INTRAVENOUS EVERY 12 HOURS
Status: DISCONTINUED | OUTPATIENT
Start: 2018-10-09 | End: 2018-10-10

## 2018-10-08 RX ORDER — IPRATROPIUM BROMIDE AND ALBUTEROL SULFATE 2.5; .5 MG/3ML; MG/3ML
3 SOLUTION RESPIRATORY (INHALATION) EVERY 4 HOURS PRN
Status: DISCONTINUED | OUTPATIENT
Start: 2018-10-08 | End: 2018-10-11 | Stop reason: HOSPADM

## 2018-10-08 RX ORDER — CYCLOBENZAPRINE HCL 10 MG
5 TABLET ORAL 3 TIMES DAILY PRN
Status: DISCONTINUED | OUTPATIENT
Start: 2018-10-08 | End: 2018-10-11 | Stop reason: HOSPADM

## 2018-10-08 RX ORDER — ACETAMINOPHEN 325 MG/1
650 TABLET ORAL ONCE
Status: COMPLETED | OUTPATIENT
Start: 2018-10-08 | End: 2018-10-08

## 2018-10-08 RX ORDER — GUAIFENESIN 600 MG/1
600 TABLET, EXTENDED RELEASE ORAL 2 TIMES DAILY
Status: DISCONTINUED | OUTPATIENT
Start: 2018-10-08 | End: 2018-10-11 | Stop reason: HOSPADM

## 2018-10-08 RX ORDER — SODIUM CHLORIDE 9 MG/ML
20 INJECTION, SOLUTION INTRAVENOUS CONTINUOUS
Status: DISCONTINUED | OUTPATIENT
Start: 2018-10-08 | End: 2018-10-10

## 2018-10-08 RX ORDER — CLONAZEPAM 0.5 MG/1
0.5 TABLET ORAL 2 TIMES DAILY PRN
Status: DISCONTINUED | OUTPATIENT
Start: 2018-10-08 | End: 2018-10-11 | Stop reason: HOSPADM

## 2018-10-08 RX ORDER — METHYLPREDNISOLONE SODIUM SUCCINATE 125 MG/2ML
80 INJECTION, POWDER, LYOPHILIZED, FOR SOLUTION INTRAMUSCULAR; INTRAVENOUS ONCE
Status: COMPLETED | OUTPATIENT
Start: 2018-10-08 | End: 2018-10-08

## 2018-10-08 RX ORDER — LOSARTAN POTASSIUM 50 MG/1
100 TABLET ORAL
Status: DISCONTINUED | OUTPATIENT
Start: 2018-10-09 | End: 2018-10-11 | Stop reason: HOSPADM

## 2018-10-08 RX ORDER — NEBIVOLOL 10 MG/1
20 TABLET ORAL DAILY
Status: DISCONTINUED | OUTPATIENT
Start: 2018-10-09 | End: 2018-10-11 | Stop reason: HOSPADM

## 2018-10-08 RX ORDER — HYDROXYCHLOROQUINE SULFATE 200 MG/1
200 TABLET, FILM COATED ORAL EVERY 12 HOURS SCHEDULED
Status: DISCONTINUED | OUTPATIENT
Start: 2018-10-08 | End: 2018-10-11 | Stop reason: HOSPADM

## 2018-10-08 RX ORDER — HEPARIN SODIUM 5000 [USP'U]/ML
5000 INJECTION, SOLUTION INTRAVENOUS; SUBCUTANEOUS EVERY 8 HOURS SCHEDULED
Status: DISCONTINUED | OUTPATIENT
Start: 2018-10-08 | End: 2018-10-11 | Stop reason: HOSPADM

## 2018-10-08 RX ORDER — PANTOPRAZOLE SODIUM 40 MG/1
40 TABLET, DELAYED RELEASE ORAL
Status: DISCONTINUED | OUTPATIENT
Start: 2018-10-08 | End: 2018-10-11 | Stop reason: HOSPADM

## 2018-10-08 RX ORDER — TERAZOSIN 5 MG/1
20 CAPSULE ORAL NIGHTLY
Status: DISCONTINUED | OUTPATIENT
Start: 2018-10-08 | End: 2018-10-11 | Stop reason: HOSPADM

## 2018-10-08 RX ORDER — FOLIC ACID/VIT B COMPLEX AND C 0.8 MG
1 TABLET ORAL DAILY
Status: DISCONTINUED | OUTPATIENT
Start: 2018-10-09 | End: 2018-10-11 | Stop reason: HOSPADM

## 2018-10-08 RX ORDER — MYCOPHENOLATE MOFETIL 250 MG/1
250 CAPSULE ORAL EVERY 12 HOURS SCHEDULED
Status: DISCONTINUED | OUTPATIENT
Start: 2018-10-08 | End: 2018-10-11 | Stop reason: HOSPADM

## 2018-10-08 RX ADMIN — SODIUM CHLORIDE 20 ML/HR: 9 INJECTION, SOLUTION INTRAVENOUS at 22:27

## 2018-10-08 RX ADMIN — PANTOPRAZOLE SODIUM 40 MG: 40 TABLET, DELAYED RELEASE ORAL at 16:43

## 2018-10-08 RX ADMIN — HEPARIN SODIUM 5000 UNITS: 5000 INJECTION, SOLUTION INTRAVENOUS; SUBCUTANEOUS at 22:27

## 2018-10-08 RX ADMIN — TAZOBACTAM SODIUM AND PIPERACILLIN SODIUM 4.5 G: 500; 4 INJECTION, SOLUTION INTRAVENOUS at 10:27

## 2018-10-08 RX ADMIN — KETOROLAC TROMETHAMINE 15 MG: 30 INJECTION, SOLUTION INTRAMUSCULAR at 11:09

## 2018-10-08 RX ADMIN — ACETAMINOPHEN 650 MG: 325 TABLET ORAL at 12:30

## 2018-10-08 RX ADMIN — TERAZOSIN HYDROCHLORIDE 20 MG: 5 CAPSULE ORAL at 21:03

## 2018-10-08 RX ADMIN — NICARDIPINE HYDROCHLORIDE 40 MG: 20 CAPSULE ORAL at 21:04

## 2018-10-08 RX ADMIN — METHYLPREDNISOLONE SODIUM SUCCINATE 80 MG: 125 INJECTION, POWDER, FOR SOLUTION INTRAMUSCULAR; INTRAVENOUS at 16:44

## 2018-10-08 RX ADMIN — ONDANSETRON 4 MG: 2 INJECTION INTRAMUSCULAR; INTRAVENOUS at 13:33

## 2018-10-08 RX ADMIN — MYCOPHENOLATE MOFETIL 250 MG: 250 CAPSULE ORAL at 21:03

## 2018-10-08 RX ADMIN — HYDROXYCHLOROQUINE SULFATE 200 MG: 200 TABLET, FILM COATED ORAL at 21:03

## 2018-10-08 RX ADMIN — CLONAZEPAM 0.5 MG: 0.5 TABLET ORAL at 16:43

## 2018-10-08 RX ADMIN — GUAIFENESIN 600 MG: 600 TABLET, EXTENDED RELEASE ORAL at 21:04

## 2018-10-08 RX ADMIN — ONDANSETRON 4 MG: 2 INJECTION, SOLUTION INTRAMUSCULAR; INTRAVENOUS at 09:57

## 2018-10-08 RX ADMIN — TAZOBACTAM SODIUM AND PIPERACILLIN SODIUM 3.38 G: 375; 3 INJECTION, SOLUTION INTRAVENOUS at 22:10

## 2018-10-08 RX ADMIN — NICARDIPINE HYDROCHLORIDE 40 MG: 20 CAPSULE ORAL at 16:43

## 2018-10-08 RX ADMIN — CYCLOBENZAPRINE HYDROCHLORIDE 5 MG: 10 TABLET, FILM COATED ORAL at 21:10

## 2018-10-08 RX ADMIN — IPRATROPIUM BROMIDE AND ALBUTEROL SULFATE 3 ML: .5; 3 SOLUTION RESPIRATORY (INHALATION) at 15:34

## 2018-10-08 RX ADMIN — AZITHROMYCIN 500 MG: 500 INJECTION, POWDER, LYOPHILIZED, FOR SOLUTION INTRAVENOUS at 21:03

## 2018-10-08 RX ADMIN — IPRATROPIUM BROMIDE AND ALBUTEROL SULFATE 3 ML: .5; 3 SOLUTION RESPIRATORY (INHALATION) at 20:39

## 2018-10-08 RX ADMIN — VANCOMYCIN HYDROCHLORIDE 1500 MG: 500 INJECTION, POWDER, LYOPHILIZED, FOR SOLUTION INTRAVENOUS at 11:28

## 2018-10-08 RX ADMIN — SODIUM CHLORIDE 1000 ML: 9 INJECTION, SOLUTION INTRAVENOUS at 09:58

## 2018-10-09 ENCOUNTER — EPISODE CHANGES (OUTPATIENT)
Dept: CASE MANAGEMENT | Facility: OTHER | Age: 28
End: 2018-10-09

## 2018-10-09 ENCOUNTER — EPISODE CHANGES (OUTPATIENT)
Dept: SOCIAL WORK | Facility: HOSPITAL | Age: 28
End: 2018-10-09

## 2018-10-09 LAB
ALBUMIN SERPL-MCNC: 3.1 G/DL (ref 3.5–5)
ALBUMIN/GLOB SERPL: 1.1 G/DL (ref 1–2)
ALP SERPL-CCNC: 51 U/L (ref 38–126)
ALT SERPL W P-5'-P-CCNC: 36 U/L (ref 13–69)
ANION GAP SERPL CALCULATED.3IONS-SCNC: 13.7 MMOL/L (ref 10–20)
ANION GAP SERPL CALCULATED.3IONS-SCNC: 17.6 MMOL/L (ref 10–20)
ANISOCYTOSIS BLD QL: ABNORMAL
AST SERPL-CCNC: 22 U/L (ref 15–46)
BILIRUB SERPL-MCNC: 0.9 MG/DL (ref 0.2–1.3)
BUN BLD-MCNC: 32 MG/DL (ref 7–20)
BUN BLD-MCNC: 40 MG/DL (ref 7–20)
BUN/CREAT SERPL: 5.4 (ref 7.1–23.5)
BUN/CREAT SERPL: 6.3 (ref 7.1–23.5)
CALCIUM SPEC-SCNC: 9.2 MG/DL (ref 8.4–10.2)
CALCIUM SPEC-SCNC: 9.6 MG/DL (ref 8.4–10.2)
CHLORIDE SERPL-SCNC: 101 MMOL/L (ref 98–107)
CHLORIDE SERPL-SCNC: 99 MMOL/L (ref 98–107)
CO2 SERPL-SCNC: 24 MMOL/L (ref 26–30)
CO2 SERPL-SCNC: 27 MMOL/L (ref 26–30)
CREAT BLD-MCNC: 5.9 MG/DL (ref 0.6–1.3)
CREAT BLD-MCNC: 6.4 MG/DL (ref 0.6–1.3)
CRP SERPL-MCNC: 23.7 MG/DL (ref 0–1)
DEPRECATED RDW RBC AUTO: 54.9 FL (ref 37–54)
ERYTHROCYTE [DISTWIDTH] IN BLOOD BY AUTOMATED COUNT: 16.3 % (ref 11.5–14.5)
GFR SERPL CREATININE-BSD FRML MDRD: 8 ML/MIN/1.73
GFR SERPL CREATININE-BSD FRML MDRD: 9 ML/MIN/1.73
GFR SERPL CREATININE-BSD FRML MDRD: ABNORMAL ML/MIN/1.73
GFR SERPL CREATININE-BSD FRML MDRD: ABNORMAL ML/MIN/1.73
GLOBULIN UR ELPH-MCNC: 2.9 GM/DL
GLUCOSE BLD-MCNC: 126 MG/DL (ref 74–98)
GLUCOSE BLD-MCNC: 140 MG/DL (ref 74–98)
HCT VFR BLD AUTO: 28.3 % (ref 37–47)
HGB BLD-MCNC: 8.5 G/DL (ref 12–16)
LYMPHOCYTES # BLD MANUAL: 0.24 10*3/MM3 (ref 0.6–3.4)
LYMPHOCYTES NFR BLD MANUAL: 6 % (ref 10–50)
MAGNESIUM SERPL-MCNC: 2.1 MG/DL (ref 1.6–2.3)
MCH RBC QN AUTO: 27.3 PG (ref 27–31)
MCHC RBC AUTO-ENTMCNC: 30 G/DL (ref 30–37)
MCV RBC AUTO: 91 FL (ref 81–99)
METAMYELOCYTES NFR BLD MANUAL: 3 % (ref 0–0)
NEUTROPHILS # BLD AUTO: 3.6 10*3/MM3 (ref 2–6.9)
NEUTROPHILS NFR BLD MANUAL: 79 % (ref 37–80)
NEUTS BAND NFR BLD MANUAL: 12 % (ref 0–6)
PLATELET # BLD AUTO: 85 10*3/MM3 (ref 130–400)
PMV BLD AUTO: 13 FL (ref 6–12)
POTASSIUM BLD-SCNC: 4.6 MMOL/L (ref 3.5–5.1)
POTASSIUM BLD-SCNC: 4.7 MMOL/L (ref 3.5–5.1)
PROT SERPL-MCNC: 6 G/DL (ref 6.3–8.2)
RBC # BLD AUTO: 3.11 10*6/MM3 (ref 4.2–5.4)
SCAN SLIDE: NORMAL
SMALL PLATELETS BLD QL SMEAR: ABNORMAL
SODIUM BLD-SCNC: 136 MMOL/L (ref 137–145)
SODIUM BLD-SCNC: 137 MMOL/L (ref 137–145)
TROPONIN I SERPL-MCNC: 0.19 NG/ML (ref 0–0.03)
VANCOMYCIN SERPL-MCNC: 20.93 MCG/ML (ref 5–40)
WBC MORPH BLD: NORMAL
WBC NRBC COR # BLD: 3.96 10*3/MM3 (ref 4.8–10.8)

## 2018-10-09 PROCEDURE — 87147 CULTURE TYPE IMMUNOLOGIC: CPT | Performed by: INTERNAL MEDICINE

## 2018-10-09 PROCEDURE — 99232 SBSQ HOSP IP/OBS MODERATE 35: CPT | Performed by: INTERNAL MEDICINE

## 2018-10-09 PROCEDURE — 25010000002 AZITHROMYCIN: Performed by: FAMILY MEDICINE

## 2018-10-09 PROCEDURE — 63710000001 MYCOPHENOLATE MOFETIL PER 250 MG: Performed by: FAMILY MEDICINE

## 2018-10-09 PROCEDURE — 25010000002 HEPARIN (PORCINE) PER 1000 UNITS: Performed by: FAMILY MEDICINE

## 2018-10-09 PROCEDURE — 25010000002 PIPERACILLIN SOD-TAZOBACTAM PER 1 G: Performed by: FAMILY MEDICINE

## 2018-10-09 PROCEDURE — 85007 BL SMEAR W/DIFF WBC COUNT: CPT | Performed by: FAMILY MEDICINE

## 2018-10-09 PROCEDURE — 87899 AGENT NOS ASSAY W/OPTIC: CPT | Performed by: FAMILY MEDICINE

## 2018-10-09 PROCEDURE — 94799 UNLISTED PULMONARY SVC/PX: CPT

## 2018-10-09 PROCEDURE — 80202 ASSAY OF VANCOMYCIN: CPT | Performed by: FAMILY MEDICINE

## 2018-10-09 PROCEDURE — 85025 COMPLETE CBC W/AUTO DIFF WBC: CPT | Performed by: FAMILY MEDICINE

## 2018-10-09 PROCEDURE — 87040 BLOOD CULTURE FOR BACTERIA: CPT | Performed by: INTERNAL MEDICINE

## 2018-10-09 PROCEDURE — 86140 C-REACTIVE PROTEIN: CPT | Performed by: FAMILY MEDICINE

## 2018-10-09 PROCEDURE — 87186 SC STD MICRODIL/AGAR DIL: CPT | Performed by: INTERNAL MEDICINE

## 2018-10-09 PROCEDURE — 83735 ASSAY OF MAGNESIUM: CPT | Performed by: FAMILY MEDICINE

## 2018-10-09 PROCEDURE — 84484 ASSAY OF TROPONIN QUANT: CPT | Performed by: FAMILY MEDICINE

## 2018-10-09 PROCEDURE — 25010000002 METHYLPREDNISOLONE PER 40 MG: Performed by: FAMILY MEDICINE

## 2018-10-09 PROCEDURE — 87077 CULTURE AEROBIC IDENTIFY: CPT | Performed by: INTERNAL MEDICINE

## 2018-10-09 PROCEDURE — 80053 COMPREHEN METABOLIC PANEL: CPT | Performed by: FAMILY MEDICINE

## 2018-10-09 PROCEDURE — 87449 NOS EACH ORGANISM AG IA: CPT | Performed by: FAMILY MEDICINE

## 2018-10-09 PROCEDURE — 63710000001 DIPHENHYDRAMINE PER 50 MG: Performed by: INTERNAL MEDICINE

## 2018-10-09 RX ORDER — DIPHENHYDRAMINE HCL 25 MG
25 CAPSULE ORAL EVERY 6 HOURS PRN
Status: DISCONTINUED | OUTPATIENT
Start: 2018-10-09 | End: 2018-10-11 | Stop reason: HOSPADM

## 2018-10-09 RX ORDER — DOCUSATE SODIUM 100 MG/1
100 CAPSULE, LIQUID FILLED ORAL 2 TIMES DAILY
Status: DISCONTINUED | OUTPATIENT
Start: 2018-10-09 | End: 2018-10-11 | Stop reason: HOSPADM

## 2018-10-09 RX ORDER — CLONIDINE HYDROCHLORIDE 0.1 MG/1
0.1 TABLET ORAL EVERY 4 HOURS PRN
Status: DISCONTINUED | OUTPATIENT
Start: 2018-10-09 | End: 2018-10-11 | Stop reason: HOSPADM

## 2018-10-09 RX ADMIN — Medication 1 TABLET: at 08:44

## 2018-10-09 RX ADMIN — IPRATROPIUM BROMIDE AND ALBUTEROL SULFATE 3 ML: .5; 3 SOLUTION RESPIRATORY (INHALATION) at 19:12

## 2018-10-09 RX ADMIN — CYCLOBENZAPRINE HYDROCHLORIDE 5 MG: 10 TABLET, FILM COATED ORAL at 21:25

## 2018-10-09 RX ADMIN — METHYLPREDNISOLONE SODIUM SUCCINATE 40 MG: 40 INJECTION, POWDER, FOR SOLUTION INTRAMUSCULAR; INTRAVENOUS at 16:56

## 2018-10-09 RX ADMIN — TAZOBACTAM SODIUM AND PIPERACILLIN SODIUM 3.38 G: 375; 3 INJECTION, SOLUTION INTRAVENOUS at 11:15

## 2018-10-09 RX ADMIN — AZITHROMYCIN 500 MG: 500 INJECTION, POWDER, LYOPHILIZED, FOR SOLUTION INTRAVENOUS at 21:24

## 2018-10-09 RX ADMIN — IPRATROPIUM BROMIDE AND ALBUTEROL SULFATE 3 ML: .5; 3 SOLUTION RESPIRATORY (INHALATION) at 13:08

## 2018-10-09 RX ADMIN — NICARDIPINE HYDROCHLORIDE 40 MG: 20 CAPSULE ORAL at 06:21

## 2018-10-09 RX ADMIN — METHYLPREDNISOLONE SODIUM SUCCINATE 40 MG: 40 INJECTION, POWDER, FOR SOLUTION INTRAMUSCULAR; INTRAVENOUS at 06:21

## 2018-10-09 RX ADMIN — GUAIFENESIN 600 MG: 600 TABLET, EXTENDED RELEASE ORAL at 21:26

## 2018-10-09 RX ADMIN — HYDROXYCHLOROQUINE SULFATE 200 MG: 200 TABLET, FILM COATED ORAL at 08:44

## 2018-10-09 RX ADMIN — NICARDIPINE HYDROCHLORIDE 40 MG: 20 CAPSULE ORAL at 13:03

## 2018-10-09 RX ADMIN — HYDROXYCHLOROQUINE SULFATE 200 MG: 200 TABLET, FILM COATED ORAL at 21:26

## 2018-10-09 RX ADMIN — LOSARTAN POTASSIUM 100 MG: 50 TABLET, FILM COATED ORAL at 08:44

## 2018-10-09 RX ADMIN — ONDANSETRON 4 MG: 4 TABLET, FILM COATED ORAL at 22:30

## 2018-10-09 RX ADMIN — TERAZOSIN HYDROCHLORIDE 20 MG: 5 CAPSULE ORAL at 21:25

## 2018-10-09 RX ADMIN — PANTOPRAZOLE SODIUM 40 MG: 40 TABLET, DELAYED RELEASE ORAL at 06:20

## 2018-10-09 RX ADMIN — PANTOPRAZOLE SODIUM 40 MG: 40 TABLET, DELAYED RELEASE ORAL at 16:56

## 2018-10-09 RX ADMIN — CLONAZEPAM 0.5 MG: 0.5 TABLET ORAL at 22:30

## 2018-10-09 RX ADMIN — TAZOBACTAM SODIUM AND PIPERACILLIN SODIUM 3.38 G: 375; 3 INJECTION, SOLUTION INTRAVENOUS at 21:30

## 2018-10-09 RX ADMIN — MYCOPHENOLATE MOFETIL 250 MG: 250 CAPSULE ORAL at 21:26

## 2018-10-09 RX ADMIN — DOCUSATE SODIUM 100 MG: 100 CAPSULE, LIQUID FILLED ORAL at 23:56

## 2018-10-09 RX ADMIN — NICARDIPINE HYDROCHLORIDE 40 MG: 20 CAPSULE ORAL at 21:26

## 2018-10-09 RX ADMIN — MYCOPHENOLATE MOFETIL 250 MG: 250 CAPSULE ORAL at 08:44

## 2018-10-09 RX ADMIN — DIPHENHYDRAMINE HYDROCHLORIDE 25 MG: 25 CAPSULE ORAL at 09:34

## 2018-10-09 RX ADMIN — IPRATROPIUM BROMIDE AND ALBUTEROL SULFATE 3 ML: .5; 3 SOLUTION RESPIRATORY (INHALATION) at 07:02

## 2018-10-09 RX ADMIN — GUAIFENESIN 600 MG: 600 TABLET, EXTENDED RELEASE ORAL at 08:44

## 2018-10-09 RX ADMIN — CLONIDINE HYDROCHLORIDE 0.1 MG: 0.1 TABLET ORAL at 17:29

## 2018-10-09 RX ADMIN — DIPHENHYDRAMINE HYDROCHLORIDE 25 MG: 25 CAPSULE ORAL at 21:25

## 2018-10-10 PROBLEM — B95.61 BACTEREMIA DUE TO STAPHYLOCOCCUS AUREUS: Status: ACTIVE | Noted: 2018-10-10

## 2018-10-10 PROBLEM — R78.81 BACTEREMIA DUE TO STAPHYLOCOCCUS AUREUS: Status: ACTIVE | Noted: 2018-10-10

## 2018-10-10 LAB
BASOPHILS # BLD AUTO: 0.01 10*3/MM3 (ref 0–0.2)
BASOPHILS NFR BLD AUTO: 0.2 % (ref 0–2.5)
CRP SERPL-MCNC: 15.7 MG/DL (ref 0–1)
DEPRECATED RDW RBC AUTO: 54.4 FL (ref 37–54)
EOSINOPHIL # BLD AUTO: 0 10*3/MM3 (ref 0–0.7)
EOSINOPHIL NFR BLD AUTO: 0 % (ref 0–7)
ERYTHROCYTE [DISTWIDTH] IN BLOOD BY AUTOMATED COUNT: 16.3 % (ref 11.5–14.5)
HAV IGM SERPL QL IA: NEGATIVE
HBV CORE IGM SERPL QL IA: NEGATIVE
HBV SURFACE AG SERPL QL IA: NEGATIVE
HCT VFR BLD AUTO: 28.5 % (ref 37–47)
HCV AB S/CO SERPL IA: <0.1 S/CO RATIO (ref 0–0.9)
HGB BLD-MCNC: 8.8 G/DL (ref 12–16)
IMM GRANULOCYTES # BLD: 0.03 10*3/MM3 (ref 0–0.06)
IMM GRANULOCYTES NFR BLD: 0.5 % (ref 0–0.6)
LYMPHOCYTES # BLD AUTO: 0.02 10*3/MM3 (ref 0.6–3.4)
LYMPHOCYTES NFR BLD AUTO: 0.4 % (ref 10–50)
MCH RBC QN AUTO: 27.8 PG (ref 27–31)
MCHC RBC AUTO-ENTMCNC: 30.9 G/DL (ref 30–37)
MCV RBC AUTO: 90.2 FL (ref 81–99)
MONOCYTES # BLD AUTO: 0.83 10*3/MM3 (ref 0–0.9)
MONOCYTES NFR BLD AUTO: 14.7 % (ref 0–12)
NEUTROPHILS # BLD AUTO: 4.74 10*3/MM3 (ref 2–6.9)
NEUTROPHILS NFR BLD AUTO: 84.2 % (ref 37–80)
NRBC BLD MANUAL-RTO: 0 /100 WBC (ref 0–0)
PLATELET # BLD AUTO: 113 10*3/MM3 (ref 130–400)
PMV BLD AUTO: 12.1 FL (ref 6–12)
RBC # BLD AUTO: 3.16 10*6/MM3 (ref 4.2–5.4)
WBC NRBC COR # BLD: 5.63 10*3/MM3 (ref 4.8–10.8)

## 2018-10-10 PROCEDURE — 99232 SBSQ HOSP IP/OBS MODERATE 35: CPT | Performed by: INTERNAL MEDICINE

## 2018-10-10 PROCEDURE — 86140 C-REACTIVE PROTEIN: CPT | Performed by: INTERNAL MEDICINE

## 2018-10-10 PROCEDURE — 63710000001 MYCOPHENOLATE MOFETIL PER 250 MG: Performed by: FAMILY MEDICINE

## 2018-10-10 PROCEDURE — 63710000001 DIPHENHYDRAMINE PER 50 MG: Performed by: INTERNAL MEDICINE

## 2018-10-10 PROCEDURE — 25010000002 PIPERACILLIN SOD-TAZOBACTAM PER 1 G: Performed by: FAMILY MEDICINE

## 2018-10-10 PROCEDURE — 25010000002 METHYLPREDNISOLONE PER 40 MG: Performed by: FAMILY MEDICINE

## 2018-10-10 PROCEDURE — 85025 COMPLETE CBC W/AUTO DIFF WBC: CPT | Performed by: INTERNAL MEDICINE

## 2018-10-10 PROCEDURE — 25010000002 AZITHROMYCIN: Performed by: FAMILY MEDICINE

## 2018-10-10 RX ORDER — L.ACID,PARA/B.BIFIDUM/S.THERM 8B CELL
1 CAPSULE ORAL 2 TIMES DAILY
Status: DISCONTINUED | OUTPATIENT
Start: 2018-10-10 | End: 2018-10-11 | Stop reason: HOSPADM

## 2018-10-10 RX ADMIN — NICARDIPINE HYDROCHLORIDE 40 MG: 20 CAPSULE ORAL at 06:02

## 2018-10-10 RX ADMIN — Medication 1 TABLET: at 09:12

## 2018-10-10 RX ADMIN — TAZOBACTAM SODIUM AND PIPERACILLIN SODIUM 3.38 G: 375; 3 INJECTION, SOLUTION INTRAVENOUS at 22:23

## 2018-10-10 RX ADMIN — NICARDIPINE HYDROCHLORIDE 40 MG: 20 CAPSULE ORAL at 18:36

## 2018-10-10 RX ADMIN — DIPHENHYDRAMINE HYDROCHLORIDE 25 MG: 25 CAPSULE ORAL at 21:05

## 2018-10-10 RX ADMIN — MYCOPHENOLATE MOFETIL 250 MG: 250 CAPSULE ORAL at 20:54

## 2018-10-10 RX ADMIN — TERAZOSIN HYDROCHLORIDE 20 MG: 5 CAPSULE ORAL at 20:54

## 2018-10-10 RX ADMIN — GUAIFENESIN 600 MG: 600 TABLET, EXTENDED RELEASE ORAL at 09:12

## 2018-10-10 RX ADMIN — GUAIFENESIN 600 MG: 600 TABLET, EXTENDED RELEASE ORAL at 20:54

## 2018-10-10 RX ADMIN — CLONAZEPAM 0.5 MG: 0.5 TABLET ORAL at 12:45

## 2018-10-10 RX ADMIN — NICARDIPINE HYDROCHLORIDE 40 MG: 20 CAPSULE ORAL at 13:00

## 2018-10-10 RX ADMIN — AZITHROMYCIN 500 MG: 500 INJECTION, POWDER, LYOPHILIZED, FOR SOLUTION INTRAVENOUS at 20:53

## 2018-10-10 RX ADMIN — TAZOBACTAM SODIUM AND PIPERACILLIN SODIUM 3.38 G: 375; 3 INJECTION, SOLUTION INTRAVENOUS at 09:32

## 2018-10-10 RX ADMIN — PANTOPRAZOLE SODIUM 40 MG: 40 TABLET, DELAYED RELEASE ORAL at 06:02

## 2018-10-10 RX ADMIN — CLONAZEPAM 0.5 MG: 0.5 TABLET ORAL at 20:57

## 2018-10-10 RX ADMIN — CLONIDINE HYDROCHLORIDE 0.1 MG: 0.1 TABLET ORAL at 17:15

## 2018-10-10 RX ADMIN — METHYLPREDNISOLONE SODIUM SUCCINATE 40 MG: 40 INJECTION, POWDER, FOR SOLUTION INTRAMUSCULAR; INTRAVENOUS at 06:01

## 2018-10-10 RX ADMIN — Medication 1 CAPSULE: at 23:13

## 2018-10-10 RX ADMIN — LOSARTAN POTASSIUM 100 MG: 50 TABLET, FILM COATED ORAL at 09:12

## 2018-10-10 RX ADMIN — NEBIVOLOL HYDROCHLORIDE 20 MG: 10 TABLET ORAL at 09:11

## 2018-10-10 RX ADMIN — Medication 10 ML: at 20:55

## 2018-10-10 RX ADMIN — NICARDIPINE HYDROCHLORIDE 40 MG: 20 CAPSULE ORAL at 21:00

## 2018-10-10 RX ADMIN — HYDROXYCHLOROQUINE SULFATE 200 MG: 200 TABLET, FILM COATED ORAL at 09:12

## 2018-10-10 RX ADMIN — MYCOPHENOLATE MOFETIL 250 MG: 250 CAPSULE ORAL at 09:12

## 2018-10-10 RX ADMIN — CLONIDINE HYDROCHLORIDE 0.1 MG: 0.1 TABLET ORAL at 23:13

## 2018-10-10 RX ADMIN — CLONIDINE HYDROCHLORIDE 0.1 MG: 0.1 TABLET ORAL at 11:30

## 2018-10-10 RX ADMIN — CLONIDINE 1 PATCH: 0.3 PATCH, EXTENDED RELEASE TRANSDERMAL at 09:25

## 2018-10-10 RX ADMIN — CLONAZEPAM 0.5 MG: 0.5 TABLET ORAL at 10:26

## 2018-10-10 RX ADMIN — HYDROXYCHLOROQUINE SULFATE 200 MG: 200 TABLET, FILM COATED ORAL at 20:55

## 2018-10-10 RX ADMIN — PANTOPRAZOLE SODIUM 40 MG: 40 TABLET, DELAYED RELEASE ORAL at 15:30

## 2018-10-11 VITALS
RESPIRATION RATE: 16 BRPM | DIASTOLIC BLOOD PRESSURE: 111 MMHG | HEIGHT: 67 IN | WEIGHT: 152.34 LBS | SYSTOLIC BLOOD PRESSURE: 173 MMHG | BODY MASS INDEX: 23.91 KG/M2 | TEMPERATURE: 97.6 F | OXYGEN SATURATION: 98 % | HEART RATE: 74 BPM

## 2018-10-11 LAB
ALBUMIN SERPL-MCNC: 2.9 G/DL (ref 3.5–5)
ANION GAP SERPL CALCULATED.3IONS-SCNC: 18.9 MMOL/L (ref 10–20)
BACTERIA SPEC AEROBE CULT: ABNORMAL
BASOPHILS # BLD AUTO: 0.01 10*3/MM3 (ref 0–0.2)
BASOPHILS NFR BLD AUTO: 0.2 % (ref 0–2.5)
BUN BLD-MCNC: 78 MG/DL (ref 7–20)
BUN/CREAT SERPL: 8.6 (ref 7.1–23.5)
CALCIUM SPEC-SCNC: 9.1 MG/DL (ref 8.4–10.2)
CHLORIDE SERPL-SCNC: 104 MMOL/L (ref 98–107)
CO2 SERPL-SCNC: 19 MMOL/L (ref 26–30)
CREAT BLD-MCNC: 9.1 MG/DL (ref 0.6–1.3)
DEPRECATED RDW RBC AUTO: 56.3 FL (ref 37–54)
EOSINOPHIL # BLD AUTO: 0.02 10*3/MM3 (ref 0–0.7)
EOSINOPHIL NFR BLD AUTO: 0.3 % (ref 0–7)
ERYTHROCYTE [DISTWIDTH] IN BLOOD BY AUTOMATED COUNT: 16.6 % (ref 11.5–14.5)
GFR SERPL CREATININE-BSD FRML MDRD: 5 ML/MIN/1.73
GFR SERPL CREATININE-BSD FRML MDRD: ABNORMAL ML/MIN/1.73
GLUCOSE BLD-MCNC: 121 MG/DL (ref 74–98)
GRAM STN SPEC: ABNORMAL
GRAM STN SPEC: ABNORMAL
HCT VFR BLD AUTO: 31.5 % (ref 37–47)
HGB BLD-MCNC: 9.5 G/DL (ref 12–16)
IMM GRANULOCYTES # BLD: 0.08 10*3/MM3 (ref 0–0.06)
IMM GRANULOCYTES NFR BLD: 1.4 % (ref 0–0.6)
ISOLATED FROM: ABNORMAL
LYMPHOCYTES # BLD AUTO: 1 10*3/MM3 (ref 0.6–3.4)
LYMPHOCYTES NFR BLD AUTO: 16.9 % (ref 10–50)
MCH RBC QN AUTO: 27.9 PG (ref 27–31)
MCHC RBC AUTO-ENTMCNC: 30.2 G/DL (ref 30–37)
MCV RBC AUTO: 92.4 FL (ref 81–99)
MONOCYTES # BLD AUTO: 0.6 10*3/MM3 (ref 0–0.9)
MONOCYTES NFR BLD AUTO: 10.2 % (ref 0–12)
NEUTROPHILS # BLD AUTO: 4.2 10*3/MM3 (ref 2–6.9)
NEUTROPHILS NFR BLD AUTO: 71 % (ref 37–80)
NRBC BLD MANUAL-RTO: 0 /100 WBC (ref 0–0)
PHOSPHATE SERPL-MCNC: 9 MG/DL (ref 2.5–4.5)
PLATELET # BLD AUTO: 160 10*3/MM3 (ref 130–400)
PMV BLD AUTO: 11.2 FL (ref 6–12)
POTASSIUM BLD-SCNC: 3.9 MMOL/L (ref 3.5–5.1)
RBC # BLD AUTO: 3.41 10*6/MM3 (ref 4.2–5.4)
SODIUM BLD-SCNC: 138 MMOL/L (ref 137–145)
WBC NRBC COR # BLD: 5.91 10*3/MM3 (ref 4.8–10.8)

## 2018-10-11 PROCEDURE — 99239 HOSP IP/OBS DSCHRG MGMT >30: CPT | Performed by: INTERNAL MEDICINE

## 2018-10-11 PROCEDURE — 36591 DRAW BLOOD OFF VENOUS DEVICE: CPT

## 2018-10-11 PROCEDURE — 90935 HEMODIALYSIS ONE EVALUATION: CPT

## 2018-10-11 PROCEDURE — 85025 COMPLETE CBC W/AUTO DIFF WBC: CPT | Performed by: INTERNAL MEDICINE

## 2018-10-11 PROCEDURE — 5A1D70Z PERFORMANCE OF URINARY FILTRATION, INTERMITTENT, LESS THAN 6 HOURS PER DAY: ICD-10-PCS | Performed by: INTERNAL MEDICINE

## 2018-10-11 PROCEDURE — 25010000002 DAPTOMYCIN PER 1 MG: Performed by: INTERNAL MEDICINE

## 2018-10-11 PROCEDURE — 80069 RENAL FUNCTION PANEL: CPT | Performed by: INTERNAL MEDICINE

## 2018-10-11 PROCEDURE — 63710000001 MYCOPHENOLATE MOFETIL PER 250 MG: Performed by: FAMILY MEDICINE

## 2018-10-11 RX ADMIN — NEBIVOLOL HYDROCHLORIDE 20 MG: 10 TABLET ORAL at 08:52

## 2018-10-11 RX ADMIN — NICARDIPINE HYDROCHLORIDE 40 MG: 20 CAPSULE ORAL at 05:51

## 2018-10-11 RX ADMIN — PANTOPRAZOLE SODIUM 40 MG: 40 TABLET, DELAYED RELEASE ORAL at 05:51

## 2018-10-11 RX ADMIN — HYDROXYCHLOROQUINE SULFATE 200 MG: 200 TABLET, FILM COATED ORAL at 08:52

## 2018-10-11 RX ADMIN — Medication 1 CAPSULE: at 08:52

## 2018-10-11 RX ADMIN — CLONIDINE HYDROCHLORIDE 0.1 MG: 0.1 TABLET ORAL at 11:55

## 2018-10-11 RX ADMIN — LOSARTAN POTASSIUM 100 MG: 50 TABLET, FILM COATED ORAL at 08:52

## 2018-10-11 RX ADMIN — CLONIDINE HYDROCHLORIDE 0.1 MG: 0.1 TABLET ORAL at 04:03

## 2018-10-11 RX ADMIN — CLONAZEPAM 0.5 MG: 0.5 TABLET ORAL at 09:01

## 2018-10-11 RX ADMIN — Medication 1 TABLET: at 08:52

## 2018-10-11 RX ADMIN — MYCOPHENOLATE MOFETIL 250 MG: 250 CAPSULE ORAL at 08:52

## 2018-10-11 RX ADMIN — DAPTOMYCIN 500 MG: 500 INJECTION, POWDER, LYOPHILIZED, FOR SOLUTION INTRAVENOUS at 12:04

## 2018-10-11 RX ADMIN — GUAIFENESIN 600 MG: 600 TABLET, EXTENDED RELEASE ORAL at 08:52

## 2018-10-12 ENCOUNTER — TRANSITIONAL CARE MANAGEMENT TELEPHONE ENCOUNTER (OUTPATIENT)
Dept: INTERNAL MEDICINE | Facility: CLINIC | Age: 28
End: 2018-10-12

## 2018-10-12 ENCOUNTER — EPISODE CHANGES (OUTPATIENT)
Dept: CASE MANAGEMENT | Facility: OTHER | Age: 28
End: 2018-10-12

## 2018-10-12 ENCOUNTER — TELEPHONE (OUTPATIENT)
Dept: INTERNAL MEDICINE | Facility: CLINIC | Age: 28
End: 2018-10-12

## 2018-10-12 ENCOUNTER — READMISSION MANAGEMENT (OUTPATIENT)
Dept: CALL CENTER | Facility: HOSPITAL | Age: 28
End: 2018-10-12

## 2018-10-12 LAB
BACTERIA FLD CULT: NORMAL
BACTERIA SPEC AEROBE CULT: ABNORMAL
GRAM STN SPEC: ABNORMAL
ISOLATED FROM: ABNORMAL
L PNEUMO1 AG UR QL IA: NEGATIVE
Lab: NORMAL
ORGANISM ID: NORMAL
S PNEUM AG SPEC QL LA: NEGATIVE
SPECIMEN SOURCE: NORMAL

## 2018-10-17 ENCOUNTER — READMISSION MANAGEMENT (OUTPATIENT)
Dept: CALL CENTER | Facility: HOSPITAL | Age: 28
End: 2018-10-17

## 2018-10-19 ENCOUNTER — HOSPITAL ENCOUNTER (OUTPATIENT)
Dept: GENERAL RADIOLOGY | Facility: HOSPITAL | Age: 28
Discharge: HOME | End: 2018-10-19
Admitting: INTERNAL MEDICINE

## 2018-10-19 ENCOUNTER — OFFICE VISIT (OUTPATIENT)
Dept: INTERNAL MEDICINE | Facility: CLINIC | Age: 28
End: 2018-10-19

## 2018-10-19 VITALS
TEMPERATURE: 98 F | BODY MASS INDEX: 23.14 KG/M2 | HEIGHT: 66 IN | SYSTOLIC BLOOD PRESSURE: 146 MMHG | HEART RATE: 90 BPM | DIASTOLIC BLOOD PRESSURE: 80 MMHG | WEIGHT: 144 LBS | OXYGEN SATURATION: 98 %

## 2018-10-19 DIAGNOSIS — J18.9 PNEUMONIA DUE TO INFECTIOUS ORGANISM, UNSPECIFIED LATERALITY, UNSPECIFIED PART OF LUNG: ICD-10-CM

## 2018-10-19 DIAGNOSIS — R07.9 LEFT SIDED CHEST PAIN: ICD-10-CM

## 2018-10-19 DIAGNOSIS — M32.19 LUPUS ENCEPHALITIS (HCC): ICD-10-CM

## 2018-10-19 DIAGNOSIS — M32.14 SYSTEMIC LUPUS ERYTHEMATOSUS WITH GLOMERULAR DISEASE, UNSPECIFIED SLE TYPE (HCC): ICD-10-CM

## 2018-10-19 DIAGNOSIS — J34.89 NASAL SORE: ICD-10-CM

## 2018-10-19 DIAGNOSIS — B95.61 BACTEREMIA DUE TO STAPHYLOCOCCUS AUREUS: ICD-10-CM

## 2018-10-19 DIAGNOSIS — R07.9 LEFT SIDED CHEST PAIN: Primary | ICD-10-CM

## 2018-10-19 DIAGNOSIS — R78.81 BACTEREMIA DUE TO STAPHYLOCOCCUS AUREUS: ICD-10-CM

## 2018-10-19 DIAGNOSIS — M32.14 ESRD (END-STAGE RENAL DISEASE) DUE TO SLE (HCC): ICD-10-CM

## 2018-10-19 DIAGNOSIS — G45.9 TIA (TRANSIENT ISCHEMIC ATTACK): ICD-10-CM

## 2018-10-19 DIAGNOSIS — N18.6 ESRD (END-STAGE RENAL DISEASE) DUE TO SLE (HCC): ICD-10-CM

## 2018-10-19 PROCEDURE — 71046 X-RAY EXAM CHEST 2 VIEWS: CPT

## 2018-10-19 PROCEDURE — 99496 TRANSJ CARE MGMT HIGH F2F 7D: CPT | Performed by: INTERNAL MEDICINE

## 2018-10-19 RX ORDER — GINSENG 100 MG
CAPSULE ORAL 2 TIMES DAILY
Qty: 14 G | Refills: 0 | Status: SHIPPED | OUTPATIENT
Start: 2018-10-19 | End: 2019-08-21

## 2018-10-20 ENCOUNTER — READMISSION MANAGEMENT (OUTPATIENT)
Dept: CALL CENTER | Facility: HOSPITAL | Age: 28
End: 2018-10-20

## 2018-10-22 ENCOUNTER — READMISSION MANAGEMENT (OUTPATIENT)
Dept: CALL CENTER | Facility: HOSPITAL | Age: 28
End: 2018-10-22

## 2018-10-23 ENCOUNTER — EPISODE CHANGES (OUTPATIENT)
Dept: CASE MANAGEMENT | Facility: OTHER | Age: 28
End: 2018-10-23

## 2018-10-23 ENCOUNTER — READMISSION MANAGEMENT (OUTPATIENT)
Dept: CALL CENTER | Facility: HOSPITAL | Age: 28
End: 2018-10-23

## 2018-10-24 ENCOUNTER — APPOINTMENT (OUTPATIENT)
Dept: GENERAL RADIOLOGY | Facility: HOSPITAL | Age: 28
End: 2018-10-24

## 2018-10-24 ENCOUNTER — HOSPITAL ENCOUNTER (INPATIENT)
Facility: HOSPITAL | Age: 28
LOS: 11 days | Discharge: HOME | End: 2018-11-04
Attending: STUDENT IN AN ORGANIZED HEALTH CARE EDUCATION/TRAINING PROGRAM | Admitting: INTERNAL MEDICINE

## 2018-10-24 ENCOUNTER — OFFICE VISIT (OUTPATIENT)
Dept: INTERNAL MEDICINE | Facility: CLINIC | Age: 28
End: 2018-10-24

## 2018-10-24 VITALS
DIASTOLIC BLOOD PRESSURE: 132 MMHG | HEIGHT: 66 IN | OXYGEN SATURATION: 100 % | WEIGHT: 143 LBS | SYSTOLIC BLOOD PRESSURE: 212 MMHG | BODY MASS INDEX: 22.98 KG/M2 | TEMPERATURE: 98.8 F | HEART RATE: 83 BPM

## 2018-10-24 DIAGNOSIS — M25.50 DIFFUSE ARTHRALGIA: ICD-10-CM

## 2018-10-24 DIAGNOSIS — M32.14 ESRD (END-STAGE RENAL DISEASE) DUE TO SLE (HCC): ICD-10-CM

## 2018-10-24 DIAGNOSIS — I16.1 HYPERTENSIVE EMERGENCY: Primary | ICD-10-CM

## 2018-10-24 DIAGNOSIS — I16.0 HYPERTENSIVE URGENCY: Primary | ICD-10-CM

## 2018-10-24 DIAGNOSIS — I10 MALIGNANT HYPERTENSION: ICD-10-CM

## 2018-10-24 DIAGNOSIS — M32.14 SYSTEMIC LUPUS ERYTHEMATOSUS WITH GLOMERULAR DISEASE, UNSPECIFIED SLE TYPE (HCC): ICD-10-CM

## 2018-10-24 DIAGNOSIS — R77.8 ELEVATED TROPONIN: ICD-10-CM

## 2018-10-24 DIAGNOSIS — N18.6 ESRD (END-STAGE RENAL DISEASE) DUE TO SLE (HCC): ICD-10-CM

## 2018-10-24 LAB
ALBUMIN SERPL-MCNC: 3.4 G/DL (ref 3.5–5)
ALBUMIN/GLOB SERPL: 1 G/DL (ref 1–2)
ALP SERPL-CCNC: 47 U/L (ref 38–126)
ALT SERPL W P-5'-P-CCNC: 29 U/L (ref 13–69)
ANION GAP SERPL CALCULATED.3IONS-SCNC: 13.5 MMOL/L (ref 10–20)
ANISOCYTOSIS BLD QL: NORMAL
AST SERPL-CCNC: 40 U/L (ref 15–46)
BASOPHILS # BLD AUTO: 0.01 10*3/MM3 (ref 0–0.2)
BASOPHILS NFR BLD AUTO: 0.3 % (ref 0–2.5)
BILIRUB SERPL-MCNC: 0.7 MG/DL (ref 0.2–1.3)
BUN BLD-MCNC: 36 MG/DL (ref 7–20)
BUN/CREAT SERPL: 4.7 (ref 7.1–23.5)
CALCIUM SPEC-SCNC: 9.8 MG/DL (ref 8.4–10.2)
CHLORIDE SERPL-SCNC: 102 MMOL/L (ref 98–107)
CK SERPL-CCNC: 93 U/L (ref 30–170)
CO2 SERPL-SCNC: 25 MMOL/L (ref 26–30)
CREAT BLD-MCNC: 7.7 MG/DL (ref 0.6–1.3)
CRP SERPL-MCNC: 3.1 MG/DL (ref 0–1)
DACRYOCYTES BLD QL SMEAR: NORMAL
DEPRECATED RDW RBC AUTO: 57.3 FL (ref 37–54)
EOSINOPHIL # BLD AUTO: 0.21 10*3/MM3 (ref 0–0.7)
EOSINOPHIL NFR BLD AUTO: 6.3 % (ref 0–7)
ERYTHROCYTE [DISTWIDTH] IN BLOOD BY AUTOMATED COUNT: 17.1 % (ref 11.5–14.5)
GFR SERPL CREATININE-BSD FRML MDRD: 6 ML/MIN/1.73
GFR SERPL CREATININE-BSD FRML MDRD: ABNORMAL ML/MIN/1.73
GLOBULIN UR ELPH-MCNC: 3.3 GM/DL
GLUCOSE BLD-MCNC: 106 MG/DL (ref 74–98)
HCT VFR BLD AUTO: 29.1 % (ref 37–47)
HGB BLD-MCNC: 8.7 G/DL (ref 12–16)
HYPOCHROMIA BLD QL: NORMAL
IMM GRANULOCYTES # BLD: 0.02 10*3/MM3 (ref 0–0.06)
IMM GRANULOCYTES NFR BLD: 0.6 % (ref 0–0.6)
LYMPHOCYTES # BLD AUTO: 0.52 10*3/MM3 (ref 0.6–3.4)
LYMPHOCYTES NFR BLD AUTO: 15.6 % (ref 10–50)
MCH RBC QN AUTO: 27.3 PG (ref 27–31)
MCHC RBC AUTO-ENTMCNC: 29.9 G/DL (ref 30–37)
MCV RBC AUTO: 91.2 FL (ref 81–99)
MONOCYTES # BLD AUTO: 0.21 10*3/MM3 (ref 0–0.9)
MONOCYTES NFR BLD AUTO: 6.3 % (ref 0–12)
NEUTROPHILS # BLD AUTO: 2.37 10*3/MM3 (ref 2–6.9)
NEUTROPHILS NFR BLD AUTO: 70.9 % (ref 37–80)
NRBC BLD MANUAL-RTO: 0 /100 WBC (ref 0–0)
PLATELET # BLD AUTO: 102 10*3/MM3 (ref 130–400)
PMV BLD AUTO: 10.1 FL (ref 6–12)
POIKILOCYTOSIS BLD QL SMEAR: NORMAL
POTASSIUM BLD-SCNC: 5.5 MMOL/L (ref 3.5–5.1)
PROT SERPL-MCNC: 6.7 G/DL (ref 6.3–8.2)
RBC # BLD AUTO: 3.19 10*6/MM3 (ref 4.2–5.4)
SMALL PLATELETS BLD QL SMEAR: NORMAL
SODIUM BLD-SCNC: 135 MMOL/L (ref 137–145)
TROPONIN I SERPL-MCNC: 0.06 NG/ML (ref 0–0.03)
WBC MORPH BLD: NORMAL
WBC NRBC COR # BLD: 3.34 10*3/MM3 (ref 4.8–10.8)

## 2018-10-24 PROCEDURE — 84484 ASSAY OF TROPONIN QUANT: CPT | Performed by: STUDENT IN AN ORGANIZED HEALTH CARE EDUCATION/TRAINING PROGRAM

## 2018-10-24 PROCEDURE — 80053 COMPREHEN METABOLIC PANEL: CPT | Performed by: STUDENT IN AN ORGANIZED HEALTH CARE EDUCATION/TRAINING PROGRAM

## 2018-10-24 PROCEDURE — 63710000001 DIPHENHYDRAMINE PER 50 MG: Performed by: EMERGENCY MEDICINE

## 2018-10-24 PROCEDURE — 87040 BLOOD CULTURE FOR BACTERIA: CPT | Performed by: FAMILY MEDICINE

## 2018-10-24 PROCEDURE — 85007 BL SMEAR W/DIFF WBC COUNT: CPT | Performed by: STUDENT IN AN ORGANIZED HEALTH CARE EDUCATION/TRAINING PROGRAM

## 2018-10-24 PROCEDURE — 99222 1ST HOSP IP/OBS MODERATE 55: CPT | Performed by: FAMILY MEDICINE

## 2018-10-24 PROCEDURE — 84484 ASSAY OF TROPONIN QUANT: CPT | Performed by: FAMILY MEDICINE

## 2018-10-24 PROCEDURE — 71046 X-RAY EXAM CHEST 2 VIEWS: CPT

## 2018-10-24 PROCEDURE — 25010000002 HYDROMORPHONE 1 MG/ML SOLUTION: Performed by: STUDENT IN AN ORGANIZED HEALTH CARE EDUCATION/TRAINING PROGRAM

## 2018-10-24 PROCEDURE — 85651 RBC SED RATE NONAUTOMATED: CPT | Performed by: FAMILY MEDICINE

## 2018-10-24 PROCEDURE — 25010000002 HYDROMORPHONE 1 MG/ML SOLUTION: Performed by: FAMILY MEDICINE

## 2018-10-24 PROCEDURE — 93005 ELECTROCARDIOGRAM TRACING: CPT | Performed by: STUDENT IN AN ORGANIZED HEALTH CARE EDUCATION/TRAINING PROGRAM

## 2018-10-24 PROCEDURE — 99214 OFFICE O/P EST MOD 30 MIN: CPT | Performed by: INTERNAL MEDICINE

## 2018-10-24 PROCEDURE — 85025 COMPLETE CBC W/AUTO DIFF WBC: CPT | Performed by: STUDENT IN AN ORGANIZED HEALTH CARE EDUCATION/TRAINING PROGRAM

## 2018-10-24 PROCEDURE — 82550 ASSAY OF CK (CPK): CPT | Performed by: FAMILY MEDICINE

## 2018-10-24 PROCEDURE — 63710000001 DIPHENHYDRAMINE PER 50 MG: Performed by: FAMILY MEDICINE

## 2018-10-24 PROCEDURE — 86140 C-REACTIVE PROTEIN: CPT | Performed by: FAMILY MEDICINE

## 2018-10-24 PROCEDURE — 99285 EMERGENCY DEPT VISIT HI MDM: CPT

## 2018-10-24 RX ORDER — ONDANSETRON 4 MG/1
4 TABLET, FILM COATED ORAL EVERY 8 HOURS PRN
Status: DISCONTINUED | OUTPATIENT
Start: 2018-10-24 | End: 2018-11-04 | Stop reason: HOSPADM

## 2018-10-24 RX ORDER — HEPARIN SODIUM 5000 [USP'U]/ML
5000 INJECTION, SOLUTION INTRAVENOUS; SUBCUTANEOUS EVERY 8 HOURS SCHEDULED
Status: DISCONTINUED | OUTPATIENT
Start: 2018-10-25 | End: 2018-11-04 | Stop reason: HOSPADM

## 2018-10-24 RX ORDER — LANOLIN ALCOHOL/MO/W.PET/CERES
5 CREAM (GRAM) TOPICAL NIGHTLY PRN
Status: DISCONTINUED | OUTPATIENT
Start: 2018-10-24 | End: 2018-11-04 | Stop reason: HOSPADM

## 2018-10-24 RX ORDER — ACETAMINOPHEN 325 MG/1
650 TABLET ORAL EVERY 4 HOURS PRN
Status: DISCONTINUED | OUTPATIENT
Start: 2018-10-24 | End: 2018-11-04 | Stop reason: HOSPADM

## 2018-10-24 RX ORDER — NICARDIPINE HYDROCHLORIDE 20 MG/1
40 CAPSULE ORAL EVERY 12 HOURS SCHEDULED
Status: DISCONTINUED | OUTPATIENT
Start: 2018-10-25 | End: 2018-10-27

## 2018-10-24 RX ORDER — NEBIVOLOL 10 MG/1
20 TABLET ORAL DAILY
Status: DISCONTINUED | OUTPATIENT
Start: 2018-10-25 | End: 2018-11-04 | Stop reason: HOSPADM

## 2018-10-24 RX ORDER — CLONAZEPAM 0.5 MG/1
0.5 TABLET ORAL ONCE
Status: COMPLETED | OUTPATIENT
Start: 2018-10-24 | End: 2018-10-24

## 2018-10-24 RX ORDER — ONDANSETRON 2 MG/ML
4 INJECTION INTRAMUSCULAR; INTRAVENOUS EVERY 6 HOURS PRN
Status: DISCONTINUED | OUTPATIENT
Start: 2018-10-24 | End: 2018-11-04 | Stop reason: HOSPADM

## 2018-10-24 RX ORDER — CLONAZEPAM 0.5 MG/1
0.5 TABLET ORAL 2 TIMES DAILY PRN
Status: DISCONTINUED | OUTPATIENT
Start: 2018-10-24 | End: 2018-10-25

## 2018-10-24 RX ORDER — ACYCLOVIR 200 MG/1
400 CAPSULE ORAL 3 TIMES DAILY
Status: DISPENSED | OUTPATIENT
Start: 2018-10-25 | End: 2018-11-02

## 2018-10-24 RX ORDER — MYCOPHENOLATE MOFETIL 250 MG/1
250 CAPSULE ORAL EVERY 12 HOURS SCHEDULED
Status: DISCONTINUED | OUTPATIENT
Start: 2018-10-25 | End: 2018-11-04 | Stop reason: HOSPADM

## 2018-10-24 RX ORDER — CYCLOBENZAPRINE HCL 10 MG
5 TABLET ORAL 3 TIMES DAILY PRN
Status: DISCONTINUED | OUTPATIENT
Start: 2018-10-24 | End: 2018-11-04 | Stop reason: HOSPADM

## 2018-10-24 RX ORDER — NICARDIPINE HYDROCHLORIDE 20 MG/1
40 CAPSULE ORAL DAILY
Status: DISCONTINUED | OUTPATIENT
Start: 2018-10-25 | End: 2018-10-29 | Stop reason: SDUPTHER

## 2018-10-24 RX ORDER — SODIUM CHLORIDE 0.9 % (FLUSH) 0.9 %
3-10 SYRINGE (ML) INJECTION AS NEEDED
Status: DISCONTINUED | OUTPATIENT
Start: 2018-10-24 | End: 2018-11-04 | Stop reason: HOSPADM

## 2018-10-24 RX ORDER — IPRATROPIUM BROMIDE AND ALBUTEROL SULFATE 2.5; .5 MG/3ML; MG/3ML
3 SOLUTION RESPIRATORY (INHALATION) EVERY 6 HOURS PRN
Status: DISCONTINUED | OUTPATIENT
Start: 2018-10-24 | End: 2018-11-04 | Stop reason: HOSPADM

## 2018-10-24 RX ORDER — DOCOSANOL 100 MG/G
1 CREAM TOPICAL AS NEEDED
Status: DISCONTINUED | OUTPATIENT
Start: 2018-10-24 | End: 2018-11-04 | Stop reason: HOSPADM

## 2018-10-24 RX ORDER — MENTHOL AND METHYL SALICYLATE 10; 30 G/100G; G/100G
CREAM TOPICAL 3 TIMES DAILY PRN
Status: DISCONTINUED | OUTPATIENT
Start: 2018-10-24 | End: 2018-11-04 | Stop reason: HOSPADM

## 2018-10-24 RX ORDER — DIPHENHYDRAMINE HCL 25 MG
25 CAPSULE ORAL ONCE
Status: COMPLETED | OUTPATIENT
Start: 2018-10-24 | End: 2018-10-24

## 2018-10-24 RX ORDER — CLONIDINE 0.3 MG/24H
1 PATCH, EXTENDED RELEASE TRANSDERMAL WEEKLY
Status: DISCONTINUED | OUTPATIENT
Start: 2018-10-25 | End: 2018-11-04 | Stop reason: HOSPADM

## 2018-10-24 RX ORDER — SODIUM CHLORIDE 0.9 % (FLUSH) 0.9 %
3 SYRINGE (ML) INJECTION EVERY 12 HOURS SCHEDULED
Status: DISCONTINUED | OUTPATIENT
Start: 2018-10-25 | End: 2018-11-04 | Stop reason: HOSPADM

## 2018-10-24 RX ORDER — LOSARTAN POTASSIUM 50 MG/1
100 TABLET ORAL
Status: DISCONTINUED | OUTPATIENT
Start: 2018-10-25 | End: 2018-11-04 | Stop reason: HOSPADM

## 2018-10-24 RX ORDER — TERAZOSIN 5 MG/1
20 CAPSULE ORAL NIGHTLY
Status: DISCONTINUED | OUTPATIENT
Start: 2018-10-25 | End: 2018-11-04 | Stop reason: HOSPADM

## 2018-10-24 RX ORDER — FOLIC ACID/VIT B COMPLEX AND C 0.8 MG
1 TABLET ORAL DAILY
Status: DISCONTINUED | OUTPATIENT
Start: 2018-10-25 | End: 2018-11-04 | Stop reason: HOSPADM

## 2018-10-24 RX ORDER — BISACODYL 10 MG
10 SUPPOSITORY, RECTAL RECTAL DAILY PRN
Status: DISCONTINUED | OUTPATIENT
Start: 2018-10-24 | End: 2018-10-26

## 2018-10-24 RX ORDER — HYDROXYCHLOROQUINE SULFATE 200 MG/1
200 TABLET, FILM COATED ORAL EVERY 12 HOURS SCHEDULED
Status: DISCONTINUED | OUTPATIENT
Start: 2018-10-25 | End: 2018-11-04 | Stop reason: HOSPADM

## 2018-10-24 RX ORDER — PANTOPRAZOLE SODIUM 40 MG/1
40 TABLET, DELAYED RELEASE ORAL
Status: DISCONTINUED | OUTPATIENT
Start: 2018-10-25 | End: 2018-11-04 | Stop reason: HOSPADM

## 2018-10-24 RX ORDER — DIPHENHYDRAMINE HCL 25 MG
25 CAPSULE ORAL EVERY 6 HOURS PRN
Status: DISCONTINUED | OUTPATIENT
Start: 2018-10-24 | End: 2018-10-30

## 2018-10-24 RX ORDER — DOCOSANOL 100 MG/G
1 CREAM TOPICAL AS NEEDED
COMMUNITY
End: 2019-11-22

## 2018-10-24 RX ADMIN — NICARDIPINE HYDROCHLORIDE 10 MG/HR: 0.1 INJECTION, SOLUTION INTRAVENOUS at 22:55

## 2018-10-24 RX ADMIN — DIPHENHYDRAMINE HYDROCHLORIDE 25 MG: 25 CAPSULE ORAL at 19:52

## 2018-10-24 RX ADMIN — Medication 3 ML: at 23:55

## 2018-10-24 RX ADMIN — NICARDIPINE HYDROCHLORIDE 10 MG/HR: 0.1 INJECTION, SOLUTION INTRAVENOUS at 20:45

## 2018-10-24 RX ADMIN — HYDROMORPHONE HYDROCHLORIDE 0.25 MG: 1 INJECTION, SOLUTION INTRAMUSCULAR; INTRAVENOUS; SUBCUTANEOUS at 23:15

## 2018-10-24 RX ADMIN — NICARDIPINE HYDROCHLORIDE 40 MG: 20 CAPSULE ORAL at 23:51

## 2018-10-24 RX ADMIN — CLONAZEPAM 0.5 MG: 0.5 TABLET ORAL at 18:08

## 2018-10-24 RX ADMIN — NICARDIPINE HYDROCHLORIDE 5 MG/HR: 0.1 INJECTION, SOLUTION INTRAVENOUS at 18:30

## 2018-10-24 RX ADMIN — Medication 3 ML: at 23:56

## 2018-10-24 RX ADMIN — HYDROMORPHONE HYDROCHLORIDE 0.25 MG: 1 INJECTION, SOLUTION INTRAMUSCULAR; INTRAVENOUS; SUBCUTANEOUS at 19:53

## 2018-10-24 RX ADMIN — DIPHENHYDRAMINE HYDROCHLORIDE 25 MG: 25 CAPSULE ORAL at 23:14

## 2018-10-24 RX ADMIN — HYDROXYCHLOROQUINE SULFATE 200 MG: 200 TABLET, FILM COATED ORAL at 23:52

## 2018-10-24 RX ADMIN — TERAZOSIN HYDROCHLORIDE 20 MG: 5 CAPSULE ORAL at 23:51

## 2018-10-24 RX ADMIN — MYCOPHENOLATE MOFETIL 250 MG: 250 CAPSULE ORAL at 23:51

## 2018-10-25 LAB
ALBUMIN SERPL-MCNC: 2.9 G/DL (ref 3.5–5)
ANION GAP SERPL CALCULATED.3IONS-SCNC: 13.4 MMOL/L (ref 10–20)
ANISOCYTOSIS BLD QL: NORMAL
BASOPHILS # BLD AUTO: 0.01 10*3/MM3 (ref 0–0.2)
BASOPHILS NFR BLD AUTO: 0.4 % (ref 0–2.5)
BUN BLD-MCNC: 38 MG/DL (ref 7–20)
BUN/CREAT SERPL: 4.5 (ref 7.1–23.5)
CALCIUM SPEC-SCNC: 9.3 MG/DL (ref 8.4–10.2)
CHLORIDE SERPL-SCNC: 105 MMOL/L (ref 98–107)
CK SERPL-CCNC: 56 U/L (ref 30–170)
CO2 SERPL-SCNC: 21 MMOL/L (ref 26–30)
CREAT BLD-MCNC: 8.4 MG/DL (ref 0.6–1.3)
DACRYOCYTES BLD QL SMEAR: NORMAL
DEPRECATED RDW RBC AUTO: 58.1 FL (ref 37–54)
EOSINOPHIL # BLD AUTO: 0.26 10*3/MM3 (ref 0–0.7)
EOSINOPHIL NFR BLD AUTO: 10.8 % (ref 0–7)
ERYTHROCYTE [DISTWIDTH] IN BLOOD BY AUTOMATED COUNT: 16.8 % (ref 11.5–14.5)
ERYTHROCYTE [SEDIMENTATION RATE] IN BLOOD: 21 MM/HR (ref 0–20)
GFR SERPL CREATININE-BSD FRML MDRD: 6 ML/MIN/1.73
GFR SERPL CREATININE-BSD FRML MDRD: ABNORMAL ML/MIN/1.73
GLUCOSE BLD-MCNC: 94 MG/DL (ref 74–98)
HCT VFR BLD AUTO: 26.5 % (ref 37–47)
HGB BLD-MCNC: 7.8 G/DL (ref 12–16)
HYPOCHROMIA BLD QL: NORMAL
IMM GRANULOCYTES # BLD: 0.01 10*3/MM3 (ref 0–0.06)
IMM GRANULOCYTES NFR BLD: 0.4 % (ref 0–0.6)
LYMPHOCYTES # BLD AUTO: 0.44 10*3/MM3 (ref 0.6–3.4)
LYMPHOCYTES NFR BLD AUTO: 18.3 % (ref 10–50)
MCH RBC QN AUTO: 27.3 PG (ref 27–31)
MCHC RBC AUTO-ENTMCNC: 29.4 G/DL (ref 30–37)
MCV RBC AUTO: 92.7 FL (ref 81–99)
MONOCYTES # BLD AUTO: 0.18 10*3/MM3 (ref 0–0.9)
MONOCYTES NFR BLD AUTO: 7.5 % (ref 0–12)
NEUTROPHILS # BLD AUTO: 1.5 10*3/MM3 (ref 2–6.9)
NEUTROPHILS NFR BLD AUTO: 62.6 % (ref 37–80)
NRBC BLD MANUAL-RTO: 0 /100 WBC (ref 0–0)
OVALOCYTES BLD QL SMEAR: NORMAL
PHOSPHATE SERPL-MCNC: 8.3 MG/DL (ref 2.5–4.5)
PLATELET # BLD AUTO: 107 10*3/MM3 (ref 130–400)
PMV BLD AUTO: 10.2 FL (ref 6–12)
POTASSIUM BLD-SCNC: 4.4 MMOL/L (ref 3.5–5.1)
RBC # BLD AUTO: 2.86 10*6/MM3 (ref 4.2–5.4)
SMALL PLATELETS BLD QL SMEAR: NORMAL
SODIUM BLD-SCNC: 135 MMOL/L (ref 137–145)
TROPONIN I SERPL-MCNC: 0.06 NG/ML (ref 0–0.03)
TROPONIN I SERPL-MCNC: 0.06 NG/ML (ref 0–0.03)
WBC MORPH BLD: NORMAL
WBC NRBC COR # BLD: 2.4 10*3/MM3 (ref 4.8–10.8)

## 2018-10-25 PROCEDURE — 85025 COMPLETE CBC W/AUTO DIFF WBC: CPT | Performed by: FAMILY MEDICINE

## 2018-10-25 PROCEDURE — 87040 BLOOD CULTURE FOR BACTERIA: CPT | Performed by: FAMILY MEDICINE

## 2018-10-25 PROCEDURE — 85007 BL SMEAR W/DIFF WBC COUNT: CPT | Performed by: FAMILY MEDICINE

## 2018-10-25 PROCEDURE — 90935 HEMODIALYSIS ONE EVALUATION: CPT

## 2018-10-25 PROCEDURE — 25010000002 HYDROMORPHONE 1 MG/ML SOLUTION: Performed by: FAMILY MEDICINE

## 2018-10-25 PROCEDURE — 82550 ASSAY OF CK (CPK): CPT | Performed by: INTERNAL MEDICINE

## 2018-10-25 PROCEDURE — 99232 SBSQ HOSP IP/OBS MODERATE 35: CPT | Performed by: INTERNAL MEDICINE

## 2018-10-25 PROCEDURE — 80069 RENAL FUNCTION PANEL: CPT | Performed by: FAMILY MEDICINE

## 2018-10-25 PROCEDURE — 25010000002 HEPARIN (PORCINE) PER 1000 UNITS: Performed by: INTERNAL MEDICINE

## 2018-10-25 PROCEDURE — 25010000002 CEFTAZIDIME PER 500 MG: Performed by: INTERNAL MEDICINE

## 2018-10-25 PROCEDURE — 63710000001 DIPHENHYDRAMINE PER 50 MG: Performed by: FAMILY MEDICINE

## 2018-10-25 PROCEDURE — 63710000001 MYCOPHENOLATE MOFETIL PER 250 MG: Performed by: FAMILY MEDICINE

## 2018-10-25 PROCEDURE — 25010000002 VANCOMYCIN 5 G RECONSTITUTED SOLUTION 5,000 MG VIAL: Performed by: FAMILY MEDICINE

## 2018-10-25 PROCEDURE — 5A1D70Z PERFORMANCE OF URINARY FILTRATION, INTERMITTENT, LESS THAN 6 HOURS PER DAY: ICD-10-PCS | Performed by: INTERNAL MEDICINE

## 2018-10-25 PROCEDURE — 84484 ASSAY OF TROPONIN QUANT: CPT | Performed by: FAMILY MEDICINE

## 2018-10-25 RX ORDER — SUCRALFATE ORAL 1 G/10ML
1 SUSPENSION ORAL
Status: DISCONTINUED | OUTPATIENT
Start: 2018-10-25 | End: 2018-10-26

## 2018-10-25 RX ORDER — NICARDIPINE HYDROCHLORIDE 20 MG/1
40 CAPSULE ORAL ONCE
Status: COMPLETED | OUTPATIENT
Start: 2018-10-26 | End: 2018-10-25

## 2018-10-25 RX ORDER — LOSARTAN POTASSIUM 50 MG/1
100 TABLET ORAL ONCE
Status: COMPLETED | OUTPATIENT
Start: 2018-10-26 | End: 2018-10-25

## 2018-10-25 RX ORDER — HEPARIN SODIUM 1000 [USP'U]/ML
4000 INJECTION, SOLUTION INTRAVENOUS; SUBCUTANEOUS AS NEEDED
Status: DISCONTINUED | OUTPATIENT
Start: 2018-10-25 | End: 2018-11-04 | Stop reason: HOSPADM

## 2018-10-25 RX ORDER — HYDROCODONE BITARTRATE AND ACETAMINOPHEN 5; 325 MG/1; MG/1
1 TABLET ORAL EVERY 6 HOURS PRN
Status: DISCONTINUED | OUTPATIENT
Start: 2018-10-25 | End: 2018-11-04 | Stop reason: HOSPADM

## 2018-10-25 RX ORDER — CLONAZEPAM 0.5 MG/1
0.5 TABLET ORAL EVERY 6 HOURS PRN
Status: DISCONTINUED | OUTPATIENT
Start: 2018-10-25 | End: 2018-11-04 | Stop reason: HOSPADM

## 2018-10-25 RX ORDER — CALCITRIOL 0.25 UG/1
0.5 CAPSULE, LIQUID FILLED ORAL DAILY
Status: DISCONTINUED | OUTPATIENT
Start: 2018-10-25 | End: 2018-11-04 | Stop reason: HOSPADM

## 2018-10-25 RX ORDER — HYDRALAZINE HYDROCHLORIDE 25 MG/1
100 TABLET, FILM COATED ORAL EVERY 8 HOURS SCHEDULED
Status: DISCONTINUED | OUTPATIENT
Start: 2018-10-25 | End: 2018-11-04 | Stop reason: HOSPADM

## 2018-10-25 RX ADMIN — NICARDIPINE HYDROCHLORIDE 40 MG: 20 CAPSULE ORAL at 14:02

## 2018-10-25 RX ADMIN — PANTOPRAZOLE SODIUM 40 MG: 40 TABLET, DELAYED RELEASE ORAL at 17:02

## 2018-10-25 RX ADMIN — HYDRALAZINE HYDROCHLORIDE 100 MG: 25 TABLET ORAL at 14:02

## 2018-10-25 RX ADMIN — CALCITRIOL CAPSULES 0.25 MCG 0.5 MCG: 0.25 CAPSULE ORAL at 17:02

## 2018-10-25 RX ADMIN — ACYCLOVIR 400 MG: 200 CAPSULE ORAL at 20:16

## 2018-10-25 RX ADMIN — ACYCLOVIR 400 MG: 200 CAPSULE ORAL at 09:24

## 2018-10-25 RX ADMIN — ACETAMINOPHEN 650 MG: 325 TABLET ORAL at 22:46

## 2018-10-25 RX ADMIN — NICARDIPINE HYDROCHLORIDE 40 MG: 20 CAPSULE ORAL at 09:25

## 2018-10-25 RX ADMIN — CLONAZEPAM 0.5 MG: 0.5 TABLET ORAL at 23:29

## 2018-10-25 RX ADMIN — Medication 3 ML: at 09:00

## 2018-10-25 RX ADMIN — HYDRALAZINE HYDROCHLORIDE 100 MG: 25 TABLET ORAL at 22:31

## 2018-10-25 RX ADMIN — LOSARTAN POTASSIUM 100 MG: 50 TABLET, FILM COATED ORAL at 23:29

## 2018-10-25 RX ADMIN — CEFTAZIDIME 1 G: 1 INJECTION, POWDER, FOR SOLUTION INTRAMUSCULAR; INTRAVENOUS at 17:02

## 2018-10-25 RX ADMIN — CLONIDINE 1 PATCH: 0.3 PATCH, EXTENDED RELEASE TRANSDERMAL at 10:49

## 2018-10-25 RX ADMIN — ACYCLOVIR 400 MG: 200 CAPSULE ORAL at 15:51

## 2018-10-25 RX ADMIN — NICARDIPINE HYDROCHLORIDE 40 MG: 20 CAPSULE ORAL at 20:16

## 2018-10-25 RX ADMIN — Medication 1 TABLET: at 09:24

## 2018-10-25 RX ADMIN — DIPHENHYDRAMINE HYDROCHLORIDE 25 MG: 25 CAPSULE ORAL at 17:50

## 2018-10-25 RX ADMIN — VANCOMYCIN HYDROCHLORIDE 1250 MG: 500 INJECTION, POWDER, LYOPHILIZED, FOR SOLUTION INTRAVENOUS at 00:11

## 2018-10-25 RX ADMIN — NEBIVOLOL HYDROCHLORIDE 20 MG: 10 TABLET ORAL at 09:24

## 2018-10-25 RX ADMIN — MYCOPHENOLATE MOFETIL 250 MG: 250 CAPSULE ORAL at 20:16

## 2018-10-25 RX ADMIN — HYDROMORPHONE HYDROCHLORIDE 0.25 MG: 1 INJECTION, SOLUTION INTRAMUSCULAR; INTRAVENOUS; SUBCUTANEOUS at 17:50

## 2018-10-25 RX ADMIN — LOSARTAN POTASSIUM 100 MG: 50 TABLET, FILM COATED ORAL at 09:24

## 2018-10-25 RX ADMIN — CLONAZEPAM 0.5 MG: 0.5 TABLET ORAL at 00:02

## 2018-10-25 RX ADMIN — PANTOPRAZOLE SODIUM 40 MG: 40 TABLET, DELAYED RELEASE ORAL at 06:31

## 2018-10-25 RX ADMIN — HYDROXYCHLOROQUINE SULFATE 200 MG: 200 TABLET, FILM COATED ORAL at 09:25

## 2018-10-25 RX ADMIN — HEPARIN SODIUM 4500 UNITS: 1000 INJECTION, SOLUTION INTRAVENOUS; SUBCUTANEOUS at 14:25

## 2018-10-25 RX ADMIN — TERAZOSIN HYDROCHLORIDE 20 MG: 5 CAPSULE ORAL at 20:16

## 2018-10-25 RX ADMIN — NICARDIPINE HYDROCHLORIDE 5 MG/HR: 0.1 INJECTION, SOLUTION INTRAVENOUS at 09:15

## 2018-10-25 RX ADMIN — HYDROMORPHONE HYDROCHLORIDE 0.25 MG: 1 INJECTION, SOLUTION INTRAMUSCULAR; INTRAVENOUS; SUBCUTANEOUS at 09:23

## 2018-10-25 RX ADMIN — CLONAZEPAM 0.5 MG: 0.5 TABLET ORAL at 10:49

## 2018-10-25 RX ADMIN — HYDROMORPHONE HYDROCHLORIDE 0.25 MG: 1 INJECTION, SOLUTION INTRAMUSCULAR; INTRAVENOUS; SUBCUTANEOUS at 22:30

## 2018-10-25 RX ADMIN — HYDROMORPHONE HYDROCHLORIDE 0.25 MG: 1 INJECTION, SOLUTION INTRAMUSCULAR; INTRAVENOUS; SUBCUTANEOUS at 13:48

## 2018-10-25 RX ADMIN — MYCOPHENOLATE MOFETIL 250 MG: 250 CAPSULE ORAL at 09:25

## 2018-10-25 RX ADMIN — DIPHENHYDRAMINE HYDROCHLORIDE 25 MG: 25 CAPSULE ORAL at 09:24

## 2018-10-25 RX ADMIN — HYDROXYCHLOROQUINE SULFATE 200 MG: 200 TABLET, FILM COATED ORAL at 20:16

## 2018-10-25 RX ADMIN — Medication 3 ML: at 20:16

## 2018-10-25 RX ADMIN — DIPHENHYDRAMINE HYDROCHLORIDE 25 MG: 25 CAPSULE ORAL at 23:29

## 2018-10-25 RX ADMIN — NICARDIPINE HYDROCHLORIDE 40 MG: 20 CAPSULE ORAL at 23:28

## 2018-10-25 RX ADMIN — NICARDIPINE HYDROCHLORIDE 5 MG/HR: 0.1 INJECTION, SOLUTION INTRAVENOUS at 04:44

## 2018-10-26 ENCOUNTER — EPISODE CHANGES (OUTPATIENT)
Dept: CASE MANAGEMENT | Facility: OTHER | Age: 28
End: 2018-10-26

## 2018-10-26 LAB — VANCOMYCIN TROUGH SERPL-MCNC: 13.81 MCG/ML (ref 5–15)

## 2018-10-26 PROCEDURE — 25010000002 ONDANSETRON PER 1 MG: Performed by: FAMILY MEDICINE

## 2018-10-26 PROCEDURE — 87070 CULTURE OTHR SPECIMN AEROBIC: CPT | Performed by: INTERNAL MEDICINE

## 2018-10-26 PROCEDURE — 25010000002 DIAZEPAM PER 5 MG: Performed by: INTERNAL MEDICINE

## 2018-10-26 PROCEDURE — 63710000001 DIPHENHYDRAMINE PER 50 MG: Performed by: FAMILY MEDICINE

## 2018-10-26 PROCEDURE — 80202 ASSAY OF VANCOMYCIN: CPT | Performed by: FAMILY MEDICINE

## 2018-10-26 PROCEDURE — 05PY33Z REMOVAL OF INFUSION DEVICE FROM UPPER VEIN, PERCUTANEOUS APPROACH: ICD-10-PCS | Performed by: INTERNAL MEDICINE

## 2018-10-26 PROCEDURE — 25010000002 HYDROMORPHONE 1 MG/ML SOLUTION: Performed by: FAMILY MEDICINE

## 2018-10-26 PROCEDURE — 99232 SBSQ HOSP IP/OBS MODERATE 35: CPT | Performed by: INTERNAL MEDICINE

## 2018-10-26 PROCEDURE — 63710000001 MYCOPHENOLATE MOFETIL PER 250 MG: Performed by: FAMILY MEDICINE

## 2018-10-26 PROCEDURE — 25010000002 DAPTOMYCIN PER 1 MG: Performed by: INTERNAL MEDICINE

## 2018-10-26 PROCEDURE — 25010000002 HEPARIN (PORCINE) PER 1000 UNITS: Performed by: FAMILY MEDICINE

## 2018-10-26 RX ORDER — DIAZEPAM 5 MG/ML
5 INJECTION, SOLUTION INTRAMUSCULAR; INTRAVENOUS ONCE
Status: COMPLETED | OUTPATIENT
Start: 2018-10-26 | End: 2018-10-26

## 2018-10-26 RX ORDER — DIPHENHYDRAMINE HYDROCHLORIDE 50 MG/ML
25 INJECTION INTRAMUSCULAR; INTRAVENOUS EVERY 6 HOURS PRN
Status: DISCONTINUED | OUTPATIENT
Start: 2018-10-26 | End: 2018-11-04 | Stop reason: HOSPADM

## 2018-10-26 RX ORDER — BISACODYL 5 MG/1
10 TABLET, DELAYED RELEASE ORAL DAILY PRN
Status: DISCONTINUED | OUTPATIENT
Start: 2018-10-26 | End: 2018-11-04 | Stop reason: HOSPADM

## 2018-10-26 RX ADMIN — CALCITRIOL CAPSULES 0.25 MCG 0.5 MCG: 0.25 CAPSULE ORAL at 09:05

## 2018-10-26 RX ADMIN — HYDROMORPHONE HYDROCHLORIDE 0.25 MG: 1 INJECTION, SOLUTION INTRAMUSCULAR; INTRAVENOUS; SUBCUTANEOUS at 13:09

## 2018-10-26 RX ADMIN — HYDROXYCHLOROQUINE SULFATE 200 MG: 200 TABLET, FILM COATED ORAL at 09:05

## 2018-10-26 RX ADMIN — PANTOPRAZOLE SODIUM 40 MG: 40 TABLET, DELAYED RELEASE ORAL at 07:04

## 2018-10-26 RX ADMIN — CLONAZEPAM 0.5 MG: 0.5 TABLET ORAL at 18:59

## 2018-10-26 RX ADMIN — MYCOPHENOLATE MOFETIL 250 MG: 250 CAPSULE ORAL at 21:43

## 2018-10-26 RX ADMIN — BISACODYL 10 MG: 5 TABLET, COATED ORAL at 13:10

## 2018-10-26 RX ADMIN — ACYCLOVIR 400 MG: 200 CAPSULE ORAL at 21:41

## 2018-10-26 RX ADMIN — HYDROMORPHONE HYDROCHLORIDE 0.25 MG: 1 INJECTION, SOLUTION INTRAMUSCULAR; INTRAVENOUS; SUBCUTANEOUS at 18:59

## 2018-10-26 RX ADMIN — LOSARTAN POTASSIUM 100 MG: 50 TABLET, FILM COATED ORAL at 09:06

## 2018-10-26 RX ADMIN — DIPHENHYDRAMINE HYDROCHLORIDE 25 MG: 25 CAPSULE ORAL at 13:10

## 2018-10-26 RX ADMIN — PANTOPRAZOLE SODIUM 40 MG: 40 TABLET, DELAYED RELEASE ORAL at 17:14

## 2018-10-26 RX ADMIN — HYDRALAZINE HYDROCHLORIDE 100 MG: 25 TABLET ORAL at 13:10

## 2018-10-26 RX ADMIN — HYDRALAZINE HYDROCHLORIDE 100 MG: 25 TABLET ORAL at 07:04

## 2018-10-26 RX ADMIN — NICARDIPINE HYDROCHLORIDE 40 MG: 20 CAPSULE ORAL at 13:09

## 2018-10-26 RX ADMIN — DAPTOMYCIN 500 MG: 500 INJECTION, POWDER, LYOPHILIZED, FOR SOLUTION INTRAVENOUS at 11:37

## 2018-10-26 RX ADMIN — ACYCLOVIR 400 MG: 200 CAPSULE ORAL at 09:05

## 2018-10-26 RX ADMIN — DIPHENHYDRAMINE HYDROCHLORIDE 25 MG: 25 CAPSULE ORAL at 18:59

## 2018-10-26 RX ADMIN — HEPARIN SODIUM 5000 UNITS: 5000 INJECTION, SOLUTION INTRAVENOUS; SUBCUTANEOUS at 21:43

## 2018-10-26 RX ADMIN — Medication 3 ML: at 21:44

## 2018-10-26 RX ADMIN — TERAZOSIN HYDROCHLORIDE 20 MG: 5 CAPSULE ORAL at 21:42

## 2018-10-26 RX ADMIN — NICARDIPINE HYDROCHLORIDE 40 MG: 20 CAPSULE ORAL at 09:06

## 2018-10-26 RX ADMIN — CYCLOBENZAPRINE HYDROCHLORIDE 5 MG: 10 TABLET, FILM COATED ORAL at 13:10

## 2018-10-26 RX ADMIN — NEBIVOLOL HYDROCHLORIDE 20 MG: 10 TABLET ORAL at 09:06

## 2018-10-26 RX ADMIN — HYDROMORPHONE HYDROCHLORIDE 0.25 MG: 1 INJECTION, SOLUTION INTRAMUSCULAR; INTRAVENOUS; SUBCUTANEOUS at 09:05

## 2018-10-26 RX ADMIN — NICARDIPINE HYDROCHLORIDE 40 MG: 20 CAPSULE ORAL at 21:41

## 2018-10-26 RX ADMIN — ACYCLOVIR 400 MG: 200 CAPSULE ORAL at 17:14

## 2018-10-26 RX ADMIN — Medication 5 MG: at 10:32

## 2018-10-26 RX ADMIN — HYDROCODONE BITARTRATE AND ACETAMINOPHEN 1 TABLET: 5; 325 TABLET ORAL at 21:42

## 2018-10-26 RX ADMIN — Medication 1 TABLET: at 09:05

## 2018-10-26 RX ADMIN — Medication 3 ML: at 11:37

## 2018-10-26 RX ADMIN — ONDANSETRON 4 MG: 2 INJECTION INTRAMUSCULAR; INTRAVENOUS at 09:05

## 2018-10-26 RX ADMIN — DIPHENHYDRAMINE HYDROCHLORIDE 25 MG: 25 CAPSULE ORAL at 09:05

## 2018-10-26 RX ADMIN — MYCOPHENOLATE MOFETIL 250 MG: 250 CAPSULE ORAL at 09:07

## 2018-10-26 RX ADMIN — HYDROXYCHLOROQUINE SULFATE 200 MG: 200 TABLET, FILM COATED ORAL at 21:42

## 2018-10-26 RX ADMIN — CLONAZEPAM 0.5 MG: 0.5 TABLET ORAL at 09:06

## 2018-10-26 RX ADMIN — Medication 3 ML: at 21:42

## 2018-10-26 RX ADMIN — HYDRALAZINE HYDROCHLORIDE 100 MG: 25 TABLET ORAL at 21:43

## 2018-10-27 PROCEDURE — 25010000002 ONDANSETRON PER 1 MG: Performed by: FAMILY MEDICINE

## 2018-10-27 PROCEDURE — 99232 SBSQ HOSP IP/OBS MODERATE 35: CPT | Performed by: INTERNAL MEDICINE

## 2018-10-27 PROCEDURE — 25010000002 DIPHENHYDRAMINE PER 50 MG: Performed by: FAMILY MEDICINE

## 2018-10-27 PROCEDURE — 25010000002 HEPARIN (PORCINE) PER 1000 UNITS: Performed by: FAMILY MEDICINE

## 2018-10-27 PROCEDURE — 63710000001 MYCOPHENOLATE MOFETIL PER 250 MG: Performed by: FAMILY MEDICINE

## 2018-10-27 PROCEDURE — 94799 UNLISTED PULMONARY SVC/PX: CPT

## 2018-10-27 PROCEDURE — 63710000001 DIPHENHYDRAMINE PER 50 MG: Performed by: FAMILY MEDICINE

## 2018-10-27 PROCEDURE — 25010000002 HYDROMORPHONE 1 MG/ML SOLUTION: Performed by: FAMILY MEDICINE

## 2018-10-27 PROCEDURE — 94640 AIRWAY INHALATION TREATMENT: CPT

## 2018-10-27 RX ORDER — NICARDIPINE HYDROCHLORIDE 20 MG/1
40 CAPSULE ORAL EVERY 8 HOURS SCHEDULED
Status: DISCONTINUED | OUTPATIENT
Start: 2018-10-27 | End: 2018-11-04 | Stop reason: HOSPADM

## 2018-10-27 RX ADMIN — PANTOPRAZOLE SODIUM 40 MG: 40 TABLET, DELAYED RELEASE ORAL at 06:32

## 2018-10-27 RX ADMIN — HYDROCODONE BITARTRATE AND ACETAMINOPHEN 1 TABLET: 5; 325 TABLET ORAL at 03:56

## 2018-10-27 RX ADMIN — LOSARTAN POTASSIUM 100 MG: 50 TABLET, FILM COATED ORAL at 08:36

## 2018-10-27 RX ADMIN — HYDROXYCHLOROQUINE SULFATE 200 MG: 200 TABLET, FILM COATED ORAL at 08:36

## 2018-10-27 RX ADMIN — CLONAZEPAM 0.5 MG: 0.5 TABLET ORAL at 09:21

## 2018-10-27 RX ADMIN — DIPHENHYDRAMINE HYDROCHLORIDE 25 MG: 50 INJECTION, SOLUTION INTRAMUSCULAR; INTRAVENOUS at 22:57

## 2018-10-27 RX ADMIN — HEPARIN SODIUM 5000 UNITS: 5000 INJECTION, SOLUTION INTRAVENOUS; SUBCUTANEOUS at 05:20

## 2018-10-27 RX ADMIN — HYDRALAZINE HYDROCHLORIDE 100 MG: 25 TABLET ORAL at 21:35

## 2018-10-27 RX ADMIN — DIPHENHYDRAMINE HYDROCHLORIDE 25 MG: 25 CAPSULE ORAL at 16:51

## 2018-10-27 RX ADMIN — HYDROMORPHONE HYDROCHLORIDE 0.25 MG: 1 INJECTION, SOLUTION INTRAMUSCULAR; INTRAVENOUS; SUBCUTANEOUS at 08:37

## 2018-10-27 RX ADMIN — TERAZOSIN HYDROCHLORIDE 20 MG: 5 CAPSULE ORAL at 21:34

## 2018-10-27 RX ADMIN — DIPHENHYDRAMINE HYDROCHLORIDE 25 MG: 50 INJECTION, SOLUTION INTRAMUSCULAR; INTRAVENOUS at 01:00

## 2018-10-27 RX ADMIN — HYDROMORPHONE HYDROCHLORIDE 0.25 MG: 1 INJECTION, SOLUTION INTRAMUSCULAR; INTRAVENOUS; SUBCUTANEOUS at 22:57

## 2018-10-27 RX ADMIN — CLONAZEPAM 0.5 MG: 0.5 TABLET ORAL at 01:00

## 2018-10-27 RX ADMIN — MYCOPHENOLATE MOFETIL 250 MG: 250 CAPSULE ORAL at 08:36

## 2018-10-27 RX ADMIN — Medication 3 ML: at 08:39

## 2018-10-27 RX ADMIN — HYDRALAZINE HYDROCHLORIDE 100 MG: 25 TABLET ORAL at 05:19

## 2018-10-27 RX ADMIN — CALCITRIOL CAPSULES 0.25 MCG 0.5 MCG: 0.25 CAPSULE ORAL at 08:36

## 2018-10-27 RX ADMIN — NICARDIPINE HYDROCHLORIDE 40 MG: 20 CAPSULE ORAL at 14:40

## 2018-10-27 RX ADMIN — MYCOPHENOLATE MOFETIL 250 MG: 250 CAPSULE ORAL at 21:35

## 2018-10-27 RX ADMIN — PANTOPRAZOLE SODIUM 40 MG: 40 TABLET, DELAYED RELEASE ORAL at 16:51

## 2018-10-27 RX ADMIN — ONDANSETRON 4 MG: 2 INJECTION INTRAMUSCULAR; INTRAVENOUS at 08:38

## 2018-10-27 RX ADMIN — HYDROMORPHONE HYDROCHLORIDE 0.25 MG: 1 INJECTION, SOLUTION INTRAMUSCULAR; INTRAVENOUS; SUBCUTANEOUS at 01:00

## 2018-10-27 RX ADMIN — ACYCLOVIR 400 MG: 200 CAPSULE ORAL at 08:36

## 2018-10-27 RX ADMIN — HYDROXYCHLOROQUINE SULFATE 200 MG: 200 TABLET, FILM COATED ORAL at 21:52

## 2018-10-27 RX ADMIN — NEBIVOLOL HYDROCHLORIDE 20 MG: 10 TABLET ORAL at 08:37

## 2018-10-27 RX ADMIN — IPRATROPIUM BROMIDE AND ALBUTEROL SULFATE 3 ML: .5; 3 SOLUTION RESPIRATORY (INHALATION) at 11:30

## 2018-10-27 RX ADMIN — HYDROMORPHONE HYDROCHLORIDE 0.25 MG: 1 INJECTION, SOLUTION INTRAMUSCULAR; INTRAVENOUS; SUBCUTANEOUS at 16:52

## 2018-10-27 RX ADMIN — ACYCLOVIR 400 MG: 200 CAPSULE ORAL at 16:51

## 2018-10-27 RX ADMIN — NICARDIPINE HYDROCHLORIDE 40 MG: 20 CAPSULE ORAL at 08:37

## 2018-10-27 RX ADMIN — DIPHENHYDRAMINE HYDROCHLORIDE 25 MG: 25 CAPSULE ORAL at 08:37

## 2018-10-27 RX ADMIN — HYDRALAZINE HYDROCHLORIDE 100 MG: 25 TABLET ORAL at 14:39

## 2018-10-27 RX ADMIN — Medication 1 TABLET: at 08:36

## 2018-10-27 RX ADMIN — ACYCLOVIR 400 MG: 200 CAPSULE ORAL at 21:34

## 2018-10-27 RX ADMIN — HYDROMORPHONE HYDROCHLORIDE 0.25 MG: 1 INJECTION, SOLUTION INTRAMUSCULAR; INTRAVENOUS; SUBCUTANEOUS at 12:30

## 2018-10-27 RX ADMIN — NICARDIPINE HYDROCHLORIDE 40 MG: 20 CAPSULE ORAL at 21:35

## 2018-10-27 RX ADMIN — NICARDIPINE HYDROCHLORIDE 40 MG: 20 CAPSULE ORAL at 14:39

## 2018-10-28 LAB
ALBUMIN SERPL-MCNC: 2.9 G/DL (ref 3.5–5)
ANION GAP SERPL CALCULATED.3IONS-SCNC: 17.4 MMOL/L (ref 10–20)
ANISOCYTOSIS BLD QL: NORMAL
BASOPHILS # BLD AUTO: 0.01 10*3/MM3 (ref 0–0.2)
BASOPHILS NFR BLD AUTO: 0.4 % (ref 0–2.5)
BUN BLD-MCNC: 43 MG/DL (ref 7–20)
BUN/CREAT SERPL: 4.3 (ref 7.1–23.5)
CALCIUM SPEC-SCNC: 9.2 MG/DL (ref 8.4–10.2)
CHLORIDE SERPL-SCNC: 98 MMOL/L (ref 98–107)
CO2 SERPL-SCNC: 19 MMOL/L (ref 26–30)
CREAT BLD-MCNC: 10.1 MG/DL (ref 0.6–1.3)
DACRYOCYTES BLD QL SMEAR: NORMAL
DEPRECATED RDW RBC AUTO: 51.8 FL (ref 37–54)
ELLIPTOCYTES BLD QL SMEAR: NORMAL
EOSINOPHIL # BLD AUTO: 0.35 10*3/MM3 (ref 0–0.7)
EOSINOPHIL NFR BLD AUTO: 15.1 % (ref 0–7)
ERYTHROCYTE [DISTWIDTH] IN BLOOD BY AUTOMATED COUNT: 16.4 % (ref 11.5–14.5)
GFR SERPL CREATININE-BSD FRML MDRD: 5 ML/MIN/1.73
GFR SERPL CREATININE-BSD FRML MDRD: ABNORMAL ML/MIN/1.73
GLUCOSE BLD-MCNC: 93 MG/DL (ref 74–98)
HCT VFR BLD AUTO: 25.7 % (ref 37–47)
HGB BLD-MCNC: 8 G/DL (ref 12–16)
HYPOCHROMIA BLD QL: NORMAL
IMM GRANULOCYTES # BLD: 0.05 10*3/MM3 (ref 0–0.06)
IMM GRANULOCYTES NFR BLD: 2.2 % (ref 0–0.6)
LYMPHOCYTES # BLD AUTO: 0.35 10*3/MM3 (ref 0.6–3.4)
LYMPHOCYTES NFR BLD AUTO: 15.1 % (ref 10–50)
MCH RBC QN AUTO: 26.7 PG (ref 27–31)
MCHC RBC AUTO-ENTMCNC: 31.1 G/DL (ref 30–37)
MCV RBC AUTO: 85.7 FL (ref 81–99)
MONOCYTES # BLD AUTO: 0.13 10*3/MM3 (ref 0–0.9)
MONOCYTES NFR BLD AUTO: 5.6 % (ref 0–12)
NEUTROPHILS # BLD AUTO: 1.43 10*3/MM3 (ref 2–6.9)
NEUTROPHILS NFR BLD AUTO: 61.6 % (ref 37–80)
NRBC BLD MANUAL-RTO: 0 /100 WBC (ref 0–0)
PHOSPHATE SERPL-MCNC: 9.3 MG/DL (ref 2.5–4.5)
PLATELET # BLD AUTO: 120 10*3/MM3 (ref 130–400)
PMV BLD AUTO: 12 FL (ref 6–12)
POIKILOCYTOSIS BLD QL SMEAR: NORMAL
POTASSIUM BLD-SCNC: 5.4 MMOL/L (ref 3.5–5.1)
RBC # BLD AUTO: 3 10*6/MM3 (ref 4.2–5.4)
SMALL PLATELETS BLD QL SMEAR: ADEQUATE
SODIUM BLD-SCNC: 129 MMOL/L (ref 137–145)
SPHEROCYTES BLD QL SMEAR: NORMAL
WBC MORPH BLD: NORMAL
WBC NRBC COR # BLD: 2.32 10*3/MM3 (ref 4.8–10.8)

## 2018-10-28 PROCEDURE — 05H633Z INSERTION OF INFUSION DEVICE INTO LEFT SUBCLAVIAN VEIN, PERCUTANEOUS APPROACH: ICD-10-PCS | Performed by: INTERNAL MEDICINE

## 2018-10-28 PROCEDURE — 25010000002 DAPTOMYCIN PER 1 MG: Performed by: INTERNAL MEDICINE

## 2018-10-28 PROCEDURE — 85025 COMPLETE CBC W/AUTO DIFF WBC: CPT | Performed by: INTERNAL MEDICINE

## 2018-10-28 PROCEDURE — 99232 SBSQ HOSP IP/OBS MODERATE 35: CPT | Performed by: INTERNAL MEDICINE

## 2018-10-28 PROCEDURE — 25010000002 HEPARIN (PORCINE) PER 1000 UNITS: Performed by: FAMILY MEDICINE

## 2018-10-28 PROCEDURE — 25010000002 HYDROMORPHONE 1 MG/ML SOLUTION: Performed by: FAMILY MEDICINE

## 2018-10-28 PROCEDURE — 63710000001 DIPHENHYDRAMINE PER 50 MG: Performed by: FAMILY MEDICINE

## 2018-10-28 PROCEDURE — 5A1D70Z PERFORMANCE OF URINARY FILTRATION, INTERMITTENT, LESS THAN 6 HOURS PER DAY: ICD-10-PCS | Performed by: INTERNAL MEDICINE

## 2018-10-28 PROCEDURE — 80069 RENAL FUNCTION PANEL: CPT | Performed by: INTERNAL MEDICINE

## 2018-10-28 PROCEDURE — C1752 CATH,HEMODIALYSIS,SHORT-TERM: HCPCS

## 2018-10-28 PROCEDURE — 25010000002 DIPHENHYDRAMINE PER 50 MG: Performed by: FAMILY MEDICINE

## 2018-10-28 PROCEDURE — 87040 BLOOD CULTURE FOR BACTERIA: CPT | Performed by: INTERNAL MEDICINE

## 2018-10-28 PROCEDURE — 86140 C-REACTIVE PROTEIN: CPT | Performed by: INTERNAL MEDICINE

## 2018-10-28 PROCEDURE — 25010000002 HYDROMORPHONE 1 MG/ML SOLUTION: Performed by: INTERNAL MEDICINE

## 2018-10-28 PROCEDURE — 90935 HEMODIALYSIS ONE EVALUATION: CPT

## 2018-10-28 PROCEDURE — 25010000002 DIAZEPAM PER 5 MG: Performed by: INTERNAL MEDICINE

## 2018-10-28 PROCEDURE — 25010000002 CEFTAZIDIME PER 500 MG: Performed by: INTERNAL MEDICINE

## 2018-10-28 PROCEDURE — 85007 BL SMEAR W/DIFF WBC COUNT: CPT | Performed by: INTERNAL MEDICINE

## 2018-10-28 PROCEDURE — 63710000001 MYCOPHENOLATE MOFETIL PER 250 MG: Performed by: FAMILY MEDICINE

## 2018-10-28 PROCEDURE — 0JH83XZ INSERTION OF TUNNELED VASCULAR ACCESS DEVICE INTO ABDOMEN SUBCUTANEOUS TISSUE AND FASCIA, PERCUTANEOUS APPROACH: ICD-10-PCS | Performed by: INTERNAL MEDICINE

## 2018-10-28 RX ORDER — DIAZEPAM 5 MG/ML
5 INJECTION, SOLUTION INTRAMUSCULAR; INTRAVENOUS ONCE
Status: COMPLETED | OUTPATIENT
Start: 2018-10-28 | End: 2018-10-28

## 2018-10-28 RX ORDER — LORAZEPAM 2 MG/ML
1 INJECTION INTRAMUSCULAR ONCE
Status: DISCONTINUED | OUTPATIENT
Start: 2018-10-28 | End: 2018-10-28

## 2018-10-28 RX ORDER — LIDOCAINE HYDROCHLORIDE 10 MG/ML
INJECTION, SOLUTION INFILTRATION; PERINEURAL
Status: DISPENSED
Start: 2018-10-28 | End: 2018-10-28

## 2018-10-28 RX ADMIN — Medication 1 TABLET: at 08:07

## 2018-10-28 RX ADMIN — HYDROXYCHLOROQUINE SULFATE 200 MG: 200 TABLET, FILM COATED ORAL at 20:51

## 2018-10-28 RX ADMIN — HYDRALAZINE HYDROCHLORIDE 100 MG: 25 TABLET ORAL at 14:05

## 2018-10-28 RX ADMIN — HYDRALAZINE HYDROCHLORIDE 100 MG: 25 TABLET ORAL at 06:31

## 2018-10-28 RX ADMIN — NICARDIPINE HYDROCHLORIDE 40 MG: 20 CAPSULE ORAL at 06:31

## 2018-10-28 RX ADMIN — CEFTAZIDIME 1 G: 1 INJECTION, POWDER, FOR SOLUTION INTRAMUSCULAR; INTRAVENOUS at 17:36

## 2018-10-28 RX ADMIN — HYDRALAZINE HYDROCHLORIDE 100 MG: 25 TABLET ORAL at 22:31

## 2018-10-28 RX ADMIN — NICARDIPINE HYDROCHLORIDE 40 MG: 20 CAPSULE ORAL at 14:05

## 2018-10-28 RX ADMIN — ACYCLOVIR 400 MG: 200 CAPSULE ORAL at 08:06

## 2018-10-28 RX ADMIN — DAPTOMYCIN 500 MG: 500 INJECTION, POWDER, LYOPHILIZED, FOR SOLUTION INTRAVENOUS at 17:36

## 2018-10-28 RX ADMIN — CALCITRIOL CAPSULES 0.25 MCG 0.5 MCG: 0.25 CAPSULE ORAL at 08:07

## 2018-10-28 RX ADMIN — HYDROXYCHLOROQUINE SULFATE 200 MG: 200 TABLET, FILM COATED ORAL at 08:06

## 2018-10-28 RX ADMIN — HYDROMORPHONE HYDROCHLORIDE 0.25 MG: 1 INJECTION, SOLUTION INTRAMUSCULAR; INTRAVENOUS; SUBCUTANEOUS at 20:49

## 2018-10-28 RX ADMIN — HYDROMORPHONE HYDROCHLORIDE 0.25 MG: 1 INJECTION, SOLUTION INTRAMUSCULAR; INTRAVENOUS; SUBCUTANEOUS at 12:09

## 2018-10-28 RX ADMIN — HEPARIN SODIUM 5000 UNITS: 5000 INJECTION, SOLUTION INTRAVENOUS; SUBCUTANEOUS at 22:31

## 2018-10-28 RX ADMIN — CLONAZEPAM 0.5 MG: 0.5 TABLET ORAL at 22:33

## 2018-10-28 RX ADMIN — HYDROMORPHONE HYDROCHLORIDE 0.25 MG: 1 INJECTION, SOLUTION INTRAMUSCULAR; INTRAVENOUS; SUBCUTANEOUS at 08:08

## 2018-10-28 RX ADMIN — DIPHENHYDRAMINE HYDROCHLORIDE 25 MG: 25 CAPSULE ORAL at 20:49

## 2018-10-28 RX ADMIN — ACYCLOVIR 400 MG: 200 CAPSULE ORAL at 20:51

## 2018-10-28 RX ADMIN — ACYCLOVIR 400 MG: 200 CAPSULE ORAL at 17:36

## 2018-10-28 RX ADMIN — PANTOPRAZOLE SODIUM 40 MG: 40 TABLET, DELAYED RELEASE ORAL at 06:31

## 2018-10-28 RX ADMIN — Medication 3 ML: at 22:34

## 2018-10-28 RX ADMIN — LOSARTAN POTASSIUM 100 MG: 50 TABLET, FILM COATED ORAL at 08:07

## 2018-10-28 RX ADMIN — NEBIVOLOL HYDROCHLORIDE 20 MG: 10 TABLET ORAL at 08:08

## 2018-10-28 RX ADMIN — DIPHENHYDRAMINE HYDROCHLORIDE 25 MG: 25 CAPSULE ORAL at 14:05

## 2018-10-28 RX ADMIN — Medication 3 ML: at 22:35

## 2018-10-28 RX ADMIN — TERAZOSIN HYDROCHLORIDE 20 MG: 5 CAPSULE ORAL at 20:49

## 2018-10-28 RX ADMIN — MYCOPHENOLATE MOFETIL 250 MG: 250 CAPSULE ORAL at 08:07

## 2018-10-28 RX ADMIN — MYCOPHENOLATE MOFETIL 250 MG: 250 CAPSULE ORAL at 20:51

## 2018-10-28 RX ADMIN — HYDROMORPHONE HYDROCHLORIDE 0.5 MG: 1 INJECTION, SOLUTION INTRAMUSCULAR; INTRAVENOUS; SUBCUTANEOUS at 10:52

## 2018-10-28 RX ADMIN — Medication 5 MG: at 10:52

## 2018-10-28 RX ADMIN — NICARDIPINE HYDROCHLORIDE 40 MG: 20 CAPSULE ORAL at 22:31

## 2018-10-28 RX ADMIN — DIPHENHYDRAMINE HYDROCHLORIDE 25 MG: 50 INJECTION, SOLUTION INTRAMUSCULAR; INTRAVENOUS at 08:07

## 2018-10-28 RX ADMIN — PANTOPRAZOLE SODIUM 40 MG: 40 TABLET, DELAYED RELEASE ORAL at 17:36

## 2018-10-29 LAB
CRP SERPL-MCNC: 5.4 MG/DL (ref 0–1)
ERYTHROCYTE [SEDIMENTATION RATE] IN BLOOD: 23 MM/HR (ref 0–20)
PROCALCITONIN SERPL-MCNC: 0.84 NG/ML

## 2018-10-29 PROCEDURE — 63710000001 DIPHENHYDRAMINE PER 50 MG: Performed by: FAMILY MEDICINE

## 2018-10-29 PROCEDURE — 85651 RBC SED RATE NONAUTOMATED: CPT | Performed by: INTERNAL MEDICINE

## 2018-10-29 PROCEDURE — 99232 SBSQ HOSP IP/OBS MODERATE 35: CPT | Performed by: INTERNAL MEDICINE

## 2018-10-29 PROCEDURE — 84145 PROCALCITONIN (PCT): CPT | Performed by: INTERNAL MEDICINE

## 2018-10-29 PROCEDURE — 63710000001 MYCOPHENOLATE MOFETIL PER 250 MG: Performed by: FAMILY MEDICINE

## 2018-10-29 PROCEDURE — 25010000002 HYDROMORPHONE 1 MG/ML SOLUTION: Performed by: FAMILY MEDICINE

## 2018-10-29 PROCEDURE — 25010000002 DIPHENHYDRAMINE PER 50 MG: Performed by: FAMILY MEDICINE

## 2018-10-29 RX ORDER — CLONIDINE HYDROCHLORIDE 0.1 MG/1
0.1 TABLET ORAL
Status: DISCONTINUED | OUTPATIENT
Start: 2018-10-29 | End: 2018-11-04 | Stop reason: HOSPADM

## 2018-10-29 RX ADMIN — NICARDIPINE HYDROCHLORIDE 40 MG: 20 CAPSULE ORAL at 06:26

## 2018-10-29 RX ADMIN — HYDROXYCHLOROQUINE SULFATE 200 MG: 200 TABLET, FILM COATED ORAL at 21:08

## 2018-10-29 RX ADMIN — NEBIVOLOL HYDROCHLORIDE 20 MG: 10 TABLET ORAL at 09:04

## 2018-10-29 RX ADMIN — Medication 3 ML: at 09:06

## 2018-10-29 RX ADMIN — NICARDIPINE HYDROCHLORIDE 40 MG: 20 CAPSULE ORAL at 11:55

## 2018-10-29 RX ADMIN — TERAZOSIN HYDROCHLORIDE 20 MG: 5 CAPSULE ORAL at 21:08

## 2018-10-29 RX ADMIN — PANTOPRAZOLE SODIUM 40 MG: 40 TABLET, DELAYED RELEASE ORAL at 07:30

## 2018-10-29 RX ADMIN — HYDROCODONE BITARTRATE AND ACETAMINOPHEN 1 TABLET: 5; 325 TABLET ORAL at 00:37

## 2018-10-29 RX ADMIN — LOSARTAN POTASSIUM 100 MG: 50 TABLET, FILM COATED ORAL at 09:03

## 2018-10-29 RX ADMIN — MYCOPHENOLATE MOFETIL 250 MG: 250 CAPSULE ORAL at 21:08

## 2018-10-29 RX ADMIN — MELATONIN TAB 3 MG 5.25 MG: 3 TAB at 21:08

## 2018-10-29 RX ADMIN — ACYCLOVIR 400 MG: 200 CAPSULE ORAL at 09:03

## 2018-10-29 RX ADMIN — DIPHENHYDRAMINE HYDROCHLORIDE 25 MG: 50 INJECTION, SOLUTION INTRAMUSCULAR; INTRAVENOUS at 21:06

## 2018-10-29 RX ADMIN — Medication 3 ML: at 21:08

## 2018-10-29 RX ADMIN — HYDRALAZINE HYDROCHLORIDE 100 MG: 25 TABLET ORAL at 21:06

## 2018-10-29 RX ADMIN — CLONIDINE HYDROCHLORIDE 0.1 MG: 0.1 TABLET ORAL at 11:55

## 2018-10-29 RX ADMIN — ACETAMINOPHEN 650 MG: 325 TABLET ORAL at 03:53

## 2018-10-29 RX ADMIN — DIPHENHYDRAMINE HYDROCHLORIDE 25 MG: 25 CAPSULE ORAL at 13:48

## 2018-10-29 RX ADMIN — NICARDIPINE HYDROCHLORIDE 40 MG: 20 CAPSULE ORAL at 21:07

## 2018-10-29 RX ADMIN — DIPHENHYDRAMINE HYDROCHLORIDE 25 MG: 25 CAPSULE ORAL at 09:02

## 2018-10-29 RX ADMIN — Medication 1 TABLET: at 09:08

## 2018-10-29 RX ADMIN — Medication 3 ML: at 21:09

## 2018-10-29 RX ADMIN — HYDROMORPHONE HYDROCHLORIDE 0.25 MG: 1 INJECTION, SOLUTION INTRAMUSCULAR; INTRAVENOUS; SUBCUTANEOUS at 14:37

## 2018-10-29 RX ADMIN — HYDRALAZINE HYDROCHLORIDE 100 MG: 25 TABLET ORAL at 06:26

## 2018-10-29 RX ADMIN — CALCITRIOL CAPSULES 0.25 MCG 0.5 MCG: 0.25 CAPSULE ORAL at 09:03

## 2018-10-29 RX ADMIN — HYDROMORPHONE HYDROCHLORIDE 0.25 MG: 1 INJECTION, SOLUTION INTRAMUSCULAR; INTRAVENOUS; SUBCUTANEOUS at 09:05

## 2018-10-29 RX ADMIN — ACYCLOVIR 400 MG: 200 CAPSULE ORAL at 16:40

## 2018-10-29 RX ADMIN — MYCOPHENOLATE MOFETIL 250 MG: 250 CAPSULE ORAL at 09:03

## 2018-10-29 RX ADMIN — BISACODYL 10 MG: 5 TABLET, COATED ORAL at 09:16

## 2018-10-29 RX ADMIN — ACYCLOVIR 400 MG: 200 CAPSULE ORAL at 21:06

## 2018-10-29 RX ADMIN — HYDRALAZINE HYDROCHLORIDE 100 MG: 25 TABLET ORAL at 11:55

## 2018-10-29 RX ADMIN — PANTOPRAZOLE SODIUM 40 MG: 40 TABLET, DELAYED RELEASE ORAL at 16:40

## 2018-10-29 RX ADMIN — DIPHENHYDRAMINE HYDROCHLORIDE 25 MG: 25 CAPSULE ORAL at 03:48

## 2018-10-29 RX ADMIN — HYDROXYCHLOROQUINE SULFATE 200 MG: 200 TABLET, FILM COATED ORAL at 09:04

## 2018-10-29 RX ADMIN — HYDROMORPHONE HYDROCHLORIDE 0.25 MG: 1 INJECTION, SOLUTION INTRAMUSCULAR; INTRAVENOUS; SUBCUTANEOUS at 21:07

## 2018-10-30 ENCOUNTER — APPOINTMENT (OUTPATIENT)
Dept: NUCLEAR MEDICINE | Facility: HOSPITAL | Age: 28
End: 2018-10-30

## 2018-10-30 LAB
ALBUMIN SERPL-MCNC: 2.9 G/DL (ref 3.5–5)
ANION GAP SERPL CALCULATED.3IONS-SCNC: 12.3 MMOL/L (ref 10–20)
BACTERIA SPEC AEROBE CULT: NORMAL
BACTERIA SPEC AEROBE CULT: NORMAL
BASOPHILS # BLD AUTO: 0.01 10*3/MM3 (ref 0–0.2)
BASOPHILS NFR BLD AUTO: 0.4 % (ref 0–2.5)
BUN BLD-MCNC: 26 MG/DL (ref 7–20)
BUN/CREAT SERPL: 2.8 (ref 7.1–23.5)
CALCIUM SPEC-SCNC: 9.7 MG/DL (ref 8.4–10.2)
CATHETER CULTURE: NO GROWTH
CHLORIDE SERPL-SCNC: 96 MMOL/L (ref 98–107)
CO2 SERPL-SCNC: 26 MMOL/L (ref 26–30)
CREAT BLD-MCNC: 9.2 MG/DL (ref 0.6–1.3)
DEPRECATED RDW RBC AUTO: 52.1 FL (ref 37–54)
EOSINOPHIL # BLD AUTO: 0.35 10*3/MM3 (ref 0–0.7)
EOSINOPHIL NFR BLD AUTO: 15.4 % (ref 0–7)
ERYTHROCYTE [DISTWIDTH] IN BLOOD BY AUTOMATED COUNT: 16 % (ref 11.5–14.5)
GFR SERPL CREATININE-BSD FRML MDRD: 5 ML/MIN/1.73
GFR SERPL CREATININE-BSD FRML MDRD: ABNORMAL ML/MIN/1.73
GLUCOSE BLD-MCNC: 131 MG/DL (ref 74–98)
HCT VFR BLD AUTO: 29.9 % (ref 37–47)
HGB BLD-MCNC: 9.2 G/DL (ref 12–16)
IMM GRANULOCYTES # BLD: 0.01 10*3/MM3 (ref 0–0.06)
IMM GRANULOCYTES NFR BLD: 0.4 % (ref 0–0.6)
LYMPHOCYTES # BLD AUTO: 0.36 10*3/MM3 (ref 0.6–3.4)
LYMPHOCYTES NFR BLD AUTO: 15.9 % (ref 10–50)
MCH RBC QN AUTO: 27.1 PG (ref 27–31)
MCHC RBC AUTO-ENTMCNC: 30.8 G/DL (ref 30–37)
MCV RBC AUTO: 87.9 FL (ref 81–99)
MONOCYTES # BLD AUTO: 0.12 10*3/MM3 (ref 0–0.9)
MONOCYTES NFR BLD AUTO: 5.3 % (ref 0–12)
NEUTROPHILS # BLD AUTO: 1.42 10*3/MM3 (ref 2–6.9)
NEUTROPHILS NFR BLD AUTO: 62.6 % (ref 37–80)
NRBC BLD MANUAL-RTO: 0 /100 WBC (ref 0–0)
PHOSPHATE SERPL-MCNC: 7.4 MG/DL (ref 2.5–4.5)
PLATELET # BLD AUTO: 167 10*3/MM3 (ref 130–400)
PMV BLD AUTO: 10.6 FL (ref 6–12)
POTASSIUM BLD-SCNC: 4.3 MMOL/L (ref 3.5–5.1)
RBC # BLD AUTO: 3.4 10*6/MM3 (ref 4.2–5.4)
SODIUM BLD-SCNC: 130 MMOL/L (ref 137–145)
WBC NRBC COR # BLD: 2.27 10*3/MM3 (ref 4.8–10.8)

## 2018-10-30 PROCEDURE — A9547 IN111 OXYQUINOLINE: HCPCS | Performed by: INTERNAL MEDICINE

## 2018-10-30 PROCEDURE — 25010000002 DAPTOMYCIN PER 1 MG: Performed by: INTERNAL MEDICINE

## 2018-10-30 PROCEDURE — 63710000001 DIPHENHYDRAMINE PER 50 MG: Performed by: INTERNAL MEDICINE

## 2018-10-30 PROCEDURE — 99232 SBSQ HOSP IP/OBS MODERATE 35: CPT | Performed by: INTERNAL MEDICINE

## 2018-10-30 PROCEDURE — 90935 HEMODIALYSIS ONE EVALUATION: CPT

## 2018-10-30 PROCEDURE — 25010000002 HEPARIN (PORCINE) PER 1000 UNITS: Performed by: INTERNAL MEDICINE

## 2018-10-30 PROCEDURE — 25010000002 HYDROMORPHONE 1 MG/ML SOLUTION: Performed by: FAMILY MEDICINE

## 2018-10-30 PROCEDURE — 63710000001 MYCOPHENOLATE MOFETIL PER 250 MG: Performed by: FAMILY MEDICINE

## 2018-10-30 PROCEDURE — 0 INDIUM-111 OXYQUINOLINE 1 MCI/ML SOLUTION: Performed by: INTERNAL MEDICINE

## 2018-10-30 PROCEDURE — 25010000002 HEPARIN (PORCINE) PER 1000 UNITS: Performed by: FAMILY MEDICINE

## 2018-10-30 PROCEDURE — 80069 RENAL FUNCTION PANEL: CPT | Performed by: INTERNAL MEDICINE

## 2018-10-30 PROCEDURE — 36591 DRAW BLOOD OFF VENOUS DEVICE: CPT

## 2018-10-30 PROCEDURE — 5A1D70Z PERFORMANCE OF URINARY FILTRATION, INTERMITTENT, LESS THAN 6 HOURS PER DAY: ICD-10-PCS | Performed by: INTERNAL MEDICINE

## 2018-10-30 PROCEDURE — 85025 COMPLETE CBC W/AUTO DIFF WBC: CPT | Performed by: INTERNAL MEDICINE

## 2018-10-30 RX ORDER — CETIRIZINE HYDROCHLORIDE 10 MG/1
5 TABLET ORAL DAILY
Status: DISCONTINUED | OUTPATIENT
Start: 2018-10-30 | End: 2018-11-04 | Stop reason: HOSPADM

## 2018-10-30 RX ORDER — INDIUM IN-111 OXYQUINOLINE 1 UG/ML
SOLUTION INTRAVENOUS
Status: COMPLETED | OUTPATIENT
Start: 2018-10-30 | End: 2018-10-30

## 2018-10-30 RX ORDER — DIPHENHYDRAMINE HCL 25 MG
25 CAPSULE ORAL EVERY 4 HOURS PRN
Status: DISCONTINUED | OUTPATIENT
Start: 2018-10-30 | End: 2018-11-04 | Stop reason: HOSPADM

## 2018-10-30 RX ORDER — FLUTICASONE PROPIONATE 50 MCG
2 SPRAY, SUSPENSION (ML) NASAL DAILY
Status: DISCONTINUED | OUTPATIENT
Start: 2018-10-30 | End: 2018-11-04 | Stop reason: HOSPADM

## 2018-10-30 RX ADMIN — Medication 3 ML: at 09:03

## 2018-10-30 RX ADMIN — DIPHENHYDRAMINE HYDROCHLORIDE 25 MG: 25 CAPSULE ORAL at 14:44

## 2018-10-30 RX ADMIN — CLONIDINE HYDROCHLORIDE 0.1 MG: 0.1 TABLET ORAL at 23:22

## 2018-10-30 RX ADMIN — ACYCLOVIR 400 MG: 200 CAPSULE ORAL at 08:59

## 2018-10-30 RX ADMIN — HYDROXYCHLOROQUINE SULFATE 200 MG: 200 TABLET, FILM COATED ORAL at 21:10

## 2018-10-30 RX ADMIN — INDIUM IN-111 OXYQUINOLINE: 1 SOLUTION INTRAVENOUS at 12:40

## 2018-10-30 RX ADMIN — MYCOPHENOLATE MOFETIL 250 MG: 250 CAPSULE ORAL at 21:09

## 2018-10-30 RX ADMIN — DIPHENHYDRAMINE HYDROCHLORIDE 25 MG: 25 CAPSULE ORAL at 22:40

## 2018-10-30 RX ADMIN — HYDRALAZINE HYDROCHLORIDE 100 MG: 25 TABLET ORAL at 21:09

## 2018-10-30 RX ADMIN — NICARDIPINE HYDROCHLORIDE 40 MG: 20 CAPSULE ORAL at 07:10

## 2018-10-30 RX ADMIN — CLONAZEPAM 0.5 MG: 0.5 TABLET ORAL at 14:44

## 2018-10-30 RX ADMIN — Medication 1 TABLET: at 09:02

## 2018-10-30 RX ADMIN — ACYCLOVIR 400 MG: 200 CAPSULE ORAL at 21:09

## 2018-10-30 RX ADMIN — Medication 3 ML: at 21:10

## 2018-10-30 RX ADMIN — NICARDIPINE HYDROCHLORIDE 40 MG: 20 CAPSULE ORAL at 12:20

## 2018-10-30 RX ADMIN — HYDROMORPHONE HYDROCHLORIDE 0.25 MG: 1 INJECTION, SOLUTION INTRAMUSCULAR; INTRAVENOUS; SUBCUTANEOUS at 14:44

## 2018-10-30 RX ADMIN — HYDROCODONE BITARTRATE AND ACETAMINOPHEN 1 TABLET: 5; 325 TABLET ORAL at 21:08

## 2018-10-30 RX ADMIN — PANTOPRAZOLE SODIUM 40 MG: 40 TABLET, DELAYED RELEASE ORAL at 18:30

## 2018-10-30 RX ADMIN — CETIRIZINE HYDROCHLORIDE 5 MG: 10 TABLET, FILM COATED ORAL at 09:02

## 2018-10-30 RX ADMIN — PANTOPRAZOLE SODIUM 40 MG: 40 TABLET, DELAYED RELEASE ORAL at 07:10

## 2018-10-30 RX ADMIN — CALCITRIOL CAPSULES 0.25 MCG 0.5 MCG: 0.25 CAPSULE ORAL at 09:02

## 2018-10-30 RX ADMIN — NEBIVOLOL HYDROCHLORIDE 20 MG: 10 TABLET ORAL at 09:03

## 2018-10-30 RX ADMIN — LOSARTAN POTASSIUM 100 MG: 50 TABLET, FILM COATED ORAL at 08:59

## 2018-10-30 RX ADMIN — CLONIDINE HYDROCHLORIDE 0.1 MG: 0.1 TABLET ORAL at 18:31

## 2018-10-30 RX ADMIN — DIPHENHYDRAMINE HYDROCHLORIDE 25 MG: 25 CAPSULE ORAL at 18:30

## 2018-10-30 RX ADMIN — HYDRALAZINE HYDROCHLORIDE 100 MG: 25 TABLET ORAL at 07:09

## 2018-10-30 RX ADMIN — TERAZOSIN HYDROCHLORIDE 20 MG: 5 CAPSULE ORAL at 21:09

## 2018-10-30 RX ADMIN — HYDROMORPHONE HYDROCHLORIDE 0.25 MG: 1 INJECTION, SOLUTION INTRAMUSCULAR; INTRAVENOUS; SUBCUTANEOUS at 22:40

## 2018-10-30 RX ADMIN — HYDROXYCHLOROQUINE SULFATE 200 MG: 200 TABLET, FILM COATED ORAL at 09:02

## 2018-10-30 RX ADMIN — CLONAZEPAM 0.5 MG: 0.5 TABLET ORAL at 21:09

## 2018-10-30 RX ADMIN — MYCOPHENOLATE MOFETIL 250 MG: 250 CAPSULE ORAL at 08:59

## 2018-10-30 RX ADMIN — CLONIDINE HYDROCHLORIDE 0.1 MG: 0.1 TABLET ORAL at 04:30

## 2018-10-30 RX ADMIN — HYDRALAZINE HYDROCHLORIDE 100 MG: 25 TABLET ORAL at 12:20

## 2018-10-30 RX ADMIN — Medication 3 ML: at 09:04

## 2018-10-30 RX ADMIN — HYDROMORPHONE HYDROCHLORIDE 0.25 MG: 1 INJECTION, SOLUTION INTRAMUSCULAR; INTRAVENOUS; SUBCUTANEOUS at 18:31

## 2018-10-30 RX ADMIN — NICARDIPINE HYDROCHLORIDE 40 MG: 20 CAPSULE ORAL at 21:10

## 2018-10-30 RX ADMIN — HEPARIN SODIUM: 1000 INJECTION, SOLUTION INTRAVENOUS; SUBCUTANEOUS at 18:01

## 2018-10-30 RX ADMIN — FLUTICASONE PROPIONATE 2 SPRAY: 50 SPRAY, METERED NASAL at 09:02

## 2018-10-30 RX ADMIN — DAPTOMYCIN 500 MG: 500 INJECTION, POWDER, LYOPHILIZED, FOR SOLUTION INTRAVENOUS at 17:30

## 2018-10-30 RX ADMIN — Medication 3 ML: at 21:11

## 2018-10-31 ENCOUNTER — APPOINTMENT (OUTPATIENT)
Dept: NUCLEAR MEDICINE | Facility: HOSPITAL | Age: 28
End: 2018-10-31

## 2018-10-31 LAB — GLUCOSE BLDC GLUCOMTR-MCNC: 120 MG/DL (ref 70–130)

## 2018-10-31 PROCEDURE — 78806 HC NM ABSCESS LOCALIZATION WHOLE BODY: CPT

## 2018-10-31 PROCEDURE — 63710000001 DIPHENHYDRAMINE PER 50 MG: Performed by: INTERNAL MEDICINE

## 2018-10-31 PROCEDURE — 63710000001 MYCOPHENOLATE MOFETIL PER 250 MG: Performed by: FAMILY MEDICINE

## 2018-10-31 PROCEDURE — 25010000002 HYDROMORPHONE 1 MG/ML SOLUTION: Performed by: FAMILY MEDICINE

## 2018-10-31 PROCEDURE — 99232 SBSQ HOSP IP/OBS MODERATE 35: CPT | Performed by: INTERNAL MEDICINE

## 2018-10-31 PROCEDURE — 25010000002 ONDANSETRON PER 1 MG: Performed by: FAMILY MEDICINE

## 2018-10-31 PROCEDURE — 82962 GLUCOSE BLOOD TEST: CPT

## 2018-10-31 PROCEDURE — 25010000002 HEPARIN (PORCINE) PER 1000 UNITS: Performed by: FAMILY MEDICINE

## 2018-10-31 RX ORDER — CALCIUM ACETATE 667 MG/1
1334 TABLET ORAL
Status: DISCONTINUED | OUTPATIENT
Start: 2018-10-31 | End: 2018-10-31 | Stop reason: CLARIF

## 2018-10-31 RX ORDER — CALCIUM ACETATE 667 MG/1
1334 CAPSULE ORAL
Status: DISCONTINUED | OUTPATIENT
Start: 2018-10-31 | End: 2018-11-04 | Stop reason: HOSPADM

## 2018-10-31 RX ADMIN — PANTOPRAZOLE SODIUM 40 MG: 40 TABLET, DELAYED RELEASE ORAL at 18:07

## 2018-10-31 RX ADMIN — DIPHENHYDRAMINE HYDROCHLORIDE 25 MG: 25 CAPSULE ORAL at 19:43

## 2018-10-31 RX ADMIN — ACYCLOVIR 400 MG: 200 CAPSULE ORAL at 08:47

## 2018-10-31 RX ADMIN — CETIRIZINE HYDROCHLORIDE 5 MG: 10 TABLET, FILM COATED ORAL at 08:47

## 2018-10-31 RX ADMIN — CLONAZEPAM 0.5 MG: 0.5 TABLET ORAL at 12:02

## 2018-10-31 RX ADMIN — MYCOPHENOLATE MOFETIL 250 MG: 250 CAPSULE ORAL at 20:44

## 2018-10-31 RX ADMIN — Medication 1 TABLET: at 08:48

## 2018-10-31 RX ADMIN — CALCIUM ACETATE 1334 MG: 667 CAPSULE ORAL at 12:02

## 2018-10-31 RX ADMIN — CLONAZEPAM 0.5 MG: 0.5 TABLET ORAL at 20:46

## 2018-10-31 RX ADMIN — HYDROMORPHONE HYDROCHLORIDE 0.25 MG: 1 INJECTION, SOLUTION INTRAMUSCULAR; INTRAVENOUS; SUBCUTANEOUS at 08:46

## 2018-10-31 RX ADMIN — HYDROCODONE BITARTRATE AND ACETAMINOPHEN 1 TABLET: 5; 325 TABLET ORAL at 03:06

## 2018-10-31 RX ADMIN — CLONIDINE HYDROCHLORIDE 0.1 MG: 0.1 TABLET ORAL at 19:43

## 2018-10-31 RX ADMIN — FLUTICASONE PROPIONATE 2 SPRAY: 50 SPRAY, METERED NASAL at 08:48

## 2018-10-31 RX ADMIN — HYDRALAZINE HYDROCHLORIDE 100 MG: 25 TABLET ORAL at 12:02

## 2018-10-31 RX ADMIN — CALCIUM ACETATE 1334 MG: 667 CAPSULE ORAL at 18:07

## 2018-10-31 RX ADMIN — MYCOPHENOLATE MOFETIL 250 MG: 250 CAPSULE ORAL at 08:47

## 2018-10-31 RX ADMIN — PANTOPRAZOLE SODIUM 40 MG: 40 TABLET, DELAYED RELEASE ORAL at 06:30

## 2018-10-31 RX ADMIN — ACYCLOVIR 400 MG: 200 CAPSULE ORAL at 20:44

## 2018-10-31 RX ADMIN — HYDRALAZINE HYDROCHLORIDE 100 MG: 25 TABLET ORAL at 21:45

## 2018-10-31 RX ADMIN — NEBIVOLOL HYDROCHLORIDE 20 MG: 10 TABLET ORAL at 08:47

## 2018-10-31 RX ADMIN — LOSARTAN POTASSIUM 100 MG: 50 TABLET, FILM COATED ORAL at 08:47

## 2018-10-31 RX ADMIN — HYDRALAZINE HYDROCHLORIDE 100 MG: 25 TABLET ORAL at 05:01

## 2018-10-31 RX ADMIN — Medication 3 ML: at 21:47

## 2018-10-31 RX ADMIN — HYDROXYCHLOROQUINE SULFATE 200 MG: 200 TABLET, FILM COATED ORAL at 20:44

## 2018-10-31 RX ADMIN — ACYCLOVIR 400 MG: 200 CAPSULE ORAL at 18:07

## 2018-10-31 RX ADMIN — NICARDIPINE HYDROCHLORIDE 40 MG: 20 CAPSULE ORAL at 12:02

## 2018-10-31 RX ADMIN — Medication 3 ML: at 21:48

## 2018-10-31 RX ADMIN — CALCITRIOL CAPSULES 0.25 MCG 0.5 MCG: 0.25 CAPSULE ORAL at 08:47

## 2018-10-31 RX ADMIN — HYDROCODONE BITARTRATE AND ACETAMINOPHEN 1 TABLET: 5; 325 TABLET ORAL at 19:43

## 2018-10-31 RX ADMIN — NICARDIPINE HYDROCHLORIDE 40 MG: 20 CAPSULE ORAL at 21:45

## 2018-10-31 RX ADMIN — Medication 3 ML: at 08:49

## 2018-10-31 RX ADMIN — NICARDIPINE HYDROCHLORIDE 40 MG: 20 CAPSULE ORAL at 05:01

## 2018-10-31 RX ADMIN — TERAZOSIN HYDROCHLORIDE 20 MG: 5 CAPSULE ORAL at 20:44

## 2018-10-31 RX ADMIN — HYDROXYCHLOROQUINE SULFATE 200 MG: 200 TABLET, FILM COATED ORAL at 08:47

## 2018-10-31 RX ADMIN — ONDANSETRON 4 MG: 2 INJECTION INTRAMUSCULAR; INTRAVENOUS at 21:45

## 2018-10-31 RX ADMIN — DIPHENHYDRAMINE HYDROCHLORIDE 25 MG: 25 CAPSULE ORAL at 08:46

## 2018-11-01 LAB
ALBUMIN SERPL-MCNC: 3 G/DL (ref 3.5–5)
ANION GAP SERPL CALCULATED.3IONS-SCNC: 13.4 MMOL/L (ref 10–20)
BASOPHILS # BLD AUTO: 0.01 10*3/MM3 (ref 0–0.2)
BASOPHILS NFR BLD AUTO: 0.3 % (ref 0–2.5)
BUN BLD-MCNC: 22 MG/DL (ref 7–20)
BUN/CREAT SERPL: 2.6 (ref 7.1–23.5)
CALCIUM SPEC-SCNC: 10.4 MG/DL (ref 8.4–10.2)
CHLORIDE SERPL-SCNC: 98 MMOL/L (ref 98–107)
CO2 SERPL-SCNC: 25 MMOL/L (ref 26–30)
CREAT BLD-MCNC: 8.5 MG/DL (ref 0.6–1.3)
DEPRECATED RDW RBC AUTO: 51.6 FL (ref 37–54)
EOSINOPHIL # BLD AUTO: 0.28 10*3/MM3 (ref 0–0.7)
EOSINOPHIL NFR BLD AUTO: 9.2 % (ref 0–7)
ERYTHROCYTE [DISTWIDTH] IN BLOOD BY AUTOMATED COUNT: 16.2 % (ref 11.5–14.5)
GFR SERPL CREATININE-BSD FRML MDRD: 6 ML/MIN/1.73
GFR SERPL CREATININE-BSD FRML MDRD: ABNORMAL ML/MIN/1.73
GLUCOSE BLD-MCNC: 107 MG/DL (ref 74–98)
HCT VFR BLD AUTO: 28.6 % (ref 37–47)
HGB BLD-MCNC: 8.8 G/DL (ref 12–16)
IMM GRANULOCYTES # BLD: 0.01 10*3/MM3 (ref 0–0.06)
IMM GRANULOCYTES NFR BLD: 0.3 % (ref 0–0.6)
LYMPHOCYTES # BLD AUTO: 0.54 10*3/MM3 (ref 0.6–3.4)
LYMPHOCYTES NFR BLD AUTO: 17.7 % (ref 10–50)
MCH RBC QN AUTO: 26.7 PG (ref 27–31)
MCHC RBC AUTO-ENTMCNC: 30.8 G/DL (ref 30–37)
MCV RBC AUTO: 86.7 FL (ref 81–99)
MONOCYTES # BLD AUTO: 0.16 10*3/MM3 (ref 0–0.9)
MONOCYTES NFR BLD AUTO: 5.2 % (ref 0–12)
NEUTROPHILS # BLD AUTO: 2.05 10*3/MM3 (ref 2–6.9)
NEUTROPHILS NFR BLD AUTO: 67.3 % (ref 37–80)
NRBC BLD MANUAL-RTO: 0 /100 WBC (ref 0–0)
PHOSPHATE SERPL-MCNC: 6.8 MG/DL (ref 2.5–4.5)
PLATELET # BLD AUTO: 192 10*3/MM3 (ref 130–400)
PMV BLD AUTO: 10.8 FL (ref 6–12)
POTASSIUM BLD-SCNC: 4.4 MMOL/L (ref 3.5–5.1)
RBC # BLD AUTO: 3.3 10*6/MM3 (ref 4.2–5.4)
SODIUM BLD-SCNC: 132 MMOL/L (ref 137–145)
WBC NRBC COR # BLD: 3.05 10*3/MM3 (ref 4.8–10.8)

## 2018-11-01 PROCEDURE — 85025 COMPLETE CBC W/AUTO DIFF WBC: CPT | Performed by: INTERNAL MEDICINE

## 2018-11-01 PROCEDURE — 5A1D70Z PERFORMANCE OF URINARY FILTRATION, INTERMITTENT, LESS THAN 6 HOURS PER DAY: ICD-10-PCS | Performed by: INTERNAL MEDICINE

## 2018-11-01 PROCEDURE — 90935 HEMODIALYSIS ONE EVALUATION: CPT

## 2018-11-01 PROCEDURE — 25010000002 DAPTOMYCIN PER 1 MG: Performed by: INTERNAL MEDICINE

## 2018-11-01 PROCEDURE — 63710000001 DIPHENHYDRAMINE PER 50 MG: Performed by: INTERNAL MEDICINE

## 2018-11-01 PROCEDURE — 99232 SBSQ HOSP IP/OBS MODERATE 35: CPT | Performed by: INTERNAL MEDICINE

## 2018-11-01 PROCEDURE — 25010000002 HYDROMORPHONE 1 MG/ML SOLUTION: Performed by: FAMILY MEDICINE

## 2018-11-01 PROCEDURE — 36591 DRAW BLOOD OFF VENOUS DEVICE: CPT

## 2018-11-01 PROCEDURE — 80069 RENAL FUNCTION PANEL: CPT | Performed by: INTERNAL MEDICINE

## 2018-11-01 PROCEDURE — 63710000001 MYCOPHENOLATE MOFETIL PER 250 MG: Performed by: FAMILY MEDICINE

## 2018-11-01 RX ORDER — IBUPROFEN 600 MG/1
600 TABLET ORAL EVERY 8 HOURS SCHEDULED
Status: DISPENSED | OUTPATIENT
Start: 2018-11-01 | End: 2018-11-03

## 2018-11-01 RX ADMIN — Medication 1 TABLET: at 08:59

## 2018-11-01 RX ADMIN — MYCOPHENOLATE MOFETIL 250 MG: 250 CAPSULE ORAL at 21:07

## 2018-11-01 RX ADMIN — HYDROXYCHLOROQUINE SULFATE 200 MG: 200 TABLET, FILM COATED ORAL at 21:07

## 2018-11-01 RX ADMIN — HYDROMORPHONE HYDROCHLORIDE 0.25 MG: 1 INJECTION, SOLUTION INTRAMUSCULAR; INTRAVENOUS; SUBCUTANEOUS at 09:09

## 2018-11-01 RX ADMIN — DAPTOMYCIN 500 MG: 500 INJECTION, POWDER, LYOPHILIZED, FOR SOLUTION INTRAVENOUS at 14:34

## 2018-11-01 RX ADMIN — IBUPROFEN 600 MG: 600 TABLET ORAL at 12:30

## 2018-11-01 RX ADMIN — HYDRALAZINE HYDROCHLORIDE 100 MG: 25 TABLET ORAL at 21:06

## 2018-11-01 RX ADMIN — CALCIUM ACETATE 1334 MG: 667 CAPSULE ORAL at 12:28

## 2018-11-01 RX ADMIN — ACYCLOVIR 400 MG: 200 CAPSULE ORAL at 21:07

## 2018-11-01 RX ADMIN — NEBIVOLOL HYDROCHLORIDE 20 MG: 10 TABLET ORAL at 08:58

## 2018-11-01 RX ADMIN — PANTOPRAZOLE SODIUM 40 MG: 40 TABLET, DELAYED RELEASE ORAL at 06:36

## 2018-11-01 RX ADMIN — HYDRALAZINE HYDROCHLORIDE 100 MG: 25 TABLET ORAL at 06:35

## 2018-11-01 RX ADMIN — CLONIDINE HYDROCHLORIDE 0.1 MG: 0.1 TABLET ORAL at 04:21

## 2018-11-01 RX ADMIN — CALCITRIOL CAPSULES 0.25 MCG 0.5 MCG: 0.25 CAPSULE ORAL at 08:58

## 2018-11-01 RX ADMIN — CALCIUM ACETATE 1334 MG: 667 CAPSULE ORAL at 17:00

## 2018-11-01 RX ADMIN — CETIRIZINE HYDROCHLORIDE 5 MG: 10 TABLET, FILM COATED ORAL at 21:07

## 2018-11-01 RX ADMIN — LOSARTAN POTASSIUM 100 MG: 50 TABLET, FILM COATED ORAL at 08:58

## 2018-11-01 RX ADMIN — NICARDIPINE HYDROCHLORIDE 40 MG: 20 CAPSULE ORAL at 06:36

## 2018-11-01 RX ADMIN — DIPHENHYDRAMINE HYDROCHLORIDE 25 MG: 25 CAPSULE ORAL at 09:09

## 2018-11-01 RX ADMIN — MYCOPHENOLATE MOFETIL 250 MG: 250 CAPSULE ORAL at 08:59

## 2018-11-01 RX ADMIN — PANTOPRAZOLE SODIUM 40 MG: 40 TABLET, DELAYED RELEASE ORAL at 17:00

## 2018-11-01 RX ADMIN — NICARDIPINE HYDROCHLORIDE 40 MG: 20 CAPSULE ORAL at 12:28

## 2018-11-01 RX ADMIN — FLUTICASONE PROPIONATE 2 SPRAY: 50 SPRAY, METERED NASAL at 08:59

## 2018-11-01 RX ADMIN — CLONAZEPAM 0.5 MG: 0.5 TABLET ORAL at 18:38

## 2018-11-01 RX ADMIN — HYDROXYCHLOROQUINE SULFATE 200 MG: 200 TABLET, FILM COATED ORAL at 08:58

## 2018-11-01 RX ADMIN — DIPHENHYDRAMINE HYDROCHLORIDE 25 MG: 25 CAPSULE ORAL at 18:17

## 2018-11-01 RX ADMIN — Medication 3 ML: at 09:00

## 2018-11-01 RX ADMIN — TERAZOSIN HYDROCHLORIDE 20 MG: 5 CAPSULE ORAL at 21:07

## 2018-11-01 RX ADMIN — Medication 3 ML: at 21:05

## 2018-11-01 RX ADMIN — CALCIUM ACETATE 1334 MG: 667 CAPSULE ORAL at 08:58

## 2018-11-01 RX ADMIN — ACYCLOVIR 400 MG: 200 CAPSULE ORAL at 08:58

## 2018-11-01 RX ADMIN — CLONIDINE 1 PATCH: 0.3 PATCH, EXTENDED RELEASE TRANSDERMAL at 12:28

## 2018-11-01 RX ADMIN — HYDROMORPHONE HYDROCHLORIDE 0.25 MG: 1 INJECTION, SOLUTION INTRAMUSCULAR; INTRAVENOUS; SUBCUTANEOUS at 18:06

## 2018-11-01 RX ADMIN — ACYCLOVIR 400 MG: 200 CAPSULE ORAL at 17:00

## 2018-11-01 RX ADMIN — NICARDIPINE HYDROCHLORIDE 40 MG: 20 CAPSULE ORAL at 21:06

## 2018-11-01 RX ADMIN — CLONAZEPAM 0.5 MG: 0.5 TABLET ORAL at 12:35

## 2018-11-01 RX ADMIN — IBUPROFEN 600 MG: 600 TABLET ORAL at 21:08

## 2018-11-01 RX ADMIN — HYDRALAZINE HYDROCHLORIDE 100 MG: 25 TABLET ORAL at 12:29

## 2018-11-01 RX ADMIN — Medication 3 ML: at 21:03

## 2018-11-01 RX ADMIN — CLONIDINE HYDROCHLORIDE 0.1 MG: 0.1 TABLET ORAL at 18:38

## 2018-11-02 ENCOUNTER — ANESTHESIA (OUTPATIENT)
Dept: PERIOP | Facility: HOSPITAL | Age: 28
End: 2018-11-02

## 2018-11-02 ENCOUNTER — ANESTHESIA EVENT (OUTPATIENT)
Dept: PERIOP | Facility: HOSPITAL | Age: 28
End: 2018-11-02

## 2018-11-02 ENCOUNTER — APPOINTMENT (OUTPATIENT)
Dept: GENERAL RADIOLOGY | Facility: HOSPITAL | Age: 28
End: 2018-11-02

## 2018-11-02 LAB
BACTERIA SPEC AEROBE CULT: NORMAL
BACTERIA SPEC AEROBE CULT: NORMAL
HCG SERPL QL: NEGATIVE

## 2018-11-02 PROCEDURE — 36558 INSERT TUNNELED CV CATH: CPT | Performed by: SURGERY

## 2018-11-02 PROCEDURE — 25010000002 VANCOMYCIN 1 G RECONSTITUTED SOLUTION 1 EACH VIAL: Performed by: SURGERY

## 2018-11-02 PROCEDURE — 0JH63XZ INSERTION OF TUNNELED VASCULAR ACCESS DEVICE INTO CHEST SUBCUTANEOUS TISSUE AND FASCIA, PERCUTANEOUS APPROACH: ICD-10-PCS | Performed by: SURGERY

## 2018-11-02 PROCEDURE — 25010000002 PROPOFOL 10 MG/ML EMULSION: Performed by: NURSE ANESTHETIST, CERTIFIED REGISTERED

## 2018-11-02 PROCEDURE — 25010000002 IOPAMIDOL 61 % SOLUTION: Performed by: SURGERY

## 2018-11-02 PROCEDURE — 25010000002 PROPOFOL 1000 MG/ML EMULSION: Performed by: NURSE ANESTHETIST, CERTIFIED REGISTERED

## 2018-11-02 PROCEDURE — 84703 CHORIONIC GONADOTROPIN ASSAY: CPT | Performed by: SURGERY

## 2018-11-02 PROCEDURE — 75860 VEIN X-RAY NECK: CPT | Performed by: SURGERY

## 2018-11-02 PROCEDURE — 71045 X-RAY EXAM CHEST 1 VIEW: CPT

## 2018-11-02 PROCEDURE — 63710000001 MYCOPHENOLATE MOFETIL PER 250 MG: Performed by: FAMILY MEDICINE

## 2018-11-02 PROCEDURE — 76000 FLUOROSCOPY <1 HR PHYS/QHP: CPT

## 2018-11-02 PROCEDURE — C1894 INTRO/SHEATH, NON-LASER: HCPCS | Performed by: SURGERY

## 2018-11-02 PROCEDURE — C1750 CATH, HEMODIALYSIS,LONG-TERM: HCPCS | Performed by: SURGERY

## 2018-11-02 PROCEDURE — B5131ZA FLUOROSCOPY OF RIGHT JUGULAR VEINS USING LOW OSMOLAR CONTRAST, GUIDANCE: ICD-10-PCS | Performed by: SURGERY

## 2018-11-02 PROCEDURE — S0260 H&P FOR SURGERY: HCPCS | Performed by: SURGERY

## 2018-11-02 PROCEDURE — 25010000002 HYDRALAZINE PER 20 MG: Performed by: INTERNAL MEDICINE

## 2018-11-02 PROCEDURE — 05HM33Z INSERTION OF INFUSION DEVICE INTO RIGHT INTERNAL JUGULAR VEIN, PERCUTANEOUS APPROACH: ICD-10-PCS | Performed by: SURGERY

## 2018-11-02 PROCEDURE — 63710000001 DIPHENHYDRAMINE PER 50 MG: Performed by: INTERNAL MEDICINE

## 2018-11-02 PROCEDURE — 99232 SBSQ HOSP IP/OBS MODERATE 35: CPT | Performed by: INTERNAL MEDICINE

## 2018-11-02 PROCEDURE — 25010000002 HYDROMORPHONE 1 MG/ML SOLUTION: Performed by: FAMILY MEDICINE

## 2018-11-02 PROCEDURE — 25010000002 MORPHINE PER 10 MG

## 2018-11-02 RX ORDER — CEFAZOLIN SODIUM 2 G/50ML
2 SOLUTION INTRAVENOUS ONCE
Status: CANCELLED | OUTPATIENT
Start: 2018-11-02 | End: 2018-11-02

## 2018-11-02 RX ORDER — BUPIVACAINE HYDROCHLORIDE 5 MG/ML
INJECTION, SOLUTION EPIDURAL; INTRACAUDAL AS NEEDED
Status: DISCONTINUED | OUTPATIENT
Start: 2018-11-02 | End: 2018-11-02 | Stop reason: HOSPADM

## 2018-11-02 RX ORDER — LIDOCAINE HYDROCHLORIDE 10 MG/ML
INJECTION, SOLUTION INFILTRATION; PERINEURAL AS NEEDED
Status: DISCONTINUED | OUTPATIENT
Start: 2018-11-02 | End: 2018-11-02 | Stop reason: HOSPADM

## 2018-11-02 RX ORDER — MORPHINE SULFATE 2 MG/ML
1 INJECTION, SOLUTION INTRAMUSCULAR; INTRAVENOUS EVERY 4 HOURS PRN
Status: COMPLETED | OUTPATIENT
Start: 2018-11-02 | End: 2018-11-02

## 2018-11-02 RX ORDER — HYDRALAZINE HYDROCHLORIDE 20 MG/ML
20 INJECTION INTRAMUSCULAR; INTRAVENOUS ONCE
Status: COMPLETED | OUTPATIENT
Start: 2018-11-02 | End: 2018-11-02

## 2018-11-02 RX ORDER — PROPOFOL 10 MG/ML
VIAL (ML) INTRAVENOUS AS NEEDED
Status: DISCONTINUED | OUTPATIENT
Start: 2018-11-02 | End: 2018-11-02 | Stop reason: SURG

## 2018-11-02 RX ORDER — ONDANSETRON 2 MG/ML
4 INJECTION INTRAMUSCULAR; INTRAVENOUS ONCE AS NEEDED
Status: DISCONTINUED | OUTPATIENT
Start: 2018-11-02 | End: 2018-11-02 | Stop reason: HOSPADM

## 2018-11-02 RX ORDER — LABETALOL HYDROCHLORIDE 5 MG/ML
20 INJECTION, SOLUTION INTRAVENOUS ONCE
Status: COMPLETED | OUTPATIENT
Start: 2018-11-02 | End: 2018-11-02

## 2018-11-02 RX ORDER — SODIUM CHLORIDE 9 MG/ML
50 INJECTION, SOLUTION INTRAVENOUS ONCE
Status: COMPLETED | OUTPATIENT
Start: 2018-11-02 | End: 2018-11-02

## 2018-11-02 RX ORDER — MORPHINE SULFATE 2 MG/ML
INJECTION, SOLUTION INTRAMUSCULAR; INTRAVENOUS
Status: COMPLETED
Start: 2018-11-02 | End: 2018-11-02

## 2018-11-02 RX ADMIN — HYDRALAZINE HYDROCHLORIDE 20 MG: 20 INJECTION INTRAMUSCULAR; INTRAVENOUS at 04:53

## 2018-11-02 RX ADMIN — NEBIVOLOL HYDROCHLORIDE 20 MG: 10 TABLET ORAL at 09:24

## 2018-11-02 RX ADMIN — NICARDIPINE HYDROCHLORIDE 40 MG: 20 CAPSULE ORAL at 12:28

## 2018-11-02 RX ADMIN — LIDOCAINE HYDROCHLORIDE 80 MG: 20 INJECTION, SOLUTION INTRAVENOUS at 14:28

## 2018-11-02 RX ADMIN — VANCOMYCIN HYDROCHLORIDE 250 ML: 1 INJECTION, POWDER, LYOPHILIZED, FOR SOLUTION INTRAVENOUS at 14:20

## 2018-11-02 RX ADMIN — PROPOFOL 50 MG: 10 INJECTION, EMULSION INTRAVENOUS at 14:36

## 2018-11-02 RX ADMIN — PROPOFOL 50 MG: 10 INJECTION, EMULSION INTRAVENOUS at 15:10

## 2018-11-02 RX ADMIN — CLONIDINE HYDROCHLORIDE 0.1 MG: 0.1 TABLET ORAL at 20:35

## 2018-11-02 RX ADMIN — CLONIDINE HYDROCHLORIDE 0.1 MG: 0.1 TABLET ORAL at 00:03

## 2018-11-02 RX ADMIN — HYDRALAZINE HYDROCHLORIDE 100 MG: 25 TABLET ORAL at 21:31

## 2018-11-02 RX ADMIN — CLONAZEPAM 0.5 MG: 0.5 TABLET ORAL at 21:31

## 2018-11-02 RX ADMIN — HYDRALAZINE HYDROCHLORIDE 100 MG: 25 TABLET ORAL at 07:08

## 2018-11-02 RX ADMIN — DIPHENHYDRAMINE HYDROCHLORIDE 25 MG: 25 CAPSULE ORAL at 17:26

## 2018-11-02 RX ADMIN — ACETAMINOPHEN 650 MG: 325 TABLET ORAL at 19:22

## 2018-11-02 RX ADMIN — LABETALOL 20 MG/4 ML (5 MG/ML) INTRAVENOUS SYRINGE 20 MG: at 06:22

## 2018-11-02 RX ADMIN — HYDRALAZINE HYDROCHLORIDE 100 MG: 25 TABLET ORAL at 12:28

## 2018-11-02 RX ADMIN — MORPHINE SULFATE 1 MG: 2 INJECTION, SOLUTION INTRAMUSCULAR; INTRAVENOUS at 16:25

## 2018-11-02 RX ADMIN — SODIUM CHLORIDE: 9 INJECTION, SOLUTION INTRAVENOUS at 14:20

## 2018-11-02 RX ADMIN — TERAZOSIN HYDROCHLORIDE 20 MG: 5 CAPSULE ORAL at 21:37

## 2018-11-02 RX ADMIN — HYDROMORPHONE HYDROCHLORIDE 0.25 MG: 1 INJECTION, SOLUTION INTRAMUSCULAR; INTRAVENOUS; SUBCUTANEOUS at 17:26

## 2018-11-02 RX ADMIN — PROPOFOL 50 MG: 10 INJECTION, EMULSION INTRAVENOUS at 14:41

## 2018-11-02 RX ADMIN — PROPOFOL 50 MG: 10 INJECTION, EMULSION INTRAVENOUS at 15:05

## 2018-11-02 RX ADMIN — Medication 3 ML: at 09:25

## 2018-11-02 RX ADMIN — CETIRIZINE HYDROCHLORIDE 5 MG: 10 TABLET, FILM COATED ORAL at 21:30

## 2018-11-02 RX ADMIN — IBUPROFEN 600 MG: 600 TABLET ORAL at 21:31

## 2018-11-02 RX ADMIN — PROPOFOL 50 MG: 10 INJECTION, EMULSION INTRAVENOUS at 15:00

## 2018-11-02 RX ADMIN — CLONAZEPAM 0.5 MG: 0.5 TABLET ORAL at 07:08

## 2018-11-02 RX ADMIN — NICARDIPINE HYDROCHLORIDE 40 MG: 20 CAPSULE ORAL at 21:30

## 2018-11-02 RX ADMIN — PROPOFOL 50 MG: 10 INJECTION, EMULSION INTRAVENOUS at 15:25

## 2018-11-02 RX ADMIN — PROPOFOL 50 MG: 10 INJECTION, EMULSION INTRAVENOUS at 14:28

## 2018-11-02 RX ADMIN — PROPOFOL 50 MG: 10 INJECTION, EMULSION INTRAVENOUS at 14:51

## 2018-11-02 RX ADMIN — Medication 3 ML: at 21:34

## 2018-11-02 RX ADMIN — HYDROMORPHONE HYDROCHLORIDE 0.25 MG: 1 INJECTION, SOLUTION INTRAMUSCULAR; INTRAVENOUS; SUBCUTANEOUS at 21:31

## 2018-11-02 RX ADMIN — CLONAZEPAM 0.5 MG: 0.5 TABLET ORAL at 17:26

## 2018-11-02 RX ADMIN — HYDROXYCHLOROQUINE SULFATE 200 MG: 200 TABLET, FILM COATED ORAL at 21:30

## 2018-11-02 RX ADMIN — PANTOPRAZOLE SODIUM 40 MG: 40 TABLET, DELAYED RELEASE ORAL at 07:08

## 2018-11-02 RX ADMIN — PROPOFOL 140 MCG/KG/MIN: 10 INJECTION, EMULSION INTRAVENOUS at 14:31

## 2018-11-02 RX ADMIN — FLUTICASONE PROPIONATE 2 SPRAY: 50 SPRAY, METERED NASAL at 09:24

## 2018-11-02 RX ADMIN — IBUPROFEN 600 MG: 600 TABLET ORAL at 07:07

## 2018-11-02 RX ADMIN — PROPOFOL 50 MG: 10 INJECTION, EMULSION INTRAVENOUS at 15:30

## 2018-11-02 RX ADMIN — PROPOFOL 50 MG: 10 INJECTION, EMULSION INTRAVENOUS at 14:46

## 2018-11-02 RX ADMIN — LOSARTAN POTASSIUM 100 MG: 50 TABLET, FILM COATED ORAL at 09:24

## 2018-11-02 RX ADMIN — PROPOFOL 50 MG: 10 INJECTION, EMULSION INTRAVENOUS at 14:33

## 2018-11-02 RX ADMIN — MYCOPHENOLATE MOFETIL 250 MG: 250 CAPSULE ORAL at 21:31

## 2018-11-02 RX ADMIN — PROPOFOL 50 MG: 10 INJECTION, EMULSION INTRAVENOUS at 14:31

## 2018-11-02 RX ADMIN — DIPHENHYDRAMINE HYDROCHLORIDE 25 MG: 25 CAPSULE ORAL at 21:30

## 2018-11-02 RX ADMIN — NICARDIPINE HYDROCHLORIDE 40 MG: 20 CAPSULE ORAL at 07:08

## 2018-11-03 LAB
ALBUMIN SERPL-MCNC: 2.8 G/DL (ref 3.5–5)
ANION GAP SERPL CALCULATED.3IONS-SCNC: 14.2 MMOL/L (ref 10–20)
BUN BLD-MCNC: 24 MG/DL (ref 7–20)
BUN/CREAT SERPL: 3.2 (ref 7.1–23.5)
CALCIUM SPEC-SCNC: 9.7 MG/DL (ref 8.4–10.2)
CHLORIDE SERPL-SCNC: 98 MMOL/L (ref 98–107)
CK SERPL-CCNC: 82 U/L (ref 30–170)
CO2 SERPL-SCNC: 24 MMOL/L (ref 26–30)
CREAT BLD-MCNC: 7.6 MG/DL (ref 0.6–1.3)
GFR SERPL CREATININE-BSD FRML MDRD: 6 ML/MIN/1.73
GFR SERPL CREATININE-BSD FRML MDRD: ABNORMAL ML/MIN/1.73
GLUCOSE BLD-MCNC: 133 MG/DL (ref 74–98)
PHOSPHATE SERPL-MCNC: 5.4 MG/DL (ref 2.5–4.5)
POTASSIUM BLD-SCNC: 4.2 MMOL/L (ref 3.5–5.1)
SODIUM BLD-SCNC: 132 MMOL/L (ref 137–145)

## 2018-11-03 PROCEDURE — 25010000002 HEPARIN (PORCINE) PER 1000 UNITS: Performed by: FAMILY MEDICINE

## 2018-11-03 PROCEDURE — 63710000001 MYCOPHENOLATE MOFETIL PER 250 MG: Performed by: FAMILY MEDICINE

## 2018-11-03 PROCEDURE — 25010000002 HYDROMORPHONE 1 MG/ML SOLUTION: Performed by: INTERNAL MEDICINE

## 2018-11-03 PROCEDURE — 36591 DRAW BLOOD OFF VENOUS DEVICE: CPT

## 2018-11-03 PROCEDURE — 63710000001 DIPHENHYDRAMINE PER 50 MG: Performed by: INTERNAL MEDICINE

## 2018-11-03 PROCEDURE — 80069 RENAL FUNCTION PANEL: CPT | Performed by: INTERNAL MEDICINE

## 2018-11-03 PROCEDURE — 25010000002 DAPTOMYCIN PER 1 MG: Performed by: INTERNAL MEDICINE

## 2018-11-03 PROCEDURE — 90935 HEMODIALYSIS ONE EVALUATION: CPT

## 2018-11-03 PROCEDURE — 5A1D70Z PERFORMANCE OF URINARY FILTRATION, INTERMITTENT, LESS THAN 6 HOURS PER DAY: ICD-10-PCS | Performed by: INTERNAL MEDICINE

## 2018-11-03 PROCEDURE — 82550 ASSAY OF CK (CPK): CPT | Performed by: INTERNAL MEDICINE

## 2018-11-03 PROCEDURE — 25010000002 HYDROMORPHONE 1 MG/ML SOLUTION: Performed by: FAMILY MEDICINE

## 2018-11-03 PROCEDURE — 99231 SBSQ HOSP IP/OBS SF/LOW 25: CPT | Performed by: INTERNAL MEDICINE

## 2018-11-03 RX ADMIN — HYDRALAZINE HYDROCHLORIDE 100 MG: 25 TABLET ORAL at 11:49

## 2018-11-03 RX ADMIN — DAPTOMYCIN 500 MG: 500 INJECTION, POWDER, LYOPHILIZED, FOR SOLUTION INTRAVENOUS at 17:20

## 2018-11-03 RX ADMIN — CALCITRIOL CAPSULES 0.25 MCG 0.5 MCG: 0.25 CAPSULE ORAL at 08:35

## 2018-11-03 RX ADMIN — PANTOPRAZOLE SODIUM 40 MG: 40 TABLET, DELAYED RELEASE ORAL at 06:47

## 2018-11-03 RX ADMIN — HYDROXYCHLOROQUINE SULFATE 200 MG: 200 TABLET, FILM COATED ORAL at 08:35

## 2018-11-03 RX ADMIN — HYDRALAZINE HYDROCHLORIDE 100 MG: 25 TABLET ORAL at 05:14

## 2018-11-03 RX ADMIN — Medication 3 ML: at 08:39

## 2018-11-03 RX ADMIN — NEBIVOLOL HYDROCHLORIDE 20 MG: 10 TABLET ORAL at 08:35

## 2018-11-03 RX ADMIN — NICARDIPINE HYDROCHLORIDE 40 MG: 20 CAPSULE ORAL at 21:17

## 2018-11-03 RX ADMIN — ACETAMINOPHEN 650 MG: 325 TABLET ORAL at 00:21

## 2018-11-03 RX ADMIN — HYDROMORPHONE HYDROCHLORIDE 0.25 MG: 1 INJECTION, SOLUTION INTRAMUSCULAR; INTRAVENOUS; SUBCUTANEOUS at 01:37

## 2018-11-03 RX ADMIN — DIPHENHYDRAMINE HYDROCHLORIDE 25 MG: 25 CAPSULE ORAL at 01:37

## 2018-11-03 RX ADMIN — CLONAZEPAM 0.5 MG: 0.5 TABLET ORAL at 22:31

## 2018-11-03 RX ADMIN — NICARDIPINE HYDROCHLORIDE 40 MG: 20 CAPSULE ORAL at 05:14

## 2018-11-03 RX ADMIN — DIPHENHYDRAMINE HYDROCHLORIDE 25 MG: 25 CAPSULE ORAL at 18:30

## 2018-11-03 RX ADMIN — Medication 3 ML: at 20:53

## 2018-11-03 RX ADMIN — HYDROMORPHONE HYDROCHLORIDE 0.25 MG: 1 INJECTION, SOLUTION INTRAMUSCULAR; INTRAVENOUS; SUBCUTANEOUS at 15:35

## 2018-11-03 RX ADMIN — TERAZOSIN HYDROCHLORIDE 20 MG: 5 CAPSULE ORAL at 21:17

## 2018-11-03 RX ADMIN — HYDROMORPHONE HYDROCHLORIDE 0.25 MG: 1 INJECTION, SOLUTION INTRAMUSCULAR; INTRAVENOUS; SUBCUTANEOUS at 18:30

## 2018-11-03 RX ADMIN — BISACODYL 10 MG: 5 TABLET, COATED ORAL at 22:31

## 2018-11-03 RX ADMIN — NICARDIPINE HYDROCHLORIDE 40 MG: 20 CAPSULE ORAL at 11:54

## 2018-11-03 RX ADMIN — PANTOPRAZOLE SODIUM 40 MG: 40 TABLET, DELAYED RELEASE ORAL at 17:54

## 2018-11-03 RX ADMIN — DIPHENHYDRAMINE HYDROCHLORIDE 25 MG: 25 CAPSULE ORAL at 08:35

## 2018-11-03 RX ADMIN — CALCIUM ACETATE 1334 MG: 667 CAPSULE ORAL at 08:35

## 2018-11-03 RX ADMIN — HYDRALAZINE HYDROCHLORIDE 100 MG: 25 TABLET ORAL at 21:17

## 2018-11-03 RX ADMIN — HYDROXYCHLOROQUINE SULFATE 200 MG: 200 TABLET, FILM COATED ORAL at 21:17

## 2018-11-03 RX ADMIN — CLONAZEPAM 0.5 MG: 0.5 TABLET ORAL at 14:19

## 2018-11-03 RX ADMIN — HEPARIN SODIUM 5000 UNITS: 5000 INJECTION, SOLUTION INTRAVENOUS; SUBCUTANEOUS at 14:19

## 2018-11-03 RX ADMIN — DIPHENHYDRAMINE HYDROCHLORIDE 25 MG: 25 CAPSULE ORAL at 13:10

## 2018-11-03 RX ADMIN — MYCOPHENOLATE MOFETIL 250 MG: 250 CAPSULE ORAL at 08:35

## 2018-11-03 RX ADMIN — HYDROMORPHONE HYDROCHLORIDE 0.25 MG: 1 INJECTION, SOLUTION INTRAMUSCULAR; INTRAVENOUS; SUBCUTANEOUS at 14:41

## 2018-11-03 RX ADMIN — HYDROMORPHONE HYDROCHLORIDE 0.25 MG: 1 INJECTION, SOLUTION INTRAMUSCULAR; INTRAVENOUS; SUBCUTANEOUS at 08:34

## 2018-11-03 RX ADMIN — CETIRIZINE HYDROCHLORIDE 5 MG: 10 TABLET, FILM COATED ORAL at 21:17

## 2018-11-03 RX ADMIN — DIPHENHYDRAMINE HYDROCHLORIDE 25 MG: 25 CAPSULE ORAL at 22:31

## 2018-11-03 RX ADMIN — Medication 1 TABLET: at 08:35

## 2018-11-03 RX ADMIN — HYDROMORPHONE HYDROCHLORIDE 0.25 MG: 1 INJECTION, SOLUTION INTRAMUSCULAR; INTRAVENOUS; SUBCUTANEOUS at 13:10

## 2018-11-03 RX ADMIN — LOSARTAN POTASSIUM 100 MG: 50 TABLET, FILM COATED ORAL at 08:34

## 2018-11-03 RX ADMIN — IBUPROFEN 600 MG: 600 TABLET ORAL at 05:14

## 2018-11-03 RX ADMIN — HYDROMORPHONE HYDROCHLORIDE 0.25 MG: 1 INJECTION, SOLUTION INTRAMUSCULAR; INTRAVENOUS; SUBCUTANEOUS at 22:32

## 2018-11-03 RX ADMIN — CALCIUM ACETATE 1334 MG: 667 CAPSULE ORAL at 11:49

## 2018-11-03 RX ADMIN — MYCOPHENOLATE MOFETIL 250 MG: 250 CAPSULE ORAL at 21:17

## 2018-11-03 RX ADMIN — FLUTICASONE PROPIONATE 2 SPRAY: 50 SPRAY, METERED NASAL at 08:38

## 2018-11-03 RX ADMIN — CLONIDINE HYDROCHLORIDE 0.1 MG: 0.1 TABLET ORAL at 17:54

## 2018-11-04 VITALS
TEMPERATURE: 97.2 F | HEART RATE: 81 BPM | DIASTOLIC BLOOD PRESSURE: 80 MMHG | SYSTOLIC BLOOD PRESSURE: 170 MMHG | WEIGHT: 146.1 LBS | BODY MASS INDEX: 22.93 KG/M2 | HEIGHT: 67 IN | OXYGEN SATURATION: 99 % | RESPIRATION RATE: 15 BRPM

## 2018-11-04 PROCEDURE — 63710000001 MYCOPHENOLATE MOFETIL PER 250 MG: Performed by: FAMILY MEDICINE

## 2018-11-04 PROCEDURE — 99238 HOSP IP/OBS DSCHRG MGMT 30/<: CPT | Performed by: INTERNAL MEDICINE

## 2018-11-04 RX ORDER — CLONIDINE HYDROCHLORIDE 0.1 MG/1
0.1 TABLET ORAL ONCE
Status: COMPLETED | OUTPATIENT
Start: 2018-11-04 | End: 2018-11-04

## 2018-11-04 RX ADMIN — FLUTICASONE PROPIONATE 2 SPRAY: 50 SPRAY, METERED NASAL at 08:54

## 2018-11-04 RX ADMIN — CALCITRIOL CAPSULES 0.25 MCG 0.5 MCG: 0.25 CAPSULE ORAL at 08:53

## 2018-11-04 RX ADMIN — HYDRALAZINE HYDROCHLORIDE 100 MG: 25 TABLET ORAL at 12:49

## 2018-11-04 RX ADMIN — PANTOPRAZOLE SODIUM 40 MG: 40 TABLET, DELAYED RELEASE ORAL at 07:04

## 2018-11-04 RX ADMIN — NICARDIPINE HYDROCHLORIDE 40 MG: 20 CAPSULE ORAL at 12:48

## 2018-11-04 RX ADMIN — Medication 1 TABLET: at 08:53

## 2018-11-04 RX ADMIN — CLONIDINE HYDROCHLORIDE 0.1 MG: 0.1 TABLET ORAL at 03:36

## 2018-11-04 RX ADMIN — NEBIVOLOL HYDROCHLORIDE 20 MG: 10 TABLET ORAL at 08:53

## 2018-11-04 RX ADMIN — Medication 3 ML: at 08:54

## 2018-11-04 RX ADMIN — LOSARTAN POTASSIUM 100 MG: 50 TABLET, FILM COATED ORAL at 08:54

## 2018-11-04 RX ADMIN — NICARDIPINE HYDROCHLORIDE 40 MG: 20 CAPSULE ORAL at 05:08

## 2018-11-04 RX ADMIN — HYDRALAZINE HYDROCHLORIDE 100 MG: 25 TABLET ORAL at 05:08

## 2018-11-04 RX ADMIN — CALCIUM ACETATE 1334 MG: 667 CAPSULE ORAL at 08:53

## 2018-11-04 RX ADMIN — CLONIDINE HYDROCHLORIDE 0.1 MG: 0.1 TABLET ORAL at 00:44

## 2018-11-04 RX ADMIN — CALCIUM ACETATE 1334 MG: 667 CAPSULE ORAL at 12:49

## 2018-11-04 RX ADMIN — ACETAMINOPHEN 650 MG: 325 TABLET ORAL at 09:30

## 2018-11-04 RX ADMIN — HYDROXYCHLOROQUINE SULFATE 200 MG: 200 TABLET, FILM COATED ORAL at 08:53

## 2018-11-04 RX ADMIN — MYCOPHENOLATE MOFETIL 250 MG: 250 CAPSULE ORAL at 08:53

## 2018-11-05 ENCOUNTER — EPISODE CHANGES (OUTPATIENT)
Dept: CASE MANAGEMENT | Facility: OTHER | Age: 28
End: 2018-11-05

## 2018-11-05 ENCOUNTER — READMISSION MANAGEMENT (OUTPATIENT)
Dept: CALL CENTER | Facility: HOSPITAL | Age: 28
End: 2018-11-05

## 2018-11-06 ENCOUNTER — TRANSITIONAL CARE MANAGEMENT TELEPHONE ENCOUNTER (OUTPATIENT)
Dept: INTERNAL MEDICINE | Facility: CLINIC | Age: 28
End: 2018-11-06

## 2018-11-07 ENCOUNTER — READMISSION MANAGEMENT (OUTPATIENT)
Dept: CALL CENTER | Facility: HOSPITAL | Age: 28
End: 2018-11-07

## 2018-11-07 ENCOUNTER — OFFICE VISIT (OUTPATIENT)
Dept: INTERNAL MEDICINE | Facility: CLINIC | Age: 28
End: 2018-11-07

## 2018-11-07 VITALS
WEIGHT: 140 LBS | BODY MASS INDEX: 21.97 KG/M2 | HEIGHT: 67 IN | DIASTOLIC BLOOD PRESSURE: 128 MMHG | HEART RATE: 81 BPM | OXYGEN SATURATION: 99 % | SYSTOLIC BLOOD PRESSURE: 214 MMHG | TEMPERATURE: 98.8 F

## 2018-11-07 DIAGNOSIS — M32.9 LUPUS ARTHRITIS (HCC): ICD-10-CM

## 2018-11-07 DIAGNOSIS — T80.219D INFECTION OF VENOUS ACCESS PORT, SUBSEQUENT ENCOUNTER: ICD-10-CM

## 2018-11-07 DIAGNOSIS — M32.14 LUPUS NEPHRITIS (HCC): ICD-10-CM

## 2018-11-07 DIAGNOSIS — M32.19 LUPUS ENCEPHALITIS (HCC): ICD-10-CM

## 2018-11-07 DIAGNOSIS — I16.0 HYPERTENSIVE URGENCY: Primary | ICD-10-CM

## 2018-11-07 DIAGNOSIS — N18.6 ESRD (END-STAGE RENAL DISEASE) DUE TO SLE (HCC): ICD-10-CM

## 2018-11-07 DIAGNOSIS — M32.14 ESRD (END-STAGE RENAL DISEASE) DUE TO SLE (HCC): ICD-10-CM

## 2018-11-07 DIAGNOSIS — R10.13 EPIGASTRIC ABDOMINAL PAIN: ICD-10-CM

## 2018-11-07 PROCEDURE — 99496 TRANSJ CARE MGMT HIGH F2F 7D: CPT | Performed by: INTERNAL MEDICINE

## 2018-11-13 ENCOUNTER — READMISSION MANAGEMENT (OUTPATIENT)
Dept: CALL CENTER | Facility: HOSPITAL | Age: 28
End: 2018-11-13

## 2018-11-16 ENCOUNTER — READMISSION MANAGEMENT (OUTPATIENT)
Dept: CALL CENTER | Facility: HOSPITAL | Age: 28
End: 2018-11-16

## 2018-11-19 ENCOUNTER — READMISSION MANAGEMENT (OUTPATIENT)
Dept: CALL CENTER | Facility: HOSPITAL | Age: 28
End: 2018-11-19

## 2018-11-20 ENCOUNTER — READMISSION MANAGEMENT (OUTPATIENT)
Dept: CALL CENTER | Facility: HOSPITAL | Age: 28
End: 2018-11-20

## 2018-11-21 ENCOUNTER — EPISODE CHANGES (OUTPATIENT)
Dept: SOCIAL WORK | Facility: HOSPITAL | Age: 28
End: 2018-11-21

## 2018-11-21 ENCOUNTER — EPISODE CHANGES (OUTPATIENT)
Dept: CASE MANAGEMENT | Facility: OTHER | Age: 28
End: 2018-11-21

## 2018-11-21 RX ORDER — PANTOPRAZOLE SODIUM 40 MG/1
40 TABLET, DELAYED RELEASE ORAL
Qty: 60 TABLET | Refills: 2 | Status: SHIPPED | OUTPATIENT
Start: 2018-11-21 | End: 2020-04-28 | Stop reason: ALTCHOICE

## 2018-11-26 ENCOUNTER — PATIENT OUTREACH (OUTPATIENT)
Dept: CASE MANAGEMENT | Facility: OTHER | Age: 28
End: 2018-11-26

## 2018-11-28 ENCOUNTER — HOSPITAL ENCOUNTER (OUTPATIENT)
Dept: GENERAL RADIOLOGY | Facility: HOSPITAL | Age: 28
Discharge: HOME | End: 2018-11-28
Admitting: INTERNAL MEDICINE

## 2018-11-28 ENCOUNTER — OFFICE VISIT (OUTPATIENT)
Dept: INTERNAL MEDICINE | Facility: CLINIC | Age: 28
End: 2018-11-28

## 2018-11-28 VITALS
TEMPERATURE: 98.1 F | BODY MASS INDEX: 21.03 KG/M2 | HEART RATE: 74 BPM | WEIGHT: 134 LBS | SYSTOLIC BLOOD PRESSURE: 168 MMHG | DIASTOLIC BLOOD PRESSURE: 102 MMHG | OXYGEN SATURATION: 100 % | HEIGHT: 67 IN

## 2018-11-28 DIAGNOSIS — R06.02 SHORTNESS OF BREATH: ICD-10-CM

## 2018-11-28 DIAGNOSIS — E87.79 OTHER HYPERVOLEMIA: ICD-10-CM

## 2018-11-28 DIAGNOSIS — M62.830 BACK MUSCLE SPASM: ICD-10-CM

## 2018-11-28 DIAGNOSIS — R05.9 COUGH: Primary | ICD-10-CM

## 2018-11-28 PROCEDURE — 71046 X-RAY EXAM CHEST 2 VIEWS: CPT

## 2018-11-28 PROCEDURE — 99214 OFFICE O/P EST MOD 30 MIN: CPT | Performed by: INTERNAL MEDICINE

## 2018-11-28 RX ORDER — CYCLOBENZAPRINE HCL 5 MG
5 TABLET ORAL 3 TIMES DAILY PRN
Qty: 20 TABLET | Refills: 2 | Status: SHIPPED | OUTPATIENT
Start: 2018-11-28 | End: 2019-12-16 | Stop reason: SDUPTHER

## 2018-12-05 ENCOUNTER — EPISODE CHANGES (OUTPATIENT)
Dept: CASE MANAGEMENT | Facility: OTHER | Age: 28
End: 2018-12-05

## 2018-12-06 ENCOUNTER — TELEPHONE (OUTPATIENT)
Dept: INTERNAL MEDICINE | Facility: CLINIC | Age: 28
End: 2018-12-06

## 2018-12-06 ENCOUNTER — HOSPITAL ENCOUNTER (EMERGENCY)
Facility: HOSPITAL | Age: 28
Discharge: HOME | End: 2018-12-06
Attending: EMERGENCY MEDICINE | Admitting: EMERGENCY MEDICINE

## 2018-12-06 ENCOUNTER — APPOINTMENT (OUTPATIENT)
Dept: GENERAL RADIOLOGY | Facility: HOSPITAL | Age: 28
End: 2018-12-06
Attending: EMERGENCY MEDICINE

## 2018-12-06 VITALS
RESPIRATION RATE: 20 BRPM | DIASTOLIC BLOOD PRESSURE: 107 MMHG | OXYGEN SATURATION: 99 % | HEART RATE: 81 BPM | BODY MASS INDEX: 21.82 KG/M2 | SYSTOLIC BLOOD PRESSURE: 163 MMHG | TEMPERATURE: 97.7 F | HEIGHT: 67 IN | WEIGHT: 139 LBS

## 2018-12-06 DIAGNOSIS — G89.4 CHRONIC PAIN SYNDROME: Primary | ICD-10-CM

## 2018-12-06 DIAGNOSIS — R53.1 GENERALIZED WEAKNESS: Primary | ICD-10-CM

## 2018-12-06 DIAGNOSIS — M79.10 MYALGIA: ICD-10-CM

## 2018-12-06 LAB
ABO GROUP BLD: NORMAL
ABO GROUP BLD: NORMAL
ALBUMIN SERPL-MCNC: 3.4 G/DL (ref 3.5–5)
ALBUMIN/GLOB SERPL: 1.1 G/DL (ref 1–2)
ALP SERPL-CCNC: 49 U/L (ref 38–126)
ALT SERPL W P-5'-P-CCNC: 23 U/L (ref 13–69)
ANION GAP SERPL CALCULATED.3IONS-SCNC: 15.5 MMOL/L (ref 10–20)
AST SERPL-CCNC: 19 U/L (ref 15–46)
BASOPHILS # BLD AUTO: 0.01 10*3/MM3 (ref 0–0.2)
BASOPHILS NFR BLD AUTO: 0.3 % (ref 0–2.5)
BILIRUB SERPL-MCNC: 0.7 MG/DL (ref 0.2–1.3)
BLD GP AB SCN SERPL QL: NEGATIVE
BUN BLD-MCNC: 49 MG/DL (ref 7–20)
BUN/CREAT SERPL: 5.6 (ref 7.1–23.5)
CALCIUM SPEC-SCNC: 10 MG/DL (ref 8.4–10.2)
CHLORIDE SERPL-SCNC: 100 MMOL/L (ref 98–107)
CK MB SERPL-CCNC: 4.09 NG/ML (ref 0.2–2.4)
CK MB SERPL-RTO: 2.9 % (ref 0–2)
CK SERPL-CCNC: 143 U/L (ref 30–170)
CO2 SERPL-SCNC: 24 MMOL/L (ref 26–30)
CREAT BLD-MCNC: 8.8 MG/DL (ref 0.6–1.3)
DEPRECATED RDW RBC AUTO: 57 FL (ref 37–54)
EOSINOPHIL # BLD AUTO: 0.18 10*3/MM3 (ref 0–0.7)
EOSINOPHIL NFR BLD AUTO: 5.7 % (ref 0–7)
ERYTHROCYTE [DISTWIDTH] IN BLOOD BY AUTOMATED COUNT: 17.2 % (ref 11.5–14.5)
GFR SERPL CREATININE-BSD FRML MDRD: 5 ML/MIN/1.73
GFR SERPL CREATININE-BSD FRML MDRD: ABNORMAL ML/MIN/1.73
GLOBULIN UR ELPH-MCNC: 3 GM/DL
GLUCOSE BLD-MCNC: 100 MG/DL (ref 74–98)
HCT VFR BLD AUTO: 25.1 % (ref 37–47)
HGB BLD-MCNC: 7.7 G/DL (ref 12–16)
IMM GRANULOCYTES # BLD: 0.01 10*3/MM3 (ref 0–0.06)
IMM GRANULOCYTES NFR BLD: 0.3 % (ref 0–0.6)
IRON 24H UR-MRATE: 19 MCG/DL (ref 37–181)
IRON SATN MFR SERPL: 10 % (ref 11–46)
LYMPHOCYTES # BLD AUTO: 0.32 10*3/MM3 (ref 0.6–3.4)
LYMPHOCYTES NFR BLD AUTO: 10.2 % (ref 10–50)
MCH RBC QN AUTO: 27.8 PG (ref 27–31)
MCHC RBC AUTO-ENTMCNC: 30.7 G/DL (ref 30–37)
MCV RBC AUTO: 90.6 FL (ref 81–99)
MONOCYTES # BLD AUTO: 0.18 10*3/MM3 (ref 0–0.9)
MONOCYTES NFR BLD AUTO: 5.7 % (ref 0–12)
NEUTROPHILS # BLD AUTO: 2.45 10*3/MM3 (ref 2–6.9)
NEUTROPHILS NFR BLD AUTO: 77.8 % (ref 37–80)
NRBC BLD MANUAL-RTO: 0 /100 WBC (ref 0–0)
PLATELET # BLD AUTO: 125 10*3/MM3 (ref 130–400)
PMV BLD AUTO: 10.5 FL (ref 6–12)
POTASSIUM BLD-SCNC: 4.5 MMOL/L (ref 3.5–5.1)
PROT SERPL-MCNC: 6.4 G/DL (ref 6.3–8.2)
RBC # BLD AUTO: 2.77 10*6/MM3 (ref 4.2–5.4)
RH BLD: POSITIVE
RH BLD: POSITIVE
SODIUM BLD-SCNC: 135 MMOL/L (ref 137–145)
T&S EXPIRATION DATE: NORMAL
TIBC SERPL-MCNC: 182 MCG/DL (ref 261–497)
WBC NRBC COR # BLD: 3.15 10*3/MM3 (ref 4.8–10.8)

## 2018-12-06 PROCEDURE — 87040 BLOOD CULTURE FOR BACTERIA: CPT | Performed by: EMERGENCY MEDICINE

## 2018-12-06 PROCEDURE — 86850 RBC ANTIBODY SCREEN: CPT | Performed by: EMERGENCY MEDICINE

## 2018-12-06 PROCEDURE — 96374 THER/PROPH/DIAG INJ IV PUSH: CPT

## 2018-12-06 PROCEDURE — 86901 BLOOD TYPING SEROLOGIC RH(D): CPT

## 2018-12-06 PROCEDURE — 25010000002 FENTANYL CITRATE (PF) 100 MCG/2ML SOLUTION: Performed by: EMERGENCY MEDICINE

## 2018-12-06 PROCEDURE — 96376 TX/PRO/DX INJ SAME DRUG ADON: CPT

## 2018-12-06 PROCEDURE — 63710000001 DIPHENHYDRAMINE PER 50 MG: Performed by: EMERGENCY MEDICINE

## 2018-12-06 PROCEDURE — 99284 EMERGENCY DEPT VISIT MOD MDM: CPT

## 2018-12-06 PROCEDURE — 71045 X-RAY EXAM CHEST 1 VIEW: CPT

## 2018-12-06 PROCEDURE — 86901 BLOOD TYPING SEROLOGIC RH(D): CPT | Performed by: EMERGENCY MEDICINE

## 2018-12-06 PROCEDURE — 82553 CREATINE MB FRACTION: CPT | Performed by: EMERGENCY MEDICINE

## 2018-12-06 PROCEDURE — 83550 IRON BINDING TEST: CPT | Performed by: EMERGENCY MEDICINE

## 2018-12-06 PROCEDURE — 82550 ASSAY OF CK (CPK): CPT | Performed by: EMERGENCY MEDICINE

## 2018-12-06 PROCEDURE — 96375 TX/PRO/DX INJ NEW DRUG ADDON: CPT

## 2018-12-06 PROCEDURE — 25010000002 ONDANSETRON PER 1 MG: Performed by: EMERGENCY MEDICINE

## 2018-12-06 PROCEDURE — 83540 ASSAY OF IRON: CPT | Performed by: EMERGENCY MEDICINE

## 2018-12-06 PROCEDURE — 85025 COMPLETE CBC W/AUTO DIFF WBC: CPT | Performed by: EMERGENCY MEDICINE

## 2018-12-06 PROCEDURE — 86900 BLOOD TYPING SEROLOGIC ABO: CPT | Performed by: EMERGENCY MEDICINE

## 2018-12-06 PROCEDURE — 80053 COMPREHEN METABOLIC PANEL: CPT | Performed by: EMERGENCY MEDICINE

## 2018-12-06 PROCEDURE — 86900 BLOOD TYPING SEROLOGIC ABO: CPT

## 2018-12-06 RX ORDER — HYDROMORPHONE HYDROCHLORIDE 2 MG/1
2 TABLET ORAL EVERY 6 HOURS PRN
Qty: 20 TABLET | Refills: 0 | Status: SHIPPED | OUTPATIENT
Start: 2018-12-06 | End: 2019-08-21

## 2018-12-06 RX ORDER — DOXYCYCLINE HYCLATE 50 MG/1
324 CAPSULE, GELATIN COATED ORAL
Qty: 30 TABLET | Refills: 0 | Status: SHIPPED | OUTPATIENT
Start: 2018-12-06 | End: 2019-08-21

## 2018-12-06 RX ORDER — FENTANYL CITRATE 50 UG/ML
25 INJECTION, SOLUTION INTRAMUSCULAR; INTRAVENOUS ONCE
Status: COMPLETED | OUTPATIENT
Start: 2018-12-06 | End: 2018-12-06

## 2018-12-06 RX ORDER — DIPHENHYDRAMINE HCL 25 MG
25 CAPSULE ORAL ONCE
Status: COMPLETED | OUTPATIENT
Start: 2018-12-06 | End: 2018-12-06

## 2018-12-06 RX ORDER — ONDANSETRON 2 MG/ML
4 INJECTION INTRAMUSCULAR; INTRAVENOUS ONCE
Status: COMPLETED | OUTPATIENT
Start: 2018-12-06 | End: 2018-12-06

## 2018-12-06 RX ORDER — FUROSEMIDE 20 MG/1
20 TABLET ORAL DAILY
Qty: 7 TABLET | Refills: 0 | Status: SHIPPED | OUTPATIENT
Start: 2018-12-06 | End: 2019-08-21

## 2018-12-06 RX ADMIN — ONDANSETRON 4 MG: 2 INJECTION INTRAMUSCULAR; INTRAVENOUS at 09:20

## 2018-12-06 RX ADMIN — FENTANYL CITRATE 25 MCG: 50 INJECTION, SOLUTION INTRAMUSCULAR; INTRAVENOUS at 10:56

## 2018-12-06 RX ADMIN — FENTANYL CITRATE 25 MCG: 50 INJECTION, SOLUTION INTRAMUSCULAR; INTRAVENOUS at 09:22

## 2018-12-06 RX ADMIN — DIPHENHYDRAMINE HYDROCHLORIDE 25 MG: 25 CAPSULE ORAL at 10:10

## 2018-12-07 ENCOUNTER — EPISODE CHANGES (OUTPATIENT)
Dept: CASE MANAGEMENT | Facility: OTHER | Age: 28
End: 2018-12-07

## 2018-12-10 ENCOUNTER — EPISODE CHANGES (OUTPATIENT)
Dept: CASE MANAGEMENT | Facility: OTHER | Age: 28
End: 2018-12-10

## 2018-12-10 ENCOUNTER — PATIENT OUTREACH (OUTPATIENT)
Dept: CASE MANAGEMENT | Facility: OTHER | Age: 28
End: 2018-12-10

## 2018-12-11 LAB — BACTERIA SPEC AEROBE CULT: NORMAL

## 2018-12-19 ENCOUNTER — OFFICE VISIT (OUTPATIENT)
Dept: INTERNAL MEDICINE | Facility: CLINIC | Age: 28
End: 2018-12-19

## 2018-12-19 VITALS
WEIGHT: 136 LBS | TEMPERATURE: 98.4 F | HEART RATE: 71 BPM | OXYGEN SATURATION: 100 % | BODY MASS INDEX: 21.35 KG/M2 | DIASTOLIC BLOOD PRESSURE: 88 MMHG | SYSTOLIC BLOOD PRESSURE: 150 MMHG | HEIGHT: 67 IN

## 2018-12-19 DIAGNOSIS — I16.0 HYPERTENSIVE URGENCY: ICD-10-CM

## 2018-12-19 DIAGNOSIS — D63.8 ANEMIA OF CHRONIC DISEASE: ICD-10-CM

## 2018-12-19 DIAGNOSIS — G45.9 TIA (TRANSIENT ISCHEMIC ATTACK): ICD-10-CM

## 2018-12-19 DIAGNOSIS — M32.14 SYSTEMIC LUPUS ERYTHEMATOSUS WITH GLOMERULAR DISEASE, UNSPECIFIED SLE TYPE (HCC): ICD-10-CM

## 2018-12-19 DIAGNOSIS — M32.19 LUPUS ENCEPHALITIS (HCC): Primary | ICD-10-CM

## 2018-12-19 DIAGNOSIS — R53.81 DEBILITY: ICD-10-CM

## 2018-12-19 DIAGNOSIS — M32.14 ESRD (END-STAGE RENAL DISEASE) DUE TO SLE (HCC): ICD-10-CM

## 2018-12-19 DIAGNOSIS — N18.6 ESRD (END-STAGE RENAL DISEASE) DUE TO SLE (HCC): ICD-10-CM

## 2018-12-19 DIAGNOSIS — M32.9 LUPUS ARTHRITIS (HCC): ICD-10-CM

## 2018-12-19 DIAGNOSIS — M32.14 LUPUS NEPHRITIS (HCC): ICD-10-CM

## 2018-12-19 PROBLEM — Z91.199 MEDICAL NON-COMPLIANCE: Status: RESOLVED | Noted: 2017-10-11 | Resolved: 2018-12-19

## 2018-12-19 PROCEDURE — G0439 PPPS, SUBSEQ VISIT: HCPCS | Performed by: INTERNAL MEDICINE

## 2019-01-10 ENCOUNTER — HOSPITAL ENCOUNTER (EMERGENCY)
Facility: HOSPITAL | Age: 29
Discharge: LEFT BEFORE DISCHARGE | End: 2019-01-10

## 2019-01-10 VITALS
DIASTOLIC BLOOD PRESSURE: 95 MMHG | OXYGEN SATURATION: 99 % | WEIGHT: 138.6 LBS | TEMPERATURE: 98.4 F | HEART RATE: 87 BPM | HEIGHT: 67 IN | RESPIRATION RATE: 18 BRPM | SYSTOLIC BLOOD PRESSURE: 157 MMHG | BODY MASS INDEX: 21.75 KG/M2

## 2019-02-21 ENCOUNTER — OFFICE VISIT (OUTPATIENT)
Dept: INTERNAL MEDICINE | Facility: CLINIC | Age: 29
End: 2019-02-21

## 2019-02-21 ENCOUNTER — HOSPITAL ENCOUNTER (OUTPATIENT)
Dept: GENERAL RADIOLOGY | Facility: HOSPITAL | Age: 29
Discharge: HOME | End: 2019-02-21
Admitting: INTERNAL MEDICINE

## 2019-02-21 VITALS
HEART RATE: 73 BPM | SYSTOLIC BLOOD PRESSURE: 160 MMHG | BODY MASS INDEX: 20.88 KG/M2 | HEIGHT: 67 IN | TEMPERATURE: 98.7 F | WEIGHT: 133 LBS | OXYGEN SATURATION: 97 % | DIASTOLIC BLOOD PRESSURE: 96 MMHG

## 2019-02-21 DIAGNOSIS — S49.91XA INJURY OF RIGHT SHOULDER, INITIAL ENCOUNTER: ICD-10-CM

## 2019-02-21 DIAGNOSIS — S49.91XA INJURY OF RIGHT SHOULDER, INITIAL ENCOUNTER: Primary | ICD-10-CM

## 2019-02-21 PROCEDURE — 73030 X-RAY EXAM OF SHOULDER: CPT

## 2019-02-21 PROCEDURE — 99214 OFFICE O/P EST MOD 30 MIN: CPT | Performed by: INTERNAL MEDICINE

## 2019-02-21 PROCEDURE — 73010 X-RAY EXAM OF SHOULDER BLADE: CPT

## 2019-02-21 RX ORDER — IRBESARTAN 150 MG/1
150 TABLET ORAL NIGHTLY
COMMUNITY
End: 2019-08-21

## 2019-05-01 ENCOUNTER — TRANSCRIBE ORDERS (OUTPATIENT)
Dept: GENERAL RADIOLOGY | Facility: HOSPITAL | Age: 29
End: 2019-05-01

## 2019-05-01 ENCOUNTER — HOSPITAL ENCOUNTER (OUTPATIENT)
Dept: GENERAL RADIOLOGY | Facility: HOSPITAL | Age: 29
Discharge: HOME | End: 2019-05-01
Admitting: INTERNAL MEDICINE

## 2019-05-01 DIAGNOSIS — M32.9 SYSTEMIC LUPUS ERYTHEMATOSUS, UNSPECIFIED SLE TYPE, UNSPECIFIED ORGAN INVOLVEMENT STATUS (HCC): ICD-10-CM

## 2019-05-01 DIAGNOSIS — M13.0 POLYARTHRITIS: ICD-10-CM

## 2019-05-01 DIAGNOSIS — M70.62 TROCHANTERIC BURSITIS OF LEFT HIP: ICD-10-CM

## 2019-05-01 DIAGNOSIS — I67.4 HYPERTENSIVE ENCEPHALOPATHY: ICD-10-CM

## 2019-05-01 DIAGNOSIS — N18.9 CHRONIC KIDNEY DISEASE, UNSPECIFIED CKD STAGE: ICD-10-CM

## 2019-05-01 DIAGNOSIS — G40.909 NONINTRACTABLE EPILEPSY WITHOUT STATUS EPILEPTICUS, UNSPECIFIED EPILEPSY TYPE (HCC): ICD-10-CM

## 2019-05-01 PROCEDURE — 73522 X-RAY EXAM HIPS BI 3-4 VIEWS: CPT

## 2019-06-25 ENCOUNTER — EPISODE CHANGES (OUTPATIENT)
Dept: CASE MANAGEMENT | Facility: OTHER | Age: 29
End: 2019-06-25

## 2019-08-21 ENCOUNTER — OFFICE VISIT (OUTPATIENT)
Dept: INTERNAL MEDICINE | Facility: CLINIC | Age: 29
End: 2019-08-21

## 2019-08-21 DIAGNOSIS — I10 ESSENTIAL HYPERTENSION: Primary | ICD-10-CM

## 2019-08-21 PROCEDURE — 99213 OFFICE O/P EST LOW 20 MIN: CPT | Performed by: INTERNAL MEDICINE

## 2019-08-21 RX ORDER — PREDNISONE 10 MG/1
TABLET ORAL
COMMUNITY
Start: 2019-06-28 | End: 2020-04-28 | Stop reason: ALTCHOICE

## 2019-08-21 RX ORDER — LOSARTAN POTASSIUM 100 MG/1
100 TABLET ORAL DAILY
Qty: 30 TABLET | Refills: 5 | Status: SHIPPED | OUTPATIENT
Start: 2019-08-21 | End: 2020-04-28 | Stop reason: ALTCHOICE

## 2019-08-29 ENCOUNTER — TELEPHONE (OUTPATIENT)
Dept: INTERNAL MEDICINE | Facility: CLINIC | Age: 29
End: 2019-08-29

## 2019-08-29 DIAGNOSIS — T78.3XXA ANGIOEDEMA, INITIAL ENCOUNTER: Primary | ICD-10-CM

## 2019-11-22 ENCOUNTER — OFFICE VISIT (OUTPATIENT)
Dept: INTERNAL MEDICINE | Facility: CLINIC | Age: 29
End: 2019-11-22

## 2019-11-22 VITALS
OXYGEN SATURATION: 100 % | WEIGHT: 141 LBS | BODY MASS INDEX: 22.13 KG/M2 | HEART RATE: 74 BPM | DIASTOLIC BLOOD PRESSURE: 68 MMHG | TEMPERATURE: 99.2 F | RESPIRATION RATE: 18 BRPM | HEIGHT: 67 IN | SYSTOLIC BLOOD PRESSURE: 138 MMHG

## 2019-11-22 DIAGNOSIS — H60.311 ACUTE DIFFUSE OTITIS EXTERNA OF RIGHT EAR: Primary | ICD-10-CM

## 2019-11-22 DIAGNOSIS — M32.14 LUPUS NEPHRITIS (HCC): ICD-10-CM

## 2019-11-22 DIAGNOSIS — G40.209 PARTIAL SYMPTOMATIC EPILEPSY WITH COMPLEX PARTIAL SEIZURES, NOT INTRACTABLE, WITHOUT STATUS EPILEPTICUS (HCC): ICD-10-CM

## 2019-11-22 DIAGNOSIS — Z99.2 DEPENDENCE ON RENAL DIALYSIS (HCC): ICD-10-CM

## 2019-11-22 PROCEDURE — 99214 OFFICE O/P EST MOD 30 MIN: CPT | Performed by: INTERNAL MEDICINE

## 2019-11-22 RX ORDER — CIPROFLOXACIN AND DEXAMETHASONE 3; 1 MG/ML; MG/ML
4 SUSPENSION/ DROPS AURICULAR (OTIC) 2 TIMES DAILY
Qty: 7.5 ML | Refills: 0 | Status: SHIPPED | OUTPATIENT
Start: 2019-11-22 | End: 2020-02-26

## 2019-11-22 RX ORDER — FEXOFENADINE HCL 180 MG/1
TABLET ORAL DAILY
COMMUNITY
Start: 2013-05-28 | End: 2019-11-22 | Stop reason: SDUPTHER

## 2019-11-22 RX ORDER — CALCIUM ACETATE 667 MG/1
CAPSULE ORAL
Refills: 11 | COMMUNITY
Start: 2019-11-04 | End: 2020-04-28 | Stop reason: ALTCHOICE

## 2019-11-22 RX ORDER — LIDOCAINE AND PRILOCAINE 25; 25 MG/G; MG/G
CREAM TOPICAL
Refills: 3 | COMMUNITY
Start: 2019-09-18 | End: 2020-06-03

## 2019-11-22 RX ORDER — MONTELUKAST SODIUM 10 MG/1
TABLET ORAL
COMMUNITY
Start: 2019-11-11 | End: 2022-02-01 | Stop reason: SDUPTHER

## 2019-11-22 RX ORDER — EPINEPHRINE 0.3 MG/.3ML
INJECTION SUBCUTANEOUS
COMMUNITY
Start: 2019-09-30 | End: 2022-07-29 | Stop reason: SDUPTHER

## 2019-12-16 DIAGNOSIS — M62.830 BACK MUSCLE SPASM: ICD-10-CM

## 2019-12-17 RX ORDER — CYCLOBENZAPRINE HCL 5 MG
TABLET ORAL
Qty: 20 TABLET | Refills: 1 | Status: SHIPPED | OUTPATIENT
Start: 2019-12-17 | End: 2020-04-28 | Stop reason: ALTCHOICE

## 2019-12-18 ENCOUNTER — OFFICE VISIT (OUTPATIENT)
Dept: OBSTETRICS AND GYNECOLOGY | Facility: CLINIC | Age: 29
End: 2019-12-18

## 2019-12-18 VITALS
BODY MASS INDEX: 22.13 KG/M2 | DIASTOLIC BLOOD PRESSURE: 70 MMHG | HEIGHT: 67 IN | WEIGHT: 141 LBS | SYSTOLIC BLOOD PRESSURE: 140 MMHG

## 2019-12-18 DIAGNOSIS — Z12.4 SCREENING FOR CERVICAL CANCER: ICD-10-CM

## 2019-12-18 DIAGNOSIS — Z01.419 ENCOUNTER FOR GYNECOLOGICAL EXAMINATION WITHOUT ABNORMAL FINDING: Primary | ICD-10-CM

## 2019-12-18 PROCEDURE — G0101 CA SCREEN;PELVIC/BREAST EXAM: HCPCS | Performed by: PHYSICIAN ASSISTANT

## 2020-01-24 ENCOUNTER — TELEPHONE (OUTPATIENT)
Dept: INTERNAL MEDICINE | Facility: CLINIC | Age: 30
End: 2020-01-24

## 2020-01-24 DIAGNOSIS — M32.14 LUPUS NEPHRITIS (HCC): Primary | ICD-10-CM

## 2020-01-29 ENCOUNTER — HOSPITAL ENCOUNTER (OUTPATIENT)
Dept: GENERAL RADIOLOGY | Facility: HOSPITAL | Age: 30
Discharge: HOME | End: 2020-01-29
Admitting: INTERNAL MEDICINE

## 2020-01-29 DIAGNOSIS — M32.14 LUPUS NEPHRITIS (HCC): ICD-10-CM

## 2020-01-29 PROCEDURE — 71046 X-RAY EXAM CHEST 2 VIEWS: CPT

## 2020-02-24 ENCOUNTER — TELEPHONE (OUTPATIENT)
Dept: INTERNAL MEDICINE | Facility: CLINIC | Age: 30
End: 2020-02-24

## 2020-02-26 DIAGNOSIS — M32.9 SLE (SYSTEMIC LUPUS ERYTHEMATOSUS RELATED SYNDROME) (HCC): ICD-10-CM

## 2020-02-26 DIAGNOSIS — Z01.818 PREOPERATIVE EXAMINATION, UNSPECIFIED: Primary | ICD-10-CM

## 2020-02-26 DIAGNOSIS — N18.6 ESRD (END STAGE RENAL DISEASE) (HCC): ICD-10-CM

## 2020-02-26 DIAGNOSIS — I63.9 CEREBROVASCULAR ACCIDENT (CVA), UNSPECIFIED MECHANISM (HCC): ICD-10-CM

## 2020-02-26 DIAGNOSIS — I10 ESSENTIAL HYPERTENSION: ICD-10-CM

## 2020-03-02 LAB
ALBUMIN SERPL-MCNC: 4.2 G/DL (ref 3.5–5.2)
ALBUMIN/GLOB SERPL: 1.5 G/DL
ALP SERPL-CCNC: 76 U/L (ref 39–117)
ALT SERPL-CCNC: 10 U/L (ref 1–33)
APTT PPP: 32.9 SECONDS (ref 24.5–37.2)
AST SERPL-CCNC: 10 U/L (ref 1–32)
AT III ACT/NOR PPP CHRO: 118 % (ref 75–135)
BASOPHILS # BLD AUTO: 0.01 10*3/MM3 (ref 0–0.2)
BASOPHILS NFR BLD AUTO: 0.3 % (ref 0–1.5)
BILIRUB SERPL-MCNC: 0.4 MG/DL (ref 0.2–1.2)
BUN SERPL-MCNC: 19 MG/DL (ref 6–20)
BUN/CREAT SERPL: 5.7 (ref 7–25)
CALCIUM SERPL-MCNC: 8.7 MG/DL (ref 8.6–10.5)
CARDIOLIPIN IGG SER IA-ACNC: 11 GPL U/ML (ref 0–14)
CARDIOLIPIN IGM SER IA-ACNC: 57 MPL U/ML (ref 0–12)
CHLORIDE SERPL-SCNC: 99 MMOL/L (ref 98–107)
CO2 SERPL-SCNC: 24.1 MMOL/L (ref 22–29)
CREAT SERPL-MCNC: 3.31 MG/DL (ref 0.57–1)
EOSINOPHIL # BLD AUTO: 0 10*3/MM3 (ref 0–0.4)
EOSINOPHIL NFR BLD AUTO: 0 % (ref 0.3–6.2)
ERYTHROCYTE [DISTWIDTH] IN BLOOD BY AUTOMATED COUNT: 14 % (ref 12.3–15.4)
FIBRINOGEN PPP-MCNC: 293 MG/DL (ref 209–518)
GLOBULIN SER CALC-MCNC: 2.8 GM/DL
GLUCOSE SERPL-MCNC: 162 MG/DL (ref 65–99)
HCT VFR BLD AUTO: 32 % (ref 34–46.6)
HGB BLD-MCNC: 10.2 G/DL (ref 12–15.9)
IMM GRANULOCYTES # BLD AUTO: 0.02 10*3/MM3 (ref 0–0.05)
IMM GRANULOCYTES NFR BLD AUTO: 0.5 % (ref 0–0.5)
INR PPP: 1.01 (ref 0.9–1.1)
LA 2 SCREEN W REFLEX-IMP: NORMAL
LYMPHOCYTES # BLD AUTO: 0.46 10*3/MM3 (ref 0.7–3.1)
LYMPHOCYTES NFR BLD AUTO: 12.5 % (ref 19.6–45.3)
MCH RBC QN AUTO: 28.3 PG (ref 26.6–33)
MCHC RBC AUTO-ENTMCNC: 31.9 G/DL (ref 31.5–35.7)
MCV RBC AUTO: 88.9 FL (ref 79–97)
MONOCYTES # BLD AUTO: 0.24 10*3/MM3 (ref 0.1–0.9)
MONOCYTES NFR BLD AUTO: 6.5 % (ref 5–12)
NEUTROPHILS # BLD AUTO: 2.96 10*3/MM3 (ref 1.7–7)
NEUTROPHILS NFR BLD AUTO: 80.2 % (ref 42.7–76)
NRBC BLD AUTO-RTO: 0 /100 WBC (ref 0–0.2)
PLATELET # BLD AUTO: 145 10*3/MM3 (ref 140–450)
POTASSIUM SERPL-SCNC: 4.3 MMOL/L (ref 3.5–5.2)
PROT C ACT/NOR PPP CHRO: 147 %
PROT C ACT/NOR PPP: 191 % (ref 73–180)
PROT S ACT/NOR PPP: 100 % (ref 63–140)
PROT SERPL-MCNC: 7 G/DL (ref 6–8.5)
PROTHROMBIN TIME: 13.7 SECONDS (ref 12–15.1)
RBC # BLD AUTO: 3.6 10*6/MM3 (ref 3.77–5.28)
SCREEN APTT: 33 SEC (ref 0–51.9)
SCREEN DRVVT: 39.7 SEC (ref 0–47)
SODIUM SERPL-SCNC: 137 MMOL/L (ref 136–145)
WBC # BLD AUTO: 3.69 10*3/MM3 (ref 3.4–10.8)

## 2020-03-31 ENCOUNTER — TELEPHONE (OUTPATIENT)
Dept: INTERNAL MEDICINE | Facility: CLINIC | Age: 30
End: 2020-03-31

## 2020-04-02 ENCOUNTER — TRANSCRIBE ORDERS (OUTPATIENT)
Dept: LAB | Facility: HOSPITAL | Age: 30
End: 2020-04-02

## 2020-04-02 ENCOUNTER — LAB (OUTPATIENT)
Dept: LAB | Facility: HOSPITAL | Age: 30
End: 2020-04-02

## 2020-04-02 DIAGNOSIS — D64.9 ANEMIA, UNSPECIFIED TYPE: ICD-10-CM

## 2020-04-02 DIAGNOSIS — Z79.621 ENCOUNTER FOR MONITORING TACROLIMUS THERAPY: ICD-10-CM

## 2020-04-02 DIAGNOSIS — R39.9 UNSPECIFIED SYMPTOMS AND SIGNS INVOLVING THE GENITOURINARY SYSTEM: ICD-10-CM

## 2020-04-02 DIAGNOSIS — R79.89 ELEVATED SERUM CREATININE: Primary | ICD-10-CM

## 2020-04-02 DIAGNOSIS — Z51.81 ENCOUNTER FOR MONITORING TACROLIMUS THERAPY: ICD-10-CM

## 2020-04-02 DIAGNOSIS — Z94.0 KIDNEY REPLACED BY TRANSPLANT: ICD-10-CM

## 2020-04-02 DIAGNOSIS — R79.89 ELEVATED SERUM CREATININE: ICD-10-CM

## 2020-04-02 LAB
ALBUMIN SERPL-MCNC: 4.2 G/DL (ref 3.5–5.2)
ANION GAP SERPL CALCULATED.3IONS-SCNC: 13.9 MMOL/L (ref 5–15)
BACTERIA UR QL AUTO: ABNORMAL /HPF
BASOPHILS # BLD AUTO: 0.03 10*3/MM3 (ref 0–0.2)
BASOPHILS NFR BLD AUTO: 0.4 % (ref 0–1.5)
BILIRUB UR QL STRIP: NEGATIVE
BUN BLD-MCNC: 31 MG/DL (ref 6–20)
BUN/CREAT SERPL: 24 (ref 7–25)
CALCIUM SPEC-SCNC: 9.5 MG/DL (ref 8.6–10.5)
CHLORIDE SERPL-SCNC: 106 MMOL/L (ref 98–107)
CLARITY UR: CLEAR
CO2 SERPL-SCNC: 18.1 MMOL/L (ref 22–29)
COLOR UR: YELLOW
CREAT BLD-MCNC: 1.29 MG/DL (ref 0.57–1)
CREAT UR-MCNC: 64.7 MG/DL
DEPRECATED RDW RBC AUTO: 48.5 FL (ref 37–54)
EOSINOPHIL # BLD AUTO: 0.34 10*3/MM3 (ref 0–0.4)
EOSINOPHIL NFR BLD AUTO: 4.7 % (ref 0.3–6.2)
ERYTHROCYTE [DISTWIDTH] IN BLOOD BY AUTOMATED COUNT: 14.9 % (ref 12.3–15.4)
GFR SERPL CREATININE-BSD FRML MDRD: 49 ML/MIN/1.73
GLUCOSE BLD-MCNC: 115 MG/DL (ref 65–99)
GLUCOSE UR STRIP-MCNC: ABNORMAL MG/DL
HCT VFR BLD AUTO: 40.6 % (ref 34–46.6)
HGB BLD-MCNC: 13 G/DL (ref 12–15.9)
HGB UR QL STRIP.AUTO: NEGATIVE
HYALINE CASTS UR QL AUTO: ABNORMAL /LPF
KETONES UR QL STRIP: NEGATIVE
LEUKOCYTE ESTERASE UR QL STRIP.AUTO: ABNORMAL
LYMPHOCYTES # BLD AUTO: 0.22 10*3/MM3 (ref 0.7–3.1)
LYMPHOCYTES NFR BLD AUTO: 3 % (ref 19.6–45.3)
MAGNESIUM SERPL-MCNC: 1.7 MG/DL (ref 1.6–2.6)
MCH RBC QN AUTO: 28.8 PG (ref 26.6–33)
MCHC RBC AUTO-ENTMCNC: 32 G/DL (ref 31.5–35.7)
MCV RBC AUTO: 89.8 FL (ref 79–97)
MONOCYTES # BLD AUTO: 0.51 10*3/MM3 (ref 0.1–0.9)
MONOCYTES NFR BLD AUTO: 7 % (ref 5–12)
NEUTROPHILS # BLD AUTO: 5.84 10*3/MM3 (ref 1.7–7)
NEUTROPHILS NFR BLD AUTO: 80.1 % (ref 42.7–76)
NITRITE UR QL STRIP: NEGATIVE
PH UR STRIP.AUTO: 5.5 [PH] (ref 5–8)
PHOSPHATE SERPL-MCNC: 2.3 MG/DL (ref 2.5–4.5)
PLATELET # BLD AUTO: 196 10*3/MM3 (ref 140–450)
PMV BLD AUTO: 14.4 FL (ref 6–12)
POTASSIUM BLD-SCNC: 4.6 MMOL/L (ref 3.5–5.2)
PROT UR QL STRIP: ABNORMAL
PROT UR-MCNC: 64 MG/DL
PROT/CREAT UR: 989.2 MG/G CREA (ref 0–200)
RBC # BLD AUTO: 4.52 10*6/MM3 (ref 3.77–5.28)
RBC # UR: ABNORMAL /HPF
REF LAB TEST METHOD: ABNORMAL
SODIUM BLD-SCNC: 138 MMOL/L (ref 136–145)
SP GR UR STRIP: 1.02 (ref 1–1.03)
SQUAMOUS #/AREA URNS HPF: ABNORMAL /HPF
UROBILINOGEN UR QL STRIP: ABNORMAL
WBC NRBC COR # BLD: 7.29 10*3/MM3 (ref 3.4–10.8)
WBC UR QL AUTO: ABNORMAL /HPF

## 2020-04-02 PROCEDURE — 82570 ASSAY OF URINE CREATININE: CPT

## 2020-04-02 PROCEDURE — 80069 RENAL FUNCTION PANEL: CPT

## 2020-04-02 PROCEDURE — 81001 URINALYSIS AUTO W/SCOPE: CPT

## 2020-04-02 PROCEDURE — 36415 COLL VENOUS BLD VENIPUNCTURE: CPT

## 2020-04-02 PROCEDURE — 83735 ASSAY OF MAGNESIUM: CPT

## 2020-04-02 PROCEDURE — 84156 ASSAY OF PROTEIN URINE: CPT

## 2020-04-02 PROCEDURE — 80197 ASSAY OF TACROLIMUS: CPT

## 2020-04-02 PROCEDURE — 87086 URINE CULTURE/COLONY COUNT: CPT

## 2020-04-02 PROCEDURE — 85025 COMPLETE CBC W/AUTO DIFF WBC: CPT

## 2020-04-03 LAB — BACTERIA SPEC AEROBE CULT: NO GROWTH

## 2020-04-04 LAB — TACROLIMUS BLD-MCNC: 10.5 NG/ML (ref 2–20)

## 2020-04-07 ENCOUNTER — LAB (OUTPATIENT)
Dept: LAB | Facility: HOSPITAL | Age: 30
End: 2020-04-07

## 2020-04-07 ENCOUNTER — TRANSCRIBE ORDERS (OUTPATIENT)
Dept: LAB | Facility: HOSPITAL | Age: 30
End: 2020-04-07

## 2020-04-07 DIAGNOSIS — Z51.81 ENCOUNTER FOR MONITORING TACROLIMUS THERAPY: ICD-10-CM

## 2020-04-07 DIAGNOSIS — Z94.0 KIDNEY REPLACED BY TRANSPLANT: ICD-10-CM

## 2020-04-07 DIAGNOSIS — R39.9 UNSPECIFIED SYMPTOMS AND SIGNS INVOLVING THE GENITOURINARY SYSTEM: ICD-10-CM

## 2020-04-07 DIAGNOSIS — R79.89 ELEVATED SERUM CREATININE: ICD-10-CM

## 2020-04-07 DIAGNOSIS — R19.7 DIARRHEA, UNSPECIFIED TYPE: Primary | ICD-10-CM

## 2020-04-07 DIAGNOSIS — Z79.621 ENCOUNTER FOR MONITORING TACROLIMUS THERAPY: ICD-10-CM

## 2020-04-07 DIAGNOSIS — R19.7 DIARRHEA, UNSPECIFIED TYPE: ICD-10-CM

## 2020-04-07 DIAGNOSIS — D64.9 ANEMIA, UNSPECIFIED TYPE: ICD-10-CM

## 2020-04-07 LAB
ALBUMIN SERPL-MCNC: 4.4 G/DL (ref 3.5–5.2)
ANION GAP SERPL CALCULATED.3IONS-SCNC: 10.4 MMOL/L (ref 5–15)
BACTERIA UR QL AUTO: ABNORMAL /HPF
BASOPHILS # BLD AUTO: 0.07 10*3/MM3 (ref 0–0.2)
BASOPHILS NFR BLD AUTO: 0.8 % (ref 0–1.5)
BILIRUB UR QL STRIP: NEGATIVE
BUN BLD-MCNC: 37 MG/DL (ref 6–20)
BUN/CREAT SERPL: 22 (ref 7–25)
CALCIUM SPEC-SCNC: 10.7 MG/DL (ref 8.6–10.5)
CHLORIDE SERPL-SCNC: 110 MMOL/L (ref 98–107)
CLARITY UR: ABNORMAL
CO2 SERPL-SCNC: 16.6 MMOL/L (ref 22–29)
COLOR UR: ABNORMAL
CREAT BLD-MCNC: 1.68 MG/DL (ref 0.57–1)
CREAT UR-MCNC: 131.3 MG/DL
DEPRECATED RDW RBC AUTO: 59.2 FL (ref 37–54)
EOSINOPHIL # BLD AUTO: 0.43 10*3/MM3 (ref 0–0.4)
EOSINOPHIL NFR BLD AUTO: 5 % (ref 0.3–6.2)
ERYTHROCYTE [DISTWIDTH] IN BLOOD BY AUTOMATED COUNT: 17 % (ref 12.3–15.4)
GFR SERPL CREATININE-BSD FRML MDRD: 36 ML/MIN/1.73
GLUCOSE BLD-MCNC: 123 MG/DL (ref 65–99)
GLUCOSE UR STRIP-MCNC: NEGATIVE MG/DL
HCT VFR BLD AUTO: 42.4 % (ref 34–46.6)
HGB BLD-MCNC: 13 G/DL (ref 12–15.9)
HGB UR QL STRIP.AUTO: ABNORMAL
HYALINE CASTS UR QL AUTO: ABNORMAL /LPF
IMM GRANULOCYTES # BLD AUTO: 0.14 10*3/MM3 (ref 0–0.05)
IMM GRANULOCYTES NFR BLD AUTO: 1.6 % (ref 0–0.5)
KETONES UR QL STRIP: NEGATIVE
LEUKOCYTE ESTERASE UR QL STRIP.AUTO: ABNORMAL
LYMPHOCYTES # BLD AUTO: 0.26 10*3/MM3 (ref 0.7–3.1)
LYMPHOCYTES NFR BLD AUTO: 3 % (ref 19.6–45.3)
MAGNESIUM SERPL-MCNC: 1.8 MG/DL (ref 1.6–2.6)
MCH RBC QN AUTO: 29.1 PG (ref 26.6–33)
MCHC RBC AUTO-ENTMCNC: 30.7 G/DL (ref 31.5–35.7)
MCV RBC AUTO: 95.1 FL (ref 79–97)
MONOCYTES # BLD AUTO: 0.41 10*3/MM3 (ref 0.1–0.9)
MONOCYTES NFR BLD AUTO: 4.8 % (ref 5–12)
NEUTROPHILS # BLD AUTO: 7.29 10*3/MM3 (ref 1.7–7)
NEUTROPHILS NFR BLD AUTO: 84.8 % (ref 42.7–76)
NITRITE UR QL STRIP: NEGATIVE
NRBC BLD AUTO-RTO: 0 /100 WBC (ref 0–0.2)
PH UR STRIP.AUTO: 6.5 [PH] (ref 5–8)
PHOSPHATE SERPL-MCNC: 2.9 MG/DL (ref 2.5–4.5)
PLATELET # BLD AUTO: 253 10*3/MM3 (ref 140–450)
PMV BLD AUTO: 11.7 FL (ref 6–12)
POTASSIUM BLD-SCNC: 6.2 MMOL/L (ref 3.5–5.2)
PROT UR QL STRIP: ABNORMAL
PROT UR-MCNC: 74 MG/DL
PROT/CREAT UR: 563.6 MG/G CREA (ref 0–200)
RBC # BLD AUTO: 4.46 10*6/MM3 (ref 3.77–5.28)
RBC # UR: ABNORMAL /HPF
REF LAB TEST METHOD: ABNORMAL
SODIUM BLD-SCNC: 137 MMOL/L (ref 136–145)
SP GR UR STRIP: 1.03 (ref 1–1.03)
SQUAMOUS #/AREA URNS HPF: ABNORMAL /HPF
UROBILINOGEN UR QL STRIP: ABNORMAL
WBC NRBC COR # BLD: 8.6 10*3/MM3 (ref 3.4–10.8)
WBC UR QL AUTO: ABNORMAL /HPF

## 2020-04-07 PROCEDURE — 84156 ASSAY OF PROTEIN URINE: CPT

## 2020-04-07 PROCEDURE — 81001 URINALYSIS AUTO W/SCOPE: CPT

## 2020-04-07 PROCEDURE — 83735 ASSAY OF MAGNESIUM: CPT

## 2020-04-07 PROCEDURE — 80197 ASSAY OF TACROLIMUS: CPT

## 2020-04-07 PROCEDURE — 36415 COLL VENOUS BLD VENIPUNCTURE: CPT

## 2020-04-07 PROCEDURE — 85025 COMPLETE CBC W/AUTO DIFF WBC: CPT

## 2020-04-07 PROCEDURE — 80069 RENAL FUNCTION PANEL: CPT

## 2020-04-07 PROCEDURE — 87086 URINE CULTURE/COLONY COUNT: CPT

## 2020-04-07 PROCEDURE — 82570 ASSAY OF URINE CREATININE: CPT

## 2020-04-08 ENCOUNTER — LAB (OUTPATIENT)
Dept: LAB | Facility: HOSPITAL | Age: 30
End: 2020-04-08

## 2020-04-08 ENCOUNTER — TRANSCRIBE ORDERS (OUTPATIENT)
Dept: LAB | Facility: HOSPITAL | Age: 30
End: 2020-04-08

## 2020-04-08 DIAGNOSIS — R19.7 DIARRHEA, UNSPECIFIED TYPE: ICD-10-CM

## 2020-04-08 DIAGNOSIS — E87.5 HYPERKALEMIA: ICD-10-CM

## 2020-04-08 DIAGNOSIS — Z94.0 KIDNEY REPLACED BY TRANSPLANT: ICD-10-CM

## 2020-04-08 DIAGNOSIS — Z94.0 KIDNEY REPLACED BY TRANSPLANT: Primary | ICD-10-CM

## 2020-04-08 LAB
ALBUMIN SERPL-MCNC: 4.1 G/DL (ref 3.5–5.2)
ANION GAP SERPL CALCULATED.3IONS-SCNC: 11.2 MMOL/L (ref 5–15)
BACTERIA SPEC AEROBE CULT: NO GROWTH
BUN BLD-MCNC: 36 MG/DL (ref 6–20)
BUN/CREAT SERPL: 24.3 (ref 7–25)
C DIFF GDH STL QL: NEGATIVE
CALCIUM SPEC-SCNC: 10.6 MG/DL (ref 8.6–10.5)
CHLORIDE SERPL-SCNC: 107 MMOL/L (ref 98–107)
CO2 SERPL-SCNC: 16.8 MMOL/L (ref 22–29)
CREAT BLD-MCNC: 1.48 MG/DL (ref 0.57–1)
GFR SERPL CREATININE-BSD FRML MDRD: 41 ML/MIN/1.73
GLUCOSE BLD-MCNC: 118 MG/DL (ref 65–99)
PHOSPHATE SERPL-MCNC: 3.1 MG/DL (ref 2.5–4.5)
POTASSIUM BLD-SCNC: 5.5 MMOL/L (ref 3.5–5.2)
SODIUM BLD-SCNC: 135 MMOL/L (ref 136–145)

## 2020-04-08 PROCEDURE — 87045 FECES CULTURE AEROBIC BACT: CPT

## 2020-04-08 PROCEDURE — 80069 RENAL FUNCTION PANEL: CPT

## 2020-04-08 PROCEDURE — 87449 NOS EACH ORGANISM AG IA: CPT

## 2020-04-08 PROCEDURE — 87046 STOOL CULTR AEROBIC BACT EA: CPT

## 2020-04-08 PROCEDURE — 36415 COLL VENOUS BLD VENIPUNCTURE: CPT

## 2020-04-08 PROCEDURE — 87324 CLOSTRIDIUM AG IA: CPT

## 2020-04-08 PROCEDURE — 87427 SHIGA-LIKE TOXIN AG IA: CPT

## 2020-04-09 LAB — TACROLIMUS BLD-MCNC: 12 NG/ML (ref 2–20)

## 2020-04-12 LAB
CAMPYLOBACTER STL CULT: NORMAL
E COLI SXT STL QL IA: NEGATIVE
Lab: NORMAL
Lab: NORMAL
SALM + SHIG STL CULT: NORMAL

## 2020-04-13 LAB
CMV DNA SERPL NAA+PROBE-ACNC: NEGATIVE IU/ML
LOG 10 CMV QN DNA PI: NORMAL

## 2020-04-14 ENCOUNTER — LAB (OUTPATIENT)
Dept: LAB | Facility: HOSPITAL | Age: 30
End: 2020-04-14

## 2020-04-14 DIAGNOSIS — R79.89 ELEVATED SERUM CREATININE: ICD-10-CM

## 2020-04-14 DIAGNOSIS — D64.9 ANEMIA, UNSPECIFIED TYPE: ICD-10-CM

## 2020-04-14 DIAGNOSIS — R39.9 UNSPECIFIED SYMPTOMS AND SIGNS INVOLVING THE GENITOURINARY SYSTEM: ICD-10-CM

## 2020-04-14 DIAGNOSIS — Z51.81 ENCOUNTER FOR MONITORING TACROLIMUS THERAPY: ICD-10-CM

## 2020-04-14 DIAGNOSIS — Z79.621 ENCOUNTER FOR MONITORING TACROLIMUS THERAPY: ICD-10-CM

## 2020-04-14 DIAGNOSIS — Z94.0 KIDNEY REPLACED BY TRANSPLANT: ICD-10-CM

## 2020-04-14 LAB
ALBUMIN SERPL-MCNC: 4.7 G/DL (ref 3.5–5.2)
ANION GAP SERPL CALCULATED.3IONS-SCNC: 14.5 MMOL/L (ref 5–15)
BACTERIA UR QL AUTO: ABNORMAL /HPF
BASOPHILS # BLD AUTO: 0.03 10*3/MM3 (ref 0–0.2)
BASOPHILS NFR BLD AUTO: 0.6 % (ref 0–1.5)
BILIRUB UR QL STRIP: NEGATIVE
BUN BLD-MCNC: 28 MG/DL (ref 6–20)
BUN/CREAT SERPL: 22.4 (ref 7–25)
CALCIUM SPEC-SCNC: 10.9 MG/DL (ref 8.6–10.5)
CHLORIDE SERPL-SCNC: 104 MMOL/L (ref 98–107)
CLARITY UR: CLEAR
CO2 SERPL-SCNC: 15.5 MMOL/L (ref 22–29)
COLOR UR: YELLOW
CREAT BLD-MCNC: 1.25 MG/DL (ref 0.57–1)
CREAT UR-MCNC: 58.3 MG/DL
DEPRECATED RDW RBC AUTO: 50.1 FL (ref 37–54)
EOSINOPHIL # BLD AUTO: 0.51 10*3/MM3 (ref 0–0.4)
EOSINOPHIL NFR BLD AUTO: 9.5 % (ref 0.3–6.2)
ERYTHROCYTE [DISTWIDTH] IN BLOOD BY AUTOMATED COUNT: 15.5 % (ref 12.3–15.4)
GFR SERPL CREATININE-BSD FRML MDRD: 50 ML/MIN/1.73
GLUCOSE BLD-MCNC: 91 MG/DL (ref 65–99)
GLUCOSE UR STRIP-MCNC: NEGATIVE MG/DL
HCT VFR BLD AUTO: 43.3 % (ref 34–46.6)
HGB BLD-MCNC: 13.9 G/DL (ref 12–15.9)
HGB UR QL STRIP.AUTO: NEGATIVE
HYALINE CASTS UR QL AUTO: ABNORMAL /LPF
IMM GRANULOCYTES # BLD AUTO: 0.09 10*3/MM3 (ref 0–0.05)
IMM GRANULOCYTES NFR BLD AUTO: 1.7 % (ref 0–0.5)
KETONES UR QL STRIP: NEGATIVE
LEUKOCYTE ESTERASE UR QL STRIP.AUTO: ABNORMAL
LYMPHOCYTES # BLD AUTO: 0.25 10*3/MM3 (ref 0.7–3.1)
LYMPHOCYTES NFR BLD AUTO: 4.7 % (ref 19.6–45.3)
MAGNESIUM SERPL-MCNC: 1.8 MG/DL (ref 1.6–2.6)
MCH RBC QN AUTO: 28.7 PG (ref 26.6–33)
MCHC RBC AUTO-ENTMCNC: 32.1 G/DL (ref 31.5–35.7)
MCV RBC AUTO: 89.3 FL (ref 79–97)
MONOCYTES # BLD AUTO: 0.17 10*3/MM3 (ref 0.1–0.9)
MONOCYTES NFR BLD AUTO: 3.2 % (ref 5–12)
NEUTROPHILS # BLD AUTO: 4.32 10*3/MM3 (ref 1.7–7)
NEUTROPHILS NFR BLD AUTO: 80.3 % (ref 42.7–76)
NITRITE UR QL STRIP: NEGATIVE
NRBC BLD AUTO-RTO: 0 /100 WBC (ref 0–0.2)
PH UR STRIP.AUTO: 6.5 [PH] (ref 5–8)
PHOSPHATE SERPL-MCNC: 2.4 MG/DL (ref 2.5–4.5)
PLATELET # BLD AUTO: 288 10*3/MM3 (ref 140–450)
PMV BLD AUTO: 12.7 FL (ref 6–12)
POTASSIUM BLD-SCNC: 5.7 MMOL/L (ref 3.5–5.2)
PROT UR QL STRIP: ABNORMAL
PROT UR-MCNC: 31 MG/DL
PROT/CREAT UR: 531.7 MG/G CREA (ref 0–200)
RBC # BLD AUTO: 4.85 10*6/MM3 (ref 3.77–5.28)
RBC # UR: ABNORMAL /HPF
REF LAB TEST METHOD: ABNORMAL
SODIUM BLD-SCNC: 134 MMOL/L (ref 136–145)
SP GR UR STRIP: 1.02 (ref 1–1.03)
SQUAMOUS #/AREA URNS HPF: ABNORMAL /HPF
UROBILINOGEN UR QL STRIP: ABNORMAL
WBC NRBC COR # BLD: 5.37 10*3/MM3 (ref 3.4–10.8)
WBC UR QL AUTO: ABNORMAL /HPF

## 2020-04-14 PROCEDURE — 83735 ASSAY OF MAGNESIUM: CPT

## 2020-04-14 PROCEDURE — 87086 URINE CULTURE/COLONY COUNT: CPT

## 2020-04-14 PROCEDURE — 80197 ASSAY OF TACROLIMUS: CPT

## 2020-04-14 PROCEDURE — 80069 RENAL FUNCTION PANEL: CPT

## 2020-04-14 PROCEDURE — 82570 ASSAY OF URINE CREATININE: CPT

## 2020-04-14 PROCEDURE — 81001 URINALYSIS AUTO W/SCOPE: CPT

## 2020-04-14 PROCEDURE — 85025 COMPLETE CBC W/AUTO DIFF WBC: CPT

## 2020-04-14 PROCEDURE — 36415 COLL VENOUS BLD VENIPUNCTURE: CPT

## 2020-04-14 PROCEDURE — 84156 ASSAY OF PROTEIN URINE: CPT

## 2020-04-15 LAB — BACTERIA SPEC AEROBE CULT: NO GROWTH

## 2020-04-16 LAB — TACROLIMUS BLD-MCNC: 11.2 NG/ML (ref 2–20)

## 2020-04-27 ENCOUNTER — LAB (OUTPATIENT)
Dept: LAB | Facility: HOSPITAL | Age: 30
End: 2020-04-27

## 2020-04-27 DIAGNOSIS — Z79.621 ENCOUNTER FOR MONITORING TACROLIMUS THERAPY: ICD-10-CM

## 2020-04-27 DIAGNOSIS — Z51.81 ENCOUNTER FOR MONITORING TACROLIMUS THERAPY: ICD-10-CM

## 2020-04-27 DIAGNOSIS — R79.89 ELEVATED SERUM CREATININE: ICD-10-CM

## 2020-04-27 DIAGNOSIS — Z94.0 KIDNEY REPLACED BY TRANSPLANT: ICD-10-CM

## 2020-04-27 DIAGNOSIS — R39.9 UNSPECIFIED SYMPTOMS AND SIGNS INVOLVING THE GENITOURINARY SYSTEM: ICD-10-CM

## 2020-04-27 DIAGNOSIS — D64.9 ANEMIA, UNSPECIFIED TYPE: ICD-10-CM

## 2020-04-27 LAB
ALBUMIN SERPL-MCNC: 4.2 G/DL (ref 3.5–5.2)
ANION GAP SERPL CALCULATED.3IONS-SCNC: 14.6 MMOL/L (ref 5–15)
BACTERIA UR QL AUTO: ABNORMAL /HPF
BILIRUB UR QL STRIP: NEGATIVE
BUN BLD-MCNC: 28 MG/DL (ref 6–20)
BUN/CREAT SERPL: 25.5 (ref 7–25)
CALCIUM SPEC-SCNC: 10.6 MG/DL (ref 8.6–10.5)
CHLORIDE SERPL-SCNC: 99 MMOL/L (ref 98–107)
CLARITY UR: CLEAR
CO2 SERPL-SCNC: 15.4 MMOL/L (ref 22–29)
COLOR UR: YELLOW
CREAT BLD-MCNC: 1.1 MG/DL (ref 0.57–1)
CREAT UR-MCNC: 81.9 MG/DL
GFR SERPL CREATININE-BSD FRML MDRD: 58 ML/MIN/1.73
GLUCOSE BLD-MCNC: 99 MG/DL (ref 65–99)
GLUCOSE UR STRIP-MCNC: NEGATIVE MG/DL
HGB UR QL STRIP.AUTO: NEGATIVE
HYALINE CASTS UR QL AUTO: ABNORMAL /LPF
KETONES UR QL STRIP: NEGATIVE
LEUKOCYTE ESTERASE UR QL STRIP.AUTO: NEGATIVE
MAGNESIUM SERPL-MCNC: 1.9 MG/DL (ref 1.6–2.6)
NITRITE UR QL STRIP: NEGATIVE
PH UR STRIP.AUTO: 5.5 [PH] (ref 5–8)
PHOSPHATE SERPL-MCNC: 2.1 MG/DL (ref 2.5–4.5)
POTASSIUM BLD-SCNC: 4.4 MMOL/L (ref 3.5–5.2)
PROT UR QL STRIP: NEGATIVE
PROT UR-MCNC: 14 MG/DL
PROT/CREAT UR: 170.9 MG/G CREA (ref 0–200)
RBC # UR: ABNORMAL /HPF
REF LAB TEST METHOD: ABNORMAL
SODIUM BLD-SCNC: 129 MMOL/L (ref 136–145)
SP GR UR STRIP: 1.02 (ref 1–1.03)
SQUAMOUS #/AREA URNS HPF: ABNORMAL /HPF
UROBILINOGEN UR QL STRIP: NORMAL
WBC UR QL AUTO: ABNORMAL /HPF

## 2020-04-27 PROCEDURE — 80197 ASSAY OF TACROLIMUS: CPT

## 2020-04-27 PROCEDURE — 87086 URINE CULTURE/COLONY COUNT: CPT

## 2020-04-27 PROCEDURE — 81001 URINALYSIS AUTO W/SCOPE: CPT

## 2020-04-27 PROCEDURE — 82570 ASSAY OF URINE CREATININE: CPT

## 2020-04-27 PROCEDURE — 83735 ASSAY OF MAGNESIUM: CPT

## 2020-04-27 PROCEDURE — 36415 COLL VENOUS BLD VENIPUNCTURE: CPT

## 2020-04-27 PROCEDURE — 80069 RENAL FUNCTION PANEL: CPT

## 2020-04-27 PROCEDURE — 84156 ASSAY OF PROTEIN URINE: CPT

## 2020-04-28 ENCOUNTER — HOSPITAL ENCOUNTER (OUTPATIENT)
Dept: GENERAL RADIOLOGY | Facility: HOSPITAL | Age: 30
Discharge: HOME | End: 2020-04-28
Admitting: INTERNAL MEDICINE

## 2020-04-28 ENCOUNTER — OFFICE VISIT (OUTPATIENT)
Dept: INTERNAL MEDICINE | Facility: CLINIC | Age: 30
End: 2020-04-28

## 2020-04-28 VITALS
OXYGEN SATURATION: 100 % | SYSTOLIC BLOOD PRESSURE: 110 MMHG | DIASTOLIC BLOOD PRESSURE: 66 MMHG | WEIGHT: 120.8 LBS | TEMPERATURE: 98.7 F | RESPIRATION RATE: 16 BRPM | HEIGHT: 67 IN | HEART RATE: 93 BPM | BODY MASS INDEX: 18.96 KG/M2

## 2020-04-28 DIAGNOSIS — Z94.0 RENAL TRANSPLANT RECIPIENT: ICD-10-CM

## 2020-04-28 DIAGNOSIS — M79.671 RIGHT FOOT PAIN: ICD-10-CM

## 2020-04-28 DIAGNOSIS — M79.604 PAIN IN BOTH LOWER EXTREMITIES: Primary | ICD-10-CM

## 2020-04-28 DIAGNOSIS — M79.605 PAIN IN BOTH LOWER EXTREMITIES: Primary | ICD-10-CM

## 2020-04-28 LAB — BACTERIA SPEC AEROBE CULT: NORMAL

## 2020-04-28 PROCEDURE — 73630 X-RAY EXAM OF FOOT: CPT

## 2020-04-28 PROCEDURE — 99213 OFFICE O/P EST LOW 20 MIN: CPT | Performed by: INTERNAL MEDICINE

## 2020-04-28 RX ORDER — CINACALCET 30 MG/1
TABLET, FILM COATED ORAL
COMMUNITY
Start: 2020-04-26 | End: 2020-06-03

## 2020-04-28 RX ORDER — TACROLIMUS 1 MG/1
7 CAPSULE ORAL EVERY MORNING
COMMUNITY
Start: 2020-04-27

## 2020-04-28 RX ORDER — MYCOPHENOLATE MOFETIL 250 MG/1
1000 CAPSULE ORAL 2 TIMES DAILY
COMMUNITY
Start: 2020-04-17

## 2020-04-28 RX ORDER — PATIROMER 8.4 G/1
POWDER, FOR SUSPENSION ORAL
COMMUNITY
Start: 2020-04-16 | End: 2020-06-03

## 2020-04-28 RX ORDER — DAPSONE 100 MG/1
TABLET ORAL
COMMUNITY
Start: 2020-04-15 | End: 2020-06-03

## 2020-04-28 RX ORDER — HYDRALAZINE HYDROCHLORIDE 50 MG/1
50 TABLET, FILM COATED ORAL
COMMUNITY
Start: 2020-04-17 | End: 2022-07-29

## 2020-04-28 RX ORDER — TACROLIMUS 5 MG/1
6 CAPSULE ORAL NIGHTLY
COMMUNITY
Start: 2020-04-27

## 2020-04-29 DIAGNOSIS — S92.301A CLOSED AVULSION FRACTURE OF METATARSAL BONE OF RIGHT FOOT, INITIAL ENCOUNTER: Primary | ICD-10-CM

## 2020-04-29 DIAGNOSIS — S92.901A CLOSED FRACTURE OF RIGHT FOOT, INITIAL ENCOUNTER: Primary | ICD-10-CM

## 2020-04-29 DIAGNOSIS — S92.902A CLOSED FRACTURE OF LEFT FOOT, INITIAL ENCOUNTER: ICD-10-CM

## 2020-04-29 LAB — TACROLIMUS BLD-MCNC: 3.2 NG/ML (ref 2–20)

## 2020-05-11 ENCOUNTER — LAB (OUTPATIENT)
Dept: LAB | Facility: HOSPITAL | Age: 30
End: 2020-05-11

## 2020-05-11 DIAGNOSIS — Z94.0 KIDNEY REPLACED BY TRANSPLANT: ICD-10-CM

## 2020-05-11 DIAGNOSIS — D64.9 ANEMIA, UNSPECIFIED TYPE: ICD-10-CM

## 2020-05-11 DIAGNOSIS — Z79.621 ENCOUNTER FOR MONITORING TACROLIMUS THERAPY: ICD-10-CM

## 2020-05-11 DIAGNOSIS — Z51.81 ENCOUNTER FOR MONITORING TACROLIMUS THERAPY: ICD-10-CM

## 2020-05-11 DIAGNOSIS — R39.9 UNSPECIFIED SYMPTOMS AND SIGNS INVOLVING THE GENITOURINARY SYSTEM: ICD-10-CM

## 2020-05-11 DIAGNOSIS — R79.89 ELEVATED SERUM CREATININE: ICD-10-CM

## 2020-05-11 LAB
ALBUMIN SERPL-MCNC: 3.9 G/DL (ref 3.5–5.2)
ANION GAP SERPL CALCULATED.3IONS-SCNC: 9.6 MMOL/L (ref 5–15)
BACTERIA UR QL AUTO: ABNORMAL /HPF
BILIRUB UR QL STRIP: NEGATIVE
BUN BLD-MCNC: 33 MG/DL (ref 6–20)
BUN/CREAT SERPL: 28.4 (ref 7–25)
CALCIUM SPEC-SCNC: 10.2 MG/DL (ref 8.6–10.5)
CHLORIDE SERPL-SCNC: 107 MMOL/L (ref 98–107)
CLARITY UR: ABNORMAL
CO2 SERPL-SCNC: 18.4 MMOL/L (ref 22–29)
COLOR UR: ABNORMAL
CREAT BLD-MCNC: 1.16 MG/DL (ref 0.57–1)
CREAT UR-MCNC: 155 MG/DL
DEPRECATED RDW RBC AUTO: 55.4 FL (ref 37–54)
EOSINOPHIL # BLD MANUAL: 0.23 10*3/MM3 (ref 0–0.4)
EOSINOPHIL NFR BLD MANUAL: 4 % (ref 0.3–6.2)
ERYTHROCYTE [DISTWIDTH] IN BLOOD BY AUTOMATED COUNT: 16.4 % (ref 12.3–15.4)
GFR SERPL CREATININE-BSD FRML MDRD: 55 ML/MIN/1.73
GLUCOSE BLD-MCNC: 78 MG/DL (ref 65–99)
GLUCOSE UR STRIP-MCNC: NEGATIVE MG/DL
HCT VFR BLD AUTO: 31.7 % (ref 34–46.6)
HGB BLD-MCNC: 10.4 G/DL (ref 12–15.9)
HGB UR QL STRIP.AUTO: ABNORMAL
HYALINE CASTS UR QL AUTO: ABNORMAL /LPF
KETONES UR QL STRIP: NEGATIVE
LEUKOCYTE ESTERASE UR QL STRIP.AUTO: ABNORMAL
LYMPHOCYTES # BLD MANUAL: 0.64 10*3/MM3 (ref 0.7–3.1)
LYMPHOCYTES NFR BLD MANUAL: 11 % (ref 19.6–45.3)
LYMPHOCYTES NFR BLD MANUAL: 7 % (ref 5–12)
MAGNESIUM SERPL-MCNC: 1.8 MG/DL (ref 1.6–2.6)
MCH RBC QN AUTO: 30.3 PG (ref 26.6–33)
MCHC RBC AUTO-ENTMCNC: 32.8 G/DL (ref 31.5–35.7)
MCV RBC AUTO: 92.4 FL (ref 79–97)
METAMYELOCYTES NFR BLD MANUAL: 2 % (ref 0–0)
MONOCYTES # BLD AUTO: 0.41 10*3/MM3 (ref 0.1–0.9)
MYELOCYTES NFR BLD MANUAL: 2 % (ref 0–0)
NEUTROPHILS # BLD AUTO: 4.34 10*3/MM3 (ref 1.7–7)
NEUTROPHILS NFR BLD MANUAL: 74 % (ref 42.7–76)
NITRITE UR QL STRIP: NEGATIVE
PH UR STRIP.AUTO: <=5 [PH] (ref 5–8)
PHOSPHATE SERPL-MCNC: 2.3 MG/DL (ref 2.5–4.5)
PLAT MORPH BLD: NORMAL
PLATELET # BLD AUTO: 264 10*3/MM3 (ref 140–450)
PMV BLD AUTO: 13.6 FL (ref 6–12)
POTASSIUM BLD-SCNC: 5.1 MMOL/L (ref 3.5–5.2)
PROT UR QL STRIP: ABNORMAL
PROT UR-MCNC: 35 MG/DL
PROT/CREAT UR: 225.8 MG/G CREA (ref 0–200)
RBC # BLD AUTO: 3.43 10*6/MM3 (ref 3.77–5.28)
RBC # UR: ABNORMAL /HPF
RBC MORPH BLD: NORMAL
REF LAB TEST METHOD: ABNORMAL
SODIUM BLD-SCNC: 135 MMOL/L (ref 136–145)
SP GR UR STRIP: 1.03 (ref 1–1.03)
SQUAMOUS #/AREA URNS HPF: ABNORMAL /HPF
UROBILINOGEN UR QL STRIP: ABNORMAL
WBC MORPH BLD: NORMAL
WBC NRBC COR # BLD: 5.86 10*3/MM3 (ref 3.4–10.8)
WBC UR QL AUTO: ABNORMAL /HPF

## 2020-05-11 PROCEDURE — 85007 BL SMEAR W/DIFF WBC COUNT: CPT

## 2020-05-11 PROCEDURE — 80197 ASSAY OF TACROLIMUS: CPT

## 2020-05-11 PROCEDURE — 85025 COMPLETE CBC W/AUTO DIFF WBC: CPT

## 2020-05-11 PROCEDURE — 80069 RENAL FUNCTION PANEL: CPT

## 2020-05-11 PROCEDURE — 83735 ASSAY OF MAGNESIUM: CPT

## 2020-05-11 PROCEDURE — 81001 URINALYSIS AUTO W/SCOPE: CPT

## 2020-05-11 PROCEDURE — 36415 COLL VENOUS BLD VENIPUNCTURE: CPT

## 2020-05-11 PROCEDURE — 82570 ASSAY OF URINE CREATININE: CPT

## 2020-05-11 PROCEDURE — 84156 ASSAY OF PROTEIN URINE: CPT

## 2020-05-11 PROCEDURE — 87086 URINE CULTURE/COLONY COUNT: CPT

## 2020-05-12 LAB — BACTERIA SPEC AEROBE CULT: NORMAL

## 2020-05-13 ENCOUNTER — LAB (OUTPATIENT)
Dept: LAB | Facility: HOSPITAL | Age: 30
End: 2020-05-13

## 2020-05-13 ENCOUNTER — TRANSCRIBE ORDERS (OUTPATIENT)
Dept: LAB | Facility: HOSPITAL | Age: 30
End: 2020-05-13

## 2020-05-13 DIAGNOSIS — R39.9 GENITOURINARY SYMPTOMS: ICD-10-CM

## 2020-05-13 DIAGNOSIS — Z94.0 KIDNEY REPLACED BY TRANSPLANT: ICD-10-CM

## 2020-05-13 DIAGNOSIS — Z94.0 KIDNEY REPLACED BY TRANSPLANT: Primary | ICD-10-CM

## 2020-05-13 LAB
BACTERIA UR QL AUTO: ABNORMAL /HPF
BASOPHILS # BLD MANUAL: 0.13 10*3/MM3 (ref 0–0.2)
BASOPHILS NFR BLD AUTO: 2 % (ref 0–1.5)
BILIRUB UR QL STRIP: ABNORMAL
CLARITY UR: ABNORMAL
COLOR UR: ABNORMAL
DEPRECATED RDW RBC AUTO: 55.4 FL (ref 37–54)
EOSINOPHIL # BLD MANUAL: 0.32 10*3/MM3 (ref 0–0.4)
EOSINOPHIL NFR BLD MANUAL: 5 % (ref 0.3–6.2)
ERYTHROCYTE [DISTWIDTH] IN BLOOD BY AUTOMATED COUNT: 15.9 % (ref 12.3–15.4)
GLUCOSE UR STRIP-MCNC: NEGATIVE MG/DL
HCT VFR BLD AUTO: 30.9 % (ref 34–46.6)
HGB BLD-MCNC: 9.8 G/DL (ref 12–15.9)
HGB UR QL STRIP.AUTO: ABNORMAL
HYALINE CASTS UR QL AUTO: ABNORMAL /LPF
KETONES UR QL STRIP: ABNORMAL
LEUKOCYTE ESTERASE UR QL STRIP.AUTO: ABNORMAL
LYMPHOCYTES # BLD MANUAL: 0.7 10*3/MM3 (ref 0.7–3.1)
LYMPHOCYTES NFR BLD MANUAL: 10 % (ref 5–12)
LYMPHOCYTES NFR BLD MANUAL: 11 % (ref 19.6–45.3)
MCH RBC QN AUTO: 30 PG (ref 26.6–33)
MCHC RBC AUTO-ENTMCNC: 31.7 G/DL (ref 31.5–35.7)
MCV RBC AUTO: 94.5 FL (ref 79–97)
METAMYELOCYTES NFR BLD MANUAL: 2 % (ref 0–0)
MONOCYTES # BLD AUTO: 0.63 10*3/MM3 (ref 0.1–0.9)
MYELOCYTES NFR BLD MANUAL: 4 % (ref 0–0)
NEUTROPHILS # BLD AUTO: 4.17 10*3/MM3 (ref 1.7–7)
NEUTROPHILS NFR BLD MANUAL: 66 % (ref 42.7–76)
NITRITE UR QL STRIP: POSITIVE
PH UR STRIP.AUTO: <=5 [PH] (ref 5–8)
PLAT MORPH BLD: NORMAL
PLATELET # BLD AUTO: 236 10*3/MM3 (ref 140–450)
PMV BLD AUTO: 13.6 FL (ref 6–12)
PROT UR QL STRIP: ABNORMAL
RBC # BLD AUTO: 3.27 10*6/MM3 (ref 3.77–5.28)
RBC # UR: ABNORMAL /HPF
RBC MORPH BLD: NORMAL
REF LAB TEST METHOD: ABNORMAL
SP GR UR STRIP: >=1.03 (ref 1–1.03)
SQUAMOUS #/AREA URNS HPF: ABNORMAL /HPF
TACROLIMUS BLD-MCNC: 8.5 NG/ML (ref 2–20)
TRANS CELLS #/AREA URNS HPF: ABNORMAL /HPF
UROBILINOGEN UR QL STRIP: ABNORMAL
WBC MORPH BLD: NORMAL
WBC NRBC COR # BLD: 6.32 10*3/MM3 (ref 3.4–10.8)
WBC UR QL AUTO: ABNORMAL /HPF

## 2020-05-13 PROCEDURE — 85007 BL SMEAR W/DIFF WBC COUNT: CPT

## 2020-05-13 PROCEDURE — 81001 URINALYSIS AUTO W/SCOPE: CPT

## 2020-05-13 PROCEDURE — 85025 COMPLETE CBC W/AUTO DIFF WBC: CPT

## 2020-05-13 PROCEDURE — 36415 COLL VENOUS BLD VENIPUNCTURE: CPT

## 2020-05-13 PROCEDURE — 87086 URINE CULTURE/COLONY COUNT: CPT

## 2020-05-14 LAB — BACTERIA SPEC AEROBE CULT: NORMAL

## 2020-05-14 PROCEDURE — 82270 OCCULT BLOOD FECES: CPT

## 2020-05-14 PROCEDURE — 0097U HC BIOFIRE FILMARRAY GI PANEL: CPT | Performed by: INTERNAL MEDICINE

## 2020-05-15 ENCOUNTER — TRANSCRIBE ORDERS (OUTPATIENT)
Dept: LAB | Facility: HOSPITAL | Age: 30
End: 2020-05-15

## 2020-05-15 ENCOUNTER — LAB (OUTPATIENT)
Dept: LAB | Facility: HOSPITAL | Age: 30
End: 2020-05-15

## 2020-05-15 DIAGNOSIS — N18.6 ESRD (END STAGE RENAL DISEASE) (HCC): ICD-10-CM

## 2020-05-15 DIAGNOSIS — D64.9 ANEMIA, UNSPECIFIED TYPE: ICD-10-CM

## 2020-05-15 DIAGNOSIS — R79.89 ELEVATED SERUM CREATININE: ICD-10-CM

## 2020-05-15 DIAGNOSIS — R19.7 DIARRHEA, UNSPECIFIED TYPE: Primary | ICD-10-CM

## 2020-05-15 DIAGNOSIS — Z94.0 KIDNEY REPLACED BY TRANSPLANT: ICD-10-CM

## 2020-05-15 DIAGNOSIS — R39.9 UNSPECIFIED SYMPTOMS AND SIGNS INVOLVING THE GENITOURINARY SYSTEM: ICD-10-CM

## 2020-05-15 DIAGNOSIS — Z94.0 KIDNEY REPLACED BY TRANSPLANT: Primary | ICD-10-CM

## 2020-05-15 DIAGNOSIS — Z51.81 ENCOUNTER FOR MONITORING TACROLIMUS THERAPY: ICD-10-CM

## 2020-05-15 DIAGNOSIS — Z79.621 ENCOUNTER FOR MONITORING TACROLIMUS THERAPY: ICD-10-CM

## 2020-05-15 LAB
ADV 40+41 DNA STL QL NAA+NON-PROBE: NOT DETECTED
ALBUMIN SERPL-MCNC: 3.9 G/DL (ref 3.5–5.2)
ANION GAP SERPL CALCULATED.3IONS-SCNC: 13.8 MMOL/L (ref 5–15)
ASTRO TYP 1-8 RNA STL QL NAA+NON-PROBE: NOT DETECTED
BACTERIA UR QL AUTO: ABNORMAL /HPF
BASOPHILS # BLD AUTO: 0.04 10*3/MM3 (ref 0–0.2)
BASOPHILS NFR BLD AUTO: 0.9 % (ref 0–1.5)
BILIRUB UR QL STRIP: NEGATIVE
BUN BLD-MCNC: 23 MG/DL (ref 6–20)
BUN/CREAT SERPL: 24.2 (ref 7–25)
C CAYETANENSIS DNA STL QL NAA+NON-PROBE: NOT DETECTED
CALCIUM SPEC-SCNC: 10 MG/DL (ref 8.6–10.5)
CAMPY SP DNA.DIARRHEA STL QL NAA+PROBE: NOT DETECTED
CHLORIDE SERPL-SCNC: 105 MMOL/L (ref 98–107)
CLARITY UR: CLEAR
CO2 SERPL-SCNC: 14.2 MMOL/L (ref 22–29)
COLOR UR: YELLOW
CREAT BLD-MCNC: 0.95 MG/DL (ref 0.57–1)
CREAT UR-MCNC: 81.6 MG/DL
CRYPTOSP STL CULT: NOT DETECTED
DEPRECATED RDW RBC AUTO: 55 FL (ref 37–54)
E COLI DNA SPEC QL NAA+PROBE: NOT DETECTED
E HISTOLYT AG STL-ACNC: NOT DETECTED
EAEC PAA PLAS AGGR+AATA ST NAA+NON-PRB: NOT DETECTED
EC STX1 + STX2 GENES STL NAA+PROBE: NOT DETECTED
EOSINOPHIL # BLD AUTO: 0.12 10*3/MM3 (ref 0–0.4)
EOSINOPHIL NFR BLD AUTO: 2.6 % (ref 0.3–6.2)
EPEC EAE GENE STL QL NAA+NON-PROBE: NOT DETECTED
ERYTHROCYTE [DISTWIDTH] IN BLOOD BY AUTOMATED COUNT: 15.7 % (ref 12.3–15.4)
ETEC LTA+ST1A+ST1B TOX ST NAA+NON-PROBE: NOT DETECTED
FERRITIN SERPL-MCNC: 1241 NG/ML (ref 13–150)
G LAMBLIA DNA SPEC QL NAA+PROBE: NOT DETECTED
GFR SERPL CREATININE-BSD FRML MDRD: 69 ML/MIN/1.73
GLUCOSE BLD-MCNC: 101 MG/DL (ref 65–99)
GLUCOSE UR STRIP-MCNC: NEGATIVE MG/DL
HAPTOGLOB SERPL-MCNC: 156 MG/DL (ref 30–200)
HCT VFR BLD AUTO: 30.7 % (ref 34–46.6)
HEMOCCULT STL QL: NEGATIVE
HGB BLD-MCNC: 10.1 G/DL (ref 12–15.9)
HGB UR QL STRIP.AUTO: NEGATIVE
HYALINE CASTS UR QL AUTO: ABNORMAL /LPF
IMM GRANULOCYTES # BLD AUTO: 0.23 10*3/MM3 (ref 0–0.05)
IMM GRANULOCYTES NFR BLD AUTO: 4.9 % (ref 0–0.5)
IRON 24H UR-MRATE: 75 MCG/DL (ref 37–145)
IRON SATN MFR SERPL: 29 % (ref 20–50)
KETONES UR QL STRIP: NEGATIVE
LDH SERPL-CCNC: 265 U/L (ref 135–214)
LEUKOCYTE ESTERASE UR QL STRIP.AUTO: ABNORMAL
LYMPHOCYTES # BLD AUTO: 0.3 10*3/MM3 (ref 0.7–3.1)
LYMPHOCYTES NFR BLD AUTO: 6.4 % (ref 19.6–45.3)
MAGNESIUM SERPL-MCNC: 1.5 MG/DL (ref 1.6–2.6)
MCH RBC QN AUTO: 31.1 PG (ref 26.6–33)
MCHC RBC AUTO-ENTMCNC: 32.9 G/DL (ref 31.5–35.7)
MCV RBC AUTO: 94.5 FL (ref 79–97)
MONOCYTES # BLD AUTO: 0.34 10*3/MM3 (ref 0.1–0.9)
MONOCYTES NFR BLD AUTO: 7.3 % (ref 5–12)
NEUTROPHILS # BLD AUTO: 3.65 10*3/MM3 (ref 1.7–7)
NEUTROPHILS NFR BLD AUTO: 77.9 % (ref 42.7–76)
NITRITE UR QL STRIP: NEGATIVE
NOROVIRUS GI+II RNA STL QL NAA+NON-PROBE: NOT DETECTED
NRBC BLD AUTO-RTO: 0 /100 WBC (ref 0–0.2)
P SHIGELLOIDES DNA STL QL NAA+PROBE: NOT DETECTED
PH UR STRIP.AUTO: 5.5 [PH] (ref 5–8)
PHOSPHATE SERPL-MCNC: 2 MG/DL (ref 2.5–4.5)
PLATELET # BLD AUTO: 254 10*3/MM3 (ref 140–450)
PMV BLD AUTO: 12.8 FL (ref 6–12)
POTASSIUM BLD-SCNC: 4.4 MMOL/L (ref 3.5–5.2)
PROT UR QL STRIP: ABNORMAL
PROT UR-MCNC: 13 MG/DL
PROT/CREAT UR: 159.3 MG/G CREA (ref 0–200)
RBC # BLD AUTO: 3.25 10*6/MM3 (ref 3.77–5.28)
RBC # UR: ABNORMAL /HPF
REF LAB TEST METHOD: ABNORMAL
RV RNA STL NAA+PROBE: NOT DETECTED
SALMONELLA DNA SPEC QL NAA+PROBE: NOT DETECTED
SAPO I+II+IV+V RNA STL QL NAA+NON-PROBE: NOT DETECTED
SHIGELLA SP+EIEC IPAH STL QL NAA+PROBE: NOT DETECTED
SODIUM BLD-SCNC: 133 MMOL/L (ref 136–145)
SP GR UR STRIP: 1.02 (ref 1–1.03)
SQUAMOUS #/AREA URNS HPF: ABNORMAL /HPF
TIBC SERPL-MCNC: 258 MCG/DL (ref 298–536)
TRANSFERRIN SERPL-MCNC: 173 MG/DL (ref 200–360)
UROBILINOGEN UR QL STRIP: ABNORMAL
V CHOLERAE DNA SPEC QL NAA+PROBE: NOT DETECTED
VIBRIO DNA SPEC NAA+PROBE: NOT DETECTED
WBC NRBC COR # BLD: 4.68 10*3/MM3 (ref 3.4–10.8)
WBC UR QL AUTO: ABNORMAL /HPF
YEAST URNS QL MICRO: ABNORMAL /HPF
YERSINIA STL CULT: NOT DETECTED

## 2020-05-15 PROCEDURE — 80197 ASSAY OF TACROLIMUS: CPT

## 2020-05-15 PROCEDURE — 82728 ASSAY OF FERRITIN: CPT

## 2020-05-15 PROCEDURE — 83735 ASSAY OF MAGNESIUM: CPT

## 2020-05-15 PROCEDURE — 84156 ASSAY OF PROTEIN URINE: CPT

## 2020-05-15 PROCEDURE — 84466 ASSAY OF TRANSFERRIN: CPT

## 2020-05-15 PROCEDURE — 82955 ASSAY OF G6PD ENZYME: CPT

## 2020-05-15 PROCEDURE — 36415 COLL VENOUS BLD VENIPUNCTURE: CPT

## 2020-05-15 PROCEDURE — 83540 ASSAY OF IRON: CPT

## 2020-05-15 PROCEDURE — 81001 URINALYSIS AUTO W/SCOPE: CPT

## 2020-05-15 PROCEDURE — 83010 ASSAY OF HAPTOGLOBIN QUANT: CPT

## 2020-05-15 PROCEDURE — 80069 RENAL FUNCTION PANEL: CPT

## 2020-05-15 PROCEDURE — 83615 LACTATE (LD) (LDH) ENZYME: CPT

## 2020-05-15 PROCEDURE — 82570 ASSAY OF URINE CREATININE: CPT

## 2020-05-15 PROCEDURE — 85041 AUTOMATED RBC COUNT: CPT

## 2020-05-15 PROCEDURE — 85025 COMPLETE CBC W/AUTO DIFF WBC: CPT

## 2020-05-18 LAB
G6PD RBC-CCNC: 374 U/10E12 RBC (ref 146–376)
RBC # BLD AUTO: 3.25 X10E6/UL (ref 3.77–5.28)
TACROLIMUS BLD-MCNC: 4.7 NG/ML (ref 2–20)

## 2020-05-26 ENCOUNTER — LAB (OUTPATIENT)
Dept: LAB | Facility: HOSPITAL | Age: 30
End: 2020-05-26

## 2020-05-26 DIAGNOSIS — D64.9 ANEMIA, UNSPECIFIED TYPE: ICD-10-CM

## 2020-05-26 DIAGNOSIS — Z79.621 ENCOUNTER FOR MONITORING TACROLIMUS THERAPY: ICD-10-CM

## 2020-05-26 DIAGNOSIS — Z51.81 ENCOUNTER FOR MONITORING TACROLIMUS THERAPY: ICD-10-CM

## 2020-05-26 DIAGNOSIS — R39.9 UNSPECIFIED SYMPTOMS AND SIGNS INVOLVING THE GENITOURINARY SYSTEM: ICD-10-CM

## 2020-05-26 DIAGNOSIS — R79.89 ELEVATED SERUM CREATININE: ICD-10-CM

## 2020-05-26 DIAGNOSIS — Z94.0 KIDNEY REPLACED BY TRANSPLANT: ICD-10-CM

## 2020-05-26 LAB
ALBUMIN SERPL-MCNC: 3.7 G/DL (ref 3.5–5.2)
ANION GAP SERPL CALCULATED.3IONS-SCNC: 10.5 MMOL/L (ref 5–15)
BACTERIA UR QL AUTO: ABNORMAL /HPF
BILIRUB UR QL STRIP: NEGATIVE
BUN BLD-MCNC: 22 MG/DL (ref 6–20)
BUN/CREAT SERPL: 21.4 (ref 7–25)
CALCIUM SPEC-SCNC: 10.4 MG/DL (ref 8.6–10.5)
CHLORIDE SERPL-SCNC: 108 MMOL/L (ref 98–107)
CLARITY UR: ABNORMAL
CO2 SERPL-SCNC: 19.5 MMOL/L (ref 22–29)
COD CRY URNS QL: ABNORMAL /HPF
COLOR UR: ABNORMAL
CREAT BLD-MCNC: 1.03 MG/DL (ref 0.57–1)
CREAT UR-MCNC: 247.6 MG/DL
GFR SERPL CREATININE-BSD FRML MDRD: 63 ML/MIN/1.73
GLUCOSE BLD-MCNC: 104 MG/DL (ref 65–99)
GLUCOSE UR STRIP-MCNC: NEGATIVE MG/DL
HGB UR QL STRIP.AUTO: NEGATIVE
HYALINE CASTS UR QL AUTO: ABNORMAL /LPF
KETONES UR QL STRIP: ABNORMAL
LEUKOCYTE ESTERASE UR QL STRIP.AUTO: ABNORMAL
MAGNESIUM SERPL-MCNC: 1.8 MG/DL (ref 1.6–2.6)
NITRITE UR QL STRIP: NEGATIVE
PH UR STRIP.AUTO: <=5 [PH] (ref 5–8)
PHOSPHATE SERPL-MCNC: 2.4 MG/DL (ref 2.5–4.5)
POTASSIUM BLD-SCNC: 4.6 MMOL/L (ref 3.5–5.2)
PROT UR QL STRIP: ABNORMAL
PROT UR-MCNC: 57 MG/DL
PROT/CREAT UR: 230.2 MG/G CREA (ref 0–200)
RBC # UR: ABNORMAL /HPF
REF LAB TEST METHOD: ABNORMAL
SODIUM BLD-SCNC: 138 MMOL/L (ref 136–145)
SP GR UR STRIP: >=1.03 (ref 1–1.03)
SQUAMOUS #/AREA URNS HPF: ABNORMAL /HPF
UROBILINOGEN UR QL STRIP: ABNORMAL
WBC UR QL AUTO: ABNORMAL /HPF

## 2020-05-26 PROCEDURE — 36415 COLL VENOUS BLD VENIPUNCTURE: CPT

## 2020-05-26 PROCEDURE — 87086 URINE CULTURE/COLONY COUNT: CPT

## 2020-05-26 PROCEDURE — 80069 RENAL FUNCTION PANEL: CPT

## 2020-05-26 PROCEDURE — 83735 ASSAY OF MAGNESIUM: CPT

## 2020-05-26 PROCEDURE — 82570 ASSAY OF URINE CREATININE: CPT

## 2020-05-26 PROCEDURE — 80197 ASSAY OF TACROLIMUS: CPT

## 2020-05-26 PROCEDURE — 81001 URINALYSIS AUTO W/SCOPE: CPT

## 2020-05-26 PROCEDURE — 84156 ASSAY OF PROTEIN URINE: CPT

## 2020-05-27 LAB — BACTERIA SPEC AEROBE CULT: NORMAL

## 2020-05-28 LAB — TACROLIMUS BLD-MCNC: 8.9 NG/ML (ref 2–20)

## 2020-06-03 ENCOUNTER — OFFICE VISIT (OUTPATIENT)
Dept: INTERNAL MEDICINE | Facility: CLINIC | Age: 30
End: 2020-06-03

## 2020-06-03 ENCOUNTER — HOSPITAL ENCOUNTER (OUTPATIENT)
Dept: GENERAL RADIOLOGY | Facility: HOSPITAL | Age: 30
Discharge: HOME | End: 2020-06-03
Admitting: INTERNAL MEDICINE

## 2020-06-03 VITALS
HEIGHT: 67 IN | TEMPERATURE: 97.4 F | DIASTOLIC BLOOD PRESSURE: 66 MMHG | RESPIRATION RATE: 16 BRPM | WEIGHT: 120 LBS | BODY MASS INDEX: 18.83 KG/M2 | OXYGEN SATURATION: 100 % | HEART RATE: 100 BPM | SYSTOLIC BLOOD PRESSURE: 114 MMHG

## 2020-06-03 DIAGNOSIS — Z30.019 ENCOUNTER FOR FEMALE BIRTH CONTROL: Primary | ICD-10-CM

## 2020-06-03 DIAGNOSIS — S99.912D INJURY OF LEFT ANKLE, SUBSEQUENT ENCOUNTER: ICD-10-CM

## 2020-06-03 PROCEDURE — 81025 URINE PREGNANCY TEST: CPT | Performed by: INTERNAL MEDICINE

## 2020-06-03 PROCEDURE — 73610 X-RAY EXAM OF ANKLE: CPT

## 2020-06-03 PROCEDURE — 99213 OFFICE O/P EST LOW 20 MIN: CPT | Performed by: INTERNAL MEDICINE

## 2020-06-03 RX ORDER — BIOTIN 1 MG
1 TABLET ORAL DAILY
COMMUNITY
Start: 2020-05-29

## 2020-06-03 RX ORDER — PANTOPRAZOLE SODIUM 40 MG/1
40 TABLET, DELAYED RELEASE ORAL 2 TIMES DAILY
COMMUNITY
Start: 2020-04-06 | End: 2020-12-28 | Stop reason: SDUPTHER

## 2020-06-03 RX ORDER — ACETAMINOPHEN AND CODEINE PHOSPHATE 300; 30 MG/1; MG/1
TABLET ORAL
COMMUNITY
Start: 2020-04-30 | End: 2020-06-03

## 2020-06-03 RX ORDER — VALGANCICLOVIR 450 MG/1
900 TABLET, FILM COATED ORAL DAILY
COMMUNITY
Start: 2020-04-15 | End: 2021-01-18

## 2020-06-03 RX ORDER — NORGESTIMATE AND ETHINYL ESTRADIOL 0.25-0.035
1 KIT ORAL DAILY
Qty: 28 TABLET | Refills: 12 | Status: SHIPPED | OUTPATIENT
Start: 2020-06-03 | End: 2021-05-04 | Stop reason: SDUPTHER

## 2020-06-03 RX ORDER — ONDANSETRON 4 MG/1
4 TABLET, FILM COATED ORAL
COMMUNITY
Start: 2020-04-06 | End: 2021-01-18

## 2020-06-10 ENCOUNTER — TRANSCRIBE ORDERS (OUTPATIENT)
Dept: ADMINISTRATIVE | Facility: HOSPITAL | Age: 30
End: 2020-06-10

## 2020-06-10 DIAGNOSIS — M66.872 NONTRAUMATIC TEAR OF TIBIALIS POSTERIOR TENDON, LEFT: Primary | ICD-10-CM

## 2020-06-12 ENCOUNTER — HOSPITAL ENCOUNTER (OUTPATIENT)
Dept: MRI IMAGING | Facility: HOSPITAL | Age: 30
Discharge: HOME | End: 2020-06-12
Admitting: PODIATRIST

## 2020-06-12 DIAGNOSIS — M66.872 NONTRAUMATIC TEAR OF TIBIALIS POSTERIOR TENDON, LEFT: ICD-10-CM

## 2020-06-12 PROCEDURE — 73721 MRI JNT OF LWR EXTRE W/O DYE: CPT

## 2020-06-15 ENCOUNTER — LAB (OUTPATIENT)
Dept: LAB | Facility: HOSPITAL | Age: 30
End: 2020-06-15

## 2020-06-15 DIAGNOSIS — Z51.81 ENCOUNTER FOR MONITORING TACROLIMUS THERAPY: ICD-10-CM

## 2020-06-15 DIAGNOSIS — R79.89 ELEVATED SERUM CREATININE: ICD-10-CM

## 2020-06-15 DIAGNOSIS — Z79.621 ENCOUNTER FOR MONITORING TACROLIMUS THERAPY: ICD-10-CM

## 2020-06-15 DIAGNOSIS — R39.9 UNSPECIFIED SYMPTOMS AND SIGNS INVOLVING THE GENITOURINARY SYSTEM: ICD-10-CM

## 2020-06-15 DIAGNOSIS — Z94.0 KIDNEY REPLACED BY TRANSPLANT: ICD-10-CM

## 2020-06-15 DIAGNOSIS — D64.9 ANEMIA, UNSPECIFIED TYPE: ICD-10-CM

## 2020-06-15 LAB
ALBUMIN SERPL-MCNC: 4.2 G/DL (ref 3.5–5.2)
ANION GAP SERPL CALCULATED.3IONS-SCNC: 9.9 MMOL/L (ref 5–15)
BACTERIA UR QL AUTO: ABNORMAL /HPF
BASOPHILS # BLD MANUAL: 0.03 10*3/MM3 (ref 0–0.2)
BASOPHILS NFR BLD AUTO: 2.2 % (ref 0–1.5)
BILIRUB UR QL STRIP: NEGATIVE
BUN BLD-MCNC: 25 MG/DL (ref 6–20)
BUN/CREAT SERPL: 21.2 (ref 7–25)
CALCIUM SPEC-SCNC: 9.9 MG/DL (ref 8.6–10.5)
CHLORIDE SERPL-SCNC: 105 MMOL/L (ref 98–107)
CLARITY UR: CLEAR
CO2 SERPL-SCNC: 20.1 MMOL/L (ref 22–29)
COLOR UR: YELLOW
CREAT BLD-MCNC: 1.18 MG/DL (ref 0.57–1)
CREAT UR-MCNC: 147.8 MG/DL
DEPRECATED RDW RBC AUTO: 48.4 FL (ref 37–54)
EOSINOPHIL # BLD MANUAL: 0.05 10*3/MM3 (ref 0–0.4)
EOSINOPHIL NFR BLD MANUAL: 3.3 % (ref 0.3–6.2)
ERYTHROCYTE [DISTWIDTH] IN BLOOD BY AUTOMATED COUNT: 13.6 % (ref 12.3–15.4)
GFR SERPL CREATININE-BSD FRML MDRD: 54 ML/MIN/1.73
GLUCOSE BLD-MCNC: 110 MG/DL (ref 65–99)
GLUCOSE UR STRIP-MCNC: NEGATIVE MG/DL
HCT VFR BLD AUTO: 33.3 % (ref 34–46.6)
HGB BLD-MCNC: 10.8 G/DL (ref 12–15.9)
HGB UR QL STRIP.AUTO: ABNORMAL
HYALINE CASTS UR QL AUTO: ABNORMAL /LPF
KETONES UR QL STRIP: ABNORMAL
LEUKOCYTE ESTERASE UR QL STRIP.AUTO: NEGATIVE
LYMPHOCYTES # BLD MANUAL: 0.31 10*3/MM3 (ref 0.7–3.1)
LYMPHOCYTES NFR BLD MANUAL: 21.1 % (ref 19.6–45.3)
LYMPHOCYTES NFR BLD MANUAL: 8.9 % (ref 5–12)
MAGNESIUM SERPL-MCNC: 2.2 MG/DL (ref 1.6–2.6)
MCH RBC QN AUTO: 31.3 PG (ref 26.6–33)
MCHC RBC AUTO-ENTMCNC: 32.4 G/DL (ref 31.5–35.7)
MCV RBC AUTO: 96.5 FL (ref 79–97)
MONOCYTES # BLD AUTO: 0.13 10*3/MM3 (ref 0.1–0.9)
NEUTROPHILS # BLD AUTO: 0.74 10*3/MM3 (ref 1.7–7)
NEUTROPHILS NFR BLD MANUAL: 51.1 % (ref 42.7–76)
NITRITE UR QL STRIP: NEGATIVE
PH UR STRIP.AUTO: <=5 [PH] (ref 5–8)
PHOSPHATE SERPL-MCNC: 2 MG/DL (ref 2.5–4.5)
PLAT MORPH BLD: NORMAL
PLATELET # BLD AUTO: 255 10*3/MM3 (ref 140–450)
PMV BLD AUTO: 13.5 FL (ref 6–12)
POTASSIUM BLD-SCNC: 4.9 MMOL/L (ref 3.5–5.2)
PROT UR QL STRIP: ABNORMAL
PROT UR-MCNC: 26 MG/DL
PROT/CREAT UR: 175.9 MG/G CREA (ref 0–200)
RBC # BLD AUTO: 3.45 10*6/MM3 (ref 3.77–5.28)
RBC # UR: ABNORMAL /HPF
RBC MORPH BLD: NORMAL
REF LAB TEST METHOD: ABNORMAL
SODIUM BLD-SCNC: 135 MMOL/L (ref 136–145)
SP GR UR STRIP: 1.03 (ref 1–1.03)
SQUAMOUS #/AREA URNS HPF: ABNORMAL /HPF
UROBILINOGEN UR QL STRIP: ABNORMAL
VARIANT LYMPHS NFR BLD MANUAL: 13.3 % (ref 0–5)
WBC MORPH BLD: NORMAL
WBC NRBC COR # BLD: 1.45 10*3/MM3 (ref 3.4–10.8)
WBC UR QL AUTO: ABNORMAL /HPF

## 2020-06-15 PROCEDURE — 85007 BL SMEAR W/DIFF WBC COUNT: CPT

## 2020-06-15 PROCEDURE — 36415 COLL VENOUS BLD VENIPUNCTURE: CPT

## 2020-06-15 PROCEDURE — 82570 ASSAY OF URINE CREATININE: CPT

## 2020-06-15 PROCEDURE — 83735 ASSAY OF MAGNESIUM: CPT

## 2020-06-15 PROCEDURE — 81001 URINALYSIS AUTO W/SCOPE: CPT

## 2020-06-15 PROCEDURE — 87086 URINE CULTURE/COLONY COUNT: CPT

## 2020-06-15 PROCEDURE — 85025 COMPLETE CBC W/AUTO DIFF WBC: CPT

## 2020-06-15 PROCEDURE — 80197 ASSAY OF TACROLIMUS: CPT

## 2020-06-15 PROCEDURE — 84156 ASSAY OF PROTEIN URINE: CPT

## 2020-06-15 PROCEDURE — 80069 RENAL FUNCTION PANEL: CPT

## 2020-06-16 ENCOUNTER — TELEPHONE (OUTPATIENT)
Dept: INTERNAL MEDICINE | Facility: CLINIC | Age: 30
End: 2020-06-16

## 2020-06-16 LAB — BACTERIA SPEC AEROBE CULT: NORMAL

## 2020-06-17 LAB — TACROLIMUS BLD-MCNC: 8.4 NG/ML (ref 2–20)

## 2020-06-26 DIAGNOSIS — S99.912D INJURY OF LEFT ANKLE, SUBSEQUENT ENCOUNTER: Primary | ICD-10-CM

## 2020-06-30 ENCOUNTER — LAB (OUTPATIENT)
Dept: LAB | Facility: HOSPITAL | Age: 30
End: 2020-06-30

## 2020-06-30 DIAGNOSIS — R79.89 ELEVATED SERUM CREATININE: ICD-10-CM

## 2020-06-30 DIAGNOSIS — Z94.0 KIDNEY REPLACED BY TRANSPLANT: ICD-10-CM

## 2020-06-30 DIAGNOSIS — Z79.621 ENCOUNTER FOR MONITORING TACROLIMUS THERAPY: ICD-10-CM

## 2020-06-30 DIAGNOSIS — R39.9 UNSPECIFIED SYMPTOMS AND SIGNS INVOLVING THE GENITOURINARY SYSTEM: ICD-10-CM

## 2020-06-30 DIAGNOSIS — Z51.81 ENCOUNTER FOR MONITORING TACROLIMUS THERAPY: ICD-10-CM

## 2020-06-30 DIAGNOSIS — D64.9 ANEMIA, UNSPECIFIED TYPE: ICD-10-CM

## 2020-06-30 LAB
ALBUMIN SERPL-MCNC: 3.8 G/DL (ref 3.5–5.2)
ANION GAP SERPL CALCULATED.3IONS-SCNC: 7.3 MMOL/L (ref 5–15)
BACTERIA UR QL AUTO: ABNORMAL /HPF
BILIRUB UR QL STRIP: NEGATIVE
BUN SERPL-MCNC: 27 MG/DL (ref 6–20)
BUN/CREAT SERPL: 23.7 (ref 7–25)
CALCIUM SPEC-SCNC: 10.4 MG/DL (ref 8.6–10.5)
CHLORIDE SERPL-SCNC: 104 MMOL/L (ref 98–107)
CLARITY UR: CLEAR
CO2 SERPL-SCNC: 17.7 MMOL/L (ref 22–29)
COLOR UR: YELLOW
CREAT SERPL-MCNC: 1.14 MG/DL (ref 0.57–1)
CREAT UR-MCNC: 129.1 MG/DL
DEPRECATED RDW RBC AUTO: 43.9 FL (ref 37–54)
EOSINOPHIL # BLD MANUAL: 0.25 10*3/MM3 (ref 0–0.4)
EOSINOPHIL NFR BLD MANUAL: 5 % (ref 0.3–6.2)
ERYTHROCYTE [DISTWIDTH] IN BLOOD BY AUTOMATED COUNT: 12.9 % (ref 12.3–15.4)
GFR SERPL CREATININE-BSD FRML MDRD: 56 ML/MIN/1.73
GLUCOSE SERPL-MCNC: 106 MG/DL (ref 65–99)
GLUCOSE UR STRIP-MCNC: NEGATIVE MG/DL
HCT VFR BLD AUTO: 34.7 % (ref 34–46.6)
HGB BLD-MCNC: 11.5 G/DL (ref 12–15.9)
HGB UR QL STRIP.AUTO: NEGATIVE
HYALINE CASTS UR QL AUTO: ABNORMAL /LPF
KETONES UR QL STRIP: NEGATIVE
LEUKOCYTE ESTERASE UR QL STRIP.AUTO: NEGATIVE
LYMPHOCYTES # BLD MANUAL: 0.84 10*3/MM3 (ref 0.7–3.1)
LYMPHOCYTES NFR BLD MANUAL: 10 % (ref 5–12)
LYMPHOCYTES NFR BLD MANUAL: 17 % (ref 19.6–45.3)
MAGNESIUM SERPL-MCNC: 2 MG/DL (ref 1.6–2.6)
MCH RBC QN AUTO: 31.1 PG (ref 26.6–33)
MCHC RBC AUTO-ENTMCNC: 33.1 G/DL (ref 31.5–35.7)
MCV RBC AUTO: 93.8 FL (ref 79–97)
METAMYELOCYTES NFR BLD MANUAL: 2 % (ref 0–0)
MONOCYTES # BLD AUTO: 0.5 10*3/MM3 (ref 0.1–0.9)
NEUTROPHILS # BLD AUTO: 3.27 10*3/MM3 (ref 1.7–7)
NEUTROPHILS NFR BLD MANUAL: 66 % (ref 42.7–76)
NITRITE UR QL STRIP: NEGATIVE
PH UR STRIP.AUTO: <=5 [PH] (ref 5–8)
PHOSPHATE SERPL-MCNC: 2.3 MG/DL (ref 2.5–4.5)
PLAT MORPH BLD: NORMAL
PLATELET # BLD AUTO: 294 10*3/MM3 (ref 140–450)
PMV BLD AUTO: 13 FL (ref 6–12)
POTASSIUM SERPL-SCNC: 4.6 MMOL/L (ref 3.5–5.2)
PROT UR QL STRIP: ABNORMAL
PROT UR-MCNC: 17.2 MG/DL
PROT/CREAT UR: 133.2 MG/G CREA (ref 0–200)
RBC # BLD AUTO: 3.7 10*6/MM3 (ref 3.77–5.28)
RBC # UR: ABNORMAL /HPF
RBC MORPH BLD: NORMAL
REF LAB TEST METHOD: ABNORMAL
SODIUM SERPL-SCNC: 129 MMOL/L (ref 136–145)
SP GR UR STRIP: 1.03 (ref 1–1.03)
SQUAMOUS #/AREA URNS HPF: ABNORMAL /HPF
UROBILINOGEN UR QL STRIP: ABNORMAL
WBC # BLD AUTO: 4.95 10*3/MM3 (ref 3.4–10.8)
WBC MORPH BLD: NORMAL
WBC UR QL AUTO: ABNORMAL /HPF

## 2020-06-30 PROCEDURE — 36415 COLL VENOUS BLD VENIPUNCTURE: CPT

## 2020-06-30 PROCEDURE — 81001 URINALYSIS AUTO W/SCOPE: CPT

## 2020-06-30 PROCEDURE — 87086 URINE CULTURE/COLONY COUNT: CPT

## 2020-06-30 PROCEDURE — 83735 ASSAY OF MAGNESIUM: CPT

## 2020-06-30 PROCEDURE — 82570 ASSAY OF URINE CREATININE: CPT

## 2020-06-30 PROCEDURE — 85007 BL SMEAR W/DIFF WBC COUNT: CPT

## 2020-06-30 PROCEDURE — 80197 ASSAY OF TACROLIMUS: CPT

## 2020-06-30 PROCEDURE — 84156 ASSAY OF PROTEIN URINE: CPT

## 2020-06-30 PROCEDURE — 80069 RENAL FUNCTION PANEL: CPT

## 2020-06-30 PROCEDURE — 85025 COMPLETE CBC W/AUTO DIFF WBC: CPT

## 2020-07-01 LAB — BACTERIA SPEC AEROBE CULT: NO GROWTH

## 2020-07-03 LAB — TACROLIMUS BLD-MCNC: 10.3 NG/ML (ref 2–20)

## 2020-07-07 ENCOUNTER — LAB (OUTPATIENT)
Dept: LAB | Facility: HOSPITAL | Age: 30
End: 2020-07-07

## 2020-07-07 DIAGNOSIS — Z51.81 ENCOUNTER FOR MONITORING TACROLIMUS THERAPY: ICD-10-CM

## 2020-07-07 DIAGNOSIS — Z79.621 ENCOUNTER FOR MONITORING TACROLIMUS THERAPY: ICD-10-CM

## 2020-07-07 DIAGNOSIS — D64.9 ANEMIA, UNSPECIFIED TYPE: ICD-10-CM

## 2020-07-07 DIAGNOSIS — Z94.0 KIDNEY REPLACED BY TRANSPLANT: ICD-10-CM

## 2020-07-07 DIAGNOSIS — R39.9 UNSPECIFIED SYMPTOMS AND SIGNS INVOLVING THE GENITOURINARY SYSTEM: ICD-10-CM

## 2020-07-07 DIAGNOSIS — R79.89 ELEVATED SERUM CREATININE: ICD-10-CM

## 2020-07-07 LAB
ALBUMIN SERPL-MCNC: 3.8 G/DL (ref 3.5–5.2)
ANION GAP SERPL CALCULATED.3IONS-SCNC: 8.6 MMOL/L (ref 5–15)
BACTERIA UR QL AUTO: ABNORMAL /HPF
BASOPHILS # BLD MANUAL: 0.11 10*3/MM3 (ref 0–0.2)
BASOPHILS NFR BLD AUTO: 2 % (ref 0–1.5)
BILIRUB UR QL STRIP: NEGATIVE
BUN SERPL-MCNC: 19 MG/DL (ref 6–20)
BUN/CREAT SERPL: 15.7 (ref 7–25)
BURR CELLS BLD QL SMEAR: ABNORMAL
CALCIUM SPEC-SCNC: 10.2 MG/DL (ref 8.6–10.5)
CHLORIDE SERPL-SCNC: 109 MMOL/L (ref 98–107)
CLARITY UR: ABNORMAL
CO2 SERPL-SCNC: 20.4 MMOL/L (ref 22–29)
COLOR UR: ABNORMAL
CREAT SERPL-MCNC: 1.21 MG/DL (ref 0.57–1)
CREAT UR-MCNC: 260.1 MG/DL
DEPRECATED RDW RBC AUTO: 42.4 FL (ref 37–54)
EOSINOPHIL # BLD MANUAL: 0.11 10*3/MM3 (ref 0–0.4)
EOSINOPHIL NFR BLD MANUAL: 2 % (ref 0.3–6.2)
ERYTHROCYTE [DISTWIDTH] IN BLOOD BY AUTOMATED COUNT: 12.5 % (ref 12.3–15.4)
GFR SERPL CREATININE-BSD FRML MDRD: 52 ML/MIN/1.73
GLUCOSE SERPL-MCNC: 101 MG/DL (ref 65–99)
GLUCOSE UR STRIP-MCNC: NEGATIVE MG/DL
HCT VFR BLD AUTO: 35.6 % (ref 34–46.6)
HGB BLD-MCNC: 11.8 G/DL (ref 12–15.9)
HGB UR QL STRIP.AUTO: ABNORMAL
HYALINE CASTS UR QL AUTO: ABNORMAL /LPF
KETONES UR QL STRIP: ABNORMAL
LEUKOCYTE ESTERASE UR QL STRIP.AUTO: ABNORMAL
LYMPHOCYTES # BLD MANUAL: 0.75 10*3/MM3 (ref 0.7–3.1)
LYMPHOCYTES NFR BLD MANUAL: 14 % (ref 19.6–45.3)
LYMPHOCYTES NFR BLD MANUAL: 16 % (ref 5–12)
MAGNESIUM SERPL-MCNC: 1.8 MG/DL (ref 1.6–2.6)
MCH RBC QN AUTO: 31.1 PG (ref 26.6–33)
MCHC RBC AUTO-ENTMCNC: 33.1 G/DL (ref 31.5–35.7)
MCV RBC AUTO: 93.9 FL (ref 79–97)
MONOCYTES # BLD AUTO: 0.86 10*3/MM3 (ref 0.1–0.9)
NEUTROPHILS # BLD AUTO: 3.55 10*3/MM3 (ref 1.7–7)
NEUTROPHILS NFR BLD MANUAL: 66 % (ref 42.7–76)
NITRITE UR QL STRIP: NEGATIVE
PH UR STRIP.AUTO: <=5 [PH] (ref 5–8)
PHOSPHATE SERPL-MCNC: 2.8 MG/DL (ref 2.5–4.5)
PLAT MORPH BLD: NORMAL
PLATELET # BLD AUTO: 282 10*3/MM3 (ref 140–450)
PMV BLD AUTO: 13.3 FL (ref 6–12)
POIKILOCYTOSIS BLD QL SMEAR: ABNORMAL
POLYCHROMASIA BLD QL SMEAR: ABNORMAL
POTASSIUM SERPL-SCNC: 5 MMOL/L (ref 3.5–5.2)
PROT UR QL STRIP: ABNORMAL
PROT UR-MCNC: 40 MG/DL
PROT/CREAT UR: 153.8 MG/G CREA (ref 0–200)
RBC # BLD AUTO: 3.79 10*6/MM3 (ref 3.77–5.28)
RBC # UR: ABNORMAL /HPF
REF LAB TEST METHOD: ABNORMAL
SODIUM SERPL-SCNC: 138 MMOL/L (ref 136–145)
SP GR UR STRIP: >=1.03 (ref 1–1.03)
SQUAMOUS #/AREA URNS HPF: ABNORMAL /HPF
UROBILINOGEN UR QL STRIP: ABNORMAL
WBC # BLD AUTO: 5.38 10*3/MM3 (ref 3.4–10.8)
WBC MORPH BLD: NORMAL
WBC UR QL AUTO: ABNORMAL /HPF

## 2020-07-07 PROCEDURE — 85025 COMPLETE CBC W/AUTO DIFF WBC: CPT

## 2020-07-07 PROCEDURE — 80069 RENAL FUNCTION PANEL: CPT

## 2020-07-07 PROCEDURE — 81001 URINALYSIS AUTO W/SCOPE: CPT

## 2020-07-07 PROCEDURE — 82570 ASSAY OF URINE CREATININE: CPT

## 2020-07-07 PROCEDURE — 87086 URINE CULTURE/COLONY COUNT: CPT

## 2020-07-07 PROCEDURE — 83735 ASSAY OF MAGNESIUM: CPT

## 2020-07-07 PROCEDURE — 85007 BL SMEAR W/DIFF WBC COUNT: CPT

## 2020-07-07 PROCEDURE — 36415 COLL VENOUS BLD VENIPUNCTURE: CPT

## 2020-07-07 PROCEDURE — 80197 ASSAY OF TACROLIMUS: CPT

## 2020-07-07 PROCEDURE — 84156 ASSAY OF PROTEIN URINE: CPT

## 2020-07-08 LAB — BACTERIA SPEC AEROBE CULT: NO GROWTH

## 2020-07-10 LAB — TACROLIMUS BLD-MCNC: 9.3 NG/ML (ref 2–20)

## 2020-07-14 ENCOUNTER — LAB (OUTPATIENT)
Dept: LAB | Facility: HOSPITAL | Age: 30
End: 2020-07-14

## 2020-07-14 ENCOUNTER — TRANSCRIBE ORDERS (OUTPATIENT)
Dept: LAB | Facility: HOSPITAL | Age: 30
End: 2020-07-14

## 2020-07-14 DIAGNOSIS — Z79.621 ENCOUNTER FOR MONITORING TACROLIMUS THERAPY: ICD-10-CM

## 2020-07-14 DIAGNOSIS — R79.89 ELEVATED SERUM CREATININE: ICD-10-CM

## 2020-07-14 DIAGNOSIS — D64.9 ANEMIA, UNSPECIFIED TYPE: ICD-10-CM

## 2020-07-14 DIAGNOSIS — Z94.0 KIDNEY REPLACED BY TRANSPLANT: ICD-10-CM

## 2020-07-14 DIAGNOSIS — Z51.81 ENCOUNTER FOR MONITORING TACROLIMUS THERAPY: ICD-10-CM

## 2020-07-14 DIAGNOSIS — R39.9 UNSPECIFIED SYMPTOMS AND SIGNS INVOLVING THE GENITOURINARY SYSTEM: ICD-10-CM

## 2020-07-14 DIAGNOSIS — Z94.0 KIDNEY REPLACED BY TRANSPLANT: Primary | ICD-10-CM

## 2020-07-14 LAB
ALBUMIN SERPL-MCNC: 3.9 G/DL (ref 3.5–5.2)
ANION GAP SERPL CALCULATED.3IONS-SCNC: 12 MMOL/L (ref 5–15)
BACTERIA UR QL AUTO: ABNORMAL /HPF
BASOPHILS # BLD MANUAL: 0.08 10*3/MM3 (ref 0–0.2)
BASOPHILS NFR BLD AUTO: 1 % (ref 0–1.5)
BILIRUB UR QL STRIP: NEGATIVE
BUN SERPL-MCNC: 22 MG/DL (ref 6–20)
BUN/CREAT SERPL: 17.3 (ref 7–25)
CALCIUM SPEC-SCNC: 10.5 MG/DL (ref 8.6–10.5)
CHLORIDE SERPL-SCNC: 104 MMOL/L (ref 98–107)
CLARITY UR: ABNORMAL
CO2 SERPL-SCNC: 23 MMOL/L (ref 22–29)
COLOR UR: YELLOW
CREAT SERPL-MCNC: 1.27 MG/DL (ref 0.57–1)
CREAT UR-MCNC: 187.9 MG/DL
DEPRECATED RDW RBC AUTO: 42.1 FL (ref 37–54)
EOSINOPHIL # BLD MANUAL: 0.24 10*3/MM3 (ref 0–0.4)
EOSINOPHIL NFR BLD MANUAL: 3 % (ref 0.3–6.2)
ERYTHROCYTE [DISTWIDTH] IN BLOOD BY AUTOMATED COUNT: 12.5 % (ref 12.3–15.4)
GFR SERPL CREATININE-BSD FRML MDRD: 49 ML/MIN/1.73
GLUCOSE SERPL-MCNC: 97 MG/DL (ref 65–99)
GLUCOSE UR STRIP-MCNC: NEGATIVE MG/DL
HCT VFR BLD AUTO: 39.2 % (ref 34–46.6)
HGB BLD-MCNC: 13 G/DL (ref 12–15.9)
HGB UR QL STRIP.AUTO: NEGATIVE
HYALINE CASTS UR QL AUTO: ABNORMAL /LPF
KETONES UR QL STRIP: NEGATIVE
LEUKOCYTE ESTERASE UR QL STRIP.AUTO: NEGATIVE
LYMPHOCYTES # BLD MANUAL: 0.64 10*3/MM3 (ref 0.7–3.1)
LYMPHOCYTES NFR BLD MANUAL: 16.2 % (ref 5–12)
LYMPHOCYTES NFR BLD MANUAL: 8.1 % (ref 19.6–45.3)
MAGNESIUM SERPL-MCNC: 2.1 MG/DL (ref 1.6–2.6)
MCH RBC QN AUTO: 30.8 PG (ref 26.6–33)
MCHC RBC AUTO-ENTMCNC: 33.2 G/DL (ref 31.5–35.7)
MCV RBC AUTO: 92.9 FL (ref 79–97)
METAMYELOCYTES NFR BLD MANUAL: 1 % (ref 0–0)
MONOCYTES # BLD AUTO: 1.28 10*3/MM3 (ref 0.1–0.9)
MYELOCYTES NFR BLD MANUAL: 1 % (ref 0–0)
NEUTROPHILS # BLD AUTO: 5.53 10*3/MM3 (ref 1.7–7)
NEUTROPHILS NFR BLD MANUAL: 69.7 % (ref 42.7–76)
NITRITE UR QL STRIP: NEGATIVE
PH UR STRIP.AUTO: <=5 [PH] (ref 5–8)
PHOSPHATE SERPL-MCNC: 3 MG/DL (ref 2.5–4.5)
PLAT MORPH BLD: NORMAL
PLATELET # BLD AUTO: 315 10*3/MM3 (ref 140–450)
PMV BLD AUTO: 13.4 FL (ref 6–12)
POLYCHROMASIA BLD QL SMEAR: ABNORMAL
POTASSIUM SERPL-SCNC: 5.6 MMOL/L (ref 3.5–5.2)
PROT UR QL STRIP: ABNORMAL
PROT UR-MCNC: 32 MG/DL
PROT/CREAT UR: 170.3 MG/G CREA (ref 0–200)
RBC # BLD AUTO: 4.22 10*6/MM3 (ref 3.77–5.28)
RBC # UR: ABNORMAL /HPF
REF LAB TEST METHOD: ABNORMAL
SODIUM SERPL-SCNC: 139 MMOL/L (ref 136–145)
SP GR UR STRIP: >=1.03 (ref 1–1.03)
SQUAMOUS #/AREA URNS HPF: ABNORMAL /HPF
UROBILINOGEN UR QL STRIP: ABNORMAL
WBC # BLD AUTO: 7.93 10*3/MM3 (ref 3.4–10.8)
WBC MORPH BLD: NORMAL
WBC UR QL AUTO: ABNORMAL /HPF
YEAST URNS QL MICRO: ABNORMAL /HPF

## 2020-07-14 PROCEDURE — 84156 ASSAY OF PROTEIN URINE: CPT

## 2020-07-14 PROCEDURE — 85025 COMPLETE CBC W/AUTO DIFF WBC: CPT

## 2020-07-14 PROCEDURE — 85007 BL SMEAR W/DIFF WBC COUNT: CPT

## 2020-07-14 PROCEDURE — 82570 ASSAY OF URINE CREATININE: CPT

## 2020-07-14 PROCEDURE — 36415 COLL VENOUS BLD VENIPUNCTURE: CPT

## 2020-07-14 PROCEDURE — 87086 URINE CULTURE/COLONY COUNT: CPT

## 2020-07-14 PROCEDURE — 80197 ASSAY OF TACROLIMUS: CPT

## 2020-07-14 PROCEDURE — 81001 URINALYSIS AUTO W/SCOPE: CPT

## 2020-07-14 PROCEDURE — 80069 RENAL FUNCTION PANEL: CPT

## 2020-07-14 PROCEDURE — 83735 ASSAY OF MAGNESIUM: CPT

## 2020-07-15 ENCOUNTER — LAB (OUTPATIENT)
Dept: LAB | Facility: HOSPITAL | Age: 30
End: 2020-07-15

## 2020-07-15 DIAGNOSIS — Z94.0 KIDNEY REPLACED BY TRANSPLANT: ICD-10-CM

## 2020-07-15 LAB — BACTERIA SPEC AEROBE CULT: NO GROWTH

## 2020-07-15 PROCEDURE — 36415 COLL VENOUS BLD VENIPUNCTURE: CPT

## 2020-07-17 LAB
CMV DNA SERPL NAA+PROBE-ACNC: NEGATIVE IU/ML
LOG 10 CMV QN DNA PI: NORMAL
TACROLIMUS BLD-MCNC: 9.5 NG/ML (ref 2–20)

## 2020-07-22 ENCOUNTER — TRANSCRIBE ORDERS (OUTPATIENT)
Dept: GENERAL RADIOLOGY | Facility: HOSPITAL | Age: 30
End: 2020-07-22

## 2020-07-22 DIAGNOSIS — M25.572 ARTHRALGIA OF LEFT ANKLE OR FOOT: Primary | ICD-10-CM

## 2020-07-27 ENCOUNTER — LAB (OUTPATIENT)
Dept: LAB | Facility: HOSPITAL | Age: 30
End: 2020-07-27

## 2020-07-27 DIAGNOSIS — Z51.81 ENCOUNTER FOR MONITORING TACROLIMUS THERAPY: ICD-10-CM

## 2020-07-27 DIAGNOSIS — Z94.0 KIDNEY REPLACED BY TRANSPLANT: ICD-10-CM

## 2020-07-27 DIAGNOSIS — R79.89 ELEVATED SERUM CREATININE: ICD-10-CM

## 2020-07-27 DIAGNOSIS — D64.9 ANEMIA, UNSPECIFIED TYPE: ICD-10-CM

## 2020-07-27 DIAGNOSIS — Z79.621 ENCOUNTER FOR MONITORING TACROLIMUS THERAPY: ICD-10-CM

## 2020-07-27 DIAGNOSIS — R39.9 UNSPECIFIED SYMPTOMS AND SIGNS INVOLVING THE GENITOURINARY SYSTEM: ICD-10-CM

## 2020-07-27 LAB
ALBUMIN SERPL-MCNC: 3.4 G/DL (ref 3.5–5.2)
ANION GAP SERPL CALCULATED.3IONS-SCNC: 9.8 MMOL/L (ref 5–15)
BACTERIA UR QL AUTO: ABNORMAL /HPF
BASOPHILS # BLD AUTO: 0.02 10*3/MM3 (ref 0–0.2)
BASOPHILS NFR BLD AUTO: 0.5 % (ref 0–1.5)
BILIRUB UR QL STRIP: NEGATIVE
BUN SERPL-MCNC: 35 MG/DL (ref 6–20)
BUN/CREAT SERPL: 15.6 (ref 7–25)
CALCIUM SPEC-SCNC: 9.6 MG/DL (ref 8.6–10.5)
CHLORIDE SERPL-SCNC: 108 MMOL/L (ref 98–107)
CLARITY UR: ABNORMAL
CO2 SERPL-SCNC: 19.2 MMOL/L (ref 22–29)
COLOR UR: YELLOW
CREAT SERPL-MCNC: 2.24 MG/DL (ref 0.57–1)
CREAT UR-MCNC: 117.8 MG/DL
DEPRECATED RDW RBC AUTO: 44 FL (ref 37–54)
EOSINOPHIL # BLD AUTO: 0.11 10*3/MM3 (ref 0–0.4)
EOSINOPHIL NFR BLD AUTO: 2.7 % (ref 0.3–6.2)
ERYTHROCYTE [DISTWIDTH] IN BLOOD BY AUTOMATED COUNT: 12.6 % (ref 12.3–15.4)
GFR SERPL CREATININE-BSD FRML MDRD: 26 ML/MIN/1.73
GLUCOSE SERPL-MCNC: 99 MG/DL (ref 65–99)
GLUCOSE UR STRIP-MCNC: NEGATIVE MG/DL
HCT VFR BLD AUTO: 34.3 % (ref 34–46.6)
HGB BLD-MCNC: 11.1 G/DL (ref 12–15.9)
HGB UR QL STRIP.AUTO: NEGATIVE
HYALINE CASTS UR QL AUTO: ABNORMAL /LPF
KETONES UR QL STRIP: NEGATIVE
LEUKOCYTE ESTERASE UR QL STRIP.AUTO: ABNORMAL
LYMPHOCYTES # BLD AUTO: 0.55 10*3/MM3 (ref 0.7–3.1)
LYMPHOCYTES NFR BLD AUTO: 13.3 % (ref 19.6–45.3)
MAGNESIUM SERPL-MCNC: 2.3 MG/DL (ref 1.6–2.6)
MCH RBC QN AUTO: 30.8 PG (ref 26.6–33)
MCHC RBC AUTO-ENTMCNC: 32.4 G/DL (ref 31.5–35.7)
MCV RBC AUTO: 95.3 FL (ref 79–97)
MONOCYTES # BLD AUTO: 0.44 10*3/MM3 (ref 0.1–0.9)
MONOCYTES NFR BLD AUTO: 10.6 % (ref 5–12)
NEUTROPHILS NFR BLD AUTO: 2.85 10*3/MM3 (ref 1.7–7)
NEUTROPHILS NFR BLD AUTO: 68.8 % (ref 42.7–76)
NITRITE UR QL STRIP: NEGATIVE
PH UR STRIP.AUTO: 5.5 [PH] (ref 5–8)
PHOSPHATE SERPL-MCNC: 3.5 MG/DL (ref 2.5–4.5)
PLATELET # BLD AUTO: 174 10*3/MM3 (ref 140–450)
PMV BLD AUTO: 13.6 FL (ref 6–12)
POTASSIUM SERPL-SCNC: 5 MMOL/L (ref 3.5–5.2)
PROT UR QL STRIP: ABNORMAL
PROT UR-MCNC: 144 MG/DL
PROT/CREAT UR: 1222.4 MG/G CREA (ref 0–200)
RBC # BLD AUTO: 3.6 10*6/MM3 (ref 3.77–5.28)
RBC # UR: ABNORMAL /HPF
REF LAB TEST METHOD: ABNORMAL
SODIUM SERPL-SCNC: 137 MMOL/L (ref 136–145)
SP GR UR STRIP: 1.02 (ref 1–1.03)
SQUAMOUS #/AREA URNS HPF: ABNORMAL /HPF
UROBILINOGEN UR QL STRIP: ABNORMAL
WBC # BLD AUTO: 4.14 10*3/MM3 (ref 3.4–10.8)
WBC UR QL AUTO: ABNORMAL /HPF

## 2020-07-27 PROCEDURE — 83735 ASSAY OF MAGNESIUM: CPT

## 2020-07-27 PROCEDURE — 85025 COMPLETE CBC W/AUTO DIFF WBC: CPT

## 2020-07-27 PROCEDURE — 84156 ASSAY OF PROTEIN URINE: CPT

## 2020-07-27 PROCEDURE — 80069 RENAL FUNCTION PANEL: CPT

## 2020-07-27 PROCEDURE — 87086 URINE CULTURE/COLONY COUNT: CPT

## 2020-07-27 PROCEDURE — 80197 ASSAY OF TACROLIMUS: CPT

## 2020-07-27 PROCEDURE — 81001 URINALYSIS AUTO W/SCOPE: CPT

## 2020-07-27 PROCEDURE — 82570 ASSAY OF URINE CREATININE: CPT

## 2020-07-27 PROCEDURE — 36415 COLL VENOUS BLD VENIPUNCTURE: CPT

## 2020-07-28 LAB — BACTERIA SPEC AEROBE CULT: NO GROWTH

## 2020-07-30 LAB
CMV DNA SERPL NAA+PROBE-ACNC: NORMAL IU/ML
LOG 10 CMV QN DNA PI: NORMAL
TACROLIMUS BLD-MCNC: 14.2 NG/ML (ref 2–20)

## 2020-08-04 ENCOUNTER — APPOINTMENT (OUTPATIENT)
Dept: BONE DENSITY | Facility: HOSPITAL | Age: 30
End: 2020-08-04

## 2020-08-12 ENCOUNTER — APPOINTMENT (OUTPATIENT)
Dept: BONE DENSITY | Facility: HOSPITAL | Age: 30
End: 2020-08-12

## 2020-08-27 ENCOUNTER — LAB (OUTPATIENT)
Dept: LAB | Facility: HOSPITAL | Age: 30
End: 2020-08-27

## 2020-08-27 DIAGNOSIS — D64.9 ANEMIA, UNSPECIFIED TYPE: ICD-10-CM

## 2020-08-27 DIAGNOSIS — Z79.621 ENCOUNTER FOR MONITORING TACROLIMUS THERAPY: ICD-10-CM

## 2020-08-27 DIAGNOSIS — R79.89 ELEVATED SERUM CREATININE: ICD-10-CM

## 2020-08-27 DIAGNOSIS — R39.9 UNSPECIFIED SYMPTOMS AND SIGNS INVOLVING THE GENITOURINARY SYSTEM: ICD-10-CM

## 2020-08-27 DIAGNOSIS — Z51.81 ENCOUNTER FOR MONITORING TACROLIMUS THERAPY: ICD-10-CM

## 2020-08-27 DIAGNOSIS — Z94.0 KIDNEY REPLACED BY TRANSPLANT: ICD-10-CM

## 2020-08-27 LAB
ALBUMIN SERPL-MCNC: 3.9 G/DL (ref 3.5–5.2)
ANION GAP SERPL CALCULATED.3IONS-SCNC: 9.2 MMOL/L (ref 5–15)
BACTERIA UR QL AUTO: ABNORMAL /HPF
BASOPHILS # BLD AUTO: 0.01 10*3/MM3 (ref 0–0.2)
BASOPHILS NFR BLD AUTO: 0.4 % (ref 0–1.5)
BILIRUB UR QL STRIP: NEGATIVE
BUN SERPL-MCNC: 20 MG/DL (ref 6–20)
BUN/CREAT SERPL: 16 (ref 7–25)
CALCIUM SPEC-SCNC: 9.9 MG/DL (ref 8.6–10.5)
CHLORIDE SERPL-SCNC: 108 MMOL/L (ref 98–107)
CLARITY UR: ABNORMAL
CO2 SERPL-SCNC: 17.8 MMOL/L (ref 22–29)
COLOR UR: YELLOW
CREAT SERPL-MCNC: 1.25 MG/DL (ref 0.57–1)
CREAT UR-MCNC: 107.8 MG/DL
DEPRECATED RDW RBC AUTO: 45.7 FL (ref 37–54)
EOSINOPHIL # BLD AUTO: 0.09 10*3/MM3 (ref 0–0.4)
EOSINOPHIL NFR BLD AUTO: 3.3 % (ref 0.3–6.2)
ERYTHROCYTE [DISTWIDTH] IN BLOOD BY AUTOMATED COUNT: 13.4 % (ref 12.3–15.4)
GFR SERPL CREATININE-BSD FRML MDRD: 50 ML/MIN/1.73
GLUCOSE SERPL-MCNC: 119 MG/DL (ref 65–99)
GLUCOSE UR STRIP-MCNC: NEGATIVE MG/DL
HCT VFR BLD AUTO: 31.9 % (ref 34–46.6)
HGB BLD-MCNC: 10.3 G/DL (ref 12–15.9)
HGB UR QL STRIP.AUTO: ABNORMAL
HYALINE CASTS UR QL AUTO: ABNORMAL /LPF
IMM GRANULOCYTES # BLD AUTO: 0.06 10*3/MM3 (ref 0–0.05)
IMM GRANULOCYTES NFR BLD AUTO: 2.2 % (ref 0–0.5)
KETONES UR QL STRIP: NEGATIVE
LEUKOCYTE ESTERASE UR QL STRIP.AUTO: NEGATIVE
LYMPHOCYTES # BLD AUTO: 0.23 10*3/MM3 (ref 0.7–3.1)
LYMPHOCYTES NFR BLD AUTO: 8.5 % (ref 19.6–45.3)
MAGNESIUM SERPL-MCNC: 2 MG/DL (ref 1.6–2.6)
MCH RBC QN AUTO: 30.1 PG (ref 26.6–33)
MCHC RBC AUTO-ENTMCNC: 32.3 G/DL (ref 31.5–35.7)
MCV RBC AUTO: 93.3 FL (ref 79–97)
MONOCYTES # BLD AUTO: 0.27 10*3/MM3 (ref 0.1–0.9)
MONOCYTES NFR BLD AUTO: 9.9 % (ref 5–12)
NEUTROPHILS NFR BLD AUTO: 2.06 10*3/MM3 (ref 1.7–7)
NEUTROPHILS NFR BLD AUTO: 75.7 % (ref 42.7–76)
NITRITE UR QL STRIP: NEGATIVE
NRBC BLD AUTO-RTO: 0 /100 WBC (ref 0–0.2)
PH UR STRIP.AUTO: 6 [PH] (ref 5–8)
PHOSPHATE SERPL-MCNC: 2.2 MG/DL (ref 2.5–4.5)
PLATELET # BLD AUTO: 209 10*3/MM3 (ref 140–450)
PMV BLD AUTO: 12.7 FL (ref 6–12)
POTASSIUM SERPL-SCNC: 4.7 MMOL/L (ref 3.5–5.2)
PROT UR QL STRIP: ABNORMAL
PROT UR-MCNC: 65 MG/DL
PROT/CREAT UR: 603 MG/G CREA (ref 0–200)
RBC # BLD AUTO: 3.42 10*6/MM3 (ref 3.77–5.28)
RBC # UR: ABNORMAL /HPF
REF LAB TEST METHOD: ABNORMAL
SODIUM SERPL-SCNC: 135 MMOL/L (ref 136–145)
SP GR UR STRIP: 1.02 (ref 1–1.03)
SQUAMOUS #/AREA URNS HPF: ABNORMAL /HPF
UROBILINOGEN UR QL STRIP: ABNORMAL
WBC # BLD AUTO: 2.72 10*3/MM3 (ref 3.4–10.8)
WBC UR QL AUTO: ABNORMAL /HPF

## 2020-08-27 PROCEDURE — 83735 ASSAY OF MAGNESIUM: CPT

## 2020-08-27 PROCEDURE — 85025 COMPLETE CBC W/AUTO DIFF WBC: CPT

## 2020-08-27 PROCEDURE — 82570 ASSAY OF URINE CREATININE: CPT

## 2020-08-27 PROCEDURE — 36415 COLL VENOUS BLD VENIPUNCTURE: CPT

## 2020-08-27 PROCEDURE — 81001 URINALYSIS AUTO W/SCOPE: CPT

## 2020-08-27 PROCEDURE — 87086 URINE CULTURE/COLONY COUNT: CPT

## 2020-08-27 PROCEDURE — 80069 RENAL FUNCTION PANEL: CPT

## 2020-08-27 PROCEDURE — 84156 ASSAY OF PROTEIN URINE: CPT

## 2020-08-27 PROCEDURE — 80197 ASSAY OF TACROLIMUS: CPT

## 2020-08-28 LAB — BACTERIA SPEC AEROBE CULT: NORMAL

## 2020-08-29 LAB
CMV DNA SERPL NAA+PROBE-ACNC: NORMAL IU/ML
LOG 10 CMV QN DNA PI: NORMAL

## 2020-08-30 LAB — TACROLIMUS BLD-MCNC: 10.9 NG/ML (ref 2–20)

## 2020-09-01 ENCOUNTER — LAB (OUTPATIENT)
Dept: LAB | Facility: HOSPITAL | Age: 30
End: 2020-09-01

## 2020-09-01 DIAGNOSIS — D64.9 ANEMIA, UNSPECIFIED TYPE: ICD-10-CM

## 2020-09-01 DIAGNOSIS — R79.89 ELEVATED SERUM CREATININE: ICD-10-CM

## 2020-09-01 DIAGNOSIS — Z51.81 ENCOUNTER FOR MONITORING TACROLIMUS THERAPY: ICD-10-CM

## 2020-09-01 DIAGNOSIS — Z79.621 ENCOUNTER FOR MONITORING TACROLIMUS THERAPY: ICD-10-CM

## 2020-09-01 DIAGNOSIS — R39.9 UNSPECIFIED SYMPTOMS AND SIGNS INVOLVING THE GENITOURINARY SYSTEM: ICD-10-CM

## 2020-09-01 DIAGNOSIS — Z94.0 KIDNEY REPLACED BY TRANSPLANT: ICD-10-CM

## 2020-09-01 LAB
ALBUMIN SERPL-MCNC: 3.8 G/DL (ref 3.5–5.2)
ANION GAP SERPL CALCULATED.3IONS-SCNC: 8.6 MMOL/L (ref 5–15)
BACTERIA UR QL AUTO: ABNORMAL /HPF
BASOPHILS # BLD AUTO: 0.02 10*3/MM3 (ref 0–0.2)
BASOPHILS NFR BLD AUTO: 0.5 % (ref 0–1.5)
BILIRUB UR QL STRIP: NEGATIVE
BUN SERPL-MCNC: 28 MG/DL (ref 6–20)
BUN/CREAT SERPL: 15.5 (ref 7–25)
CALCIUM SPEC-SCNC: 9.6 MG/DL (ref 8.6–10.5)
CHLORIDE SERPL-SCNC: 108 MMOL/L (ref 98–107)
CLARITY UR: ABNORMAL
CO2 SERPL-SCNC: 16.4 MMOL/L (ref 22–29)
COLOR UR: YELLOW
CREAT SERPL-MCNC: 1.81 MG/DL (ref 0.57–1)
CREAT UR-MCNC: 136 MG/DL
DEPRECATED RDW RBC AUTO: 47.6 FL (ref 37–54)
EOSINOPHIL # BLD AUTO: 0.09 10*3/MM3 (ref 0–0.4)
EOSINOPHIL NFR BLD AUTO: 2.3 % (ref 0.3–6.2)
ERYTHROCYTE [DISTWIDTH] IN BLOOD BY AUTOMATED COUNT: 13.6 % (ref 12.3–15.4)
GFR SERPL CREATININE-BSD FRML MDRD: 33 ML/MIN/1.73
GLUCOSE SERPL-MCNC: 74 MG/DL (ref 65–99)
GLUCOSE UR STRIP-MCNC: NEGATIVE MG/DL
HCT VFR BLD AUTO: 33.8 % (ref 34–46.6)
HGB BLD-MCNC: 10.8 G/DL (ref 12–15.9)
HGB UR QL STRIP.AUTO: NEGATIVE
HYALINE CASTS UR QL AUTO: ABNORMAL /LPF
IMM GRANULOCYTES # BLD AUTO: 0.17 10*3/MM3 (ref 0–0.05)
IMM GRANULOCYTES NFR BLD AUTO: 4.3 % (ref 0–0.5)
KETONES UR QL STRIP: NEGATIVE
LEUKOCYTE ESTERASE UR QL STRIP.AUTO: NEGATIVE
LYMPHOCYTES # BLD AUTO: 0.19 10*3/MM3 (ref 0.7–3.1)
LYMPHOCYTES NFR BLD AUTO: 4.8 % (ref 19.6–45.3)
MAGNESIUM SERPL-MCNC: 2 MG/DL (ref 1.6–2.6)
MCH RBC QN AUTO: 30.4 PG (ref 26.6–33)
MCHC RBC AUTO-ENTMCNC: 32 G/DL (ref 31.5–35.7)
MCV RBC AUTO: 95.2 FL (ref 79–97)
MONOCYTES # BLD AUTO: 0.29 10*3/MM3 (ref 0.1–0.9)
MONOCYTES NFR BLD AUTO: 7.3 % (ref 5–12)
NEUTROPHILS NFR BLD AUTO: 3.22 10*3/MM3 (ref 1.7–7)
NEUTROPHILS NFR BLD AUTO: 80.8 % (ref 42.7–76)
NITRITE UR QL STRIP: NEGATIVE
NRBC BLD AUTO-RTO: 0 /100 WBC (ref 0–0.2)
PH UR STRIP.AUTO: 6 [PH] (ref 5–8)
PHOSPHATE SERPL-MCNC: 2.8 MG/DL (ref 2.5–4.5)
PLATELET # BLD AUTO: 236 10*3/MM3 (ref 140–450)
PMV BLD AUTO: 12.2 FL (ref 6–12)
POTASSIUM SERPL-SCNC: 5.1 MMOL/L (ref 3.5–5.2)
PROT UR QL STRIP: ABNORMAL
PROT UR-MCNC: 67 MG/DL
PROT/CREAT UR: 492.6 MG/G CREA (ref 0–200)
RBC # BLD AUTO: 3.55 10*6/MM3 (ref 3.77–5.28)
RBC # UR: ABNORMAL /HPF
REF LAB TEST METHOD: ABNORMAL
SODIUM SERPL-SCNC: 133 MMOL/L (ref 136–145)
SP GR UR STRIP: 1.02 (ref 1–1.03)
SQUAMOUS #/AREA URNS HPF: ABNORMAL /HPF
UROBILINOGEN UR QL STRIP: ABNORMAL
WBC # BLD AUTO: 3.98 10*3/MM3 (ref 3.4–10.8)
WBC UR QL AUTO: ABNORMAL /HPF

## 2020-09-01 PROCEDURE — 83735 ASSAY OF MAGNESIUM: CPT

## 2020-09-01 PROCEDURE — 80069 RENAL FUNCTION PANEL: CPT

## 2020-09-01 PROCEDURE — 87086 URINE CULTURE/COLONY COUNT: CPT

## 2020-09-01 PROCEDURE — 81001 URINALYSIS AUTO W/SCOPE: CPT

## 2020-09-01 PROCEDURE — 80197 ASSAY OF TACROLIMUS: CPT

## 2020-09-01 PROCEDURE — 84156 ASSAY OF PROTEIN URINE: CPT

## 2020-09-01 PROCEDURE — 85025 COMPLETE CBC W/AUTO DIFF WBC: CPT

## 2020-09-01 PROCEDURE — 82570 ASSAY OF URINE CREATININE: CPT

## 2020-09-01 PROCEDURE — 36415 COLL VENOUS BLD VENIPUNCTURE: CPT

## 2020-09-02 LAB — BACTERIA SPEC AEROBE CULT: NO GROWTH

## 2020-09-03 LAB
CMV DNA SERPL NAA+PROBE-ACNC: NORMAL IU/ML
LOG 10 CMV QN DNA PI: NORMAL

## 2020-09-04 LAB — TACROLIMUS BLD-MCNC: 7.9 NG/ML (ref 2–20)

## 2020-09-09 ENCOUNTER — LAB REQUISITION (OUTPATIENT)
Dept: LAB | Facility: HOSPITAL | Age: 30
End: 2020-09-09

## 2020-09-09 DIAGNOSIS — Z94.0 KIDNEY TRANSPLANT STATUS: ICD-10-CM

## 2020-09-09 DIAGNOSIS — R19.7 DIARRHEA, UNSPECIFIED: ICD-10-CM

## 2020-09-09 LAB — C DIFF TOX GENS STL QL NAA+PROBE: NOT DETECTED

## 2020-09-09 PROCEDURE — 87046 STOOL CULTR AEROBIC BACT EA: CPT | Performed by: INTERNAL MEDICINE

## 2020-09-09 PROCEDURE — 87493 C DIFF AMPLIFIED PROBE: CPT | Performed by: INTERNAL MEDICINE

## 2020-09-09 PROCEDURE — 87045 FECES CULTURE AEROBIC BACT: CPT | Performed by: INTERNAL MEDICINE

## 2020-09-09 PROCEDURE — 87427 SHIGA-LIKE TOXIN AG IA: CPT | Performed by: INTERNAL MEDICINE

## 2020-09-11 ENCOUNTER — APPOINTMENT (OUTPATIENT)
Dept: BONE DENSITY | Facility: HOSPITAL | Age: 30
End: 2020-09-11

## 2020-09-11 DIAGNOSIS — M25.572 ARTHRALGIA OF LEFT ANKLE OR FOOT: ICD-10-CM

## 2020-09-11 PROCEDURE — 77080 DXA BONE DENSITY AXIAL: CPT

## 2020-09-18 ENCOUNTER — LAB (OUTPATIENT)
Dept: LAB | Facility: HOSPITAL | Age: 30
End: 2020-09-18

## 2020-09-18 DIAGNOSIS — D64.9 ANEMIA, UNSPECIFIED TYPE: ICD-10-CM

## 2020-09-18 DIAGNOSIS — Z79.621 ENCOUNTER FOR MONITORING TACROLIMUS THERAPY: ICD-10-CM

## 2020-09-18 DIAGNOSIS — R79.89 ELEVATED SERUM CREATININE: ICD-10-CM

## 2020-09-18 DIAGNOSIS — R39.9 UNSPECIFIED SYMPTOMS AND SIGNS INVOLVING THE GENITOURINARY SYSTEM: ICD-10-CM

## 2020-09-18 DIAGNOSIS — Z94.0 KIDNEY REPLACED BY TRANSPLANT: ICD-10-CM

## 2020-09-18 DIAGNOSIS — Z51.81 ENCOUNTER FOR MONITORING TACROLIMUS THERAPY: ICD-10-CM

## 2020-09-18 LAB
ALBUMIN SERPL-MCNC: 3.9 G/DL (ref 3.5–5.2)
ANION GAP SERPL CALCULATED.3IONS-SCNC: 9.3 MMOL/L (ref 5–15)
BACTERIA UR QL AUTO: ABNORMAL /HPF
BASOPHILS # BLD AUTO: 0.04 10*3/MM3 (ref 0–0.2)
BASOPHILS NFR BLD AUTO: 0.9 % (ref 0–1.5)
BILIRUB UR QL STRIP: NEGATIVE
BUN SERPL-MCNC: 34 MG/DL (ref 6–20)
BUN/CREAT SERPL: 23.8 (ref 7–25)
CALCIUM SPEC-SCNC: 9.4 MG/DL (ref 8.6–10.5)
CHLORIDE SERPL-SCNC: 111 MMOL/L (ref 98–107)
CLARITY UR: CLEAR
CO2 SERPL-SCNC: 14.7 MMOL/L (ref 22–29)
COLOR UR: YELLOW
CREAT SERPL-MCNC: 1.43 MG/DL (ref 0.57–1)
CREAT UR-MCNC: 87.1 MG/DL
DEPRECATED RDW RBC AUTO: 47.1 FL (ref 37–54)
EOSINOPHIL # BLD AUTO: 0.12 10*3/MM3 (ref 0–0.4)
EOSINOPHIL NFR BLD AUTO: 2.8 % (ref 0.3–6.2)
ERYTHROCYTE [DISTWIDTH] IN BLOOD BY AUTOMATED COUNT: 13.9 % (ref 12.3–15.4)
GFR SERPL CREATININE-BSD FRML MDRD: 43 ML/MIN/1.73
GLUCOSE SERPL-MCNC: 105 MG/DL (ref 65–99)
GLUCOSE UR STRIP-MCNC: NEGATIVE MG/DL
HCT VFR BLD AUTO: 31.6 % (ref 34–46.6)
HGB BLD-MCNC: 10.2 G/DL (ref 12–15.9)
HGB UR QL STRIP.AUTO: NEGATIVE
HYALINE CASTS UR QL AUTO: ABNORMAL /LPF
IMM GRANULOCYTES # BLD AUTO: 0.21 10*3/MM3 (ref 0–0.05)
IMM GRANULOCYTES NFR BLD AUTO: 4.9 % (ref 0–0.5)
KETONES UR QL STRIP: NEGATIVE
LEUKOCYTE ESTERASE UR QL STRIP.AUTO: ABNORMAL
LYMPHOCYTES # BLD AUTO: 0.36 10*3/MM3 (ref 0.7–3.1)
LYMPHOCYTES NFR BLD AUTO: 8.5 % (ref 19.6–45.3)
MAGNESIUM SERPL-MCNC: 2 MG/DL (ref 1.6–2.6)
MCH RBC QN AUTO: 30.2 PG (ref 26.6–33)
MCHC RBC AUTO-ENTMCNC: 32.3 G/DL (ref 31.5–35.7)
MCV RBC AUTO: 93.5 FL (ref 79–97)
MONOCYTES # BLD AUTO: 0.5 10*3/MM3 (ref 0.1–0.9)
MONOCYTES NFR BLD AUTO: 11.8 % (ref 5–12)
NEUTROPHILS NFR BLD AUTO: 3.02 10*3/MM3 (ref 1.7–7)
NEUTROPHILS NFR BLD AUTO: 71.1 % (ref 42.7–76)
NITRITE UR QL STRIP: NEGATIVE
NRBC BLD AUTO-RTO: 0 /100 WBC (ref 0–0.2)
PH UR STRIP.AUTO: 5.5 [PH] (ref 5–8)
PHOSPHATE SERPL-MCNC: 3.6 MG/DL (ref 2.5–4.5)
PLATELET # BLD AUTO: 200 10*3/MM3 (ref 140–450)
PMV BLD AUTO: 12.6 FL (ref 6–12)
POTASSIUM SERPL-SCNC: 4.8 MMOL/L (ref 3.5–5.2)
PROT UR QL STRIP: ABNORMAL
PROT UR-MCNC: 118 MG/DL
PROT/CREAT UR: 1354.8 MG/G CREA (ref 0–200)
RBC # BLD AUTO: 3.38 10*6/MM3 (ref 3.77–5.28)
RBC # UR: ABNORMAL /HPF
REF LAB TEST METHOD: ABNORMAL
SODIUM SERPL-SCNC: 135 MMOL/L (ref 136–145)
SP GR UR STRIP: 1.01 (ref 1–1.03)
SQUAMOUS #/AREA URNS HPF: ABNORMAL /HPF
UROBILINOGEN UR QL STRIP: ABNORMAL
WBC # BLD AUTO: 4.25 10*3/MM3 (ref 3.4–10.8)
WBC UR QL AUTO: ABNORMAL /HPF

## 2020-09-18 PROCEDURE — 80197 ASSAY OF TACROLIMUS: CPT

## 2020-09-18 PROCEDURE — 85025 COMPLETE CBC W/AUTO DIFF WBC: CPT

## 2020-09-18 PROCEDURE — 81001 URINALYSIS AUTO W/SCOPE: CPT

## 2020-09-18 PROCEDURE — 84156 ASSAY OF PROTEIN URINE: CPT

## 2020-09-18 PROCEDURE — 83735 ASSAY OF MAGNESIUM: CPT

## 2020-09-18 PROCEDURE — 80069 RENAL FUNCTION PANEL: CPT

## 2020-09-18 PROCEDURE — 87086 URINE CULTURE/COLONY COUNT: CPT

## 2020-09-18 PROCEDURE — 82570 ASSAY OF URINE CREATININE: CPT

## 2020-09-18 PROCEDURE — 36415 COLL VENOUS BLD VENIPUNCTURE: CPT

## 2020-09-19 LAB — BACTERIA SPEC AEROBE CULT: NO GROWTH

## 2020-09-20 LAB — TACROLIMUS BLD LC/MS/MS-MCNC: 8 NG/ML (ref 2–20)

## 2020-09-22 LAB
CMV DNA SERPL NAA+PROBE-ACNC: NEGATIVE IU/ML
CMV DNA SERPL NAA+PROBE-LOG IU: NORMAL {LOG_IU}/ML

## 2020-10-02 ENCOUNTER — LAB (OUTPATIENT)
Dept: LAB | Facility: HOSPITAL | Age: 30
End: 2020-10-02

## 2020-10-02 ENCOUNTER — TRANSCRIBE ORDERS (OUTPATIENT)
Dept: ADMINISTRATIVE | Facility: HOSPITAL | Age: 30
End: 2020-10-02

## 2020-10-02 DIAGNOSIS — Z94.0 KIDNEY REPLACED BY TRANSPLANT: Primary | ICD-10-CM

## 2020-10-02 DIAGNOSIS — Z94.4 LIVER TRANSPLANTED (HCC): Primary | ICD-10-CM

## 2020-10-02 DIAGNOSIS — Z94.0 KIDNEY REPLACED BY TRANSPLANT: ICD-10-CM

## 2020-10-02 DIAGNOSIS — R39.9 UNSPECIFIED SYMPTOMS AND SIGNS INVOLVING THE GENITOURINARY SYSTEM: ICD-10-CM

## 2020-10-02 DIAGNOSIS — Z94.4 LIVER TRANSPLANTED (HCC): ICD-10-CM

## 2020-10-02 DIAGNOSIS — D64.9 ANEMIA, UNSPECIFIED TYPE: ICD-10-CM

## 2020-10-02 DIAGNOSIS — R79.89 ELEVATED SERUM CREATININE: ICD-10-CM

## 2020-10-02 DIAGNOSIS — Z79.621 ENCOUNTER FOR MONITORING TACROLIMUS THERAPY: ICD-10-CM

## 2020-10-02 DIAGNOSIS — Z51.81 ENCOUNTER FOR MONITORING TACROLIMUS THERAPY: ICD-10-CM

## 2020-10-02 LAB
ALBUMIN SERPL-MCNC: 3.6 G/DL (ref 3.5–5.2)
ANION GAP SERPL CALCULATED.3IONS-SCNC: 8 MMOL/L (ref 5–15)
BACTERIA UR QL AUTO: ABNORMAL /HPF
BILIRUB UR QL STRIP: NEGATIVE
BUN SERPL-MCNC: 31 MG/DL (ref 6–20)
BUN/CREAT SERPL: 20.7 (ref 7–25)
BURR CELLS BLD QL SMEAR: ABNORMAL
CALCIUM SPEC-SCNC: 9.3 MG/DL (ref 8.6–10.5)
CHLORIDE SERPL-SCNC: 108 MMOL/L (ref 98–107)
CLARITY UR: ABNORMAL
CO2 SERPL-SCNC: 19 MMOL/L (ref 22–29)
COLOR UR: YELLOW
CREAT SERPL-MCNC: 1.5 MG/DL (ref 0.57–1)
CREAT UR-MCNC: 70.8 MG/DL
DEPRECATED RDW RBC AUTO: 45.5 FL (ref 37–54)
EOSINOPHIL # BLD MANUAL: 0.27 10*3/MM3 (ref 0–0.4)
EOSINOPHIL NFR BLD MANUAL: 6.7 % (ref 0.3–6.2)
ERYTHROCYTE [DISTWIDTH] IN BLOOD BY AUTOMATED COUNT: 13.5 % (ref 12.3–15.4)
GFR SERPL CREATININE-BSD FRML MDRD: 41 ML/MIN/1.73
GLUCOSE SERPL-MCNC: 78 MG/DL (ref 65–99)
GLUCOSE UR STRIP-MCNC: NEGATIVE MG/DL
HCT VFR BLD AUTO: 32 % (ref 34–46.6)
HGB BLD-MCNC: 10.5 G/DL (ref 12–15.9)
HGB UR QL STRIP.AUTO: ABNORMAL
HYALINE CASTS UR QL AUTO: ABNORMAL /LPF
KETONES UR QL STRIP: NEGATIVE
LEUKOCYTE ESTERASE UR QL STRIP.AUTO: NEGATIVE
LYMPHOCYTES # BLD MANUAL: 0.58 10*3/MM3 (ref 0.7–3.1)
LYMPHOCYTES NFR BLD MANUAL: 14.4 % (ref 19.6–45.3)
LYMPHOCYTES NFR BLD MANUAL: 5.6 % (ref 5–12)
MAGNESIUM SERPL-MCNC: 2 MG/DL (ref 1.6–2.6)
MCH RBC QN AUTO: 30.5 PG (ref 26.6–33)
MCHC RBC AUTO-ENTMCNC: 32.8 G/DL (ref 31.5–35.7)
MCV RBC AUTO: 93 FL (ref 79–97)
MONOCYTES # BLD AUTO: 0.23 10*3/MM3 (ref 0.1–0.9)
NEUTROPHILS # BLD AUTO: 2.97 10*3/MM3 (ref 1.7–7)
NEUTROPHILS NFR BLD MANUAL: 73.3 % (ref 42.7–76)
NITRITE UR QL STRIP: NEGATIVE
PH UR STRIP.AUTO: 6.5 [PH] (ref 5–8)
PHOSPHATE SERPL-MCNC: 3.3 MG/DL (ref 2.5–4.5)
PLAT MORPH BLD: NORMAL
PLATELET # BLD AUTO: 233 10*3/MM3 (ref 140–450)
PMV BLD AUTO: 12.8 FL (ref 6–12)
POIKILOCYTOSIS BLD QL SMEAR: ABNORMAL
POTASSIUM SERPL-SCNC: 5.1 MMOL/L (ref 3.5–5.2)
PROT UR QL STRIP: ABNORMAL
PROT UR-MCNC: 186 MG/DL
PROT/CREAT UR: 2627.1 MG/G CREA (ref 0–200)
RBC # BLD AUTO: 3.44 10*6/MM3 (ref 3.77–5.28)
RBC # UR: ABNORMAL /HPF
REF LAB TEST METHOD: ABNORMAL
SODIUM SERPL-SCNC: 135 MMOL/L (ref 136–145)
SP GR UR STRIP: 1.01 (ref 1–1.03)
SQUAMOUS #/AREA URNS HPF: ABNORMAL /HPF
UROBILINOGEN UR QL STRIP: ABNORMAL
WBC # BLD AUTO: 4.05 10*3/MM3 (ref 3.4–10.8)
WBC MORPH BLD: NORMAL
WBC UR QL AUTO: ABNORMAL /HPF

## 2020-10-02 PROCEDURE — 84156 ASSAY OF PROTEIN URINE: CPT

## 2020-10-02 PROCEDURE — 36415 COLL VENOUS BLD VENIPUNCTURE: CPT

## 2020-10-02 PROCEDURE — 83735 ASSAY OF MAGNESIUM: CPT

## 2020-10-02 PROCEDURE — 81001 URINALYSIS AUTO W/SCOPE: CPT

## 2020-10-02 PROCEDURE — 85007 BL SMEAR W/DIFF WBC COUNT: CPT

## 2020-10-02 PROCEDURE — 86160 COMPLEMENT ANTIGEN: CPT

## 2020-10-02 PROCEDURE — 80069 RENAL FUNCTION PANEL: CPT

## 2020-10-02 PROCEDURE — 85025 COMPLETE CBC W/AUTO DIFF WBC: CPT

## 2020-10-02 PROCEDURE — 80197 ASSAY OF TACROLIMUS: CPT

## 2020-10-02 PROCEDURE — 86225 DNA ANTIBODY NATIVE: CPT

## 2020-10-02 PROCEDURE — 82570 ASSAY OF URINE CREATININE: CPT

## 2020-10-03 LAB
C3 SERPL-MCNC: 82 MG/DL (ref 82–167)
C4 SERPL-MCNC: 13 MG/DL (ref 14–44)

## 2020-10-05 LAB
DSDNA AB SER-ACNC: <1 IU/ML (ref 0–9)
TACROLIMUS BLD LC/MS/MS-MCNC: 7.1 NG/ML (ref 2–20)

## 2020-10-06 LAB
CMV DNA SERPL NAA+PROBE-ACNC: NEGATIVE IU/ML
CMV DNA SERPL NAA+PROBE-LOG IU: NORMAL {LOG_IU}/ML

## 2020-10-16 ENCOUNTER — LAB (OUTPATIENT)
Dept: LAB | Facility: HOSPITAL | Age: 30
End: 2020-10-16

## 2020-10-16 DIAGNOSIS — R79.89 ELEVATED SERUM CREATININE: ICD-10-CM

## 2020-10-16 DIAGNOSIS — Z94.0 KIDNEY REPLACED BY TRANSPLANT: ICD-10-CM

## 2020-10-16 DIAGNOSIS — Z51.81 ENCOUNTER FOR MONITORING TACROLIMUS THERAPY: ICD-10-CM

## 2020-10-16 DIAGNOSIS — D64.9 ANEMIA, UNSPECIFIED TYPE: ICD-10-CM

## 2020-10-16 DIAGNOSIS — R39.9 UNSPECIFIED SYMPTOMS AND SIGNS INVOLVING THE GENITOURINARY SYSTEM: ICD-10-CM

## 2020-10-16 DIAGNOSIS — Z79.621 ENCOUNTER FOR MONITORING TACROLIMUS THERAPY: ICD-10-CM

## 2020-10-16 LAB
ALBUMIN SERPL-MCNC: 4 G/DL (ref 3.5–5.2)
ANION GAP SERPL CALCULATED.3IONS-SCNC: 8.5 MMOL/L (ref 5–15)
BACTERIA UR QL AUTO: ABNORMAL /HPF
BILIRUB UR QL STRIP: NEGATIVE
BUN SERPL-MCNC: 27 MG/DL (ref 6–20)
BUN/CREAT SERPL: 22.7 (ref 7–25)
CALCIUM SPEC-SCNC: 9.8 MG/DL (ref 8.6–10.5)
CHLORIDE SERPL-SCNC: 103 MMOL/L (ref 98–107)
CLARITY UR: ABNORMAL
CO2 SERPL-SCNC: 18.5 MMOL/L (ref 22–29)
COLOR UR: YELLOW
CREAT SERPL-MCNC: 1.19 MG/DL (ref 0.57–1)
CREAT UR-MCNC: 111.7 MG/DL
DEPRECATED RDW RBC AUTO: 41.5 FL (ref 37–54)
EOSINOPHIL # BLD MANUAL: 0.05 10*3/MM3 (ref 0–0.4)
EOSINOPHIL NFR BLD MANUAL: 1.1 % (ref 0.3–6.2)
ERYTHROCYTE [DISTWIDTH] IN BLOOD BY AUTOMATED COUNT: 12.7 % (ref 12.3–15.4)
GFR SERPL CREATININE-BSD FRML MDRD: 53 ML/MIN/1.73
GLUCOSE SERPL-MCNC: 112 MG/DL (ref 65–99)
GLUCOSE UR STRIP-MCNC: NEGATIVE MG/DL
HCT VFR BLD AUTO: 39 % (ref 34–46.6)
HGB BLD-MCNC: 12.8 G/DL (ref 12–15.9)
HGB UR QL STRIP.AUTO: NEGATIVE
HYALINE CASTS UR QL AUTO: ABNORMAL /LPF
KETONES UR QL STRIP: NEGATIVE
LEUKOCYTE ESTERASE UR QL STRIP.AUTO: NEGATIVE
LYMPHOCYTES # BLD MANUAL: 0.64 10*3/MM3 (ref 0.7–3.1)
LYMPHOCYTES NFR BLD MANUAL: 15.6 % (ref 19.6–45.3)
LYMPHOCYTES NFR BLD MANUAL: 24.4 % (ref 5–12)
MAGNESIUM SERPL-MCNC: 1.9 MG/DL (ref 1.6–2.6)
MCH RBC QN AUTO: 29.6 PG (ref 26.6–33)
MCHC RBC AUTO-ENTMCNC: 32.8 G/DL (ref 31.5–35.7)
MCV RBC AUTO: 90.3 FL (ref 79–97)
MONOCYTES # BLD AUTO: 1.01 10*3/MM3 (ref 0.1–0.9)
NEUTROPHILS # BLD AUTO: 2.43 10*3/MM3 (ref 1.7–7)
NEUTROPHILS NFR BLD MANUAL: 58.9 % (ref 42.7–76)
NITRITE UR QL STRIP: NEGATIVE
PH UR STRIP.AUTO: <=5 [PH] (ref 5–8)
PHOSPHATE SERPL-MCNC: 2.7 MG/DL (ref 2.5–4.5)
PLAT MORPH BLD: NORMAL
PLATELET # BLD AUTO: 273 10*3/MM3 (ref 140–450)
PMV BLD AUTO: 12.5 FL (ref 6–12)
POTASSIUM SERPL-SCNC: 4.8 MMOL/L (ref 3.5–5.2)
PROT UR QL STRIP: ABNORMAL
PROT UR-MCNC: 203 MG/DL
PROT/CREAT UR: 1817.4 MG/G CREA (ref 0–200)
RBC # BLD AUTO: 4.32 10*6/MM3 (ref 3.77–5.28)
RBC # UR: ABNORMAL /HPF
RBC MORPH BLD: NORMAL
REF LAB TEST METHOD: ABNORMAL
SODIUM SERPL-SCNC: 130 MMOL/L (ref 136–145)
SP GR UR STRIP: 1.02 (ref 1–1.03)
SQUAMOUS #/AREA URNS HPF: ABNORMAL /HPF
UROBILINOGEN UR QL STRIP: ABNORMAL
WBC # BLD AUTO: 4.12 10*3/MM3 (ref 3.4–10.8)
WBC MORPH BLD: NORMAL
WBC UR QL AUTO: ABNORMAL /HPF

## 2020-10-16 PROCEDURE — 81001 URINALYSIS AUTO W/SCOPE: CPT

## 2020-10-16 PROCEDURE — 87086 URINE CULTURE/COLONY COUNT: CPT

## 2020-10-16 PROCEDURE — 84156 ASSAY OF PROTEIN URINE: CPT

## 2020-10-16 PROCEDURE — 85025 COMPLETE CBC W/AUTO DIFF WBC: CPT

## 2020-10-16 PROCEDURE — 36415 COLL VENOUS BLD VENIPUNCTURE: CPT

## 2020-10-16 PROCEDURE — 80197 ASSAY OF TACROLIMUS: CPT

## 2020-10-16 PROCEDURE — 80069 RENAL FUNCTION PANEL: CPT

## 2020-10-16 PROCEDURE — 83735 ASSAY OF MAGNESIUM: CPT

## 2020-10-16 PROCEDURE — 85007 BL SMEAR W/DIFF WBC COUNT: CPT

## 2020-10-16 PROCEDURE — 82570 ASSAY OF URINE CREATININE: CPT

## 2020-10-17 LAB — BACTERIA SPEC AEROBE CULT: NO GROWTH

## 2020-10-19 LAB — TACROLIMUS BLD LC/MS/MS-MCNC: 8 NG/ML (ref 2–20)

## 2020-10-20 LAB
CMV DNA SERPL NAA+PROBE-ACNC: NEGATIVE IU/ML
CMV DNA SERPL NAA+PROBE-LOG IU: NORMAL {LOG_IU}/ML

## 2020-10-30 ENCOUNTER — LAB (OUTPATIENT)
Dept: LAB | Facility: HOSPITAL | Age: 30
End: 2020-10-30

## 2020-10-30 DIAGNOSIS — Z79.621 ENCOUNTER FOR MONITORING TACROLIMUS THERAPY: ICD-10-CM

## 2020-10-30 DIAGNOSIS — R79.89 ELEVATED SERUM CREATININE: ICD-10-CM

## 2020-10-30 DIAGNOSIS — R39.9 UNSPECIFIED SYMPTOMS AND SIGNS INVOLVING THE GENITOURINARY SYSTEM: ICD-10-CM

## 2020-10-30 DIAGNOSIS — D64.9 ANEMIA, UNSPECIFIED TYPE: ICD-10-CM

## 2020-10-30 DIAGNOSIS — Z94.0 KIDNEY REPLACED BY TRANSPLANT: ICD-10-CM

## 2020-10-30 DIAGNOSIS — Z51.81 ENCOUNTER FOR MONITORING TACROLIMUS THERAPY: ICD-10-CM

## 2020-10-30 LAB
ACANTHOCYTES BLD QL SMEAR: ABNORMAL
ALBUMIN SERPL-MCNC: 4.1 G/DL (ref 3.5–5.2)
ANION GAP SERPL CALCULATED.3IONS-SCNC: 6.8 MMOL/L (ref 5–15)
BACTERIA UR QL AUTO: ABNORMAL /HPF
BILIRUB UR QL STRIP: NEGATIVE
BUN SERPL-MCNC: 24 MG/DL (ref 6–20)
BUN/CREAT SERPL: 19.8 (ref 7–25)
CALCIUM SPEC-SCNC: 9.9 MG/DL (ref 8.6–10.5)
CHLORIDE SERPL-SCNC: 108 MMOL/L (ref 98–107)
CLARITY UR: CLEAR
CO2 SERPL-SCNC: 20.2 MMOL/L (ref 22–29)
COLOR UR: YELLOW
CREAT SERPL-MCNC: 1.21 MG/DL (ref 0.57–1)
CREAT UR-MCNC: 33.9 MG/DL
DEPRECATED RDW RBC AUTO: 41.1 FL (ref 37–54)
EOSINOPHIL # BLD MANUAL: 0.22 10*3/MM3 (ref 0–0.4)
EOSINOPHIL NFR BLD MANUAL: 4.9 % (ref 0.3–6.2)
ERYTHROCYTE [DISTWIDTH] IN BLOOD BY AUTOMATED COUNT: 12.4 % (ref 12.3–15.4)
GFR SERPL CREATININE-BSD FRML MDRD: 52 ML/MIN/1.73
GLUCOSE SERPL-MCNC: 65 MG/DL (ref 65–99)
GLUCOSE UR STRIP-MCNC: NEGATIVE MG/DL
HCT VFR BLD AUTO: 38.6 % (ref 34–46.6)
HGB BLD-MCNC: 12.4 G/DL (ref 12–15.9)
HGB UR QL STRIP.AUTO: NEGATIVE
HYALINE CASTS UR QL AUTO: ABNORMAL /LPF
KETONES UR QL STRIP: NEGATIVE
LEUKOCYTE ESTERASE UR QL STRIP.AUTO: NEGATIVE
LYMPHOCYTES # BLD MANUAL: 0.62 10*3/MM3 (ref 0.7–3.1)
LYMPHOCYTES NFR BLD MANUAL: 13.6 % (ref 19.6–45.3)
LYMPHOCYTES NFR BLD MANUAL: 18.4 % (ref 5–12)
MAGNESIUM SERPL-MCNC: 1.9 MG/DL (ref 1.6–2.6)
MCH RBC QN AUTO: 29.5 PG (ref 26.6–33)
MCHC RBC AUTO-ENTMCNC: 32.1 G/DL (ref 31.5–35.7)
MCV RBC AUTO: 91.9 FL (ref 79–97)
MONOCYTES # BLD AUTO: 0.84 10*3/MM3 (ref 0.1–0.9)
NEUTROPHILS # BLD AUTO: 2.87 10*3/MM3 (ref 1.7–7)
NEUTROPHILS NFR BLD MANUAL: 63.1 % (ref 42.7–76)
NITRITE UR QL STRIP: NEGATIVE
OVALOCYTES BLD QL SMEAR: ABNORMAL
PH UR STRIP.AUTO: 6.5 [PH] (ref 5–8)
PHOSPHATE SERPL-MCNC: 2.6 MG/DL (ref 2.5–4.5)
PLAT MORPH BLD: NORMAL
PLATELET # BLD AUTO: 260 10*3/MM3 (ref 140–450)
PMV BLD AUTO: 12.8 FL (ref 6–12)
POIKILOCYTOSIS BLD QL SMEAR: ABNORMAL
POTASSIUM SERPL-SCNC: 5.3 MMOL/L (ref 3.5–5.2)
PROT UR QL STRIP: ABNORMAL
PROT UR-MCNC: 23 MG/DL
PROT/CREAT UR: 678.5 MG/G CREA (ref 0–200)
RBC # BLD AUTO: 4.2 10*6/MM3 (ref 3.77–5.28)
RBC # UR: ABNORMAL /HPF
REF LAB TEST METHOD: ABNORMAL
SODIUM SERPL-SCNC: 135 MMOL/L (ref 136–145)
SP GR UR STRIP: 1.01 (ref 1–1.03)
SQUAMOUS #/AREA URNS HPF: ABNORMAL /HPF
UROBILINOGEN UR QL STRIP: ABNORMAL
WBC # BLD AUTO: 4.55 10*3/MM3 (ref 3.4–10.8)
WBC MORPH BLD: NORMAL
WBC UR QL AUTO: ABNORMAL /HPF

## 2020-10-30 PROCEDURE — 81001 URINALYSIS AUTO W/SCOPE: CPT

## 2020-10-30 PROCEDURE — 82570 ASSAY OF URINE CREATININE: CPT

## 2020-10-30 PROCEDURE — 87086 URINE CULTURE/COLONY COUNT: CPT

## 2020-10-30 PROCEDURE — 36415 COLL VENOUS BLD VENIPUNCTURE: CPT

## 2020-10-30 PROCEDURE — 85025 COMPLETE CBC W/AUTO DIFF WBC: CPT

## 2020-10-30 PROCEDURE — 80069 RENAL FUNCTION PANEL: CPT

## 2020-10-30 PROCEDURE — 83735 ASSAY OF MAGNESIUM: CPT

## 2020-10-30 PROCEDURE — 80197 ASSAY OF TACROLIMUS: CPT

## 2020-10-30 PROCEDURE — 85007 BL SMEAR W/DIFF WBC COUNT: CPT

## 2020-10-30 PROCEDURE — 84156 ASSAY OF PROTEIN URINE: CPT

## 2020-10-31 LAB — BACTERIA SPEC AEROBE CULT: NO GROWTH

## 2020-11-03 LAB
CMV DNA SERPL NAA+PROBE-ACNC: NEGATIVE IU/ML
CMV DNA SERPL NAA+PROBE-LOG IU: NORMAL {LOG_IU}/ML
TACROLIMUS BLD LC/MS/MS-MCNC: 5.3 NG/ML (ref 2–20)

## 2020-11-13 ENCOUNTER — LAB (OUTPATIENT)
Dept: LAB | Facility: HOSPITAL | Age: 30
End: 2020-11-13

## 2020-11-13 DIAGNOSIS — D64.9 ANEMIA, UNSPECIFIED TYPE: ICD-10-CM

## 2020-11-13 DIAGNOSIS — R79.89 ELEVATED SERUM CREATININE: ICD-10-CM

## 2020-11-13 DIAGNOSIS — R39.9 UNSPECIFIED SYMPTOMS AND SIGNS INVOLVING THE GENITOURINARY SYSTEM: ICD-10-CM

## 2020-11-13 DIAGNOSIS — Z79.621 ENCOUNTER FOR MONITORING TACROLIMUS THERAPY: ICD-10-CM

## 2020-11-13 DIAGNOSIS — Z51.81 ENCOUNTER FOR MONITORING TACROLIMUS THERAPY: ICD-10-CM

## 2020-11-13 DIAGNOSIS — Z94.0 KIDNEY REPLACED BY TRANSPLANT: ICD-10-CM

## 2020-11-13 LAB
ALBUMIN SERPL-MCNC: 4.2 G/DL (ref 3.5–5.2)
ANION GAP SERPL CALCULATED.3IONS-SCNC: 8.3 MMOL/L (ref 5–15)
BACTERIA UR QL AUTO: ABNORMAL /HPF
BASOPHILS # BLD MANUAL: 0.05 10*3/MM3 (ref 0–0.2)
BASOPHILS NFR BLD AUTO: 1 % (ref 0–1.5)
BILIRUB UR QL STRIP: NEGATIVE
BUN SERPL-MCNC: 21 MG/DL (ref 6–20)
BUN/CREAT SERPL: 16.7 (ref 7–25)
CALCIUM SPEC-SCNC: 9.9 MG/DL (ref 8.6–10.5)
CHLORIDE SERPL-SCNC: 106 MMOL/L (ref 98–107)
CLARITY UR: ABNORMAL
CO2 SERPL-SCNC: 17.7 MMOL/L (ref 22–29)
COLOR UR: YELLOW
CREAT SERPL-MCNC: 1.26 MG/DL (ref 0.57–1)
CREAT UR-MCNC: 32.3 MG/DL
DEPRECATED RDW RBC AUTO: 40.8 FL (ref 37–54)
EOSINOPHIL # BLD MANUAL: 0.26 10*3/MM3 (ref 0–0.4)
EOSINOPHIL NFR BLD MANUAL: 5.1 % (ref 0.3–6.2)
ERYTHROCYTE [DISTWIDTH] IN BLOOD BY AUTOMATED COUNT: 12 % (ref 12.3–15.4)
GFR SERPL CREATININE-BSD FRML MDRD: 50 ML/MIN/1.73
GLUCOSE SERPL-MCNC: 102 MG/DL (ref 65–99)
GLUCOSE UR STRIP-MCNC: NEGATIVE MG/DL
HCT VFR BLD AUTO: 39.7 % (ref 34–46.6)
HGB BLD-MCNC: 12.8 G/DL (ref 12–15.9)
HGB UR QL STRIP.AUTO: NEGATIVE
HYALINE CASTS UR QL AUTO: ABNORMAL /LPF
KETONES UR QL STRIP: NEGATIVE
LEUKOCYTE ESTERASE UR QL STRIP.AUTO: NEGATIVE
LYMPHOCYTES # BLD MANUAL: 0.98 10*3/MM3 (ref 0.7–3.1)
LYMPHOCYTES NFR BLD MANUAL: 12.2 % (ref 5–12)
LYMPHOCYTES NFR BLD MANUAL: 19.4 % (ref 19.6–45.3)
MAGNESIUM SERPL-MCNC: 1.8 MG/DL (ref 1.6–2.6)
MCH RBC QN AUTO: 29.8 PG (ref 26.6–33)
MCHC RBC AUTO-ENTMCNC: 32.2 G/DL (ref 31.5–35.7)
MCV RBC AUTO: 92.5 FL (ref 79–97)
MONOCYTES # BLD AUTO: 0.62 10*3/MM3 (ref 0.1–0.9)
NEUTROPHILS # BLD AUTO: 3.15 10*3/MM3 (ref 1.7–7)
NEUTROPHILS NFR BLD MANUAL: 62.2 % (ref 42.7–76)
NITRITE UR QL STRIP: NEGATIVE
PH UR STRIP.AUTO: 5.5 [PH] (ref 5–8)
PHOSPHATE SERPL-MCNC: 2.9 MG/DL (ref 2.5–4.5)
PLAT MORPH BLD: NORMAL
PLATELET # BLD AUTO: 256 10*3/MM3 (ref 140–450)
PMV BLD AUTO: 12.8 FL (ref 6–12)
POTASSIUM SERPL-SCNC: 5.3 MMOL/L (ref 3.5–5.2)
PROT UR QL STRIP: ABNORMAL
PROT UR-MCNC: 16 MG/DL
PROT/CREAT UR: 495.4 MG/G CREA (ref 0–200)
RBC # BLD AUTO: 4.29 10*6/MM3 (ref 3.77–5.28)
RBC # UR: ABNORMAL /HPF
RBC MORPH BLD: NORMAL
REF LAB TEST METHOD: ABNORMAL
SODIUM SERPL-SCNC: 132 MMOL/L (ref 136–145)
SP GR UR STRIP: 1.01 (ref 1–1.03)
SQUAMOUS #/AREA URNS HPF: ABNORMAL /HPF
UROBILINOGEN UR QL STRIP: ABNORMAL
WBC # BLD AUTO: 5.07 10*3/MM3 (ref 3.4–10.8)
WBC MORPH BLD: NORMAL
WBC UR QL AUTO: ABNORMAL /HPF

## 2020-11-13 PROCEDURE — 80197 ASSAY OF TACROLIMUS: CPT

## 2020-11-13 PROCEDURE — 87086 URINE CULTURE/COLONY COUNT: CPT

## 2020-11-13 PROCEDURE — 80069 RENAL FUNCTION PANEL: CPT

## 2020-11-13 PROCEDURE — 83735 ASSAY OF MAGNESIUM: CPT

## 2020-11-13 PROCEDURE — 82570 ASSAY OF URINE CREATININE: CPT

## 2020-11-13 PROCEDURE — 85007 BL SMEAR W/DIFF WBC COUNT: CPT

## 2020-11-13 PROCEDURE — 84156 ASSAY OF PROTEIN URINE: CPT

## 2020-11-13 PROCEDURE — 36415 COLL VENOUS BLD VENIPUNCTURE: CPT

## 2020-11-13 PROCEDURE — 85025 COMPLETE CBC W/AUTO DIFF WBC: CPT

## 2020-11-13 PROCEDURE — 81001 URINALYSIS AUTO W/SCOPE: CPT

## 2020-11-14 LAB — BACTERIA SPEC AEROBE CULT: NO GROWTH

## 2020-11-16 LAB — TACROLIMUS BLD LC/MS/MS-MCNC: 7.3 NG/ML (ref 2–20)

## 2020-11-17 LAB
CMV DNA SERPL NAA+PROBE-ACNC: NEGATIVE IU/ML
CMV DNA SERPL NAA+PROBE-LOG IU: NORMAL {LOG_IU}/ML

## 2020-12-09 ENCOUNTER — HOSPITAL ENCOUNTER (OUTPATIENT)
Facility: HOSPITAL | Age: 30
Setting detail: OBSERVATION
Discharge: HOME | End: 2020-12-11
Attending: EMERGENCY MEDICINE | Admitting: INTERNAL MEDICINE

## 2020-12-09 ENCOUNTER — APPOINTMENT (OUTPATIENT)
Dept: CT IMAGING | Facility: HOSPITAL | Age: 30
End: 2020-12-09

## 2020-12-09 ENCOUNTER — OFFICE VISIT (OUTPATIENT)
Dept: INTERNAL MEDICINE | Facility: CLINIC | Age: 30
End: 2020-12-09

## 2020-12-09 ENCOUNTER — APPOINTMENT (OUTPATIENT)
Dept: GENERAL RADIOLOGY | Facility: HOSPITAL | Age: 30
End: 2020-12-09

## 2020-12-09 VITALS
TEMPERATURE: 100 F | HEART RATE: 124 BPM | RESPIRATION RATE: 18 BRPM | SYSTOLIC BLOOD PRESSURE: 90 MMHG | WEIGHT: 129 LBS | OXYGEN SATURATION: 99 % | HEIGHT: 67 IN | DIASTOLIC BLOOD PRESSURE: 63 MMHG | BODY MASS INDEX: 20.25 KG/M2

## 2020-12-09 DIAGNOSIS — N17.9 ACUTE RENAL INJURY (HCC): Primary | ICD-10-CM

## 2020-12-09 DIAGNOSIS — N18.6 ESRD (END STAGE RENAL DISEASE) (HCC): ICD-10-CM

## 2020-12-09 DIAGNOSIS — R50.9 FEVER, UNSPECIFIED FEVER CAUSE: ICD-10-CM

## 2020-12-09 DIAGNOSIS — M32.9 SLE (SYSTEMIC LUPUS ERYTHEMATOSUS RELATED SYNDROME) (HCC): ICD-10-CM

## 2020-12-09 DIAGNOSIS — R60.0 LIP EDEMA: ICD-10-CM

## 2020-12-09 DIAGNOSIS — N30.00 ACUTE CYSTITIS WITHOUT HEMATURIA: ICD-10-CM

## 2020-12-09 DIAGNOSIS — T78.40XA ALLERGIC REACTION, INITIAL ENCOUNTER: ICD-10-CM

## 2020-12-09 DIAGNOSIS — T78.3XXA ANGIOEDEMA, INITIAL ENCOUNTER: Primary | ICD-10-CM

## 2020-12-09 DIAGNOSIS — Z94.0 RENAL TRANSPLANT RECIPIENT: ICD-10-CM

## 2020-12-09 DIAGNOSIS — T78.3XXA ANGIOEDEMA, INITIAL ENCOUNTER: ICD-10-CM

## 2020-12-09 PROBLEM — M06.9 RA (RHEUMATOID ARTHRITIS): Status: ACTIVE | Noted: 2020-12-09

## 2020-12-09 PROBLEM — D64.9 ANEMIA: Status: ACTIVE | Noted: 2020-12-09

## 2020-12-09 PROBLEM — F41.9 ANXIETY: Status: ACTIVE | Noted: 2020-12-09

## 2020-12-09 PROBLEM — D72.819 LEUKOPENIA: Status: ACTIVE | Noted: 2020-06-16

## 2020-12-09 PROBLEM — R47.02 DYSPHASIA: Status: ACTIVE | Noted: 2020-12-09

## 2020-12-09 PROBLEM — G40.909 EPILEPSY: Status: ACTIVE | Noted: 2020-12-09

## 2020-12-09 LAB
ALBUMIN SERPL-MCNC: 3.3 G/DL (ref 3.5–5.2)
ALBUMIN/GLOB SERPL: 1.1 G/DL
ALP SERPL-CCNC: 103 U/L (ref 39–117)
ALT SERPL W P-5'-P-CCNC: 16 U/L (ref 1–33)
ANION GAP SERPL CALCULATED.3IONS-SCNC: 13.1 MMOL/L (ref 5–15)
AST SERPL-CCNC: 11 U/L (ref 1–32)
BACTERIA UR QL AUTO: ABNORMAL /HPF
BASOPHILS # BLD AUTO: 0.03 10*3/MM3 (ref 0–0.2)
BASOPHILS NFR BLD AUTO: 0.2 % (ref 0–1.5)
BILIRUB SERPL-MCNC: 0.6 MG/DL (ref 0–1.2)
BILIRUB UR QL STRIP: NEGATIVE
BUN SERPL-MCNC: 29 MG/DL (ref 6–20)
BUN/CREAT SERPL: 15.8 (ref 7–25)
CALCIUM SPEC-SCNC: 9.9 MG/DL (ref 8.6–10.5)
CHLORIDE SERPL-SCNC: 103 MMOL/L (ref 98–107)
CLARITY UR: ABNORMAL
CO2 SERPL-SCNC: 12.9 MMOL/L (ref 22–29)
COLOR UR: YELLOW
CREAT SERPL-MCNC: 1.84 MG/DL (ref 0.57–1)
DEPRECATED RDW RBC AUTO: 44.8 FL (ref 37–54)
EOSINOPHIL # BLD AUTO: 0 10*3/MM3 (ref 0–0.4)
EOSINOPHIL NFR BLD AUTO: 0 % (ref 0.3–6.2)
ERYTHROCYTE [DISTWIDTH] IN BLOOD BY AUTOMATED COUNT: 12.9 % (ref 12.3–15.4)
FLUAV AG NPH QL: NEGATIVE
FLUBV AG NPH QL IA: NEGATIVE
GFR SERPL CREATININE-BSD FRML MDRD: 32 ML/MIN/1.73
GLOBULIN UR ELPH-MCNC: 3.1 GM/DL
GLUCOSE SERPL-MCNC: 127 MG/DL (ref 65–99)
GLUCOSE UR STRIP-MCNC: NEGATIVE MG/DL
HCT VFR BLD AUTO: 41.4 % (ref 34–46.6)
HGB BLD-MCNC: 13 G/DL (ref 12–15.9)
HGB UR QL STRIP.AUTO: ABNORMAL
HYALINE CASTS UR QL AUTO: ABNORMAL /LPF
IMM GRANULOCYTES # BLD AUTO: 0.11 10*3/MM3 (ref 0–0.05)
IMM GRANULOCYTES NFR BLD AUTO: 0.9 % (ref 0–0.5)
KETONES UR QL STRIP: ABNORMAL
LEUKOCYTE ESTERASE UR QL STRIP.AUTO: NEGATIVE
LYMPHOCYTES # BLD AUTO: 0.62 10*3/MM3 (ref 0.7–3.1)
LYMPHOCYTES NFR BLD AUTO: 4.9 % (ref 19.6–45.3)
MAGNESIUM SERPL-MCNC: 1.4 MG/DL (ref 1.6–2.6)
MCH RBC QN AUTO: 29.7 PG (ref 26.6–33)
MCHC RBC AUTO-ENTMCNC: 31.4 G/DL (ref 31.5–35.7)
MCV RBC AUTO: 94.5 FL (ref 79–97)
MONOCYTES # BLD AUTO: 0.91 10*3/MM3 (ref 0.1–0.9)
MONOCYTES NFR BLD AUTO: 7.1 % (ref 5–12)
NEUTROPHILS NFR BLD AUTO: 11.07 10*3/MM3 (ref 1.7–7)
NEUTROPHILS NFR BLD AUTO: 86.9 % (ref 42.7–76)
NITRITE UR QL STRIP: NEGATIVE
NRBC BLD AUTO-RTO: 0 /100 WBC (ref 0–0.2)
PH UR STRIP.AUTO: <=5 [PH] (ref 5–8)
PLATELET # BLD AUTO: 170 10*3/MM3 (ref 140–450)
PMV BLD AUTO: 11 FL (ref 6–12)
POTASSIUM SERPL-SCNC: 5 MMOL/L (ref 3.5–5.2)
PROCALCITONIN SERPL-MCNC: 30.54 NG/ML (ref 0–0.25)
PROT SERPL-MCNC: 6.4 G/DL (ref 6–8.5)
PROT UR QL STRIP: ABNORMAL
RBC # BLD AUTO: 4.38 10*6/MM3 (ref 3.77–5.28)
RBC # UR: ABNORMAL /HPF
REF LAB TEST METHOD: ABNORMAL
S PYO AG THROAT QL: NEGATIVE
SARS-COV-2 RNA PNL SPEC NAA+PROBE: NOT DETECTED
SODIUM SERPL-SCNC: 129 MMOL/L (ref 136–145)
SP GR UR STRIP: 1.02 (ref 1–1.03)
SQUAMOUS #/AREA URNS HPF: ABNORMAL /HPF
UROBILINOGEN UR QL STRIP: ABNORMAL
WBC # BLD AUTO: 12.74 10*3/MM3 (ref 3.4–10.8)
WBC UR QL AUTO: ABNORMAL /HPF

## 2020-12-09 PROCEDURE — 99284 EMERGENCY DEPT VISIT MOD MDM: CPT

## 2020-12-09 PROCEDURE — 25010000002 METHYLPREDNISOLONE PER 125 MG: Performed by: EMERGENCY MEDICINE

## 2020-12-09 PROCEDURE — 84145 PROCALCITONIN (PCT): CPT | Performed by: EMERGENCY MEDICINE

## 2020-12-09 PROCEDURE — 87040 BLOOD CULTURE FOR BACTERIA: CPT | Performed by: EMERGENCY MEDICINE

## 2020-12-09 PROCEDURE — 74176 CT ABD & PELVIS W/O CONTRAST: CPT

## 2020-12-09 PROCEDURE — 87635 SARS-COV-2 COVID-19 AMP PRB: CPT | Performed by: EMERGENCY MEDICINE

## 2020-12-09 PROCEDURE — G0378 HOSPITAL OBSERVATION PER HR: HCPCS

## 2020-12-09 PROCEDURE — 99219 PR INITIAL OBSERVATION CARE/DAY 50 MINUTES: CPT | Performed by: INTERNAL MEDICINE

## 2020-12-09 PROCEDURE — 83605 ASSAY OF LACTIC ACID: CPT | Performed by: EMERGENCY MEDICINE

## 2020-12-09 PROCEDURE — 71045 X-RAY EXAM CHEST 1 VIEW: CPT

## 2020-12-09 PROCEDURE — 71250 CT THORAX DX C-: CPT

## 2020-12-09 PROCEDURE — 80053 COMPREHEN METABOLIC PANEL: CPT | Performed by: EMERGENCY MEDICINE

## 2020-12-09 PROCEDURE — 85025 COMPLETE CBC W/AUTO DIFF WBC: CPT | Performed by: EMERGENCY MEDICINE

## 2020-12-09 PROCEDURE — 96365 THER/PROPH/DIAG IV INF INIT: CPT

## 2020-12-09 PROCEDURE — 96375 TX/PRO/DX INJ NEW DRUG ADDON: CPT

## 2020-12-09 PROCEDURE — 25010000002 DIPHENHYDRAMINE PER 50 MG: Performed by: EMERGENCY MEDICINE

## 2020-12-09 PROCEDURE — C9803 HOPD COVID-19 SPEC COLLECT: HCPCS

## 2020-12-09 PROCEDURE — 83735 ASSAY OF MAGNESIUM: CPT | Performed by: EMERGENCY MEDICINE

## 2020-12-09 PROCEDURE — 81001 URINALYSIS AUTO W/SCOPE: CPT | Performed by: EMERGENCY MEDICINE

## 2020-12-09 PROCEDURE — 99214 OFFICE O/P EST MOD 30 MIN: CPT | Performed by: INTERNAL MEDICINE

## 2020-12-09 PROCEDURE — 87804 INFLUENZA ASSAY W/OPTIC: CPT | Performed by: EMERGENCY MEDICINE

## 2020-12-09 PROCEDURE — 87880 STREP A ASSAY W/OPTIC: CPT | Performed by: EMERGENCY MEDICINE

## 2020-12-09 PROCEDURE — 25010000002 CEFTRIAXONE SODIUM-DEXTROSE 1-3.74 GM-%(50ML) RECONSTITUTED SOLUTION: Performed by: EMERGENCY MEDICINE

## 2020-12-09 PROCEDURE — 87081 CULTURE SCREEN ONLY: CPT | Performed by: EMERGENCY MEDICINE

## 2020-12-09 RX ORDER — TACROLIMUS 0.5 MG/1
5 CAPSULE ORAL DAILY
Status: DISCONTINUED | OUTPATIENT
Start: 2020-12-10 | End: 2020-12-09

## 2020-12-09 RX ORDER — LISINOPRIL 10 MG/1
TABLET ORAL
Status: ON HOLD | COMMUNITY
Start: 2020-10-22 | End: 2020-12-09

## 2020-12-09 RX ORDER — TACROLIMUS 0.5 MG/1
2 CAPSULE ORAL DAILY
Status: DISCONTINUED | OUTPATIENT
Start: 2020-12-10 | End: 2020-12-09

## 2020-12-09 RX ORDER — SODIUM CHLORIDE 0.9 % (FLUSH) 0.9 %
10 SYRINGE (ML) INJECTION EVERY 12 HOURS SCHEDULED
Status: DISCONTINUED | OUTPATIENT
Start: 2020-12-10 | End: 2020-12-11 | Stop reason: HOSPADM

## 2020-12-09 RX ORDER — SODIUM CHLORIDE 0.9 % (FLUSH) 0.9 %
10 SYRINGE (ML) INJECTION AS NEEDED
Status: DISCONTINUED | OUTPATIENT
Start: 2020-12-09 | End: 2020-12-11 | Stop reason: HOSPADM

## 2020-12-09 RX ORDER — METHYLPREDNISOLONE SODIUM SUCCINATE 40 MG/ML
40 INJECTION, POWDER, LYOPHILIZED, FOR SOLUTION INTRAMUSCULAR; INTRAVENOUS EVERY 8 HOURS
Status: DISCONTINUED | OUTPATIENT
Start: 2020-12-10 | End: 2020-12-11

## 2020-12-09 RX ORDER — CEFTRIAXONE 1 G/50ML
1 INJECTION, SOLUTION INTRAVENOUS EVERY 24 HOURS
Status: DISCONTINUED | OUTPATIENT
Start: 2020-12-10 | End: 2020-12-11 | Stop reason: HOSPADM

## 2020-12-09 RX ORDER — CLONAZEPAM 0.5 MG/1
0.5 TABLET ORAL 3 TIMES DAILY PRN
Status: DISCONTINUED | OUTPATIENT
Start: 2020-12-09 | End: 2020-12-11 | Stop reason: HOSPADM

## 2020-12-09 RX ORDER — MYCOPHENOLATE MOFETIL 250 MG/1
750 CAPSULE ORAL EVERY 12 HOURS SCHEDULED
Status: DISCONTINUED | OUTPATIENT
Start: 2020-12-10 | End: 2020-12-10

## 2020-12-09 RX ORDER — HYDRALAZINE HYDROCHLORIDE 25 MG/1
50 TABLET, FILM COATED ORAL EVERY 12 HOURS SCHEDULED
Status: DISCONTINUED | OUTPATIENT
Start: 2020-12-10 | End: 2020-12-11 | Stop reason: HOSPADM

## 2020-12-09 RX ORDER — TACROLIMUS 0.5 MG/1
9 CAPSULE ORAL EVERY MORNING
Status: DISCONTINUED | OUTPATIENT
Start: 2020-12-10 | End: 2020-12-10 | Stop reason: ALTCHOICE

## 2020-12-09 RX ORDER — DIPHENHYDRAMINE HYDROCHLORIDE 50 MG/ML
25 INJECTION INTRAMUSCULAR; INTRAVENOUS ONCE
Status: COMPLETED | OUTPATIENT
Start: 2020-12-09 | End: 2020-12-09

## 2020-12-09 RX ORDER — FAMOTIDINE 20 MG/1
20 TABLET, FILM COATED ORAL DAILY
Status: DISCONTINUED | OUTPATIENT
Start: 2020-12-10 | End: 2020-12-11 | Stop reason: HOSPADM

## 2020-12-09 RX ORDER — TACROLIMUS 0.5 MG/1
10 CAPSULE ORAL NIGHTLY
Status: DISCONTINUED | OUTPATIENT
Start: 2020-12-10 | End: 2020-12-10 | Stop reason: ALTCHOICE

## 2020-12-09 RX ORDER — MONTELUKAST SODIUM 10 MG/1
10 TABLET ORAL NIGHTLY
Status: DISCONTINUED | OUTPATIENT
Start: 2020-12-10 | End: 2020-12-11 | Stop reason: HOSPADM

## 2020-12-09 RX ORDER — SULFAMETHOXAZOLE AND TRIMETHOPRIM 400; 80 MG/1; MG/1
TABLET ORAL
COMMUNITY
Start: 2020-10-01 | End: 2021-01-18

## 2020-12-09 RX ORDER — CETIRIZINE HYDROCHLORIDE 10 MG/1
10 TABLET ORAL DAILY
Status: DISCONTINUED | OUTPATIENT
Start: 2020-12-10 | End: 2020-12-11 | Stop reason: HOSPADM

## 2020-12-09 RX ORDER — METHYLPREDNISOLONE SODIUM SUCCINATE 125 MG/2ML
125 INJECTION, POWDER, LYOPHILIZED, FOR SOLUTION INTRAMUSCULAR; INTRAVENOUS ONCE
Status: COMPLETED | OUTPATIENT
Start: 2020-12-09 | End: 2020-12-09

## 2020-12-09 RX ORDER — PANTOPRAZOLE SODIUM 40 MG/1
40 TABLET, DELAYED RELEASE ORAL DAILY
Status: DISCONTINUED | OUTPATIENT
Start: 2020-12-10 | End: 2020-12-11 | Stop reason: HOSPADM

## 2020-12-09 RX ORDER — NICARDIPINE HYDROCHLORIDE 20 MG/1
20 CAPSULE ORAL AS NEEDED
COMMUNITY

## 2020-12-09 RX ORDER — SODIUM POLYSTYRENE SULFONATE 15 G/60ML
SUSPENSION ORAL; RECTAL
COMMUNITY
Start: 2020-09-28 | End: 2021-01-18

## 2020-12-09 RX ORDER — FAMOTIDINE 20 MG/1
20 TABLET, FILM COATED ORAL
COMMUNITY
Start: 2020-06-22

## 2020-12-09 RX ORDER — ACETAMINOPHEN 325 MG/1
975 TABLET ORAL ONCE
Status: COMPLETED | OUTPATIENT
Start: 2020-12-09 | End: 2020-12-09

## 2020-12-09 RX ORDER — LOPERAMIDE HYDROCHLORIDE 2 MG/1
2 CAPSULE ORAL 4 TIMES DAILY PRN
Status: DISCONTINUED | OUTPATIENT
Start: 2020-12-09 | End: 2020-12-11 | Stop reason: HOSPADM

## 2020-12-09 RX ORDER — CEFTRIAXONE 1 G/50ML
1 INJECTION, SOLUTION INTRAVENOUS ONCE
Status: COMPLETED | OUTPATIENT
Start: 2020-12-09 | End: 2020-12-09

## 2020-12-09 RX ORDER — HYDROXYCHLOROQUINE SULFATE 200 MG/1
200 TABLET, FILM COATED ORAL EVERY 12 HOURS SCHEDULED
Status: DISCONTINUED | OUTPATIENT
Start: 2020-12-10 | End: 2020-12-10

## 2020-12-09 RX ADMIN — ACETAMINOPHEN 975 MG: 325 TABLET, FILM COATED ORAL at 13:56

## 2020-12-09 RX ADMIN — DIPHENHYDRAMINE HYDROCHLORIDE 25 MG: 50 INJECTION, SOLUTION INTRAMUSCULAR; INTRAVENOUS at 13:12

## 2020-12-09 RX ADMIN — SODIUM CHLORIDE 2000 ML: 9 INJECTION, SOLUTION INTRAVENOUS at 13:17

## 2020-12-09 RX ADMIN — METHYLPREDNISOLONE SODIUM SUCCINATE 125 MG: 125 INJECTION, POWDER, FOR SOLUTION INTRAMUSCULAR; INTRAVENOUS at 13:13

## 2020-12-09 RX ADMIN — CEFTRIAXONE 1 G: 1 INJECTION, SOLUTION INTRAVENOUS at 15:20

## 2020-12-10 LAB
ALBUMIN SERPL-MCNC: 3.6 G/DL (ref 3.5–5.2)
ALBUMIN/GLOB SERPL: 1.1 G/DL
ALP SERPL-CCNC: 101 U/L (ref 39–117)
ALT SERPL W P-5'-P-CCNC: 14 U/L (ref 1–33)
ANION GAP SERPL CALCULATED.3IONS-SCNC: 11.7 MMOL/L (ref 5–15)
AST SERPL-CCNC: 11 U/L (ref 1–32)
BILIRUB SERPL-MCNC: 0.3 MG/DL (ref 0–1.2)
BUN SERPL-MCNC: 26 MG/DL (ref 6–20)
BUN/CREAT SERPL: 20 (ref 7–25)
CALCIUM SPEC-SCNC: 10.6 MG/DL (ref 8.6–10.5)
CHLORIDE SERPL-SCNC: 107 MMOL/L (ref 98–107)
CO2 SERPL-SCNC: 13.3 MMOL/L (ref 22–29)
CREAT SERPL-MCNC: 1.3 MG/DL (ref 0.57–1)
D-LACTATE SERPL-SCNC: 1 MMOL/L (ref 0.5–2)
DEPRECATED RDW RBC AUTO: 44.6 FL (ref 37–54)
ERYTHROCYTE [DISTWIDTH] IN BLOOD BY AUTOMATED COUNT: 12.9 % (ref 12.3–15.4)
GFR SERPL CREATININE-BSD FRML MDRD: 48 ML/MIN/1.73
GLOBULIN UR ELPH-MCNC: 3.3 GM/DL
GLUCOSE SERPL-MCNC: 168 MG/DL (ref 65–99)
HCT VFR BLD AUTO: 43.8 % (ref 34–46.6)
HGB BLD-MCNC: 13.5 G/DL (ref 12–15.9)
MCH RBC QN AUTO: 28.7 PG (ref 26.6–33)
MCHC RBC AUTO-ENTMCNC: 30.8 G/DL (ref 31.5–35.7)
MCV RBC AUTO: 93.2 FL (ref 79–97)
PLATELET # BLD AUTO: 149 10*3/MM3 (ref 140–450)
PMV BLD AUTO: 11.1 FL (ref 6–12)
POTASSIUM SERPL-SCNC: 5.1 MMOL/L (ref 3.5–5.2)
PROT SERPL-MCNC: 6.9 G/DL (ref 6–8.5)
RBC # BLD AUTO: 4.7 10*6/MM3 (ref 3.77–5.28)
SODIUM SERPL-SCNC: 132 MMOL/L (ref 136–145)
WBC # BLD AUTO: 6.21 10*3/MM3 (ref 3.4–10.8)

## 2020-12-10 PROCEDURE — G0378 HOSPITAL OBSERVATION PER HR: HCPCS

## 2020-12-10 PROCEDURE — 96366 THER/PROPH/DIAG IV INF ADDON: CPT

## 2020-12-10 PROCEDURE — 63710000001 TACROLIMUS PER 1 MG: Performed by: FAMILY MEDICINE

## 2020-12-10 PROCEDURE — 96376 TX/PRO/DX INJ SAME DRUG ADON: CPT

## 2020-12-10 PROCEDURE — 85027 COMPLETE CBC AUTOMATED: CPT | Performed by: INTERNAL MEDICINE

## 2020-12-10 PROCEDURE — 96361 HYDRATE IV INFUSION ADD-ON: CPT

## 2020-12-10 PROCEDURE — 80053 COMPREHEN METABOLIC PANEL: CPT | Performed by: INTERNAL MEDICINE

## 2020-12-10 PROCEDURE — 25010000002 CEFTRIAXONE SODIUM-DEXTROSE 1-3.74 GM-%(50ML) RECONSTITUTED SOLUTION: Performed by: INTERNAL MEDICINE

## 2020-12-10 PROCEDURE — 25010000002 METHYLPREDNISOLONE PER 40 MG: Performed by: INTERNAL MEDICINE

## 2020-12-10 PROCEDURE — 63710000001 TACROLIMUS PER 1 MG: Performed by: INTERNAL MEDICINE

## 2020-12-10 PROCEDURE — 80197 ASSAY OF TACROLIMUS: CPT | Performed by: INTERNAL MEDICINE

## 2020-12-10 PROCEDURE — 99225 PR SBSQ OBSERVATION CARE/DAY 25 MINUTES: CPT | Performed by: FAMILY MEDICINE

## 2020-12-10 PROCEDURE — 63710000001 MYCOPHENOLATE MOFETIL PER 250 MG: Performed by: INTERNAL MEDICINE

## 2020-12-10 RX ORDER — TACROLIMUS 0.5 MG/1
9 CAPSULE ORAL EVERY EVENING
Status: DISCONTINUED | OUTPATIENT
Start: 2020-12-10 | End: 2020-12-11 | Stop reason: DRUGHIGH

## 2020-12-10 RX ORDER — SODIUM CHLORIDE 9 MG/ML
100 INJECTION, SOLUTION INTRAVENOUS CONTINUOUS
Status: DISCONTINUED | OUTPATIENT
Start: 2020-12-10 | End: 2020-12-11 | Stop reason: HOSPADM

## 2020-12-10 RX ORDER — SODIUM BICARBONATE 650 MG/1
1300 TABLET ORAL 2 TIMES DAILY
Status: DISCONTINUED | OUTPATIENT
Start: 2020-12-10 | End: 2020-12-11 | Stop reason: HOSPADM

## 2020-12-10 RX ORDER — TACROLIMUS 0.5 MG/1
4 CAPSULE ORAL NIGHTLY
Status: DISCONTINUED | OUTPATIENT
Start: 2020-12-10 | End: 2020-12-10 | Stop reason: DRUGHIGH

## 2020-12-10 RX ORDER — HYDROXYCHLOROQUINE SULFATE 200 MG/1
400 TABLET, FILM COATED ORAL EVERY 12 HOURS SCHEDULED
Status: DISCONTINUED | OUTPATIENT
Start: 2020-12-10 | End: 2020-12-10

## 2020-12-10 RX ORDER — LOSARTAN POTASSIUM 25 MG/1
25 TABLET ORAL
Status: DISCONTINUED | OUTPATIENT
Start: 2020-12-11 | End: 2020-12-10

## 2020-12-10 RX ORDER — NORGESTIMATE AND ETHINYL ESTRADIOL 0.25-0.035
1 KIT ORAL DAILY
Status: DISCONTINUED | OUTPATIENT
Start: 2020-12-10 | End: 2020-12-10

## 2020-12-10 RX ORDER — ACETAMINOPHEN 325 MG/1
650 TABLET ORAL EVERY 6 HOURS PRN
Status: DISCONTINUED | OUTPATIENT
Start: 2020-12-10 | End: 2020-12-11 | Stop reason: HOSPADM

## 2020-12-10 RX ORDER — HYDROXYCHLOROQUINE SULFATE 200 MG/1
200 TABLET, FILM COATED ORAL EVERY 12 HOURS SCHEDULED
Status: DISCONTINUED | OUTPATIENT
Start: 2020-12-10 | End: 2020-12-11 | Stop reason: HOSPADM

## 2020-12-10 RX ORDER — TACROLIMUS 5 MG/1
10 CAPSULE ORAL DAILY
Status: DISCONTINUED | OUTPATIENT
Start: 2020-12-11 | End: 2020-12-11 | Stop reason: DRUGHIGH

## 2020-12-10 RX ORDER — VALGANCICLOVIR 450 MG/1
450 TABLET, FILM COATED ORAL DAILY
Status: DISCONTINUED | OUTPATIENT
Start: 2020-12-10 | End: 2020-12-10

## 2020-12-10 RX ORDER — TACROLIMUS 5 MG/1
5 CAPSULE ORAL NIGHTLY
Status: DISCONTINUED | OUTPATIENT
Start: 2020-12-10 | End: 2020-12-10 | Stop reason: ALTCHOICE

## 2020-12-10 RX ORDER — TACROLIMUS 1 MG/1
CAPSULE ORAL NIGHTLY
Status: DISCONTINUED | OUTPATIENT
Start: 2020-12-10 | End: 2020-12-10 | Stop reason: ALTCHOICE

## 2020-12-10 RX ORDER — METHYLPREDNISOLONE 4 MG/1
40 TABLET ORAL ONCE
Status: DISCONTINUED | OUTPATIENT
Start: 2020-12-11 | End: 2020-12-10 | Stop reason: ALTCHOICE

## 2020-12-10 RX ORDER — METHYLPREDNISOLONE SODIUM SUCCINATE 40 MG/ML
40 INJECTION, POWDER, LYOPHILIZED, FOR SOLUTION INTRAMUSCULAR; INTRAVENOUS ONCE
Status: COMPLETED | OUTPATIENT
Start: 2020-12-11 | End: 2020-12-11

## 2020-12-10 RX ORDER — MYCOPHENOLATE MOFETIL 250 MG/1
1000 CAPSULE ORAL EVERY 12 HOURS SCHEDULED
Status: DISCONTINUED | OUTPATIENT
Start: 2020-12-10 | End: 2020-12-11 | Stop reason: HOSPADM

## 2020-12-10 RX ORDER — TACROLIMUS 5 MG/1
10 CAPSULE ORAL DAILY
Status: DISCONTINUED | OUTPATIENT
Start: 2020-12-11 | End: 2020-12-10 | Stop reason: SDUPTHER

## 2020-12-10 RX ORDER — NICARDIPINE HYDROCHLORIDE 20 MG/1
20 CAPSULE ORAL 2 TIMES DAILY PRN
Status: DISCONTINUED | OUTPATIENT
Start: 2020-12-10 | End: 2020-12-11 | Stop reason: HOSPADM

## 2020-12-10 RX ADMIN — HYDROXYCHLOROQUINE SULFATE 200 MG: 200 TABLET ORAL at 21:30

## 2020-12-10 RX ADMIN — SODIUM BICARBONATE 650 MG TABLET 1300 MG: at 10:59

## 2020-12-10 RX ADMIN — PANTOPRAZOLE SODIUM 40 MG: 40 TABLET, DELAYED RELEASE ORAL at 06:18

## 2020-12-10 RX ADMIN — MONTELUKAST 10 MG: 10 TABLET, FILM COATED ORAL at 21:30

## 2020-12-10 RX ADMIN — SODIUM CHLORIDE 100 ML/HR: 9 INJECTION, SOLUTION INTRAVENOUS at 10:59

## 2020-12-10 RX ADMIN — METHYLPREDNISOLONE SODIUM SUCCINATE 40 MG: 40 INJECTION, POWDER, FOR SOLUTION INTRAMUSCULAR; INTRAVENOUS at 01:03

## 2020-12-10 RX ADMIN — MONTELUKAST 10 MG: 10 TABLET, FILM COATED ORAL at 01:52

## 2020-12-10 RX ADMIN — NICARDIPINE HYDROCHLORIDE 20 MG: 20 CAPSULE ORAL at 06:59

## 2020-12-10 RX ADMIN — SODIUM CHLORIDE, PRESERVATIVE FREE 10 ML: 5 INJECTION INTRAVENOUS at 08:16

## 2020-12-10 RX ADMIN — HYDRALAZINE HYDROCHLORIDE 50 MG: 25 TABLET, FILM COATED ORAL at 21:30

## 2020-12-10 RX ADMIN — TACROLIMUS 9 MG: 0.5 CAPSULE ORAL at 10:37

## 2020-12-10 RX ADMIN — TACROLIMUS 9 MG: 0.5 CAPSULE ORAL at 23:00

## 2020-12-10 RX ADMIN — CEFTRIAXONE 1 G: 1 INJECTION, SOLUTION INTRAVENOUS at 14:38

## 2020-12-10 RX ADMIN — MYCOPHENOLATE MOFETIL 1000 MG: 250 CAPSULE ORAL at 21:30

## 2020-12-10 RX ADMIN — NICARDIPINE HYDROCHLORIDE 20 MG: 20 CAPSULE ORAL at 16:50

## 2020-12-10 RX ADMIN — FAMOTIDINE 20 MG: 20 TABLET, FILM COATED ORAL at 08:15

## 2020-12-10 RX ADMIN — MYCOPHENOLATE MOFETIL 1000 MG: 250 CAPSULE ORAL at 10:36

## 2020-12-10 RX ADMIN — CLONAZEPAM 0.5 MG: 0.5 TABLET ORAL at 16:49

## 2020-12-10 RX ADMIN — METHYLPREDNISOLONE SODIUM SUCCINATE 40 MG: 40 INJECTION, POWDER, FOR SOLUTION INTRAMUSCULAR; INTRAVENOUS at 08:15

## 2020-12-10 RX ADMIN — ACETAMINOPHEN 650 MG: 325 TABLET, FILM COATED ORAL at 06:59

## 2020-12-10 RX ADMIN — LOPERAMIDE HYDROCHLORIDE 2 MG: 2 CAPSULE ORAL at 01:03

## 2020-12-10 RX ADMIN — ACETAMINOPHEN 650 MG: 325 TABLET, FILM COATED ORAL at 14:45

## 2020-12-10 RX ADMIN — LOPERAMIDE HYDROCHLORIDE 2 MG: 2 CAPSULE ORAL at 12:16

## 2020-12-10 RX ADMIN — HYDRALAZINE HYDROCHLORIDE 50 MG: 25 TABLET, FILM COATED ORAL at 08:15

## 2020-12-10 RX ADMIN — SODIUM CHLORIDE 100 ML/HR: 9 INJECTION, SOLUTION INTRAVENOUS at 00:46

## 2020-12-10 RX ADMIN — METHYLPREDNISOLONE SODIUM SUCCINATE 40 MG: 40 INJECTION, POWDER, FOR SOLUTION INTRAMUSCULAR; INTRAVENOUS at 16:36

## 2020-12-10 RX ADMIN — HYDROXYCHLOROQUINE SULFATE 200 MG: 200 TABLET ORAL at 10:36

## 2020-12-10 RX ADMIN — SODIUM CHLORIDE, PRESERVATIVE FREE 10 ML: 5 INJECTION INTRAVENOUS at 01:03

## 2020-12-10 RX ADMIN — SODIUM BICARBONATE 650 MG TABLET 1300 MG: at 21:29

## 2020-12-11 ENCOUNTER — READMISSION MANAGEMENT (OUTPATIENT)
Dept: CALL CENTER | Facility: HOSPITAL | Age: 30
End: 2020-12-11

## 2020-12-11 VITALS
TEMPERATURE: 97.9 F | RESPIRATION RATE: 18 BRPM | DIASTOLIC BLOOD PRESSURE: 76 MMHG | BODY MASS INDEX: 20.25 KG/M2 | OXYGEN SATURATION: 99 % | SYSTOLIC BLOOD PRESSURE: 127 MMHG | HEIGHT: 67 IN | WEIGHT: 129 LBS | HEART RATE: 105 BPM

## 2020-12-11 LAB
ALBUMIN SERPL-MCNC: 3.4 G/DL (ref 3.5–5.2)
ANION GAP SERPL CALCULATED.3IONS-SCNC: 11.7 MMOL/L (ref 5–15)
BACTERIA SPEC AEROBE CULT: NORMAL
BASOPHILS # BLD AUTO: 0.01 10*3/MM3 (ref 0–0.2)
BASOPHILS NFR BLD AUTO: 0.2 % (ref 0–1.5)
BUN SERPL-MCNC: 32 MG/DL (ref 6–20)
BUN/CREAT SERPL: 25.6 (ref 7–25)
CALCIUM SPEC-SCNC: 10.5 MG/DL (ref 8.6–10.5)
CHLORIDE SERPL-SCNC: 110 MMOL/L (ref 98–107)
CO2 SERPL-SCNC: 15.3 MMOL/L (ref 22–29)
CREAT SERPL-MCNC: 1.25 MG/DL (ref 0.57–1)
DEPRECATED RDW RBC AUTO: 45.1 FL (ref 37–54)
EOSINOPHIL # BLD AUTO: 0 10*3/MM3 (ref 0–0.4)
EOSINOPHIL NFR BLD AUTO: 0 % (ref 0.3–6.2)
ERYTHROCYTE [DISTWIDTH] IN BLOOD BY AUTOMATED COUNT: 13.2 % (ref 12.3–15.4)
GFR SERPL CREATININE-BSD FRML MDRD: 50 ML/MIN/1.73
GLUCOSE SERPL-MCNC: 170 MG/DL (ref 65–99)
HCT VFR BLD AUTO: 42.8 % (ref 34–46.6)
HGB BLD-MCNC: 13.1 G/DL (ref 12–15.9)
IMM GRANULOCYTES # BLD AUTO: 0.06 10*3/MM3 (ref 0–0.05)
IMM GRANULOCYTES NFR BLD AUTO: 1.3 % (ref 0–0.5)
LYMPHOCYTES # BLD AUTO: 0.43 10*3/MM3 (ref 0.7–3.1)
LYMPHOCYTES NFR BLD AUTO: 9.6 % (ref 19.6–45.3)
MCH RBC QN AUTO: 28.7 PG (ref 26.6–33)
MCHC RBC AUTO-ENTMCNC: 30.6 G/DL (ref 31.5–35.7)
MCV RBC AUTO: 93.7 FL (ref 79–97)
MONOCYTES # BLD AUTO: 0.29 10*3/MM3 (ref 0.1–0.9)
MONOCYTES NFR BLD AUTO: 6.5 % (ref 5–12)
NEUTROPHILS NFR BLD AUTO: 3.7 10*3/MM3 (ref 1.7–7)
NEUTROPHILS NFR BLD AUTO: 82.4 % (ref 42.7–76)
NRBC BLD AUTO-RTO: 0 /100 WBC (ref 0–0.2)
PHOSPHATE SERPL-MCNC: 3.2 MG/DL (ref 2.5–4.5)
PLATELET # BLD AUTO: 197 10*3/MM3 (ref 140–450)
PMV BLD AUTO: 11.5 FL (ref 6–12)
POTASSIUM SERPL-SCNC: 5 MMOL/L (ref 3.5–5.2)
RBC # BLD AUTO: 4.57 10*6/MM3 (ref 3.77–5.28)
SODIUM SERPL-SCNC: 137 MMOL/L (ref 136–145)
WBC # BLD AUTO: 4.49 10*3/MM3 (ref 3.4–10.8)

## 2020-12-11 PROCEDURE — 85025 COMPLETE CBC W/AUTO DIFF WBC: CPT | Performed by: FAMILY MEDICINE

## 2020-12-11 PROCEDURE — 99217 PR OBSERVATION CARE DISCHARGE MANAGEMENT: CPT | Performed by: INTERNAL MEDICINE

## 2020-12-11 PROCEDURE — 25010000002 TACROLIMUS PER 1 MG: Performed by: FAMILY MEDICINE

## 2020-12-11 PROCEDURE — 80069 RENAL FUNCTION PANEL: CPT | Performed by: FAMILY MEDICINE

## 2020-12-11 PROCEDURE — 96376 TX/PRO/DX INJ SAME DRUG ADON: CPT

## 2020-12-11 PROCEDURE — 63710000001 MYCOPHENOLATE MOFETIL PER 250 MG: Performed by: INTERNAL MEDICINE

## 2020-12-11 PROCEDURE — 25010000002 METHYLPREDNISOLONE PER 40 MG: Performed by: INTERNAL MEDICINE

## 2020-12-11 PROCEDURE — G0378 HOSPITAL OBSERVATION PER HR: HCPCS

## 2020-12-11 PROCEDURE — 63710000001 PREDNISONE PER 5 MG: Performed by: INTERNAL MEDICINE

## 2020-12-11 RX ORDER — TACROLIMUS 5 MG/1
10 CAPSULE ORAL EVERY MORNING
Status: DISCONTINUED | OUTPATIENT
Start: 2020-12-11 | End: 2020-12-11 | Stop reason: HOSPADM

## 2020-12-11 RX ORDER — TACROLIMUS 5 MG/1
10 CAPSULE ORAL NIGHTLY
Status: DISCONTINUED | OUTPATIENT
Start: 2020-12-11 | End: 2020-12-11

## 2020-12-11 RX ORDER — TACROLIMUS 1 MG/1
4 CAPSULE ORAL EVERY MORNING
Status: DISCONTINUED | OUTPATIENT
Start: 2020-12-11 | End: 2020-12-11

## 2020-12-11 RX ORDER — PREDNISONE 10 MG/1
40 TABLET ORAL ONCE
Status: COMPLETED | OUTPATIENT
Start: 2020-12-11 | End: 2020-12-11

## 2020-12-11 RX ORDER — PREDNISONE 10 MG/1
TABLET ORAL
Qty: 10 TABLET | Refills: 0 | Status: SHIPPED | OUTPATIENT
Start: 2020-12-11 | End: 2021-01-18

## 2020-12-11 RX ORDER — DIPHENHYDRAMINE HCL 25 MG
25 TABLET ORAL 2 TIMES DAILY
Qty: 6 TABLET | Refills: 0 | Status: SHIPPED | OUTPATIENT
Start: 2020-12-11 | End: 2020-12-14

## 2020-12-11 RX ORDER — TACROLIMUS 1 MG/1
4 CAPSULE ORAL NIGHTLY
Status: DISCONTINUED | OUTPATIENT
Start: 2020-12-11 | End: 2020-12-11 | Stop reason: HOSPADM

## 2020-12-11 RX ORDER — TACROLIMUS 5 MG/1
5 CAPSULE ORAL NIGHTLY
Status: DISCONTINUED | OUTPATIENT
Start: 2020-12-11 | End: 2020-12-11 | Stop reason: HOSPADM

## 2020-12-11 RX ORDER — TACROLIMUS 5 MG/1
5 CAPSULE ORAL EVERY MORNING
Status: DISCONTINUED | OUTPATIENT
Start: 2020-12-11 | End: 2020-12-11

## 2020-12-11 RX ADMIN — HYDROXYCHLOROQUINE SULFATE 200 MG: 200 TABLET ORAL at 10:50

## 2020-12-11 RX ADMIN — MYCOPHENOLATE MOFETIL 1000 MG: 250 CAPSULE ORAL at 10:50

## 2020-12-11 RX ADMIN — PREDNISONE 40 MG: 10 TABLET ORAL at 08:30

## 2020-12-11 RX ADMIN — SODIUM BICARBONATE 650 MG TABLET 1300 MG: at 08:31

## 2020-12-11 RX ADMIN — NICARDIPINE HYDROCHLORIDE 20 MG: 20 CAPSULE ORAL at 06:50

## 2020-12-11 RX ADMIN — TACROLIMUS 10 MG: 5 CAPSULE ORAL at 08:47

## 2020-12-11 RX ADMIN — CLONAZEPAM 0.5 MG: 0.5 TABLET ORAL at 06:26

## 2020-12-11 RX ADMIN — ACETAMINOPHEN 650 MG: 325 TABLET, FILM COATED ORAL at 06:26

## 2020-12-11 RX ADMIN — HYDRALAZINE HYDROCHLORIDE 50 MG: 25 TABLET, FILM COATED ORAL at 08:31

## 2020-12-11 RX ADMIN — CETIRIZINE HYDROCHLORIDE 10 MG: 10 TABLET, FILM COATED ORAL at 08:31

## 2020-12-11 RX ADMIN — FAMOTIDINE 20 MG: 20 TABLET, FILM COATED ORAL at 08:31

## 2020-12-11 RX ADMIN — METHYLPREDNISOLONE SODIUM SUCCINATE 40 MG: 40 INJECTION, POWDER, FOR SOLUTION INTRAMUSCULAR; INTRAVENOUS at 01:17

## 2020-12-11 RX ADMIN — PANTOPRAZOLE SODIUM 40 MG: 40 TABLET, DELAYED RELEASE ORAL at 06:26

## 2020-12-12 ENCOUNTER — TRANSITIONAL CARE MANAGEMENT TELEPHONE ENCOUNTER (OUTPATIENT)
Dept: CALL CENTER | Facility: HOSPITAL | Age: 30
End: 2020-12-12

## 2020-12-13 LAB — TACROLIMUS BLD LC/MS/MS-MCNC: 25 NG/ML (ref 2–20)

## 2020-12-14 ENCOUNTER — LAB (OUTPATIENT)
Dept: LAB | Facility: HOSPITAL | Age: 30
End: 2020-12-14

## 2020-12-14 DIAGNOSIS — Z94.0 KIDNEY REPLACED BY TRANSPLANT: ICD-10-CM

## 2020-12-14 DIAGNOSIS — Z79.621 ENCOUNTER FOR MONITORING TACROLIMUS THERAPY: ICD-10-CM

## 2020-12-14 DIAGNOSIS — R39.9 UNSPECIFIED SYMPTOMS AND SIGNS INVOLVING THE GENITOURINARY SYSTEM: ICD-10-CM

## 2020-12-14 DIAGNOSIS — Z51.81 ENCOUNTER FOR MONITORING TACROLIMUS THERAPY: ICD-10-CM

## 2020-12-14 DIAGNOSIS — D64.9 ANEMIA, UNSPECIFIED TYPE: ICD-10-CM

## 2020-12-14 DIAGNOSIS — R79.89 ELEVATED SERUM CREATININE: ICD-10-CM

## 2020-12-14 LAB
ALBUMIN SERPL-MCNC: 3.5 G/DL (ref 3.5–5.2)
ANION GAP SERPL CALCULATED.3IONS-SCNC: 11.4 MMOL/L (ref 5–15)
BACTERIA SPEC AEROBE CULT: NORMAL
BACTERIA SPEC AEROBE CULT: NORMAL
BACTERIA UR QL AUTO: NORMAL /HPF
BILIRUB UR QL STRIP: NEGATIVE
BUN SERPL-MCNC: 31 MG/DL (ref 6–20)
BUN/CREAT SERPL: 33 (ref 7–25)
CALCIUM SPEC-SCNC: 9.8 MG/DL (ref 8.6–10.5)
CHLORIDE SERPL-SCNC: 104 MMOL/L (ref 98–107)
CLARITY UR: CLEAR
CO2 SERPL-SCNC: 21.6 MMOL/L (ref 22–29)
COLOR UR: YELLOW
CREAT SERPL-MCNC: 0.94 MG/DL (ref 0.57–1)
CREAT UR-MCNC: 55.1 MG/DL
DEPRECATED RDW RBC AUTO: 39.8 FL (ref 37–54)
EOSINOPHIL # BLD MANUAL: 0.2 10*3/MM3 (ref 0–0.4)
EOSINOPHIL NFR BLD MANUAL: 3 % (ref 0.3–6.2)
ERYTHROCYTE [DISTWIDTH] IN BLOOD BY AUTOMATED COUNT: 12 % (ref 12.3–15.4)
GFR SERPL CREATININE-BSD FRML MDRD: 70 ML/MIN/1.73
GLUCOSE SERPL-MCNC: 128 MG/DL (ref 65–99)
GLUCOSE UR STRIP-MCNC: NEGATIVE MG/DL
HCT VFR BLD AUTO: 40.7 % (ref 34–46.6)
HGB BLD-MCNC: 13 G/DL (ref 12–15.9)
HGB UR QL STRIP.AUTO: NEGATIVE
HYALINE CASTS UR QL AUTO: NORMAL /LPF
KETONES UR QL STRIP: NEGATIVE
LEUKOCYTE ESTERASE UR QL STRIP.AUTO: NEGATIVE
LYMPHOCYTES # BLD MANUAL: 0.59 10*3/MM3 (ref 0.7–3.1)
LYMPHOCYTES NFR BLD MANUAL: 11 % (ref 5–12)
LYMPHOCYTES NFR BLD MANUAL: 9 % (ref 19.6–45.3)
MAGNESIUM SERPL-MCNC: 2 MG/DL (ref 1.6–2.6)
MCH RBC QN AUTO: 29.1 PG (ref 26.6–33)
MCHC RBC AUTO-ENTMCNC: 31.9 G/DL (ref 31.5–35.7)
MCV RBC AUTO: 91.3 FL (ref 79–97)
METAMYELOCYTES NFR BLD MANUAL: 2 % (ref 0–0)
MONOCYTES # BLD AUTO: 0.72 10*3/MM3 (ref 0.1–0.9)
NEUTROPHILS # BLD AUTO: 4.91 10*3/MM3 (ref 1.7–7)
NEUTROPHILS NFR BLD MANUAL: 75 % (ref 42.7–76)
NITRITE UR QL STRIP: NEGATIVE
PH UR STRIP.AUTO: 6.5 [PH] (ref 5–8)
PHOSPHATE SERPL-MCNC: 1.9 MG/DL (ref 2.5–4.5)
PLAT MORPH BLD: NORMAL
PLATELET # BLD AUTO: 237 10*3/MM3 (ref 140–450)
PMV BLD AUTO: 11.9 FL (ref 6–12)
POTASSIUM SERPL-SCNC: 4.1 MMOL/L (ref 3.5–5.2)
PROT UR QL STRIP: ABNORMAL
PROT UR-MCNC: 37 MG/DL
PROT/CREAT UR: 671.5 MG/G CREA (ref 0–200)
RBC # BLD AUTO: 4.46 10*6/MM3 (ref 3.77–5.28)
RBC # UR: NORMAL /HPF
RBC MORPH BLD: NORMAL
REF LAB TEST METHOD: NORMAL
SODIUM SERPL-SCNC: 137 MMOL/L (ref 136–145)
SP GR UR STRIP: 1.02 (ref 1–1.03)
SQUAMOUS #/AREA URNS HPF: NORMAL /HPF
UROBILINOGEN UR QL STRIP: ABNORMAL
WBC # BLD AUTO: 6.54 10*3/MM3 (ref 3.4–10.8)
WBC MORPH BLD: NORMAL
WBC UR QL AUTO: NORMAL /HPF

## 2020-12-14 PROCEDURE — 82570 ASSAY OF URINE CREATININE: CPT

## 2020-12-14 PROCEDURE — 36415 COLL VENOUS BLD VENIPUNCTURE: CPT

## 2020-12-14 PROCEDURE — 83735 ASSAY OF MAGNESIUM: CPT

## 2020-12-14 PROCEDURE — 85007 BL SMEAR W/DIFF WBC COUNT: CPT

## 2020-12-14 PROCEDURE — 81001 URINALYSIS AUTO W/SCOPE: CPT

## 2020-12-14 PROCEDURE — 87077 CULTURE AEROBIC IDENTIFY: CPT

## 2020-12-14 PROCEDURE — 84156 ASSAY OF PROTEIN URINE: CPT

## 2020-12-14 PROCEDURE — 87086 URINE CULTURE/COLONY COUNT: CPT

## 2020-12-14 PROCEDURE — 80069 RENAL FUNCTION PANEL: CPT

## 2020-12-14 PROCEDURE — 85025 COMPLETE CBC W/AUTO DIFF WBC: CPT

## 2020-12-14 PROCEDURE — 80197 ASSAY OF TACROLIMUS: CPT

## 2020-12-15 LAB — BACTERIA SPEC AEROBE CULT: ABNORMAL

## 2020-12-16 LAB — TACROLIMUS BLD LC/MS/MS-MCNC: 15.3 NG/ML (ref 2–20)

## 2020-12-17 LAB
CMV DNA SERPL NAA+PROBE-ACNC: NEGATIVE IU/ML
CMV DNA SERPL NAA+PROBE-LOG IU: NORMAL {LOG_IU}/ML

## 2020-12-18 ENCOUNTER — READMISSION MANAGEMENT (OUTPATIENT)
Dept: CALL CENTER | Facility: HOSPITAL | Age: 30
End: 2020-12-18

## 2020-12-23 ENCOUNTER — LAB (OUTPATIENT)
Dept: LAB | Facility: HOSPITAL | Age: 30
End: 2020-12-23

## 2020-12-23 DIAGNOSIS — R79.89 ELEVATED SERUM CREATININE: ICD-10-CM

## 2020-12-23 DIAGNOSIS — Z51.81 ENCOUNTER FOR MONITORING TACROLIMUS THERAPY: ICD-10-CM

## 2020-12-23 DIAGNOSIS — D64.9 ANEMIA, UNSPECIFIED TYPE: ICD-10-CM

## 2020-12-23 DIAGNOSIS — Z79.621 ENCOUNTER FOR MONITORING TACROLIMUS THERAPY: ICD-10-CM

## 2020-12-23 DIAGNOSIS — R39.9 UNSPECIFIED SYMPTOMS AND SIGNS INVOLVING THE GENITOURINARY SYSTEM: ICD-10-CM

## 2020-12-23 DIAGNOSIS — Z94.0 KIDNEY REPLACED BY TRANSPLANT: ICD-10-CM

## 2020-12-23 LAB
ALBUMIN SERPL-MCNC: 3.7 G/DL (ref 3.5–5.2)
ANION GAP SERPL CALCULATED.3IONS-SCNC: 11.6 MMOL/L (ref 5–15)
BACTERIA UR QL AUTO: ABNORMAL /HPF
BASOPHILS # BLD AUTO: 0.05 10*3/MM3 (ref 0–0.2)
BASOPHILS NFR BLD AUTO: 0.8 % (ref 0–1.5)
BILIRUB UR QL STRIP: NEGATIVE
BUN SERPL-MCNC: 17 MG/DL (ref 6–20)
BUN/CREAT SERPL: 14.2 (ref 7–25)
CALCIUM SPEC-SCNC: 9.6 MG/DL (ref 8.6–10.5)
CHLORIDE SERPL-SCNC: 105 MMOL/L (ref 98–107)
CLARITY UR: ABNORMAL
CO2 SERPL-SCNC: 17.4 MMOL/L (ref 22–29)
COLOR UR: YELLOW
CREAT SERPL-MCNC: 1.2 MG/DL (ref 0.57–1)
CREAT UR-MCNC: 208.9 MG/DL
DEPRECATED RDW RBC AUTO: 38.5 FL (ref 37–54)
EOSINOPHIL # BLD AUTO: 0.09 10*3/MM3 (ref 0–0.4)
EOSINOPHIL NFR BLD AUTO: 1.4 % (ref 0.3–6.2)
ERYTHROCYTE [DISTWIDTH] IN BLOOD BY AUTOMATED COUNT: 12.1 % (ref 12.3–15.4)
GFR SERPL CREATININE-BSD FRML MDRD: 53 ML/MIN/1.73
GLUCOSE SERPL-MCNC: 123 MG/DL (ref 65–99)
GLUCOSE UR STRIP-MCNC: ABNORMAL MG/DL
HCT VFR BLD AUTO: 38 % (ref 34–46.6)
HGB BLD-MCNC: 12.3 G/DL (ref 12–15.9)
HGB UR QL STRIP.AUTO: NEGATIVE
HYALINE CASTS UR QL AUTO: ABNORMAL /LPF
IMM GRANULOCYTES # BLD AUTO: 0.13 10*3/MM3 (ref 0–0.05)
IMM GRANULOCYTES NFR BLD AUTO: 2 % (ref 0–0.5)
KETONES UR QL STRIP: NEGATIVE
LEUKOCYTE ESTERASE UR QL STRIP.AUTO: NEGATIVE
LYMPHOCYTES # BLD AUTO: 0.63 10*3/MM3 (ref 0.7–3.1)
LYMPHOCYTES NFR BLD AUTO: 9.7 % (ref 19.6–45.3)
MAGNESIUM SERPL-MCNC: 1.8 MG/DL (ref 1.6–2.6)
MCH RBC QN AUTO: 28.4 PG (ref 26.6–33)
MCHC RBC AUTO-ENTMCNC: 32.4 G/DL (ref 31.5–35.7)
MCV RBC AUTO: 87.8 FL (ref 79–97)
MONOCYTES # BLD AUTO: 0.48 10*3/MM3 (ref 0.1–0.9)
MONOCYTES NFR BLD AUTO: 7.4 % (ref 5–12)
NEUTROPHILS NFR BLD AUTO: 5.1 10*3/MM3 (ref 1.7–7)
NEUTROPHILS NFR BLD AUTO: 78.7 % (ref 42.7–76)
NITRITE UR QL STRIP: NEGATIVE
NRBC BLD AUTO-RTO: 0 /100 WBC (ref 0–0.2)
PH UR STRIP.AUTO: 5.5 [PH] (ref 5–8)
PHOSPHATE SERPL-MCNC: 3.1 MG/DL (ref 2.5–4.5)
PLATELET # BLD AUTO: 244 10*3/MM3 (ref 140–450)
PMV BLD AUTO: 12.5 FL (ref 6–12)
POTASSIUM SERPL-SCNC: 5.4 MMOL/L (ref 3.5–5.2)
PROT UR QL STRIP: ABNORMAL
PROT UR-MCNC: 135 MG/DL
PROT/CREAT UR: 646.2 MG/G CREA (ref 0–200)
RBC # BLD AUTO: 4.33 10*6/MM3 (ref 3.77–5.28)
RBC # UR: ABNORMAL /HPF
REF LAB TEST METHOD: ABNORMAL
SODIUM SERPL-SCNC: 134 MMOL/L (ref 136–145)
SP GR UR STRIP: 1.03 (ref 1–1.03)
SQUAMOUS #/AREA URNS HPF: ABNORMAL /HPF
UROBILINOGEN UR QL STRIP: ABNORMAL
WBC # BLD AUTO: 6.48 10*3/MM3 (ref 3.4–10.8)
WBC UR QL AUTO: ABNORMAL /HPF

## 2020-12-23 PROCEDURE — 85025 COMPLETE CBC W/AUTO DIFF WBC: CPT

## 2020-12-23 PROCEDURE — 84156 ASSAY OF PROTEIN URINE: CPT

## 2020-12-23 PROCEDURE — 83735 ASSAY OF MAGNESIUM: CPT

## 2020-12-23 PROCEDURE — 36415 COLL VENOUS BLD VENIPUNCTURE: CPT

## 2020-12-23 PROCEDURE — 82570 ASSAY OF URINE CREATININE: CPT

## 2020-12-23 PROCEDURE — 80197 ASSAY OF TACROLIMUS: CPT

## 2020-12-23 PROCEDURE — 81001 URINALYSIS AUTO W/SCOPE: CPT

## 2020-12-23 PROCEDURE — 80069 RENAL FUNCTION PANEL: CPT

## 2020-12-23 PROCEDURE — 87086 URINE CULTURE/COLONY COUNT: CPT

## 2020-12-24 LAB — BACTERIA SPEC AEROBE CULT: NO GROWTH

## 2020-12-25 LAB
CMV DNA SERPL NAA+PROBE-ACNC: NEGATIVE IU/ML
CMV DNA SERPL NAA+PROBE-LOG IU: NORMAL {LOG_IU}/ML

## 2020-12-26 ENCOUNTER — APPOINTMENT (OUTPATIENT)
Dept: CT IMAGING | Facility: HOSPITAL | Age: 30
End: 2020-12-26

## 2020-12-26 ENCOUNTER — HOSPITAL ENCOUNTER (EMERGENCY)
Facility: HOSPITAL | Age: 30
Discharge: HOME | End: 2020-12-26
Attending: STUDENT IN AN ORGANIZED HEALTH CARE EDUCATION/TRAINING PROGRAM | Admitting: STUDENT IN AN ORGANIZED HEALTH CARE EDUCATION/TRAINING PROGRAM

## 2020-12-26 VITALS
HEART RATE: 96 BPM | WEIGHT: 140 LBS | RESPIRATION RATE: 18 BRPM | SYSTOLIC BLOOD PRESSURE: 135 MMHG | OXYGEN SATURATION: 100 % | DIASTOLIC BLOOD PRESSURE: 75 MMHG | TEMPERATURE: 98.9 F | HEIGHT: 67 IN | BODY MASS INDEX: 21.97 KG/M2

## 2020-12-26 DIAGNOSIS — N39.0 URINARY TRACT INFECTION WITHOUT HEMATURIA, SITE UNSPECIFIED: Primary | ICD-10-CM

## 2020-12-26 DIAGNOSIS — R11.2 NAUSEA AND VOMITING, INTRACTABILITY OF VOMITING NOT SPECIFIED, UNSPECIFIED VOMITING TYPE: ICD-10-CM

## 2020-12-26 DIAGNOSIS — Z94.0 HISTORY OF KIDNEY TRANSPLANT: ICD-10-CM

## 2020-12-26 LAB
ALBUMIN SERPL-MCNC: 3.6 G/DL (ref 3.5–5.2)
ALBUMIN/GLOB SERPL: 1.2 G/DL
ALP SERPL-CCNC: 137 U/L (ref 39–117)
ALT SERPL W P-5'-P-CCNC: 16 U/L (ref 1–33)
ANION GAP SERPL CALCULATED.3IONS-SCNC: 14.1 MMOL/L (ref 5–15)
ANION GAP SERPL CALCULATED.3IONS-SCNC: 14.5 MMOL/L (ref 5–15)
AST SERPL-CCNC: 11 U/L (ref 1–32)
BACTERIA UR QL AUTO: ABNORMAL /HPF
BASOPHILS # BLD AUTO: 0.02 10*3/MM3 (ref 0–0.2)
BASOPHILS NFR BLD AUTO: 0.3 % (ref 0–1.5)
BILIRUB SERPL-MCNC: 0.9 MG/DL (ref 0–1.2)
BILIRUB UR QL STRIP: ABNORMAL
BUN SERPL-MCNC: 27 MG/DL (ref 6–20)
BUN SERPL-MCNC: 28 MG/DL (ref 6–20)
BUN/CREAT SERPL: 13 (ref 7–25)
BUN/CREAT SERPL: 14.9 (ref 7–25)
CALCIUM SPEC-SCNC: 10.2 MG/DL (ref 8.6–10.5)
CALCIUM SPEC-SCNC: 9.5 MG/DL (ref 8.6–10.5)
CHLORIDE SERPL-SCNC: 102 MMOL/L (ref 98–107)
CHLORIDE SERPL-SCNC: 103 MMOL/L (ref 98–107)
CLARITY UR: ABNORMAL
CO2 SERPL-SCNC: 12.9 MMOL/L (ref 22–29)
CO2 SERPL-SCNC: 14.5 MMOL/L (ref 22–29)
COLOR UR: ABNORMAL
CREAT SERPL-MCNC: 1.81 MG/DL (ref 0.57–1)
CREAT SERPL-MCNC: 2.16 MG/DL (ref 0.57–1)
D-LACTATE SERPL-SCNC: 0.4 MMOL/L (ref 0.5–2)
DEPRECATED RDW RBC AUTO: 45.5 FL (ref 37–54)
EOSINOPHIL # BLD AUTO: 0.05 10*3/MM3 (ref 0–0.4)
EOSINOPHIL NFR BLD AUTO: 0.7 % (ref 0.3–6.2)
ERYTHROCYTE [DISTWIDTH] IN BLOOD BY AUTOMATED COUNT: 13.2 % (ref 12.3–15.4)
GFR SERPL CREATININE-BSD FRML MDRD: 27 ML/MIN/1.73
GFR SERPL CREATININE-BSD FRML MDRD: 33 ML/MIN/1.73
GLOBULIN UR ELPH-MCNC: 2.9 GM/DL
GLUCOSE SERPL-MCNC: 107 MG/DL (ref 65–99)
GLUCOSE SERPL-MCNC: 119 MG/DL (ref 65–99)
GLUCOSE UR STRIP-MCNC: NEGATIVE MG/DL
HCT VFR BLD AUTO: 39.9 % (ref 34–46.6)
HGB BLD-MCNC: 12.3 G/DL (ref 12–15.9)
HGB UR QL STRIP.AUTO: NEGATIVE
HYALINE CASTS UR QL AUTO: ABNORMAL /LPF
IMM GRANULOCYTES # BLD AUTO: 0.09 10*3/MM3 (ref 0–0.05)
IMM GRANULOCYTES NFR BLD AUTO: 1.2 % (ref 0–0.5)
KETONES UR QL STRIP: ABNORMAL
LEUKOCYTE ESTERASE UR QL STRIP.AUTO: ABNORMAL
LYMPHOCYTES # BLD AUTO: 0.47 10*3/MM3 (ref 0.7–3.1)
LYMPHOCYTES NFR BLD AUTO: 6.4 % (ref 19.6–45.3)
MCH RBC QN AUTO: 28.4 PG (ref 26.6–33)
MCHC RBC AUTO-ENTMCNC: 30.8 G/DL (ref 31.5–35.7)
MCV RBC AUTO: 92.1 FL (ref 79–97)
MONOCYTES # BLD AUTO: 0.49 10*3/MM3 (ref 0.1–0.9)
MONOCYTES NFR BLD AUTO: 6.7 % (ref 5–12)
NEUTROPHILS NFR BLD AUTO: 6.23 10*3/MM3 (ref 1.7–7)
NEUTROPHILS NFR BLD AUTO: 84.7 % (ref 42.7–76)
NITRITE UR QL STRIP: POSITIVE
NRBC BLD AUTO-RTO: 0 /100 WBC (ref 0–0.2)
PH UR STRIP.AUTO: <=5 [PH] (ref 5–8)
PLATELET # BLD AUTO: 161 10*3/MM3 (ref 140–450)
PMV BLD AUTO: 10.9 FL (ref 6–12)
POTASSIUM SERPL-SCNC: 5.5 MMOL/L (ref 3.5–5.2)
POTASSIUM SERPL-SCNC: 5.8 MMOL/L (ref 3.5–5.2)
PROT SERPL-MCNC: 6.5 G/DL (ref 6–8.5)
PROT UR QL STRIP: ABNORMAL
RBC # BLD AUTO: 4.33 10*6/MM3 (ref 3.77–5.28)
RBC # UR: ABNORMAL /HPF
REF LAB TEST METHOD: ABNORMAL
SODIUM SERPL-SCNC: 130 MMOL/L (ref 136–145)
SODIUM SERPL-SCNC: 131 MMOL/L (ref 136–145)
SP GR UR STRIP: >=1.03 (ref 1–1.03)
SQUAMOUS #/AREA URNS HPF: ABNORMAL /HPF
TACROLIMUS BLD LC/MS/MS-MCNC: 7.6 NG/ML (ref 2–20)
TRANS CELLS #/AREA URNS HPF: ABNORMAL /HPF
UROBILINOGEN UR QL STRIP: ABNORMAL
WBC # BLD AUTO: 7.35 10*3/MM3 (ref 3.4–10.8)
WBC CLUMPS # UR AUTO: ABNORMAL /HPF
WBC UR QL AUTO: ABNORMAL /HPF

## 2020-12-26 PROCEDURE — 99282 EMERGENCY DEPT VISIT SF MDM: CPT

## 2020-12-26 PROCEDURE — 81001 URINALYSIS AUTO W/SCOPE: CPT | Performed by: STUDENT IN AN ORGANIZED HEALTH CARE EDUCATION/TRAINING PROGRAM

## 2020-12-26 PROCEDURE — 85025 COMPLETE CBC W/AUTO DIFF WBC: CPT | Performed by: STUDENT IN AN ORGANIZED HEALTH CARE EDUCATION/TRAINING PROGRAM

## 2020-12-26 PROCEDURE — 25010000002 MORPHINE PER 10 MG: Performed by: STUDENT IN AN ORGANIZED HEALTH CARE EDUCATION/TRAINING PROGRAM

## 2020-12-26 PROCEDURE — 74176 CT ABD & PELVIS W/O CONTRAST: CPT

## 2020-12-26 PROCEDURE — 96365 THER/PROPH/DIAG IV INF INIT: CPT

## 2020-12-26 PROCEDURE — 25010000002 HALOPERIDOL LACTATE PER 5 MG: Performed by: STUDENT IN AN ORGANIZED HEALTH CARE EDUCATION/TRAINING PROGRAM

## 2020-12-26 PROCEDURE — 25010000002 PROMETHAZINE PER 50 MG: Performed by: STUDENT IN AN ORGANIZED HEALTH CARE EDUCATION/TRAINING PROGRAM

## 2020-12-26 PROCEDURE — 83605 ASSAY OF LACTIC ACID: CPT | Performed by: STUDENT IN AN ORGANIZED HEALTH CARE EDUCATION/TRAINING PROGRAM

## 2020-12-26 PROCEDURE — 99283 EMERGENCY DEPT VISIT LOW MDM: CPT

## 2020-12-26 PROCEDURE — 25010000002 CEFTRIAXONE PER 250 MG: Performed by: STUDENT IN AN ORGANIZED HEALTH CARE EDUCATION/TRAINING PROGRAM

## 2020-12-26 PROCEDURE — 80053 COMPREHEN METABOLIC PANEL: CPT | Performed by: STUDENT IN AN ORGANIZED HEALTH CARE EDUCATION/TRAINING PROGRAM

## 2020-12-26 PROCEDURE — 25010000002 ONDANSETRON PER 1 MG: Performed by: STUDENT IN AN ORGANIZED HEALTH CARE EDUCATION/TRAINING PROGRAM

## 2020-12-26 PROCEDURE — 25010000002 DIPHENHYDRAMINE PER 50 MG: Performed by: STUDENT IN AN ORGANIZED HEALTH CARE EDUCATION/TRAINING PROGRAM

## 2020-12-26 PROCEDURE — 96376 TX/PRO/DX INJ SAME DRUG ADON: CPT

## 2020-12-26 PROCEDURE — 96375 TX/PRO/DX INJ NEW DRUG ADDON: CPT

## 2020-12-26 RX ORDER — DIPHENHYDRAMINE HYDROCHLORIDE 50 MG/ML
25 INJECTION INTRAMUSCULAR; INTRAVENOUS ONCE
Status: COMPLETED | OUTPATIENT
Start: 2020-12-26 | End: 2020-12-26

## 2020-12-26 RX ORDER — MORPHINE SULFATE 4 MG/ML
4 INJECTION, SOLUTION INTRAMUSCULAR; INTRAVENOUS ONCE
Status: COMPLETED | OUTPATIENT
Start: 2020-12-26 | End: 2020-12-26

## 2020-12-26 RX ORDER — ONDANSETRON 4 MG/1
TABLET, ORALLY DISINTEGRATING ORAL
Qty: 20 TABLET | Refills: 0 | Status: SHIPPED | OUTPATIENT
Start: 2020-12-26

## 2020-12-26 RX ORDER — ONDANSETRON 2 MG/ML
8 INJECTION INTRAMUSCULAR; INTRAVENOUS ONCE
Status: COMPLETED | OUTPATIENT
Start: 2020-12-26 | End: 2020-12-26

## 2020-12-26 RX ORDER — CEFDINIR 300 MG/1
600 CAPSULE ORAL DAILY
Qty: 20 CAPSULE | Refills: 0 | Status: SHIPPED | OUTPATIENT
Start: 2020-12-26 | End: 2021-01-18

## 2020-12-26 RX ORDER — HALOPERIDOL 5 MG/ML
2.5 INJECTION INTRAMUSCULAR ONCE
Status: COMPLETED | OUTPATIENT
Start: 2020-12-26 | End: 2020-12-26

## 2020-12-26 RX ORDER — HYDROCODONE BITARTRATE AND ACETAMINOPHEN 5; 325 MG/1; MG/1
TABLET ORAL
Qty: 20 TABLET | Refills: 0 | Status: SHIPPED | OUTPATIENT
Start: 2020-12-26 | End: 2021-01-18

## 2020-12-26 RX ORDER — PROMETHAZINE HYDROCHLORIDE 25 MG/1
25 TABLET ORAL EVERY 6 HOURS PRN
Qty: 20 TABLET | Refills: 0 | Status: SHIPPED | OUTPATIENT
Start: 2020-12-26

## 2020-12-26 RX ADMIN — ONDANSETRON 8 MG: 2 INJECTION INTRAMUSCULAR; INTRAVENOUS at 10:11

## 2020-12-26 RX ADMIN — SODIUM CHLORIDE 1000 ML: 9 INJECTION, SOLUTION INTRAVENOUS at 12:12

## 2020-12-26 RX ADMIN — DIPHENHYDRAMINE HYDROCHLORIDE 25 MG: 50 INJECTION, SOLUTION INTRAMUSCULAR; INTRAVENOUS at 10:29

## 2020-12-26 RX ADMIN — MORPHINE SULFATE 4 MG: 4 INJECTION, SOLUTION INTRAMUSCULAR; INTRAVENOUS at 17:02

## 2020-12-26 RX ADMIN — MORPHINE SULFATE 4 MG: 4 INJECTION, SOLUTION INTRAMUSCULAR; INTRAVENOUS at 10:31

## 2020-12-26 RX ADMIN — PROMETHAZINE HYDROCHLORIDE 12.5 MG: 25 INJECTION INTRAMUSCULAR; INTRAVENOUS at 12:16

## 2020-12-26 RX ADMIN — HALOPERIDOL LACTATE 2.5 MG: 5 INJECTION, SOLUTION INTRAMUSCULAR at 12:54

## 2020-12-26 RX ADMIN — CEFTRIAXONE SODIUM 1 G: 1 INJECTION, POWDER, FOR SOLUTION INTRAMUSCULAR; INTRAVENOUS at 12:55

## 2020-12-26 RX ADMIN — DIPHENHYDRAMINE HYDROCHLORIDE 25 MG: 50 INJECTION, SOLUTION INTRAMUSCULAR; INTRAVENOUS at 17:02

## 2020-12-26 RX ADMIN — PROMETHAZINE HYDROCHLORIDE 12.5 MG: 25 INJECTION INTRAMUSCULAR; INTRAVENOUS at 17:03

## 2020-12-28 ENCOUNTER — READMISSION MANAGEMENT (OUTPATIENT)
Dept: CALL CENTER | Facility: HOSPITAL | Age: 30
End: 2020-12-28

## 2020-12-28 ENCOUNTER — LAB (OUTPATIENT)
Dept: LAB | Facility: HOSPITAL | Age: 30
End: 2020-12-28

## 2020-12-28 DIAGNOSIS — R39.9 UNSPECIFIED SYMPTOMS AND SIGNS INVOLVING THE GENITOURINARY SYSTEM: ICD-10-CM

## 2020-12-28 DIAGNOSIS — Z51.81 ENCOUNTER FOR MONITORING TACROLIMUS THERAPY: ICD-10-CM

## 2020-12-28 DIAGNOSIS — D64.9 ANEMIA, UNSPECIFIED TYPE: ICD-10-CM

## 2020-12-28 DIAGNOSIS — Z79.621 ENCOUNTER FOR MONITORING TACROLIMUS THERAPY: ICD-10-CM

## 2020-12-28 DIAGNOSIS — Z94.0 KIDNEY REPLACED BY TRANSPLANT: ICD-10-CM

## 2020-12-28 DIAGNOSIS — R79.89 ELEVATED SERUM CREATININE: ICD-10-CM

## 2020-12-28 LAB
ALBUMIN SERPL-MCNC: 3.2 G/DL (ref 3.5–5.2)
ANION GAP SERPL CALCULATED.3IONS-SCNC: 9.1 MMOL/L (ref 5–15)
BACTERIA UR QL AUTO: ABNORMAL /HPF
BASOPHILS # BLD AUTO: 0.02 10*3/MM3 (ref 0–0.2)
BASOPHILS NFR BLD AUTO: 0.5 % (ref 0–1.5)
BILIRUB UR QL STRIP: NEGATIVE
BUN SERPL-MCNC: 25 MG/DL (ref 6–20)
BUN/CREAT SERPL: 14.3 (ref 7–25)
CALCIUM SPEC-SCNC: 9 MG/DL (ref 8.6–10.5)
CHLORIDE SERPL-SCNC: 106 MMOL/L (ref 98–107)
CLARITY UR: CLEAR
CO2 SERPL-SCNC: 17.9 MMOL/L (ref 22–29)
COLOR UR: YELLOW
CREAT SERPL-MCNC: 1.75 MG/DL (ref 0.57–1)
CREAT UR-MCNC: 180 MG/DL
DEPRECATED RDW RBC AUTO: 41.8 FL (ref 37–54)
EOSINOPHIL # BLD AUTO: 0.09 10*3/MM3 (ref 0–0.4)
EOSINOPHIL NFR BLD AUTO: 2.4 % (ref 0.3–6.2)
ERYTHROCYTE [DISTWIDTH] IN BLOOD BY AUTOMATED COUNT: 12.5 % (ref 12.3–15.4)
GFR SERPL CREATININE-BSD FRML MDRD: 34 ML/MIN/1.73
GLUCOSE SERPL-MCNC: 97 MG/DL (ref 65–99)
GLUCOSE UR STRIP-MCNC: NEGATIVE MG/DL
HCT VFR BLD AUTO: 32.7 % (ref 34–46.6)
HGB BLD-MCNC: 10.4 G/DL (ref 12–15.9)
HGB UR QL STRIP.AUTO: NEGATIVE
HYALINE CASTS UR QL AUTO: ABNORMAL /LPF
IMM GRANULOCYTES # BLD AUTO: 0.05 10*3/MM3 (ref 0–0.05)
IMM GRANULOCYTES NFR BLD AUTO: 1.3 % (ref 0–0.5)
KETONES UR QL STRIP: NEGATIVE
LEUKOCYTE ESTERASE UR QL STRIP.AUTO: NEGATIVE
LYMPHOCYTES # BLD AUTO: 0.52 10*3/MM3 (ref 0.7–3.1)
LYMPHOCYTES NFR BLD AUTO: 14 % (ref 19.6–45.3)
MAGNESIUM SERPL-MCNC: 1.7 MG/DL (ref 1.6–2.6)
MCH RBC QN AUTO: 28.9 PG (ref 26.6–33)
MCHC RBC AUTO-ENTMCNC: 31.8 G/DL (ref 31.5–35.7)
MCV RBC AUTO: 90.8 FL (ref 79–97)
MONOCYTES # BLD AUTO: 0.43 10*3/MM3 (ref 0.1–0.9)
MONOCYTES NFR BLD AUTO: 11.6 % (ref 5–12)
NEUTROPHILS NFR BLD AUTO: 2.61 10*3/MM3 (ref 1.7–7)
NEUTROPHILS NFR BLD AUTO: 70.2 % (ref 42.7–76)
NITRITE UR QL STRIP: NEGATIVE
NRBC BLD AUTO-RTO: 0 /100 WBC (ref 0–0.2)
PH UR STRIP.AUTO: 5.5 [PH] (ref 5–8)
PHOSPHATE SERPL-MCNC: 2.9 MG/DL (ref 2.5–4.5)
PLATELET # BLD AUTO: 195 10*3/MM3 (ref 140–450)
PMV BLD AUTO: 12.7 FL (ref 6–12)
POTASSIUM SERPL-SCNC: 4.5 MMOL/L (ref 3.5–5.2)
PROT UR QL STRIP: ABNORMAL
PROT UR-MCNC: 137 MG/DL
PROT/CREAT UR: 761.1 MG/G CREA (ref 0–200)
RBC # BLD AUTO: 3.6 10*6/MM3 (ref 3.77–5.28)
RBC # UR: ABNORMAL /HPF
REF LAB TEST METHOD: ABNORMAL
SODIUM SERPL-SCNC: 133 MMOL/L (ref 136–145)
SP GR UR STRIP: 1.02 (ref 1–1.03)
SQUAMOUS #/AREA URNS HPF: ABNORMAL /HPF
UROBILINOGEN UR QL STRIP: ABNORMAL
WBC # BLD AUTO: 3.72 10*3/MM3 (ref 3.4–10.8)
WBC UR QL AUTO: ABNORMAL /HPF

## 2020-12-28 PROCEDURE — 87086 URINE CULTURE/COLONY COUNT: CPT

## 2020-12-28 PROCEDURE — 85025 COMPLETE CBC W/AUTO DIFF WBC: CPT

## 2020-12-28 PROCEDURE — 84156 ASSAY OF PROTEIN URINE: CPT

## 2020-12-28 PROCEDURE — 83735 ASSAY OF MAGNESIUM: CPT

## 2020-12-28 PROCEDURE — 36415 COLL VENOUS BLD VENIPUNCTURE: CPT

## 2020-12-28 PROCEDURE — 80069 RENAL FUNCTION PANEL: CPT

## 2020-12-28 PROCEDURE — 80197 ASSAY OF TACROLIMUS: CPT

## 2020-12-28 PROCEDURE — 82570 ASSAY OF URINE CREATININE: CPT

## 2020-12-28 PROCEDURE — 81001 URINALYSIS AUTO W/SCOPE: CPT

## 2020-12-28 RX ORDER — PANTOPRAZOLE SODIUM 40 MG/1
40 TABLET, DELAYED RELEASE ORAL 2 TIMES DAILY
Qty: 180 TABLET | Refills: 0 | Status: SHIPPED | OUTPATIENT
Start: 2020-12-28

## 2020-12-29 LAB — BACTERIA SPEC AEROBE CULT: NO GROWTH

## 2020-12-30 LAB — TACROLIMUS BLD LC/MS/MS-MCNC: 10 NG/ML (ref 2–20)

## 2020-12-31 LAB
CMV DNA SERPL NAA+PROBE-ACNC: NEGATIVE IU/ML
CMV DNA SERPL NAA+PROBE-LOG IU: NORMAL {LOG_IU}/ML

## 2021-01-04 ENCOUNTER — READMISSION MANAGEMENT (OUTPATIENT)
Dept: CALL CENTER | Facility: HOSPITAL | Age: 31
End: 2021-01-04

## 2021-01-18 ENCOUNTER — OFFICE VISIT (OUTPATIENT)
Dept: INTERNAL MEDICINE | Facility: CLINIC | Age: 31
End: 2021-01-18

## 2021-01-18 ENCOUNTER — HOSPITAL ENCOUNTER (OUTPATIENT)
Dept: ULTRASOUND IMAGING | Facility: HOSPITAL | Age: 31
Discharge: HOME | End: 2021-01-18
Admitting: NURSE PRACTITIONER

## 2021-01-18 VITALS
WEIGHT: 139 LBS | BODY MASS INDEX: 21.82 KG/M2 | HEART RATE: 76 BPM | HEIGHT: 67 IN | OXYGEN SATURATION: 99 % | SYSTOLIC BLOOD PRESSURE: 118 MMHG | TEMPERATURE: 96.8 F | DIASTOLIC BLOOD PRESSURE: 72 MMHG

## 2021-01-18 DIAGNOSIS — R39.9 URINARY SYMPTOM OR SIGN: Primary | ICD-10-CM

## 2021-01-18 DIAGNOSIS — Z94.0 RENAL TRANSPLANT RECIPIENT: ICD-10-CM

## 2021-01-18 DIAGNOSIS — N18.6 ESRD (END STAGE RENAL DISEASE) (HCC): ICD-10-CM

## 2021-01-18 DIAGNOSIS — R82.998 LEUKOCYTES IN URINE: ICD-10-CM

## 2021-01-18 DIAGNOSIS — M32.14 LUPUS NEPHRITIS (HCC): ICD-10-CM

## 2021-01-18 DIAGNOSIS — I15.0 RENOVASCULAR HYPERTENSION: ICD-10-CM

## 2021-01-18 DIAGNOSIS — Z86.73 H/O: CVA (CEREBROVASCULAR ACCIDENT): ICD-10-CM

## 2021-01-18 LAB
BILIRUB BLD-MCNC: NEGATIVE MG/DL
CLARITY, POC: ABNORMAL
COLOR UR: YELLOW
GLUCOSE UR STRIP-MCNC: NEGATIVE MG/DL
KETONES UR QL: NEGATIVE
LEUKOCYTE EST, POC: ABNORMAL
NITRITE UR-MCNC: NEGATIVE MG/ML
PH UR: 5.5 [PH] (ref 5–8)
PROT UR STRIP-MCNC: ABNORMAL MG/DL
RBC # UR STRIP: ABNORMAL /UL
SP GR UR: 1.02 (ref 1–1.03)
UROBILINOGEN UR QL: NORMAL

## 2021-01-18 PROCEDURE — 99214 OFFICE O/P EST MOD 30 MIN: CPT | Performed by: NURSE PRACTITIONER

## 2021-01-18 PROCEDURE — 76700 US EXAM ABDOM COMPLETE: CPT

## 2021-01-18 PROCEDURE — 81003 URINALYSIS AUTO W/O SCOPE: CPT | Performed by: NURSE PRACTITIONER

## 2021-01-18 RX ORDER — AMOXICILLIN AND CLAVULANATE POTASSIUM 875; 125 MG/1; MG/1
1 TABLET, FILM COATED ORAL 2 TIMES DAILY
Qty: 20 TABLET | Refills: 0 | Status: SHIPPED | OUTPATIENT
Start: 2021-01-18 | End: 2021-01-28

## 2021-01-18 RX ORDER — SULFAMETHOXAZOLE AND TRIMETHOPRIM 400; 80 MG/1; MG/1
1 TABLET ORAL 3 TIMES WEEKLY
COMMUNITY
End: 2021-12-26

## 2021-01-21 ENCOUNTER — LAB (OUTPATIENT)
Dept: LAB | Facility: HOSPITAL | Age: 31
End: 2021-01-21

## 2021-01-21 DIAGNOSIS — Z51.81 ENCOUNTER FOR MONITORING TACROLIMUS THERAPY: ICD-10-CM

## 2021-01-21 DIAGNOSIS — R39.9 UNSPECIFIED SYMPTOMS AND SIGNS INVOLVING THE GENITOURINARY SYSTEM: ICD-10-CM

## 2021-01-21 DIAGNOSIS — Z94.0 KIDNEY REPLACED BY TRANSPLANT: ICD-10-CM

## 2021-01-21 DIAGNOSIS — D64.9 ANEMIA, UNSPECIFIED TYPE: ICD-10-CM

## 2021-01-21 DIAGNOSIS — Z79.621 ENCOUNTER FOR MONITORING TACROLIMUS THERAPY: ICD-10-CM

## 2021-01-21 DIAGNOSIS — R79.89 ELEVATED SERUM CREATININE: ICD-10-CM

## 2021-01-21 LAB
ALBUMIN SERPL-MCNC: 3.4 G/DL (ref 3.5–5.2)
ANION GAP SERPL CALCULATED.3IONS-SCNC: 11.6 MMOL/L (ref 5–15)
BACTERIA UR CULT: ABNORMAL
BACTERIA UR CULT: ABNORMAL
BACTERIA UR QL AUTO: ABNORMAL /HPF
BASOPHILS # BLD AUTO: 0.02 10*3/MM3 (ref 0–0.2)
BASOPHILS NFR BLD AUTO: 0.3 % (ref 0–1.5)
BILIRUB UR QL STRIP: NEGATIVE
BUN SERPL-MCNC: 26 MG/DL (ref 6–20)
BUN/CREAT SERPL: 14.7 (ref 7–25)
CALCIUM SPEC-SCNC: 9.8 MG/DL (ref 8.6–10.5)
CHLORIDE SERPL-SCNC: 102 MMOL/L (ref 98–107)
CLARITY UR: ABNORMAL
CO2 SERPL-SCNC: 19.4 MMOL/L (ref 22–29)
COLOR UR: ABNORMAL
CREAT SERPL-MCNC: 1.77 MG/DL (ref 0.57–1)
CREAT UR-MCNC: 249 MG/DL
DEPRECATED RDW RBC AUTO: 40.2 FL (ref 37–54)
EOSINOPHIL # BLD AUTO: 0.21 10*3/MM3 (ref 0–0.4)
EOSINOPHIL NFR BLD AUTO: 3.7 % (ref 0.3–6.2)
ERYTHROCYTE [DISTWIDTH] IN BLOOD BY AUTOMATED COUNT: 13 % (ref 12.3–15.4)
GFR SERPL CREATININE-BSD FRML MDRD: 34 ML/MIN/1.73
GLUCOSE SERPL-MCNC: 143 MG/DL (ref 65–99)
GLUCOSE UR STRIP-MCNC: NEGATIVE MG/DL
HCT VFR BLD AUTO: 33.8 % (ref 34–46.6)
HGB BLD-MCNC: 10.9 G/DL (ref 12–15.9)
HGB UR QL STRIP.AUTO: NEGATIVE
HYALINE CASTS UR QL AUTO: ABNORMAL /LPF
IMM GRANULOCYTES # BLD AUTO: 0.07 10*3/MM3 (ref 0–0.05)
IMM GRANULOCYTES NFR BLD AUTO: 1.2 % (ref 0–0.5)
KETONES UR QL STRIP: ABNORMAL
LEUKOCYTE ESTERASE UR QL STRIP.AUTO: ABNORMAL
LYMPHOCYTES # BLD AUTO: 0.48 10*3/MM3 (ref 0.7–3.1)
LYMPHOCYTES NFR BLD AUTO: 8.3 % (ref 19.6–45.3)
MAGNESIUM SERPL-MCNC: 1.7 MG/DL (ref 1.6–2.6)
MCH RBC QN AUTO: 28 PG (ref 26.6–33)
MCHC RBC AUTO-ENTMCNC: 32.2 G/DL (ref 31.5–35.7)
MCV RBC AUTO: 86.9 FL (ref 79–97)
MONOCYTES # BLD AUTO: 0.73 10*3/MM3 (ref 0.1–0.9)
MONOCYTES NFR BLD AUTO: 12.7 % (ref 5–12)
NEUTROPHILS NFR BLD AUTO: 4.24 10*3/MM3 (ref 1.7–7)
NEUTROPHILS NFR BLD AUTO: 73.8 % (ref 42.7–76)
NITRITE UR QL STRIP: NEGATIVE
NRBC BLD AUTO-RTO: 0 /100 WBC (ref 0–0.2)
OTHER ANTIBIOTIC SUSC ISLT: ABNORMAL
PH UR STRIP.AUTO: 5.5 [PH] (ref 5–8)
PHOSPHATE SERPL-MCNC: 2.3 MG/DL (ref 2.5–4.5)
PLATELET # BLD AUTO: 197 10*3/MM3 (ref 140–450)
PMV BLD AUTO: 13.5 FL (ref 6–12)
POTASSIUM SERPL-SCNC: 4.7 MMOL/L (ref 3.5–5.2)
PROT UR QL STRIP: ABNORMAL
PROT UR-MCNC: 185 MG/DL
PROT/CREAT UR: 743 MG/G CREA (ref 0–200)
RBC # BLD AUTO: 3.89 10*6/MM3 (ref 3.77–5.28)
RBC # UR: ABNORMAL /HPF
REF LAB TEST METHOD: ABNORMAL
SODIUM SERPL-SCNC: 133 MMOL/L (ref 136–145)
SP GR UR STRIP: 1.02 (ref 1–1.03)
SQUAMOUS #/AREA URNS HPF: ABNORMAL /HPF
UROBILINOGEN UR QL STRIP: ABNORMAL
WBC # BLD AUTO: 5.75 10*3/MM3 (ref 3.4–10.8)
WBC UR QL AUTO: ABNORMAL /HPF
YEAST URNS QL MICRO: ABNORMAL /HPF

## 2021-01-21 PROCEDURE — 80197 ASSAY OF TACROLIMUS: CPT

## 2021-01-21 PROCEDURE — 81001 URINALYSIS AUTO W/SCOPE: CPT

## 2021-01-21 PROCEDURE — 84156 ASSAY OF PROTEIN URINE: CPT

## 2021-01-21 PROCEDURE — 87086 URINE CULTURE/COLONY COUNT: CPT

## 2021-01-21 PROCEDURE — 82570 ASSAY OF URINE CREATININE: CPT

## 2021-01-21 PROCEDURE — 85025 COMPLETE CBC W/AUTO DIFF WBC: CPT

## 2021-01-21 PROCEDURE — 83735 ASSAY OF MAGNESIUM: CPT

## 2021-01-21 PROCEDURE — 80069 RENAL FUNCTION PANEL: CPT

## 2021-01-21 PROCEDURE — 36415 COLL VENOUS BLD VENIPUNCTURE: CPT

## 2021-01-22 LAB — BACTERIA SPEC AEROBE CULT: NO GROWTH

## 2021-01-22 RX ORDER — NITROFURANTOIN 25; 75 MG/1; MG/1
100 CAPSULE ORAL 2 TIMES DAILY
Qty: 10 CAPSULE | Refills: 0 | Status: SHIPPED | OUTPATIENT
Start: 2021-01-22 | End: 2021-01-27

## 2021-01-25 LAB — TACROLIMUS BLD LC/MS/MS-MCNC: 15.2 NG/ML (ref 2–20)

## 2021-01-26 LAB
CMV DNA SERPL NAA+PROBE-ACNC: NEGATIVE IU/ML
CMV DNA SERPL NAA+PROBE-LOG IU: NORMAL {LOG_IU}/ML

## 2021-01-29 ENCOUNTER — TRANSCRIBE ORDERS (OUTPATIENT)
Dept: LAB | Facility: HOSPITAL | Age: 31
End: 2021-01-29

## 2021-01-29 DIAGNOSIS — Z01.818 PRE-OP TESTING: Primary | ICD-10-CM

## 2021-02-05 ENCOUNTER — TRANSCRIBE ORDERS (OUTPATIENT)
Dept: LAB | Facility: HOSPITAL | Age: 31
End: 2021-02-05

## 2021-02-05 ENCOUNTER — LAB (OUTPATIENT)
Dept: LAB | Facility: HOSPITAL | Age: 31
End: 2021-02-05

## 2021-02-05 DIAGNOSIS — Z94.0 KIDNEY REPLACED BY TRANSPLANT: ICD-10-CM

## 2021-02-05 DIAGNOSIS — R39.9 UNSPECIFIED SYMPTOMS AND SIGNS INVOLVING THE GENITOURINARY SYSTEM: ICD-10-CM

## 2021-02-05 DIAGNOSIS — Z01.818 PRE-OP TESTING: ICD-10-CM

## 2021-02-05 DIAGNOSIS — D64.9 ANEMIA, UNSPECIFIED TYPE: ICD-10-CM

## 2021-02-05 DIAGNOSIS — R79.89 ELEVATED SERUM CREATININE: Primary | ICD-10-CM

## 2021-02-05 DIAGNOSIS — Z79.621 ENCOUNTER FOR MONITORING TACROLIMUS THERAPY: ICD-10-CM

## 2021-02-05 DIAGNOSIS — R79.89 ELEVATED SERUM CREATININE: ICD-10-CM

## 2021-02-05 DIAGNOSIS — Z51.81 ENCOUNTER FOR MONITORING TACROLIMUS THERAPY: ICD-10-CM

## 2021-02-05 LAB
ALBUMIN SERPL-MCNC: 4.1 G/DL (ref 3.5–5.2)
ANION GAP SERPL CALCULATED.3IONS-SCNC: 9.2 MMOL/L (ref 5–15)
BACTERIA UR QL AUTO: NORMAL /HPF
BASOPHILS # BLD AUTO: 0.03 10*3/MM3 (ref 0–0.2)
BASOPHILS NFR BLD AUTO: 0.6 % (ref 0–1.5)
BILIRUB UR QL STRIP: NEGATIVE
BUN SERPL-MCNC: 16 MG/DL (ref 6–20)
BUN/CREAT SERPL: 13.7 (ref 7–25)
CALCIUM SPEC-SCNC: 10 MG/DL (ref 8.6–10.5)
CHLORIDE SERPL-SCNC: 105 MMOL/L (ref 98–107)
CLARITY UR: CLEAR
CO2 SERPL-SCNC: 18.8 MMOL/L (ref 22–29)
COLOR UR: YELLOW
CREAT SERPL-MCNC: 1.17 MG/DL (ref 0.57–1)
CREAT UR-MCNC: 25.7 MG/DL
DEPRECATED RDW RBC AUTO: 41.7 FL (ref 37–54)
EOSINOPHIL # BLD AUTO: 0.11 10*3/MM3 (ref 0–0.4)
EOSINOPHIL NFR BLD AUTO: 2.3 % (ref 0.3–6.2)
ERYTHROCYTE [DISTWIDTH] IN BLOOD BY AUTOMATED COUNT: 13.3 % (ref 12.3–15.4)
GFR SERPL CREATININE-BSD FRML MDRD: 54 ML/MIN/1.73
GLUCOSE SERPL-MCNC: 77 MG/DL (ref 65–99)
GLUCOSE UR STRIP-MCNC: NEGATIVE MG/DL
HCT VFR BLD AUTO: 38.4 % (ref 34–46.6)
HGB BLD-MCNC: 12.8 G/DL (ref 12–15.9)
HGB UR QL STRIP.AUTO: NEGATIVE
HYALINE CASTS UR QL AUTO: NORMAL /LPF
IMM GRANULOCYTES # BLD AUTO: 0.06 10*3/MM3 (ref 0–0.05)
IMM GRANULOCYTES NFR BLD AUTO: 1.2 % (ref 0–0.5)
INR PPP: 0.99 (ref 0.9–1.1)
KETONES UR QL STRIP: NEGATIVE
LEUKOCYTE ESTERASE UR QL STRIP.AUTO: NEGATIVE
LYMPHOCYTES # BLD AUTO: 0.83 10*3/MM3 (ref 0.7–3.1)
LYMPHOCYTES NFR BLD AUTO: 17 % (ref 19.6–45.3)
MAGNESIUM SERPL-MCNC: 1.6 MG/DL (ref 1.6–2.6)
MCH RBC QN AUTO: 29 PG (ref 26.6–33)
MCHC RBC AUTO-ENTMCNC: 33.3 G/DL (ref 31.5–35.7)
MCV RBC AUTO: 87.1 FL (ref 79–97)
MONOCYTES # BLD AUTO: 0.44 10*3/MM3 (ref 0.1–0.9)
MONOCYTES NFR BLD AUTO: 9 % (ref 5–12)
NEUTROPHILS NFR BLD AUTO: 3.41 10*3/MM3 (ref 1.7–7)
NEUTROPHILS NFR BLD AUTO: 69.9 % (ref 42.7–76)
NITRITE UR QL STRIP: NEGATIVE
NRBC BLD AUTO-RTO: 0 /100 WBC (ref 0–0.2)
PH UR STRIP.AUTO: 6 [PH] (ref 5–8)
PHOSPHATE SERPL-MCNC: 2.7 MG/DL (ref 2.5–4.5)
PLATELET # BLD AUTO: 299 10*3/MM3 (ref 140–450)
PMV BLD AUTO: 12.7 FL (ref 6–12)
POTASSIUM SERPL-SCNC: 5.2 MMOL/L (ref 3.5–5.2)
PROT UR QL STRIP: ABNORMAL
PROT UR-MCNC: 19 MG/DL
PROT/CREAT UR: 739.3 MG/G CREA (ref 0–200)
PROTHROMBIN TIME: 13.5 SECONDS (ref 12–15.1)
RBC # BLD AUTO: 4.41 10*6/MM3 (ref 3.77–5.28)
RBC # UR: NORMAL /HPF
REF LAB TEST METHOD: NORMAL
SODIUM SERPL-SCNC: 133 MMOL/L (ref 136–145)
SP GR UR STRIP: 1.01 (ref 1–1.03)
SQUAMOUS #/AREA URNS HPF: NORMAL /HPF
UROBILINOGEN UR QL STRIP: ABNORMAL
WBC # BLD AUTO: 4.88 10*3/MM3 (ref 3.4–10.8)
WBC UR QL AUTO: NORMAL /HPF

## 2021-02-05 PROCEDURE — 80069 RENAL FUNCTION PANEL: CPT

## 2021-02-05 PROCEDURE — 81001 URINALYSIS AUTO W/SCOPE: CPT

## 2021-02-05 PROCEDURE — 85610 PROTHROMBIN TIME: CPT

## 2021-02-05 PROCEDURE — 87086 URINE CULTURE/COLONY COUNT: CPT

## 2021-02-05 PROCEDURE — 83735 ASSAY OF MAGNESIUM: CPT

## 2021-02-05 PROCEDURE — U0004 COV-19 TEST NON-CDC HGH THRU: HCPCS

## 2021-02-05 PROCEDURE — 80197 ASSAY OF TACROLIMUS: CPT

## 2021-02-05 PROCEDURE — 82570 ASSAY OF URINE CREATININE: CPT

## 2021-02-05 PROCEDURE — 85025 COMPLETE CBC W/AUTO DIFF WBC: CPT

## 2021-02-05 PROCEDURE — 36415 COLL VENOUS BLD VENIPUNCTURE: CPT

## 2021-02-05 PROCEDURE — C9803 HOPD COVID-19 SPEC COLLECT: HCPCS

## 2021-02-05 PROCEDURE — 84156 ASSAY OF PROTEIN URINE: CPT

## 2021-02-06 LAB
BACTERIA SPEC AEROBE CULT: NO GROWTH
SARS-COV-2 RNA RESP QL NAA+PROBE: NOT DETECTED

## 2021-02-09 LAB
CMV DNA SERPL NAA+PROBE-ACNC: NEGATIVE IU/ML
CMV DNA SERPL NAA+PROBE-LOG IU: NORMAL {LOG_IU}/ML
TACROLIMUS BLD LC/MS/MS-MCNC: 8 NG/ML (ref 2–20)

## 2021-02-20 ENCOUNTER — TRANSCRIBE ORDERS (OUTPATIENT)
Dept: LAB | Facility: HOSPITAL | Age: 31
End: 2021-02-20

## 2021-02-20 DIAGNOSIS — R79.89 ELEVATED SERUM CREATININE: ICD-10-CM

## 2021-02-20 DIAGNOSIS — D84.9 IMMUNOSUPPRESSION-RELATED INFECTIOUS DISEASE (HCC): ICD-10-CM

## 2021-02-20 DIAGNOSIS — Z94.0 KIDNEY REPLACED BY TRANSPLANT: Primary | ICD-10-CM

## 2021-02-20 DIAGNOSIS — B99.8 IMMUNOSUPPRESSION-RELATED INFECTIOUS DISEASE (HCC): ICD-10-CM

## 2021-02-22 ENCOUNTER — LAB (OUTPATIENT)
Dept: LAB | Facility: HOSPITAL | Age: 31
End: 2021-02-22

## 2021-02-22 DIAGNOSIS — R39.9 UNSPECIFIED SYMPTOMS AND SIGNS INVOLVING THE GENITOURINARY SYSTEM: ICD-10-CM

## 2021-02-22 DIAGNOSIS — Z79.621 ENCOUNTER FOR MONITORING TACROLIMUS THERAPY: ICD-10-CM

## 2021-02-22 DIAGNOSIS — D64.9 ANEMIA, UNSPECIFIED TYPE: ICD-10-CM

## 2021-02-22 DIAGNOSIS — B99.8 IMMUNOSUPPRESSION-RELATED INFECTIOUS DISEASE (HCC): ICD-10-CM

## 2021-02-22 DIAGNOSIS — D84.9 IMMUNOSUPPRESSION-RELATED INFECTIOUS DISEASE (HCC): ICD-10-CM

## 2021-02-22 DIAGNOSIS — R79.89 ELEVATED SERUM CREATININE: ICD-10-CM

## 2021-02-22 DIAGNOSIS — Z94.0 KIDNEY REPLACED BY TRANSPLANT: ICD-10-CM

## 2021-02-22 DIAGNOSIS — Z51.81 ENCOUNTER FOR MONITORING TACROLIMUS THERAPY: ICD-10-CM

## 2021-02-22 LAB
ALBUMIN SERPL-MCNC: 3.8 G/DL (ref 3.5–5.2)
ANION GAP SERPL CALCULATED.3IONS-SCNC: 9.6 MMOL/L (ref 5–15)
BACTERIA UR QL AUTO: NORMAL /HPF
BASOPHILS # BLD AUTO: 0.02 10*3/MM3 (ref 0–0.2)
BASOPHILS NFR BLD AUTO: 0.4 % (ref 0–1.5)
BILIRUB UR QL STRIP: NEGATIVE
BUN SERPL-MCNC: 19 MG/DL (ref 6–20)
BUN/CREAT SERPL: 16 (ref 7–25)
CALCIUM SPEC-SCNC: 9.6 MG/DL (ref 8.6–10.5)
CHLORIDE SERPL-SCNC: 107 MMOL/L (ref 98–107)
CLARITY UR: CLEAR
CO2 SERPL-SCNC: 19.4 MMOL/L (ref 22–29)
COLOR UR: YELLOW
CREAT SERPL-MCNC: 1.19 MG/DL (ref 0.57–1)
CREAT UR-MCNC: 49.5 MG/DL
DEPRECATED RDW RBC AUTO: 43.5 FL (ref 37–54)
EOSINOPHIL # BLD AUTO: 0.11 10*3/MM3 (ref 0–0.4)
EOSINOPHIL NFR BLD AUTO: 2.3 % (ref 0.3–6.2)
ERYTHROCYTE [DISTWIDTH] IN BLOOD BY AUTOMATED COUNT: 13.5 % (ref 12.3–15.4)
GFR SERPL CREATININE-BSD FRML MDRD: 53 ML/MIN/1.73
GLUCOSE SERPL-MCNC: 74 MG/DL (ref 65–99)
GLUCOSE UR STRIP-MCNC: NEGATIVE MG/DL
HCT VFR BLD AUTO: 40.1 % (ref 34–46.6)
HGB BLD-MCNC: 12.6 G/DL (ref 12–15.9)
HGB UR QL STRIP.AUTO: NEGATIVE
HYALINE CASTS UR QL AUTO: NORMAL /LPF
IMM GRANULOCYTES # BLD AUTO: 0.07 10*3/MM3 (ref 0–0.05)
IMM GRANULOCYTES NFR BLD AUTO: 1.5 % (ref 0–0.5)
INR PPP: 0.97 (ref 0.9–1.1)
KETONES UR QL STRIP: NEGATIVE
LEUKOCYTE ESTERASE UR QL STRIP.AUTO: NEGATIVE
LYMPHOCYTES # BLD AUTO: 0.59 10*3/MM3 (ref 0.7–3.1)
LYMPHOCYTES NFR BLD AUTO: 12.4 % (ref 19.6–45.3)
MAGNESIUM SERPL-MCNC: 1.6 MG/DL (ref 1.6–2.6)
MCH RBC QN AUTO: 27.8 PG (ref 26.6–33)
MCHC RBC AUTO-ENTMCNC: 31.4 G/DL (ref 31.5–35.7)
MCV RBC AUTO: 88.5 FL (ref 79–97)
MONOCYTES # BLD AUTO: 0.52 10*3/MM3 (ref 0.1–0.9)
MONOCYTES NFR BLD AUTO: 10.9 % (ref 5–12)
NEUTROPHILS NFR BLD AUTO: 3.45 10*3/MM3 (ref 1.7–7)
NEUTROPHILS NFR BLD AUTO: 72.5 % (ref 42.7–76)
NITRITE UR QL STRIP: NEGATIVE
NRBC BLD AUTO-RTO: 0 /100 WBC (ref 0–0.2)
PH UR STRIP.AUTO: 6.5 [PH] (ref 5–8)
PHOSPHATE SERPL-MCNC: 2.9 MG/DL (ref 2.5–4.5)
PLATELET # BLD AUTO: 244 10*3/MM3 (ref 140–450)
PMV BLD AUTO: 12.6 FL (ref 6–12)
POTASSIUM SERPL-SCNC: 5 MMOL/L (ref 3.5–5.2)
PROT UR QL STRIP: ABNORMAL
PROT UR-MCNC: 16 MG/DL
PROT/CREAT UR: 323.2 MG/G CREA (ref 0–200)
PROTHROMBIN TIME: 13.3 SECONDS (ref 12–15.1)
RBC # BLD AUTO: 4.53 10*6/MM3 (ref 3.77–5.28)
RBC # UR: NORMAL /HPF
REF LAB TEST METHOD: NORMAL
SODIUM SERPL-SCNC: 136 MMOL/L (ref 136–145)
SP GR UR STRIP: 1.01 (ref 1–1.03)
SQUAMOUS #/AREA URNS HPF: NORMAL /HPF
UROBILINOGEN UR QL STRIP: ABNORMAL
WBC # BLD AUTO: 4.76 10*3/MM3 (ref 3.4–10.8)
WBC UR QL AUTO: NORMAL /HPF

## 2021-02-22 PROCEDURE — 36415 COLL VENOUS BLD VENIPUNCTURE: CPT

## 2021-02-22 PROCEDURE — 83735 ASSAY OF MAGNESIUM: CPT

## 2021-02-22 PROCEDURE — 87086 URINE CULTURE/COLONY COUNT: CPT

## 2021-02-22 PROCEDURE — U0004 COV-19 TEST NON-CDC HGH THRU: HCPCS

## 2021-02-22 PROCEDURE — 84156 ASSAY OF PROTEIN URINE: CPT

## 2021-02-22 PROCEDURE — 82570 ASSAY OF URINE CREATININE: CPT

## 2021-02-22 PROCEDURE — U0005 INFEC AGEN DETEC AMPLI PROBE: HCPCS

## 2021-02-22 PROCEDURE — 85025 COMPLETE CBC W/AUTO DIFF WBC: CPT

## 2021-02-22 PROCEDURE — 80197 ASSAY OF TACROLIMUS: CPT

## 2021-02-22 PROCEDURE — 85610 PROTHROMBIN TIME: CPT

## 2021-02-22 PROCEDURE — 81001 URINALYSIS AUTO W/SCOPE: CPT

## 2021-02-22 PROCEDURE — C9803 HOPD COVID-19 SPEC COLLECT: HCPCS

## 2021-02-22 PROCEDURE — 80069 RENAL FUNCTION PANEL: CPT

## 2021-02-23 LAB
BACTERIA SPEC AEROBE CULT: NORMAL
SARS-COV-2 RNA RESP QL NAA+PROBE: NOT DETECTED

## 2021-02-25 LAB
CMV DNA SERPL NAA+PROBE-ACNC: NEGATIVE IU/ML
CMV DNA SERPL NAA+PROBE-LOG IU: NORMAL {LOG_IU}/ML
TACROLIMUS BLD LC/MS/MS-MCNC: 7.1 NG/ML (ref 2–20)

## 2021-03-01 ENCOUNTER — HOSPITAL ENCOUNTER (EMERGENCY)
Facility: HOSPITAL | Age: 31
Discharge: HOME | End: 2021-03-01
Attending: EMERGENCY MEDICINE | Admitting: EMERGENCY MEDICINE

## 2021-03-01 ENCOUNTER — APPOINTMENT (OUTPATIENT)
Dept: CT IMAGING | Facility: HOSPITAL | Age: 31
End: 2021-03-01

## 2021-03-01 VITALS
SYSTOLIC BLOOD PRESSURE: 118 MMHG | TEMPERATURE: 99.1 F | RESPIRATION RATE: 18 BRPM | BODY MASS INDEX: 22.19 KG/M2 | HEART RATE: 97 BPM | OXYGEN SATURATION: 99 % | HEIGHT: 67 IN | WEIGHT: 141.4 LBS | DIASTOLIC BLOOD PRESSURE: 77 MMHG

## 2021-03-01 DIAGNOSIS — N39.0 ACUTE UTI: Primary | ICD-10-CM

## 2021-03-01 LAB
ALBUMIN SERPL-MCNC: 3.7 G/DL (ref 3.5–5.2)
ALBUMIN/GLOB SERPL: 1.1 G/DL
ALP SERPL-CCNC: 112 U/L (ref 39–117)
ALT SERPL W P-5'-P-CCNC: 15 U/L (ref 1–33)
ANION GAP SERPL CALCULATED.3IONS-SCNC: 9.4 MMOL/L (ref 5–15)
AST SERPL-CCNC: 14 U/L (ref 1–32)
B-HCG UR QL: NEGATIVE
BACTERIA UR QL AUTO: ABNORMAL /HPF
BASOPHILS # BLD AUTO: 0.05 10*3/MM3 (ref 0–0.2)
BASOPHILS NFR BLD AUTO: 0.3 % (ref 0–1.5)
BILIRUB SERPL-MCNC: 0.7 MG/DL (ref 0–1.2)
BILIRUB UR QL STRIP: NEGATIVE
BUN SERPL-MCNC: 22 MG/DL (ref 6–20)
BUN/CREAT SERPL: 14.8 (ref 7–25)
CALCIUM SPEC-SCNC: 9.9 MG/DL (ref 8.6–10.5)
CHLORIDE SERPL-SCNC: 106 MMOL/L (ref 98–107)
CLARITY UR: ABNORMAL
CO2 SERPL-SCNC: 15.6 MMOL/L (ref 22–29)
COLOR UR: YELLOW
CREAT SERPL-MCNC: 1.49 MG/DL (ref 0.57–1)
D-LACTATE SERPL-SCNC: 0.6 MMOL/L (ref 0.5–2)
DEPRECATED RDW RBC AUTO: 47.6 FL (ref 37–54)
EOSINOPHIL # BLD AUTO: 0.01 10*3/MM3 (ref 0–0.4)
EOSINOPHIL NFR BLD AUTO: 0.1 % (ref 0.3–6.2)
ERYTHROCYTE [DISTWIDTH] IN BLOOD BY AUTOMATED COUNT: 14.1 % (ref 12.3–15.4)
GFR SERPL CREATININE-BSD FRML MDRD: 41 ML/MIN/1.73
GLOBULIN UR ELPH-MCNC: 3.3 GM/DL
GLUCOSE SERPL-MCNC: 119 MG/DL (ref 65–99)
GLUCOSE UR STRIP-MCNC: NEGATIVE MG/DL
HCT VFR BLD AUTO: 42.4 % (ref 34–46.6)
HGB BLD-MCNC: 13.2 G/DL (ref 12–15.9)
HGB UR QL STRIP.AUTO: ABNORMAL
HYALINE CASTS UR QL AUTO: ABNORMAL /LPF
IMM GRANULOCYTES # BLD AUTO: 0.12 10*3/MM3 (ref 0–0.05)
IMM GRANULOCYTES NFR BLD AUTO: 0.7 % (ref 0–0.5)
KETONES UR QL STRIP: NEGATIVE
LEUKOCYTE ESTERASE UR QL STRIP.AUTO: ABNORMAL
LIPASE SERPL-CCNC: 14 U/L (ref 13–60)
LYMPHOCYTES # BLD AUTO: 0.36 10*3/MM3 (ref 0.7–3.1)
LYMPHOCYTES NFR BLD AUTO: 2 % (ref 19.6–45.3)
MCH RBC QN AUTO: 28.8 PG (ref 26.6–33)
MCHC RBC AUTO-ENTMCNC: 31.1 G/DL (ref 31.5–35.7)
MCV RBC AUTO: 92.6 FL (ref 79–97)
MONOCYTES # BLD AUTO: 0.89 10*3/MM3 (ref 0.1–0.9)
MONOCYTES NFR BLD AUTO: 4.9 % (ref 5–12)
NEUTROPHILS NFR BLD AUTO: 16.6 10*3/MM3 (ref 1.7–7)
NEUTROPHILS NFR BLD AUTO: 92 % (ref 42.7–76)
NITRITE UR QL STRIP: NEGATIVE
NRBC BLD AUTO-RTO: 0 /100 WBC (ref 0–0.2)
PH UR STRIP.AUTO: <=5 [PH] (ref 5–8)
PLATELET # BLD AUTO: 176 10*3/MM3 (ref 140–450)
PMV BLD AUTO: 10.7 FL (ref 6–12)
POTASSIUM SERPL-SCNC: 4.9 MMOL/L (ref 3.5–5.2)
PROCALCITONIN SERPL-MCNC: 1.81 NG/ML (ref 0–0.25)
PROT SERPL-MCNC: 7 G/DL (ref 6–8.5)
PROT UR QL STRIP: ABNORMAL
RBC # BLD AUTO: 4.58 10*6/MM3 (ref 3.77–5.28)
RBC # UR: ABNORMAL /HPF
REF LAB TEST METHOD: ABNORMAL
SODIUM SERPL-SCNC: 131 MMOL/L (ref 136–145)
SP GR UR STRIP: 1.02 (ref 1–1.03)
SQUAMOUS #/AREA URNS HPF: ABNORMAL /HPF
UROBILINOGEN UR QL STRIP: ABNORMAL
WBC # BLD AUTO: 18.03 10*3/MM3 (ref 3.4–10.8)
WBC UR QL AUTO: ABNORMAL /HPF

## 2021-03-01 PROCEDURE — 87086 URINE CULTURE/COLONY COUNT: CPT | Performed by: NURSE PRACTITIONER

## 2021-03-01 PROCEDURE — 84145 PROCALCITONIN (PCT): CPT | Performed by: NURSE PRACTITIONER

## 2021-03-01 PROCEDURE — 87186 SC STD MICRODIL/AGAR DIL: CPT | Performed by: NURSE PRACTITIONER

## 2021-03-01 PROCEDURE — 96375 TX/PRO/DX INJ NEW DRUG ADDON: CPT

## 2021-03-01 PROCEDURE — 80053 COMPREHEN METABOLIC PANEL: CPT | Performed by: NURSE PRACTITIONER

## 2021-03-01 PROCEDURE — 87088 URINE BACTERIA CULTURE: CPT | Performed by: NURSE PRACTITIONER

## 2021-03-01 PROCEDURE — 96365 THER/PROPH/DIAG IV INF INIT: CPT

## 2021-03-01 PROCEDURE — 25010000002 ONDANSETRON PER 1 MG: Performed by: NURSE PRACTITIONER

## 2021-03-01 PROCEDURE — 85025 COMPLETE CBC W/AUTO DIFF WBC: CPT | Performed by: NURSE PRACTITIONER

## 2021-03-01 PROCEDURE — 83605 ASSAY OF LACTIC ACID: CPT | Performed by: NURSE PRACTITIONER

## 2021-03-01 PROCEDURE — 83690 ASSAY OF LIPASE: CPT | Performed by: NURSE PRACTITIONER

## 2021-03-01 PROCEDURE — 25010000002 CEFTRIAXONE SODIUM-DEXTROSE 1-3.74 GM-%(50ML) RECONSTITUTED SOLUTION: Performed by: NURSE PRACTITIONER

## 2021-03-01 PROCEDURE — 81025 URINE PREGNANCY TEST: CPT | Performed by: NURSE PRACTITIONER

## 2021-03-01 PROCEDURE — 25010000002 FENTANYL CITRATE (PF) 100 MCG/2ML SOLUTION: Performed by: NURSE PRACTITIONER

## 2021-03-01 PROCEDURE — 74176 CT ABD & PELVIS W/O CONTRAST: CPT

## 2021-03-01 PROCEDURE — 25010000002 DIPHENHYDRAMINE PER 50 MG: Performed by: NURSE PRACTITIONER

## 2021-03-01 PROCEDURE — 81001 URINALYSIS AUTO W/SCOPE: CPT | Performed by: NURSE PRACTITIONER

## 2021-03-01 PROCEDURE — 87040 BLOOD CULTURE FOR BACTERIA: CPT | Performed by: NURSE PRACTITIONER

## 2021-03-01 PROCEDURE — 99283 EMERGENCY DEPT VISIT LOW MDM: CPT

## 2021-03-01 RX ORDER — ONDANSETRON 2 MG/ML
4 INJECTION INTRAMUSCULAR; INTRAVENOUS ONCE
Status: COMPLETED | OUTPATIENT
Start: 2021-03-01 | End: 2021-03-01

## 2021-03-01 RX ORDER — FENTANYL CITRATE 50 UG/ML
50 INJECTION, SOLUTION INTRAMUSCULAR; INTRAVENOUS ONCE
Status: COMPLETED | OUTPATIENT
Start: 2021-03-01 | End: 2021-03-01

## 2021-03-01 RX ORDER — DIPHENHYDRAMINE HYDROCHLORIDE 50 MG/ML
25 INJECTION INTRAMUSCULAR; INTRAVENOUS ONCE
Status: COMPLETED | OUTPATIENT
Start: 2021-03-01 | End: 2021-03-01

## 2021-03-01 RX ORDER — CEFDINIR 300 MG/1
300 CAPSULE ORAL 2 TIMES DAILY
Qty: 28 CAPSULE | Refills: 0 | OUTPATIENT
Start: 2021-03-01 | End: 2021-12-26

## 2021-03-01 RX ORDER — SODIUM CHLORIDE 0.9 % (FLUSH) 0.9 %
10 SYRINGE (ML) INJECTION AS NEEDED
Status: DISCONTINUED | OUTPATIENT
Start: 2021-03-01 | End: 2021-03-01 | Stop reason: HOSPADM

## 2021-03-01 RX ORDER — CEFTRIAXONE 1 G/50ML
1 INJECTION, SOLUTION INTRAVENOUS ONCE
Status: COMPLETED | OUTPATIENT
Start: 2021-03-01 | End: 2021-03-01

## 2021-03-01 RX ADMIN — SODIUM CHLORIDE 1000 ML: 9 INJECTION, SOLUTION INTRAVENOUS at 10:45

## 2021-03-01 RX ADMIN — CEFTRIAXONE 1 G: 1 INJECTION, SOLUTION INTRAVENOUS at 10:04

## 2021-03-01 RX ADMIN — ONDANSETRON 4 MG: 2 INJECTION INTRAMUSCULAR; INTRAVENOUS at 08:50

## 2021-03-01 RX ADMIN — SODIUM CHLORIDE 1000 ML: 9 INJECTION, SOLUTION INTRAVENOUS at 08:36

## 2021-03-01 RX ADMIN — FENTANYL CITRATE 50 MCG: 50 INJECTION, SOLUTION INTRAMUSCULAR; INTRAVENOUS at 08:52

## 2021-03-01 RX ADMIN — DIPHENHYDRAMINE HYDROCHLORIDE 25 MG: 50 INJECTION, SOLUTION INTRAMUSCULAR; INTRAVENOUS at 08:51

## 2021-03-02 ENCOUNTER — HOSPITAL ENCOUNTER (EMERGENCY)
Facility: HOSPITAL | Age: 31
Discharge: ANOTHER HOSPITAL | End: 2021-03-02
Attending: EMERGENCY MEDICINE | Admitting: EMERGENCY MEDICINE

## 2021-03-02 VITALS
HEIGHT: 67 IN | OXYGEN SATURATION: 100 % | HEART RATE: 106 BPM | SYSTOLIC BLOOD PRESSURE: 143 MMHG | DIASTOLIC BLOOD PRESSURE: 88 MMHG | TEMPERATURE: 99.4 F | BODY MASS INDEX: 22 KG/M2 | WEIGHT: 140.2 LBS | RESPIRATION RATE: 18 BRPM

## 2021-03-02 DIAGNOSIS — B96.20 E. COLI UTI: ICD-10-CM

## 2021-03-02 DIAGNOSIS — T86.11 RENAL TRANSPLANT REJECTION: Primary | ICD-10-CM

## 2021-03-02 DIAGNOSIS — N39.0 E. COLI UTI: ICD-10-CM

## 2021-03-02 DIAGNOSIS — R11.2 NON-INTRACTABLE VOMITING WITH NAUSEA, UNSPECIFIED VOMITING TYPE: ICD-10-CM

## 2021-03-02 DIAGNOSIS — R10.84 GENERALIZED ABDOMINAL PAIN: ICD-10-CM

## 2021-03-02 DIAGNOSIS — Z94.0 HISTORY OF RENAL TRANSPLANT: ICD-10-CM

## 2021-03-02 LAB
ALBUMIN SERPL-MCNC: 3.4 G/DL (ref 3.5–5.2)
ALBUMIN/GLOB SERPL: 1.2 G/DL
ALP SERPL-CCNC: 99 U/L (ref 39–117)
ALT SERPL W P-5'-P-CCNC: 9 U/L (ref 1–33)
ANION GAP SERPL CALCULATED.3IONS-SCNC: 11 MMOL/L (ref 5–15)
AST SERPL-CCNC: 10 U/L (ref 1–32)
BACTERIA UR QL AUTO: ABNORMAL /HPF
BASOPHILS # BLD AUTO: 0.03 10*3/MM3 (ref 0–0.2)
BASOPHILS NFR BLD AUTO: 0.3 % (ref 0–1.5)
BILIRUB SERPL-MCNC: 0.3 MG/DL (ref 0–1.2)
BILIRUB UR QL STRIP: NEGATIVE
BUN SERPL-MCNC: 21 MG/DL (ref 6–20)
BUN/CREAT SERPL: 12.4 (ref 7–25)
CALCIUM SPEC-SCNC: 9.5 MG/DL (ref 8.6–10.5)
CHLORIDE SERPL-SCNC: 109 MMOL/L (ref 98–107)
CLARITY UR: ABNORMAL
CO2 SERPL-SCNC: 13 MMOL/L (ref 22–29)
COLOR UR: YELLOW
CREAT SERPL-MCNC: 1.69 MG/DL (ref 0.57–1)
D-LACTATE SERPL-SCNC: 0.5 MMOL/L (ref 0.5–2)
DEPRECATED RDW RBC AUTO: 50 FL (ref 37–54)
EOSINOPHIL # BLD AUTO: 0.04 10*3/MM3 (ref 0–0.4)
EOSINOPHIL NFR BLD AUTO: 0.4 % (ref 0.3–6.2)
ERYTHROCYTE [DISTWIDTH] IN BLOOD BY AUTOMATED COUNT: 14.6 % (ref 12.3–15.4)
GFR SERPL CREATININE-BSD FRML MDRD: 35 ML/MIN/1.73
GLOBULIN UR ELPH-MCNC: 2.8 GM/DL
GLUCOSE SERPL-MCNC: 115 MG/DL (ref 65–99)
GLUCOSE UR STRIP-MCNC: NEGATIVE MG/DL
GRAN CASTS URNS QL MICRO: ABNORMAL /LPF
HCT VFR BLD AUTO: 40 % (ref 34–46.6)
HGB BLD-MCNC: 12.4 G/DL (ref 12–15.9)
HGB UR QL STRIP.AUTO: ABNORMAL
HOLD SPECIMEN: NORMAL
HOLD SPECIMEN: NORMAL
HYALINE CASTS UR QL AUTO: ABNORMAL /LPF
IMM GRANULOCYTES # BLD AUTO: 0.13 10*3/MM3 (ref 0–0.05)
IMM GRANULOCYTES NFR BLD AUTO: 1.2 % (ref 0–0.5)
KETONES UR QL STRIP: ABNORMAL
LEUKOCYTE ESTERASE UR QL STRIP.AUTO: ABNORMAL
LIPASE SERPL-CCNC: 10 U/L (ref 13–60)
LYMPHOCYTES # BLD AUTO: 0.45 10*3/MM3 (ref 0.7–3.1)
LYMPHOCYTES NFR BLD AUTO: 4.1 % (ref 19.6–45.3)
MCH RBC QN AUTO: 28.9 PG (ref 26.6–33)
MCHC RBC AUTO-ENTMCNC: 31 G/DL (ref 31.5–35.7)
MCV RBC AUTO: 93.2 FL (ref 79–97)
MONOCYTES # BLD AUTO: 0.59 10*3/MM3 (ref 0.1–0.9)
MONOCYTES NFR BLD AUTO: 5.4 % (ref 5–12)
NEUTROPHILS NFR BLD AUTO: 88.6 % (ref 42.7–76)
NEUTROPHILS NFR BLD AUTO: 9.77 10*3/MM3 (ref 1.7–7)
NITRITE UR QL STRIP: NEGATIVE
NRBC BLD AUTO-RTO: 0 /100 WBC (ref 0–0.2)
PH UR STRIP.AUTO: <=5 [PH] (ref 5–8)
PLATELET # BLD AUTO: 167 10*3/MM3 (ref 140–450)
PMV BLD AUTO: 11.5 FL (ref 6–12)
POTASSIUM SERPL-SCNC: 4.5 MMOL/L (ref 3.5–5.2)
PROCALCITONIN SERPL-MCNC: 11.65 NG/ML (ref 0–0.25)
PROT SERPL-MCNC: 6.2 G/DL (ref 6–8.5)
PROT UR QL STRIP: ABNORMAL
RBC # BLD AUTO: 4.29 10*6/MM3 (ref 3.77–5.28)
RBC # UR: ABNORMAL /HPF
REF LAB TEST METHOD: ABNORMAL
SODIUM SERPL-SCNC: 133 MMOL/L (ref 136–145)
SP GR UR STRIP: 1.02 (ref 1–1.03)
SQUAMOUS #/AREA URNS HPF: ABNORMAL /HPF
UROBILINOGEN UR QL STRIP: ABNORMAL
WBC # BLD AUTO: 11.01 10*3/MM3 (ref 3.4–10.8)
WBC UR QL AUTO: ABNORMAL /HPF
WHOLE BLOOD HOLD SPECIMEN: NORMAL
WHOLE BLOOD HOLD SPECIMEN: NORMAL

## 2021-03-02 PROCEDURE — 81001 URINALYSIS AUTO W/SCOPE: CPT | Performed by: EMERGENCY MEDICINE

## 2021-03-02 PROCEDURE — 83690 ASSAY OF LIPASE: CPT | Performed by: EMERGENCY MEDICINE

## 2021-03-02 PROCEDURE — 85025 COMPLETE CBC W/AUTO DIFF WBC: CPT | Performed by: EMERGENCY MEDICINE

## 2021-03-02 PROCEDURE — 96375 TX/PRO/DX INJ NEW DRUG ADDON: CPT

## 2021-03-02 PROCEDURE — 96365 THER/PROPH/DIAG IV INF INIT: CPT

## 2021-03-02 PROCEDURE — 25010000002 CEFTRIAXONE SODIUM-DEXTROSE 1-3.74 GM-%(50ML) RECONSTITUTED SOLUTION: Performed by: EMERGENCY MEDICINE

## 2021-03-02 PROCEDURE — 84145 PROCALCITONIN (PCT): CPT | Performed by: EMERGENCY MEDICINE

## 2021-03-02 PROCEDURE — 25010000002 ONDANSETRON PER 1 MG: Performed by: EMERGENCY MEDICINE

## 2021-03-02 PROCEDURE — 25010000002 MORPHINE PER 10 MG: Performed by: EMERGENCY MEDICINE

## 2021-03-02 PROCEDURE — 80053 COMPREHEN METABOLIC PANEL: CPT | Performed by: EMERGENCY MEDICINE

## 2021-03-02 PROCEDURE — 83605 ASSAY OF LACTIC ACID: CPT | Performed by: EMERGENCY MEDICINE

## 2021-03-02 PROCEDURE — 99284 EMERGENCY DEPT VISIT MOD MDM: CPT

## 2021-03-02 PROCEDURE — 96376 TX/PRO/DX INJ SAME DRUG ADON: CPT

## 2021-03-02 PROCEDURE — 96361 HYDRATE IV INFUSION ADD-ON: CPT

## 2021-03-02 PROCEDURE — 25010000002 DIPHENHYDRAMINE PER 50 MG: Performed by: EMERGENCY MEDICINE

## 2021-03-02 RX ORDER — MORPHINE SULFATE 4 MG/ML
4 INJECTION, SOLUTION INTRAMUSCULAR; INTRAVENOUS ONCE
Status: COMPLETED | OUTPATIENT
Start: 2021-03-02 | End: 2021-03-02

## 2021-03-02 RX ORDER — DIPHENHYDRAMINE HYDROCHLORIDE 50 MG/ML
12.5 INJECTION INTRAMUSCULAR; INTRAVENOUS ONCE
Status: COMPLETED | OUTPATIENT
Start: 2021-03-02 | End: 2021-03-02

## 2021-03-02 RX ORDER — CEFTRIAXONE 1 G/50ML
1 INJECTION, SOLUTION INTRAVENOUS ONCE
Status: COMPLETED | OUTPATIENT
Start: 2021-03-02 | End: 2021-03-02

## 2021-03-02 RX ORDER — DIPHENHYDRAMINE HYDROCHLORIDE 50 MG/ML
25 INJECTION INTRAMUSCULAR; INTRAVENOUS ONCE
Status: COMPLETED | OUTPATIENT
Start: 2021-03-02 | End: 2021-03-02

## 2021-03-02 RX ORDER — ONDANSETRON 2 MG/ML
4 INJECTION INTRAMUSCULAR; INTRAVENOUS ONCE
Status: COMPLETED | OUTPATIENT
Start: 2021-03-02 | End: 2021-03-02

## 2021-03-02 RX ORDER — SODIUM CHLORIDE 0.9 % (FLUSH) 0.9 %
10 SYRINGE (ML) INJECTION AS NEEDED
Status: DISCONTINUED | OUTPATIENT
Start: 2021-03-02 | End: 2021-03-02 | Stop reason: HOSPADM

## 2021-03-02 RX ADMIN — DIPHENHYDRAMINE HYDROCHLORIDE 25 MG: 50 INJECTION, SOLUTION INTRAMUSCULAR; INTRAVENOUS at 10:26

## 2021-03-02 RX ADMIN — MORPHINE SULFATE 4 MG: 4 INJECTION, SOLUTION INTRAMUSCULAR; INTRAVENOUS at 17:31

## 2021-03-02 RX ADMIN — SODIUM CHLORIDE 1000 ML: 9 INJECTION, SOLUTION INTRAVENOUS at 09:56

## 2021-03-02 RX ADMIN — ONDANSETRON 4 MG: 2 INJECTION INTRAMUSCULAR; INTRAVENOUS at 09:51

## 2021-03-02 RX ADMIN — MORPHINE SULFATE 4 MG: 4 INJECTION, SOLUTION INTRAMUSCULAR; INTRAVENOUS at 10:27

## 2021-03-02 RX ADMIN — ONDANSETRON 4 MG: 2 INJECTION INTRAMUSCULAR; INTRAVENOUS at 11:33

## 2021-03-02 RX ADMIN — CEFTRIAXONE 1 G: 1 INJECTION, SOLUTION INTRAVENOUS at 13:37

## 2021-03-02 RX ADMIN — MORPHINE SULFATE 4 MG: 4 INJECTION, SOLUTION INTRAMUSCULAR; INTRAVENOUS at 13:09

## 2021-03-02 RX ADMIN — DIPHENHYDRAMINE HYDROCHLORIDE 12.5 MG: 50 INJECTION, SOLUTION INTRAMUSCULAR; INTRAVENOUS at 17:31

## 2021-03-03 LAB — BACTERIA SPEC AEROBE CULT: ABNORMAL

## 2021-03-06 LAB
BACTERIA SPEC AEROBE CULT: NORMAL
BACTERIA SPEC AEROBE CULT: NORMAL

## 2021-03-08 ENCOUNTER — LAB (OUTPATIENT)
Dept: LAB | Facility: HOSPITAL | Age: 31
End: 2021-03-08

## 2021-03-08 DIAGNOSIS — R79.89 ELEVATED SERUM CREATININE: ICD-10-CM

## 2021-03-08 DIAGNOSIS — Z51.81 ENCOUNTER FOR MONITORING TACROLIMUS THERAPY: ICD-10-CM

## 2021-03-08 DIAGNOSIS — D64.9 ANEMIA, UNSPECIFIED TYPE: ICD-10-CM

## 2021-03-08 DIAGNOSIS — R39.9 UNSPECIFIED SYMPTOMS AND SIGNS INVOLVING THE GENITOURINARY SYSTEM: ICD-10-CM

## 2021-03-08 DIAGNOSIS — Z94.0 KIDNEY REPLACED BY TRANSPLANT: ICD-10-CM

## 2021-03-08 DIAGNOSIS — Z79.621 ENCOUNTER FOR MONITORING TACROLIMUS THERAPY: ICD-10-CM

## 2021-03-08 LAB
ALBUMIN SERPL-MCNC: 3.4 G/DL (ref 3.5–5.2)
ANION GAP SERPL CALCULATED.3IONS-SCNC: 10.2 MMOL/L (ref 5–15)
BACTERIA UR QL AUTO: ABNORMAL /HPF
BILIRUB UR QL STRIP: NEGATIVE
BUN SERPL-MCNC: 26 MG/DL (ref 6–20)
BUN/CREAT SERPL: 27.1 (ref 7–25)
CALCIUM SPEC-SCNC: 9.5 MG/DL (ref 8.6–10.5)
CHLORIDE SERPL-SCNC: 104 MMOL/L (ref 98–107)
CLARITY UR: CLEAR
CO2 SERPL-SCNC: 22.8 MMOL/L (ref 22–29)
COLOR UR: YELLOW
CREAT SERPL-MCNC: 0.96 MG/DL (ref 0.57–1)
CREAT UR-MCNC: 72.5 MG/DL
DEPRECATED RDW RBC AUTO: 43.6 FL (ref 37–54)
ERYTHROCYTE [DISTWIDTH] IN BLOOD BY AUTOMATED COUNT: 13.4 % (ref 12.3–15.4)
GFR SERPL CREATININE-BSD FRML MDRD: 68 ML/MIN/1.73
GLUCOSE SERPL-MCNC: 172 MG/DL (ref 65–99)
GLUCOSE UR STRIP-MCNC: NEGATIVE MG/DL
HCT VFR BLD AUTO: 40.4 % (ref 34–46.6)
HGB BLD-MCNC: 12.9 G/DL (ref 12–15.9)
HGB UR QL STRIP.AUTO: NEGATIVE
HYALINE CASTS UR QL AUTO: ABNORMAL /LPF
INR PPP: 0.9 (ref 0.9–1.1)
KETONES UR QL STRIP: NEGATIVE
LEUKOCYTE ESTERASE UR QL STRIP.AUTO: NEGATIVE
MAGNESIUM SERPL-MCNC: 1.7 MG/DL (ref 1.6–2.6)
MCH RBC QN AUTO: 28.5 PG (ref 26.6–33)
MCHC RBC AUTO-ENTMCNC: 31.9 G/DL (ref 31.5–35.7)
MCV RBC AUTO: 89.4 FL (ref 79–97)
NITRITE UR QL STRIP: NEGATIVE
NRBC BLD AUTO-RTO: 0 /100 WBC (ref 0–0.2)
PH UR STRIP.AUTO: 7 [PH] (ref 5–8)
PHOSPHATE SERPL-MCNC: 2.2 MG/DL (ref 2.5–4.5)
PLATELET # BLD AUTO: 235 10*3/MM3 (ref 140–450)
PMV BLD AUTO: 12.7 FL (ref 6–12)
POTASSIUM SERPL-SCNC: 3.5 MMOL/L (ref 3.5–5.2)
PROT UR QL STRIP: ABNORMAL
PROT UR-MCNC: 73 MG/DL
PROT/CREAT UR: 1006.9 MG/G CREA (ref 0–200)
PROTHROMBIN TIME: 12.5 SECONDS (ref 12–15.1)
RBC # BLD AUTO: 4.52 10*6/MM3 (ref 3.77–5.28)
RBC # UR: ABNORMAL /HPF
REF LAB TEST METHOD: ABNORMAL
SODIUM SERPL-SCNC: 137 MMOL/L (ref 136–145)
SP GR UR STRIP: 1.02 (ref 1–1.03)
SQUAMOUS #/AREA URNS HPF: ABNORMAL /HPF
UROBILINOGEN UR QL STRIP: ABNORMAL
WBC # BLD AUTO: 11.51 10*3/MM3 (ref 3.4–10.8)
WBC UR QL AUTO: ABNORMAL /HPF

## 2021-03-08 PROCEDURE — 85007 BL SMEAR W/DIFF WBC COUNT: CPT

## 2021-03-08 PROCEDURE — 85610 PROTHROMBIN TIME: CPT

## 2021-03-08 PROCEDURE — 83735 ASSAY OF MAGNESIUM: CPT

## 2021-03-08 PROCEDURE — 87086 URINE CULTURE/COLONY COUNT: CPT

## 2021-03-08 PROCEDURE — 80069 RENAL FUNCTION PANEL: CPT

## 2021-03-08 PROCEDURE — 84156 ASSAY OF PROTEIN URINE: CPT

## 2021-03-08 PROCEDURE — 85025 COMPLETE CBC W/AUTO DIFF WBC: CPT

## 2021-03-08 PROCEDURE — 81001 URINALYSIS AUTO W/SCOPE: CPT

## 2021-03-08 PROCEDURE — 36415 COLL VENOUS BLD VENIPUNCTURE: CPT

## 2021-03-08 PROCEDURE — 82570 ASSAY OF URINE CREATININE: CPT

## 2021-03-08 PROCEDURE — 80197 ASSAY OF TACROLIMUS: CPT

## 2021-03-09 LAB
BACTERIA SPEC AEROBE CULT: NO GROWTH
EOSINOPHIL # BLD MANUAL: 0.12 10*3/MM3 (ref 0–0.4)
EOSINOPHIL NFR BLD MANUAL: 1 % (ref 0.3–6.2)
LYMPHOCYTES # BLD MANUAL: 0.23 10*3/MM3 (ref 0.7–3.1)
LYMPHOCYTES NFR BLD MANUAL: 1 % (ref 5–12)
LYMPHOCYTES NFR BLD MANUAL: 2 % (ref 19.6–45.3)
MONOCYTES # BLD AUTO: 0.12 10*3/MM3 (ref 0.1–0.9)
MYELOCYTES NFR BLD MANUAL: 4 % (ref 0–0)
NEUTROPHILS # BLD AUTO: 9.9 10*3/MM3 (ref 1.7–7)
NEUTROPHILS NFR BLD MANUAL: 86 % (ref 42.7–76)
PLAT MORPH BLD: NORMAL
PROMYELOCYTES NFR BLD MANUAL: 2 % (ref 0–0)
RBC MORPH BLD: NORMAL
VARIANT LYMPHS NFR BLD MANUAL: 4 % (ref 0–5)
WBC MORPH BLD: NORMAL

## 2021-03-10 LAB — TACROLIMUS BLD LC/MS/MS-MCNC: 5.5 NG/ML (ref 2–20)

## 2021-03-23 ENCOUNTER — BULK ORDERING (OUTPATIENT)
Dept: CASE MANAGEMENT | Facility: OTHER | Age: 31
End: 2021-03-23

## 2021-03-23 ENCOUNTER — LAB (OUTPATIENT)
Dept: LAB | Facility: HOSPITAL | Age: 31
End: 2021-03-23

## 2021-03-23 DIAGNOSIS — Z23 IMMUNIZATION DUE: ICD-10-CM

## 2021-03-23 DIAGNOSIS — D64.9 ANEMIA, UNSPECIFIED TYPE: ICD-10-CM

## 2021-03-23 DIAGNOSIS — R39.9 UNSPECIFIED SYMPTOMS AND SIGNS INVOLVING THE GENITOURINARY SYSTEM: ICD-10-CM

## 2021-03-23 DIAGNOSIS — R79.89 ELEVATED SERUM CREATININE: ICD-10-CM

## 2021-03-23 DIAGNOSIS — Z51.81 ENCOUNTER FOR MONITORING TACROLIMUS THERAPY: ICD-10-CM

## 2021-03-23 DIAGNOSIS — Z94.0 KIDNEY REPLACED BY TRANSPLANT: ICD-10-CM

## 2021-03-23 DIAGNOSIS — Z79.621 ENCOUNTER FOR MONITORING TACROLIMUS THERAPY: ICD-10-CM

## 2021-03-23 LAB
ALBUMIN SERPL-MCNC: 3.8 G/DL (ref 3.5–5.2)
ANION GAP SERPL CALCULATED.3IONS-SCNC: 8.5 MMOL/L (ref 5–15)
BACTERIA UR QL AUTO: ABNORMAL /HPF
BASOPHILS # BLD AUTO: 0.01 10*3/MM3 (ref 0–0.2)
BASOPHILS NFR BLD AUTO: 0.3 % (ref 0–1.5)
BILIRUB UR QL STRIP: NEGATIVE
BUN SERPL-MCNC: 18 MG/DL (ref 6–20)
BUN/CREAT SERPL: 16.7 (ref 7–25)
CALCIUM SPEC-SCNC: 9.5 MG/DL (ref 8.6–10.5)
CHLORIDE SERPL-SCNC: 107 MMOL/L (ref 98–107)
CLARITY UR: CLEAR
CO2 SERPL-SCNC: 20.5 MMOL/L (ref 22–29)
COLOR UR: YELLOW
CREAT SERPL-MCNC: 1.08 MG/DL (ref 0.57–1)
CREAT UR-MCNC: 40.9 MG/DL
DEPRECATED RDW RBC AUTO: 44.9 FL (ref 37–54)
EOSINOPHIL # BLD AUTO: 0.14 10*3/MM3 (ref 0–0.4)
EOSINOPHIL NFR BLD AUTO: 3.6 % (ref 0.3–6.2)
ERYTHROCYTE [DISTWIDTH] IN BLOOD BY AUTOMATED COUNT: 13.7 % (ref 12.3–15.4)
GFR SERPL CREATININE-BSD FRML MDRD: 59 ML/MIN/1.73
GLUCOSE SERPL-MCNC: 87 MG/DL (ref 65–99)
GLUCOSE UR STRIP-MCNC: NEGATIVE MG/DL
HCT VFR BLD AUTO: 38.6 % (ref 34–46.6)
HGB BLD-MCNC: 12.4 G/DL (ref 12–15.9)
HGB UR QL STRIP.AUTO: NEGATIVE
HYALINE CASTS UR QL AUTO: ABNORMAL /LPF
IMM GRANULOCYTES # BLD AUTO: 0.07 10*3/MM3 (ref 0–0.05)
IMM GRANULOCYTES NFR BLD AUTO: 1.8 % (ref 0–0.5)
INR PPP: 0.97 (ref 0.9–1.1)
KETONES UR QL STRIP: NEGATIVE
LEUKOCYTE ESTERASE UR QL STRIP.AUTO: NEGATIVE
LYMPHOCYTES # BLD AUTO: 0.54 10*3/MM3 (ref 0.7–3.1)
LYMPHOCYTES NFR BLD AUTO: 13.8 % (ref 19.6–45.3)
MAGNESIUM SERPL-MCNC: 1.8 MG/DL (ref 1.6–2.6)
MCH RBC QN AUTO: 28.8 PG (ref 26.6–33)
MCHC RBC AUTO-ENTMCNC: 32.1 G/DL (ref 31.5–35.7)
MCV RBC AUTO: 89.6 FL (ref 79–97)
MONOCYTES # BLD AUTO: 0.51 10*3/MM3 (ref 0.1–0.9)
MONOCYTES NFR BLD AUTO: 13 % (ref 5–12)
NEUTROPHILS NFR BLD AUTO: 2.65 10*3/MM3 (ref 1.7–7)
NEUTROPHILS NFR BLD AUTO: 67.5 % (ref 42.7–76)
NITRITE UR QL STRIP: NEGATIVE
NRBC BLD AUTO-RTO: 0 /100 WBC (ref 0–0.2)
PH UR STRIP.AUTO: 6.5 [PH] (ref 5–8)
PHOSPHATE SERPL-MCNC: 2.7 MG/DL (ref 2.5–4.5)
PLATELET # BLD AUTO: 252 10*3/MM3 (ref 140–450)
PMV BLD AUTO: 12 FL (ref 6–12)
POTASSIUM SERPL-SCNC: 4.8 MMOL/L (ref 3.5–5.2)
PROT UR QL STRIP: ABNORMAL
PROT UR-MCNC: 17 MG/DL
PROT/CREAT UR: 415.6 MG/G CREA (ref 0–200)
PROTHROMBIN TIME: 13.2 SECONDS (ref 12–15.1)
RBC # BLD AUTO: 4.31 10*6/MM3 (ref 3.77–5.28)
RBC # UR: ABNORMAL /HPF
REF LAB TEST METHOD: ABNORMAL
SODIUM SERPL-SCNC: 136 MMOL/L (ref 136–145)
SP GR UR STRIP: 1.01 (ref 1–1.03)
SQUAMOUS #/AREA URNS HPF: ABNORMAL /HPF
UROBILINOGEN UR QL STRIP: ABNORMAL
WBC # BLD AUTO: 3.92 10*3/MM3 (ref 3.4–10.8)
WBC UR QL AUTO: ABNORMAL /HPF

## 2021-03-23 PROCEDURE — 85610 PROTHROMBIN TIME: CPT

## 2021-03-23 PROCEDURE — 84156 ASSAY OF PROTEIN URINE: CPT

## 2021-03-23 PROCEDURE — 81001 URINALYSIS AUTO W/SCOPE: CPT

## 2021-03-23 PROCEDURE — 80197 ASSAY OF TACROLIMUS: CPT

## 2021-03-23 PROCEDURE — 85025 COMPLETE CBC W/AUTO DIFF WBC: CPT

## 2021-03-23 PROCEDURE — 87086 URINE CULTURE/COLONY COUNT: CPT

## 2021-03-23 PROCEDURE — 82570 ASSAY OF URINE CREATININE: CPT

## 2021-03-23 PROCEDURE — 83735 ASSAY OF MAGNESIUM: CPT

## 2021-03-23 PROCEDURE — 80069 RENAL FUNCTION PANEL: CPT

## 2021-03-23 PROCEDURE — 36415 COLL VENOUS BLD VENIPUNCTURE: CPT

## 2021-03-24 LAB — BACTERIA SPEC AEROBE CULT: NORMAL

## 2021-03-25 LAB
CMV DNA SERPL NAA+PROBE-ACNC: NEGATIVE IU/ML
CMV DNA SERPL NAA+PROBE-LOG IU: NORMAL {LOG_IU}/ML

## 2021-03-26 LAB — TACROLIMUS BLD LC/MS/MS-MCNC: 2.7 NG/ML (ref 2–20)

## 2021-04-28 ENCOUNTER — APPOINTMENT (OUTPATIENT)
Dept: LAB | Facility: HOSPITAL | Age: 31
End: 2021-04-28

## 2021-04-28 ENCOUNTER — TRANSCRIBE ORDERS (OUTPATIENT)
Dept: LAB | Facility: HOSPITAL | Age: 31
End: 2021-04-28

## 2021-04-28 DIAGNOSIS — D84.9 IMMUNOSUPPRESSION-RELATED INFECTIOUS DISEASE (HCC): ICD-10-CM

## 2021-04-28 DIAGNOSIS — B99.8 IMMUNOSUPPRESSION-RELATED INFECTIOUS DISEASE (HCC): ICD-10-CM

## 2021-04-28 DIAGNOSIS — Z94.0 KIDNEY REPLACED BY TRANSPLANT: ICD-10-CM

## 2021-04-28 DIAGNOSIS — R39.9 UNSPECIFIED SYMPTOMS AND SIGNS INVOLVING THE GENITOURINARY SYSTEM: ICD-10-CM

## 2021-04-28 DIAGNOSIS — R79.89 ELEVATED SERUM CREATININE: Primary | ICD-10-CM

## 2021-04-30 ENCOUNTER — LAB (OUTPATIENT)
Dept: LAB | Facility: HOSPITAL | Age: 31
End: 2021-04-30

## 2021-04-30 DIAGNOSIS — R39.9 UNSPECIFIED SYMPTOMS AND SIGNS INVOLVING THE GENITOURINARY SYSTEM: ICD-10-CM

## 2021-04-30 DIAGNOSIS — B99.8 IMMUNOSUPPRESSION-RELATED INFECTIOUS DISEASE (HCC): ICD-10-CM

## 2021-04-30 DIAGNOSIS — Z94.0 KIDNEY REPLACED BY TRANSPLANT: ICD-10-CM

## 2021-04-30 DIAGNOSIS — R79.89 ELEVATED SERUM CREATININE: ICD-10-CM

## 2021-04-30 DIAGNOSIS — D84.9 IMMUNOSUPPRESSION-RELATED INFECTIOUS DISEASE (HCC): ICD-10-CM

## 2021-04-30 LAB
ALBUMIN SERPL-MCNC: 3.8 G/DL (ref 3.5–5.2)
ALP SERPL-CCNC: 97 U/L (ref 39–117)
ALT SERPL W P-5'-P-CCNC: 22 U/L (ref 1–33)
ANION GAP SERPL CALCULATED.3IONS-SCNC: 11 MMOL/L (ref 5–15)
AST SERPL-CCNC: 19 U/L (ref 1–32)
BACTERIA UR QL AUTO: ABNORMAL /HPF
BASOPHILS # BLD AUTO: 0.02 10*3/MM3 (ref 0–0.2)
BASOPHILS NFR BLD AUTO: 0.6 % (ref 0–1.5)
BILIRUB CONJ SERPL-MCNC: <0.2 MG/DL (ref 0–0.3)
BILIRUB INDIRECT SERPL-MCNC: NORMAL MG/DL
BILIRUB SERPL-MCNC: 0.3 MG/DL (ref 0–1.2)
BILIRUB UR QL STRIP: NEGATIVE
BUN SERPL-MCNC: 17 MG/DL (ref 6–20)
BUN/CREAT SERPL: 13.7 (ref 7–25)
CALCIUM SPEC-SCNC: 9.6 MG/DL (ref 8.6–10.5)
CHLORIDE SERPL-SCNC: 106 MMOL/L (ref 98–107)
CHOLEST SERPL-MCNC: 158 MG/DL (ref 0–200)
CLARITY UR: CLEAR
CO2 SERPL-SCNC: 22 MMOL/L (ref 22–29)
COLOR UR: YELLOW
CREAT SERPL-MCNC: 1.24 MG/DL (ref 0.57–1)
CREAT UR-MCNC: 53.8 MG/DL
DEPRECATED RDW RBC AUTO: 45.4 FL (ref 37–54)
EOSINOPHIL # BLD AUTO: 0.12 10*3/MM3 (ref 0–0.4)
EOSINOPHIL NFR BLD AUTO: 3.4 % (ref 0.3–6.2)
ERYTHROCYTE [DISTWIDTH] IN BLOOD BY AUTOMATED COUNT: 13.6 % (ref 12.3–15.4)
GFR SERPL CREATININE-BSD FRML MDRD: 50 ML/MIN/1.73
GLUCOSE SERPL-MCNC: 94 MG/DL (ref 65–99)
GLUCOSE UR STRIP-MCNC: NEGATIVE MG/DL
HBA1C MFR BLD: 4.95 % (ref 4.8–5.6)
HCT VFR BLD AUTO: 43 % (ref 34–46.6)
HDLC SERPL-MCNC: 42 MG/DL (ref 40–60)
HGB BLD-MCNC: 13.9 G/DL (ref 12–15.9)
HGB UR QL STRIP.AUTO: ABNORMAL
HYALINE CASTS UR QL AUTO: ABNORMAL /LPF
IMM GRANULOCYTES # BLD AUTO: 0.05 10*3/MM3 (ref 0–0.05)
IMM GRANULOCYTES NFR BLD AUTO: 1.4 % (ref 0–0.5)
INR PPP: 0.88 (ref 0.9–1.1)
KETONES UR QL STRIP: NEGATIVE
LDLC SERPL CALC-MCNC: 74 MG/DL (ref 0–100)
LDLC/HDLC SERPL: 1.54 {RATIO}
LEUKOCYTE ESTERASE UR QL STRIP.AUTO: NEGATIVE
LYMPHOCYTES # BLD AUTO: 0.58 10*3/MM3 (ref 0.7–3.1)
LYMPHOCYTES NFR BLD AUTO: 16.5 % (ref 19.6–45.3)
MAGNESIUM SERPL-MCNC: 1.7 MG/DL (ref 1.6–2.6)
MCH RBC QN AUTO: 29.3 PG (ref 26.6–33)
MCHC RBC AUTO-ENTMCNC: 32.3 G/DL (ref 31.5–35.7)
MCV RBC AUTO: 90.7 FL (ref 79–97)
MONOCYTES # BLD AUTO: 0.43 10*3/MM3 (ref 0.1–0.9)
MONOCYTES NFR BLD AUTO: 12.2 % (ref 5–12)
NEUTROPHILS NFR BLD AUTO: 2.32 10*3/MM3 (ref 1.7–7)
NEUTROPHILS NFR BLD AUTO: 65.9 % (ref 42.7–76)
NITRITE UR QL STRIP: NEGATIVE
NRBC BLD AUTO-RTO: 0 /100 WBC (ref 0–0.2)
PH UR STRIP.AUTO: 6 [PH] (ref 5–8)
PHOSPHATE SERPL-MCNC: 2.6 MG/DL (ref 2.5–4.5)
PLATELET # BLD AUTO: 191 10*3/MM3 (ref 140–450)
PMV BLD AUTO: 12.3 FL (ref 6–12)
POTASSIUM SERPL-SCNC: 4.5 MMOL/L (ref 3.5–5.2)
PROT SERPL-MCNC: 6.5 G/DL (ref 6–8.5)
PROT UR QL STRIP: NEGATIVE
PROT UR-MCNC: 11 MG/DL
PROT/CREAT UR: 204.5 MG/G CREA (ref 0–200)
PROTHROMBIN TIME: 12.4 SECONDS (ref 12–15.1)
RBC # BLD AUTO: 4.74 10*6/MM3 (ref 3.77–5.28)
RBC # UR: ABNORMAL /HPF
REF LAB TEST METHOD: ABNORMAL
SODIUM SERPL-SCNC: 139 MMOL/L (ref 136–145)
SP GR UR STRIP: 1.01 (ref 1–1.03)
SQUAMOUS #/AREA URNS HPF: ABNORMAL /HPF
TRIGL SERPL-MCNC: 257 MG/DL (ref 0–150)
UROBILINOGEN UR QL STRIP: ABNORMAL
VLDLC SERPL-MCNC: 42 MG/DL (ref 5–40)
WBC # BLD AUTO: 3.52 10*3/MM3 (ref 3.4–10.8)
WBC UR QL AUTO: ABNORMAL /HPF

## 2021-04-30 PROCEDURE — 83036 HEMOGLOBIN GLYCOSYLATED A1C: CPT

## 2021-04-30 PROCEDURE — 80048 BASIC METABOLIC PNL TOTAL CA: CPT

## 2021-04-30 PROCEDURE — 85610 PROTHROMBIN TIME: CPT

## 2021-04-30 PROCEDURE — 87086 URINE CULTURE/COLONY COUNT: CPT

## 2021-04-30 PROCEDURE — 36415 COLL VENOUS BLD VENIPUNCTURE: CPT

## 2021-04-30 PROCEDURE — 84156 ASSAY OF PROTEIN URINE: CPT

## 2021-04-30 PROCEDURE — 80061 LIPID PANEL: CPT

## 2021-04-30 PROCEDURE — 80197 ASSAY OF TACROLIMUS: CPT

## 2021-04-30 PROCEDURE — 81001 URINALYSIS AUTO W/SCOPE: CPT

## 2021-04-30 PROCEDURE — 85025 COMPLETE CBC W/AUTO DIFF WBC: CPT

## 2021-04-30 PROCEDURE — 80076 HEPATIC FUNCTION PANEL: CPT

## 2021-04-30 PROCEDURE — 82570 ASSAY OF URINE CREATININE: CPT

## 2021-04-30 PROCEDURE — 84100 ASSAY OF PHOSPHORUS: CPT

## 2021-04-30 PROCEDURE — 83735 ASSAY OF MAGNESIUM: CPT

## 2021-05-01 LAB — BACTERIA SPEC AEROBE CULT: NO GROWTH

## 2021-05-03 LAB — TACROLIMUS BLD LC/MS/MS-MCNC: 5.4 NG/ML (ref 2–20)

## 2021-05-04 DIAGNOSIS — Z30.019 ENCOUNTER FOR FEMALE BIRTH CONTROL: ICD-10-CM

## 2021-05-04 LAB
CMV DNA SERPL NAA+PROBE-ACNC: NEGATIVE IU/ML
CMV DNA SERPL NAA+PROBE-LOG IU: NORMAL {LOG_IU}/ML

## 2021-05-05 RX ORDER — NORGESTIMATE AND ETHINYL ESTRADIOL 0.25-0.035
1 KIT ORAL DAILY
Qty: 28 TABLET | Refills: 12 | Status: SHIPPED | OUTPATIENT
Start: 2021-05-05 | End: 2022-04-26 | Stop reason: CLARIF

## 2021-06-03 ENCOUNTER — TELEPHONE (OUTPATIENT)
Dept: INTERNAL MEDICINE | Facility: CLINIC | Age: 31
End: 2021-06-03

## 2021-06-04 ENCOUNTER — LAB (OUTPATIENT)
Dept: LAB | Facility: HOSPITAL | Age: 31
End: 2021-06-04

## 2021-06-04 DIAGNOSIS — B99.8 IMMUNOSUPPRESSION-RELATED INFECTIOUS DISEASE (HCC): ICD-10-CM

## 2021-06-04 DIAGNOSIS — R39.9 UNSPECIFIED SYMPTOMS AND SIGNS INVOLVING THE GENITOURINARY SYSTEM: ICD-10-CM

## 2021-06-04 DIAGNOSIS — D84.9 IMMUNOSUPPRESSION-RELATED INFECTIOUS DISEASE (HCC): ICD-10-CM

## 2021-06-04 DIAGNOSIS — Z94.0 KIDNEY REPLACED BY TRANSPLANT: ICD-10-CM

## 2021-06-04 DIAGNOSIS — R79.89 ELEVATED SERUM CREATININE: ICD-10-CM

## 2021-06-04 LAB
ALBUMIN SERPL-MCNC: 4 G/DL (ref 3.5–5.2)
ALP SERPL-CCNC: 87 U/L (ref 39–117)
ALT SERPL W P-5'-P-CCNC: 28 U/L (ref 1–33)
ANION GAP SERPL CALCULATED.3IONS-SCNC: 12 MMOL/L (ref 5–15)
AST SERPL-CCNC: 21 U/L (ref 1–32)
BASOPHILS # BLD AUTO: 0.02 10*3/MM3 (ref 0–0.2)
BASOPHILS NFR BLD AUTO: 0.6 % (ref 0–1.5)
BILIRUB CONJ SERPL-MCNC: <0.2 MG/DL (ref 0–0.3)
BILIRUB INDIRECT SERPL-MCNC: NORMAL MG/DL
BILIRUB SERPL-MCNC: 0.3 MG/DL (ref 0–1.2)
BILIRUB UR QL STRIP: NEGATIVE
BUN SERPL-MCNC: 14 MG/DL (ref 6–20)
BUN/CREAT SERPL: 11.3 (ref 7–25)
CALCIUM SPEC-SCNC: 9.8 MG/DL (ref 8.6–10.5)
CHLORIDE SERPL-SCNC: 102 MMOL/L (ref 98–107)
CHOLEST SERPL-MCNC: 175 MG/DL (ref 0–200)
CLARITY UR: ABNORMAL
CLUMPED PLATELETS: PRESENT
CO2 SERPL-SCNC: 21 MMOL/L (ref 22–29)
COLOR UR: YELLOW
CREAT SERPL-MCNC: 1.24 MG/DL (ref 0.57–1)
CREAT UR-MCNC: 42.7 MG/DL
DEPRECATED RDW RBC AUTO: 39.9 FL (ref 37–54)
EOSINOPHIL # BLD AUTO: 0.11 10*3/MM3 (ref 0–0.4)
EOSINOPHIL NFR BLD AUTO: 3.4 % (ref 0.3–6.2)
ERYTHROCYTE [DISTWIDTH] IN BLOOD BY AUTOMATED COUNT: 12.4 % (ref 12.3–15.4)
GFR SERPL CREATININE-BSD FRML MDRD: 50 ML/MIN/1.73
GLUCOSE SERPL-MCNC: 91 MG/DL (ref 65–99)
GLUCOSE UR STRIP-MCNC: NEGATIVE MG/DL
HBA1C MFR BLD: 4.9 % (ref 4.8–5.6)
HCT VFR BLD AUTO: 41.8 % (ref 34–46.6)
HDLC SERPL-MCNC: 43 MG/DL (ref 40–60)
HGB BLD-MCNC: 13.9 G/DL (ref 12–15.9)
HGB UR QL STRIP.AUTO: NEGATIVE
IMM GRANULOCYTES # BLD AUTO: 0.03 10*3/MM3 (ref 0–0.05)
IMM GRANULOCYTES NFR BLD AUTO: 0.9 % (ref 0–0.5)
INR PPP: 0.91 (ref 0.9–1.1)
KETONES UR QL STRIP: NEGATIVE
LDLC SERPL CALC-MCNC: 101 MG/DL (ref 0–100)
LDLC/HDLC SERPL: 2.24 {RATIO}
LEUKOCYTE ESTERASE UR QL STRIP.AUTO: NEGATIVE
LYMPHOCYTES # BLD AUTO: 0.74 10*3/MM3 (ref 0.7–3.1)
LYMPHOCYTES NFR BLD AUTO: 22.8 % (ref 19.6–45.3)
MAGNESIUM SERPL-MCNC: 1.8 MG/DL (ref 1.6–2.6)
MCH RBC QN AUTO: 29.3 PG (ref 26.6–33)
MCHC RBC AUTO-ENTMCNC: 33.3 G/DL (ref 31.5–35.7)
MCV RBC AUTO: 88.2 FL (ref 79–97)
MONOCYTES # BLD AUTO: 0.45 10*3/MM3 (ref 0.1–0.9)
MONOCYTES NFR BLD AUTO: 13.8 % (ref 5–12)
NEUTROPHILS NFR BLD AUTO: 1.9 10*3/MM3 (ref 1.7–7)
NEUTROPHILS NFR BLD AUTO: 58.5 % (ref 42.7–76)
NITRITE UR QL STRIP: NEGATIVE
NRBC BLD AUTO-RTO: 0.3 /100 WBC (ref 0–0.2)
PH UR STRIP.AUTO: 6.5 [PH] (ref 5–8)
PHOSPHATE SERPL-MCNC: 2.2 MG/DL (ref 2.5–4.5)
PLATELET # BLD AUTO: 109 10*3/MM3 (ref 140–450)
PMV BLD AUTO: 11.6 FL (ref 6–12)
POTASSIUM SERPL-SCNC: 4.3 MMOL/L (ref 3.5–5.2)
PROT SERPL-MCNC: 6.8 G/DL (ref 6–8.5)
PROT UR QL STRIP: NEGATIVE
PROT UR-MCNC: 6 MG/DL
PROT/CREAT UR: 140.5 MG/G CREA (ref 0–200)
PROTHROMBIN TIME: 12.7 SECONDS (ref 12–15.1)
RBC # BLD AUTO: 4.74 10*6/MM3 (ref 3.77–5.28)
RBC MORPH BLD: NORMAL
SODIUM SERPL-SCNC: 135 MMOL/L (ref 136–145)
SP GR UR STRIP: 1.01 (ref 1–1.03)
TRIGL SERPL-MCNC: 178 MG/DL (ref 0–150)
UROBILINOGEN UR QL STRIP: ABNORMAL
VLDLC SERPL-MCNC: 31 MG/DL (ref 5–40)
WBC # BLD AUTO: 3.25 10*3/MM3 (ref 3.4–10.8)
WBC MORPH BLD: NORMAL

## 2021-06-04 PROCEDURE — 80076 HEPATIC FUNCTION PANEL: CPT

## 2021-06-04 PROCEDURE — 84100 ASSAY OF PHOSPHORUS: CPT

## 2021-06-04 PROCEDURE — 87086 URINE CULTURE/COLONY COUNT: CPT

## 2021-06-04 PROCEDURE — 83036 HEMOGLOBIN GLYCOSYLATED A1C: CPT

## 2021-06-04 PROCEDURE — 80048 BASIC METABOLIC PNL TOTAL CA: CPT

## 2021-06-04 PROCEDURE — 80197 ASSAY OF TACROLIMUS: CPT

## 2021-06-04 PROCEDURE — 36415 COLL VENOUS BLD VENIPUNCTURE: CPT

## 2021-06-04 PROCEDURE — 81003 URINALYSIS AUTO W/O SCOPE: CPT

## 2021-06-04 PROCEDURE — 84156 ASSAY OF PROTEIN URINE: CPT

## 2021-06-04 PROCEDURE — 85007 BL SMEAR W/DIFF WBC COUNT: CPT

## 2021-06-04 PROCEDURE — 82570 ASSAY OF URINE CREATININE: CPT

## 2021-06-04 PROCEDURE — 85025 COMPLETE CBC W/AUTO DIFF WBC: CPT

## 2021-06-04 PROCEDURE — 85610 PROTHROMBIN TIME: CPT

## 2021-06-04 PROCEDURE — 80061 LIPID PANEL: CPT

## 2021-06-04 PROCEDURE — 83735 ASSAY OF MAGNESIUM: CPT

## 2021-06-05 LAB — BACTERIA SPEC AEROBE CULT: NO GROWTH

## 2021-06-07 LAB — TACROLIMUS BLD LC/MS/MS-MCNC: 3.9 NG/ML (ref 2–20)

## 2021-06-23 ENCOUNTER — LAB (OUTPATIENT)
Dept: LAB | Facility: HOSPITAL | Age: 31
End: 2021-06-23

## 2021-06-23 DIAGNOSIS — Z94.0 KIDNEY REPLACED BY TRANSPLANT: ICD-10-CM

## 2021-06-23 DIAGNOSIS — R79.89 ELEVATED SERUM CREATININE: ICD-10-CM

## 2021-06-23 DIAGNOSIS — D84.9 IMMUNOSUPPRESSION-RELATED INFECTIOUS DISEASE (HCC): ICD-10-CM

## 2021-06-23 DIAGNOSIS — B99.8 IMMUNOSUPPRESSION-RELATED INFECTIOUS DISEASE (HCC): ICD-10-CM

## 2021-06-23 DIAGNOSIS — R39.9 UNSPECIFIED SYMPTOMS AND SIGNS INVOLVING THE GENITOURINARY SYSTEM: ICD-10-CM

## 2021-06-23 LAB
ALBUMIN SERPL-MCNC: 3.9 G/DL (ref 3.5–5.2)
ALP SERPL-CCNC: 81 U/L (ref 39–117)
ALT SERPL W P-5'-P-CCNC: 18 U/L (ref 1–33)
ANION GAP SERPL CALCULATED.3IONS-SCNC: 10.4 MMOL/L (ref 5–15)
AST SERPL-CCNC: 11 U/L (ref 1–32)
BASOPHILS # BLD MANUAL: 0.09 10*3/MM3 (ref 0–0.2)
BASOPHILS NFR BLD AUTO: 2 % (ref 0–1.5)
BILIRUB CONJ SERPL-MCNC: <0.2 MG/DL (ref 0–0.3)
BILIRUB INDIRECT SERPL-MCNC: NORMAL MG/DL
BILIRUB SERPL-MCNC: 0.3 MG/DL (ref 0–1.2)
BILIRUB UR QL STRIP: NEGATIVE
BUN SERPL-MCNC: 18 MG/DL (ref 6–20)
BUN/CREAT SERPL: 15.8 (ref 7–25)
CALCIUM SPEC-SCNC: 9.6 MG/DL (ref 8.6–10.5)
CHLORIDE SERPL-SCNC: 102 MMOL/L (ref 98–107)
CHOLEST SERPL-MCNC: 164 MG/DL (ref 0–200)
CLARITY UR: CLEAR
CO2 SERPL-SCNC: 22.6 MMOL/L (ref 22–29)
COLOR UR: YELLOW
CREAT SERPL-MCNC: 1.14 MG/DL (ref 0.57–1)
CREAT UR-MCNC: 39.6 MG/DL
DEPRECATED RDW RBC AUTO: 41.5 FL (ref 37–54)
EOSINOPHIL # BLD MANUAL: 0.29 10*3/MM3 (ref 0–0.4)
EOSINOPHIL NFR BLD MANUAL: 6.1 % (ref 0.3–6.2)
ERYTHROCYTE [DISTWIDTH] IN BLOOD BY AUTOMATED COUNT: 12.5 % (ref 12.3–15.4)
GFR SERPL CREATININE-BSD FRML MDRD: 56 ML/MIN/1.73
GLUCOSE SERPL-MCNC: 64 MG/DL (ref 65–99)
GLUCOSE UR STRIP-MCNC: NEGATIVE MG/DL
HBA1C MFR BLD: 4.8 % (ref 4.8–5.6)
HCT VFR BLD AUTO: 45.3 % (ref 34–46.6)
HDLC SERPL-MCNC: 39 MG/DL (ref 40–60)
HGB BLD-MCNC: 14.5 G/DL (ref 12–15.9)
HGB UR QL STRIP.AUTO: NEGATIVE
INR PPP: 0.95 (ref 0.9–1.1)
KETONES UR QL STRIP: NEGATIVE
LDLC SERPL CALC-MCNC: 90 MG/DL (ref 0–100)
LDLC/HDLC SERPL: 2.15 {RATIO}
LEUKOCYTE ESTERASE UR QL STRIP.AUTO: NEGATIVE
LYMPHOCYTES # BLD MANUAL: 0.76 10*3/MM3 (ref 0.7–3.1)
LYMPHOCYTES NFR BLD MANUAL: 16.2 % (ref 19.6–45.3)
LYMPHOCYTES NFR BLD MANUAL: 18.2 % (ref 5–12)
MAGNESIUM SERPL-MCNC: 1.6 MG/DL (ref 1.6–2.6)
MCH RBC QN AUTO: 29 PG (ref 26.6–33)
MCHC RBC AUTO-ENTMCNC: 32 G/DL (ref 31.5–35.7)
MCV RBC AUTO: 90.6 FL (ref 79–97)
MONOCYTES # BLD AUTO: 0.86 10*3/MM3 (ref 0.1–0.9)
NEUTROPHILS # BLD AUTO: 2.72 10*3/MM3 (ref 1.7–7)
NEUTROPHILS NFR BLD MANUAL: 57.6 % (ref 42.7–76)
NITRITE UR QL STRIP: NEGATIVE
PH UR STRIP.AUTO: 6.5 [PH] (ref 5–8)
PHOSPHATE SERPL-MCNC: 2.7 MG/DL (ref 2.5–4.5)
PLAT MORPH BLD: NORMAL
PLATELET # BLD AUTO: 105 10*3/MM3 (ref 140–450)
PMV BLD AUTO: 11.4 FL (ref 6–12)
POTASSIUM SERPL-SCNC: 4.3 MMOL/L (ref 3.5–5.2)
PROT SERPL-MCNC: 6.5 G/DL (ref 6–8.5)
PROT UR QL STRIP: NEGATIVE
PROT UR-MCNC: 6 MG/DL
PROT/CREAT UR: 151.5 MG/G CREA (ref 0–200)
PROTHROMBIN TIME: 13.2 SECONDS (ref 12–15.1)
RBC # BLD AUTO: 5 10*6/MM3 (ref 3.77–5.28)
RBC MORPH BLD: NORMAL
SMUDGE CELLS BLD QL SMEAR: ABNORMAL
SODIUM SERPL-SCNC: 135 MMOL/L (ref 136–145)
SP GR UR STRIP: 1.01 (ref 1–1.03)
TRIGL SERPL-MCNC: 206 MG/DL (ref 0–150)
UROBILINOGEN UR QL STRIP: NORMAL
VLDLC SERPL-MCNC: 35 MG/DL (ref 5–40)
WBC # BLD AUTO: 4.72 10*3/MM3 (ref 3.4–10.8)

## 2021-06-23 PROCEDURE — 84156 ASSAY OF PROTEIN URINE: CPT

## 2021-06-23 PROCEDURE — 85025 COMPLETE CBC W/AUTO DIFF WBC: CPT

## 2021-06-23 PROCEDURE — 80048 BASIC METABOLIC PNL TOTAL CA: CPT

## 2021-06-23 PROCEDURE — 36415 COLL VENOUS BLD VENIPUNCTURE: CPT

## 2021-06-23 PROCEDURE — 80076 HEPATIC FUNCTION PANEL: CPT

## 2021-06-23 PROCEDURE — 83036 HEMOGLOBIN GLYCOSYLATED A1C: CPT

## 2021-06-23 PROCEDURE — 81003 URINALYSIS AUTO W/O SCOPE: CPT

## 2021-06-23 PROCEDURE — 82570 ASSAY OF URINE CREATININE: CPT

## 2021-06-23 PROCEDURE — 80197 ASSAY OF TACROLIMUS: CPT

## 2021-06-23 PROCEDURE — 85610 PROTHROMBIN TIME: CPT

## 2021-06-23 PROCEDURE — 85007 BL SMEAR W/DIFF WBC COUNT: CPT

## 2021-06-23 PROCEDURE — 83735 ASSAY OF MAGNESIUM: CPT

## 2021-06-23 PROCEDURE — 87086 URINE CULTURE/COLONY COUNT: CPT

## 2021-06-23 PROCEDURE — 84100 ASSAY OF PHOSPHORUS: CPT

## 2021-06-23 PROCEDURE — 80061 LIPID PANEL: CPT

## 2021-06-24 LAB — BACTERIA SPEC AEROBE CULT: NORMAL

## 2021-06-25 LAB
CMV DNA SERPL NAA+PROBE-ACNC: NEGATIVE IU/ML
CMV DNA SERPL NAA+PROBE-LOG IU: NORMAL {LOG_IU}/ML

## 2021-06-26 LAB — TACROLIMUS BLD LC/MS/MS-MCNC: 4.1 NG/ML (ref 2–20)

## 2021-07-21 ENCOUNTER — LAB (OUTPATIENT)
Dept: LAB | Facility: HOSPITAL | Age: 31
End: 2021-07-21

## 2021-07-21 DIAGNOSIS — R39.9 UNSPECIFIED SYMPTOMS AND SIGNS INVOLVING THE GENITOURINARY SYSTEM: ICD-10-CM

## 2021-07-21 DIAGNOSIS — D84.9 IMMUNOSUPPRESSION-RELATED INFECTIOUS DISEASE (HCC): ICD-10-CM

## 2021-07-21 DIAGNOSIS — B99.8 IMMUNOSUPPRESSION-RELATED INFECTIOUS DISEASE (HCC): ICD-10-CM

## 2021-07-21 DIAGNOSIS — R79.89 ELEVATED SERUM CREATININE: ICD-10-CM

## 2021-07-21 DIAGNOSIS — Z94.0 KIDNEY REPLACED BY TRANSPLANT: ICD-10-CM

## 2021-07-21 LAB
ALBUMIN SERPL-MCNC: 3.9 G/DL (ref 3.5–5.2)
ALP SERPL-CCNC: 70 U/L (ref 39–117)
ALT SERPL W P-5'-P-CCNC: 19 U/L (ref 1–33)
ANION GAP SERPL CALCULATED.3IONS-SCNC: 12.9 MMOL/L (ref 5–15)
AST SERPL-CCNC: 15 U/L (ref 1–32)
BASOPHILS # BLD MANUAL: 0.05 10*3/MM3 (ref 0–0.2)
BASOPHILS NFR BLD AUTO: 1 % (ref 0–1.5)
BILIRUB CONJ SERPL-MCNC: <0.2 MG/DL (ref 0–0.3)
BILIRUB INDIRECT SERPL-MCNC: NORMAL MG/DL
BILIRUB SERPL-MCNC: 0.4 MG/DL (ref 0–1.2)
BILIRUB UR QL STRIP: NEGATIVE
BUN SERPL-MCNC: 19 MG/DL (ref 6–20)
BUN/CREAT SERPL: 15.8 (ref 7–25)
CALCIUM SPEC-SCNC: 9.8 MG/DL (ref 8.6–10.5)
CHLORIDE SERPL-SCNC: 99 MMOL/L (ref 98–107)
CHOLEST SERPL-MCNC: 174 MG/DL (ref 0–200)
CLARITY UR: CLEAR
CLUMPED PLATELETS: PRESENT
CO2 SERPL-SCNC: 21.1 MMOL/L (ref 22–29)
COLOR UR: YELLOW
CREAT SERPL-MCNC: 1.2 MG/DL (ref 0.57–1)
CREAT UR-MCNC: 31 MG/DL
DEPRECATED RDW RBC AUTO: 41.7 FL (ref 37–54)
EOSINOPHIL # BLD MANUAL: 0.15 10*3/MM3 (ref 0–0.4)
EOSINOPHIL NFR BLD MANUAL: 3 % (ref 0.3–6.2)
ERYTHROCYTE [DISTWIDTH] IN BLOOD BY AUTOMATED COUNT: 12.8 % (ref 12.3–15.4)
GFR SERPL CREATININE-BSD FRML MDRD: 52 ML/MIN/1.73
GLUCOSE SERPL-MCNC: 78 MG/DL (ref 65–99)
GLUCOSE UR STRIP-MCNC: NEGATIVE MG/DL
HBA1C MFR BLD: 4.99 % (ref 4.8–5.6)
HCT VFR BLD AUTO: 47.7 % (ref 34–46.6)
HDLC SERPL-MCNC: 43 MG/DL (ref 40–60)
HGB BLD-MCNC: 15.3 G/DL (ref 12–15.9)
HGB UR QL STRIP.AUTO: NEGATIVE
INR PPP: 0.93 (ref 0.9–1.1)
KETONES UR QL STRIP: NEGATIVE
LDLC SERPL CALC-MCNC: 107 MG/DL (ref 0–100)
LDLC/HDLC SERPL: 2.41 {RATIO}
LEUKOCYTE ESTERASE UR QL STRIP.AUTO: NEGATIVE
LYMPHOCYTES # BLD MANUAL: 0.77 10*3/MM3 (ref 0.7–3.1)
LYMPHOCYTES NFR BLD MANUAL: 10 % (ref 5–12)
LYMPHOCYTES NFR BLD MANUAL: 15 % (ref 19.6–45.3)
MAGNESIUM SERPL-MCNC: 1.6 MG/DL (ref 1.6–2.6)
MCH RBC QN AUTO: 28.9 PG (ref 26.6–33)
MCHC RBC AUTO-ENTMCNC: 32.1 G/DL (ref 31.5–35.7)
MCV RBC AUTO: 90.2 FL (ref 79–97)
MONOCYTES # BLD AUTO: 0.52 10*3/MM3 (ref 0.1–0.9)
NEUTROPHILS # BLD AUTO: 3.5 10*3/MM3 (ref 1.7–7)
NEUTROPHILS NFR BLD MANUAL: 68 % (ref 42.7–76)
NITRITE UR QL STRIP: NEGATIVE
PH UR STRIP.AUTO: 6 [PH] (ref 5–8)
PHOSPHATE SERPL-MCNC: 2.8 MG/DL (ref 2.5–4.5)
PLATELET # BLD AUTO: 170 10*3/MM3 (ref 140–450)
PMV BLD AUTO: 12 FL (ref 6–12)
POTASSIUM SERPL-SCNC: 4.7 MMOL/L (ref 3.5–5.2)
PROT SERPL-MCNC: 6.4 G/DL (ref 6–8.5)
PROT UR QL STRIP: NEGATIVE
PROT UR-MCNC: 4 MG/DL
PROT/CREAT UR: 129 MG/G CREA (ref 0–200)
PROTHROMBIN TIME: 13 SECONDS (ref 12–15.1)
RBC # BLD AUTO: 5.29 10*6/MM3 (ref 3.77–5.28)
RBC MORPH BLD: NORMAL
SODIUM SERPL-SCNC: 133 MMOL/L (ref 136–145)
SP GR UR STRIP: 1.01 (ref 1–1.03)
TRIGL SERPL-MCNC: 137 MG/DL (ref 0–150)
UROBILINOGEN UR QL STRIP: NORMAL
VLDLC SERPL-MCNC: 24 MG/DL (ref 5–40)
WBC # BLD AUTO: 5.15 10*3/MM3 (ref 3.4–10.8)
WBC MORPH BLD: NORMAL

## 2021-07-21 PROCEDURE — 84156 ASSAY OF PROTEIN URINE: CPT

## 2021-07-21 PROCEDURE — 36415 COLL VENOUS BLD VENIPUNCTURE: CPT

## 2021-07-21 PROCEDURE — 80048 BASIC METABOLIC PNL TOTAL CA: CPT

## 2021-07-21 PROCEDURE — 81003 URINALYSIS AUTO W/O SCOPE: CPT

## 2021-07-21 PROCEDURE — 82570 ASSAY OF URINE CREATININE: CPT

## 2021-07-21 PROCEDURE — 85610 PROTHROMBIN TIME: CPT

## 2021-07-21 PROCEDURE — 83735 ASSAY OF MAGNESIUM: CPT

## 2021-07-21 PROCEDURE — 80197 ASSAY OF TACROLIMUS: CPT

## 2021-07-21 PROCEDURE — 85007 BL SMEAR W/DIFF WBC COUNT: CPT

## 2021-07-21 PROCEDURE — 80076 HEPATIC FUNCTION PANEL: CPT

## 2021-07-21 PROCEDURE — 84100 ASSAY OF PHOSPHORUS: CPT

## 2021-07-21 PROCEDURE — 85025 COMPLETE CBC W/AUTO DIFF WBC: CPT

## 2021-07-21 PROCEDURE — 87086 URINE CULTURE/COLONY COUNT: CPT

## 2021-07-21 PROCEDURE — 80061 LIPID PANEL: CPT

## 2021-07-21 PROCEDURE — 83036 HEMOGLOBIN GLYCOSYLATED A1C: CPT

## 2021-07-22 LAB — BACTERIA SPEC AEROBE CULT: NO GROWTH

## 2021-07-23 LAB — TACROLIMUS BLD LC/MS/MS-MCNC: 5.3 NG/ML (ref 2–20)

## 2021-08-04 ENCOUNTER — LAB (OUTPATIENT)
Dept: LAB | Facility: HOSPITAL | Age: 31
End: 2021-08-04

## 2021-08-04 DIAGNOSIS — R39.9 UNSPECIFIED SYMPTOMS AND SIGNS INVOLVING THE GENITOURINARY SYSTEM: ICD-10-CM

## 2021-08-04 DIAGNOSIS — Z94.0 KIDNEY REPLACED BY TRANSPLANT: ICD-10-CM

## 2021-08-04 DIAGNOSIS — R79.89 ELEVATED SERUM CREATININE: ICD-10-CM

## 2021-08-04 LAB
ALBUMIN SERPL-MCNC: 4 G/DL (ref 3.5–5.2)
ANION GAP SERPL CALCULATED.3IONS-SCNC: 8 MMOL/L (ref 5–15)
BUN SERPL-MCNC: 12 MG/DL (ref 6–20)
BUN/CREAT SERPL: 9.7 (ref 7–25)
CALCIUM SPEC-SCNC: 9.6 MG/DL (ref 8.6–10.5)
CHLORIDE SERPL-SCNC: 101 MMOL/L (ref 98–107)
CO2 SERPL-SCNC: 21 MMOL/L (ref 22–29)
CREAT SERPL-MCNC: 1.24 MG/DL (ref 0.57–1)
GFR SERPL CREATININE-BSD FRML MDRD: 50 ML/MIN/1.73
GLUCOSE SERPL-MCNC: 103 MG/DL (ref 65–99)
PHOSPHATE SERPL-MCNC: 2.2 MG/DL (ref 2.5–4.5)
POTASSIUM SERPL-SCNC: 4.4 MMOL/L (ref 3.5–5.2)
SODIUM SERPL-SCNC: 130 MMOL/L (ref 136–145)

## 2021-08-04 PROCEDURE — 80069 RENAL FUNCTION PANEL: CPT

## 2021-08-04 PROCEDURE — 36415 COLL VENOUS BLD VENIPUNCTURE: CPT

## 2021-08-17 ENCOUNTER — LAB (OUTPATIENT)
Dept: LAB | Facility: HOSPITAL | Age: 31
End: 2021-08-17

## 2021-08-17 DIAGNOSIS — B99.8 IMMUNOSUPPRESSION-RELATED INFECTIOUS DISEASE (HCC): ICD-10-CM

## 2021-08-17 DIAGNOSIS — R79.89 ELEVATED SERUM CREATININE: ICD-10-CM

## 2021-08-17 DIAGNOSIS — Z94.0 KIDNEY REPLACED BY TRANSPLANT: ICD-10-CM

## 2021-08-17 DIAGNOSIS — D84.9 IMMUNOSUPPRESSION-RELATED INFECTIOUS DISEASE (HCC): ICD-10-CM

## 2021-08-17 DIAGNOSIS — R39.9 UNSPECIFIED SYMPTOMS AND SIGNS INVOLVING THE GENITOURINARY SYSTEM: ICD-10-CM

## 2021-08-17 LAB
ALBUMIN SERPL-MCNC: 4.1 G/DL (ref 3.5–5.2)
ALP SERPL-CCNC: 59 U/L (ref 39–117)
ALT SERPL W P-5'-P-CCNC: 18 U/L (ref 1–33)
ANION GAP SERPL CALCULATED.3IONS-SCNC: 9.1 MMOL/L (ref 5–15)
AST SERPL-CCNC: 15 U/L (ref 1–32)
BACTERIA UR QL AUTO: ABNORMAL /HPF
BILIRUB CONJ SERPL-MCNC: <0.2 MG/DL (ref 0–0.3)
BILIRUB INDIRECT SERPL-MCNC: NORMAL MG/DL
BILIRUB SERPL-MCNC: 0.4 MG/DL (ref 0–1.2)
BILIRUB UR QL STRIP: NEGATIVE
BUN SERPL-MCNC: 18 MG/DL (ref 6–20)
BUN/CREAT SERPL: 15 (ref 7–25)
CALCIUM SPEC-SCNC: 10.1 MG/DL (ref 8.6–10.5)
CHLORIDE SERPL-SCNC: 100 MMOL/L (ref 98–107)
CHOLEST SERPL-MCNC: 172 MG/DL (ref 0–200)
CLARITY UR: CLEAR
CO2 SERPL-SCNC: 22.9 MMOL/L (ref 22–29)
COLOR UR: YELLOW
CREAT SERPL-MCNC: 1.2 MG/DL (ref 0.57–1)
CREAT UR-MCNC: 32.2 MG/DL
DEPRECATED RDW RBC AUTO: 42.8 FL (ref 37–54)
EOSINOPHIL # BLD MANUAL: 0.1 10*3/MM3 (ref 0–0.4)
EOSINOPHIL NFR BLD MANUAL: 2 % (ref 0.3–6.2)
ERYTHROCYTE [DISTWIDTH] IN BLOOD BY AUTOMATED COUNT: 13.1 % (ref 12.3–15.4)
GFR SERPL CREATININE-BSD FRML MDRD: 52 ML/MIN/1.73
GLUCOSE SERPL-MCNC: 95 MG/DL (ref 65–99)
GLUCOSE UR STRIP-MCNC: NEGATIVE MG/DL
HBA1C MFR BLD: 4.98 % (ref 4.8–5.6)
HCT VFR BLD AUTO: 44.8 % (ref 34–46.6)
HDLC SERPL-MCNC: 54 MG/DL (ref 40–60)
HGB BLD-MCNC: 14.9 G/DL (ref 12–15.9)
HGB UR QL STRIP.AUTO: NEGATIVE
HYALINE CASTS UR QL AUTO: ABNORMAL /LPF
INR PPP: 0.88 (ref 0.9–1.1)
KETONES UR QL STRIP: NEGATIVE
LDLC SERPL CALC-MCNC: 95 MG/DL (ref 0–100)
LDLC/HDLC SERPL: 1.7 {RATIO}
LEUKOCYTE ESTERASE UR QL STRIP.AUTO: ABNORMAL
LYMPHOCYTES # BLD MANUAL: 0.81 10*3/MM3 (ref 0.7–3.1)
LYMPHOCYTES NFR BLD MANUAL: 16.2 % (ref 19.6–45.3)
LYMPHOCYTES NFR BLD MANUAL: 9.1 % (ref 5–12)
MAGNESIUM SERPL-MCNC: 1.5 MG/DL (ref 1.6–2.6)
MCH RBC QN AUTO: 29.7 PG (ref 26.6–33)
MCHC RBC AUTO-ENTMCNC: 33.3 G/DL (ref 31.5–35.7)
MCV RBC AUTO: 89.2 FL (ref 79–97)
MONOCYTES # BLD AUTO: 0.45 10*3/MM3 (ref 0.1–0.9)
NEUTROPHILS # BLD AUTO: 3.61 10*3/MM3 (ref 1.7–7)
NEUTROPHILS NFR BLD MANUAL: 72.7 % (ref 42.7–76)
NITRITE UR QL STRIP: NEGATIVE
PH UR STRIP.AUTO: 6.5 [PH] (ref 5–8)
PHOSPHATE SERPL-MCNC: 2.2 MG/DL (ref 2.5–4.5)
PLAT MORPH BLD: NORMAL
PLATELET # BLD AUTO: 81 10*3/MM3 (ref 140–450)
PMV BLD AUTO: 11 FL (ref 6–12)
POTASSIUM SERPL-SCNC: 4.7 MMOL/L (ref 3.5–5.2)
PROT SERPL-MCNC: 6.7 G/DL (ref 6–8.5)
PROT UR QL STRIP: NEGATIVE
PROT UR-MCNC: 6 MG/DL
PROT/CREAT UR: 186.3 MG/G CREA (ref 0–200)
PROTHROMBIN TIME: 12.4 SECONDS (ref 12–15.1)
RBC # BLD AUTO: 5.02 10*6/MM3 (ref 3.77–5.28)
RBC # UR: ABNORMAL /HPF
RBC MORPH BLD: NORMAL
REF LAB TEST METHOD: ABNORMAL
SODIUM SERPL-SCNC: 132 MMOL/L (ref 136–145)
SP GR UR STRIP: 1.01 (ref 1–1.03)
SQUAMOUS #/AREA URNS HPF: ABNORMAL /HPF
TRIGL SERPL-MCNC: 131 MG/DL (ref 0–150)
UROBILINOGEN UR QL STRIP: ABNORMAL
VLDLC SERPL-MCNC: 23 MG/DL (ref 5–40)
WBC # BLD AUTO: 4.97 10*3/MM3 (ref 3.4–10.8)
WBC MORPH BLD: NORMAL
WBC UR QL AUTO: ABNORMAL /HPF

## 2021-08-17 PROCEDURE — 36415 COLL VENOUS BLD VENIPUNCTURE: CPT

## 2021-08-17 PROCEDURE — 83735 ASSAY OF MAGNESIUM: CPT

## 2021-08-17 PROCEDURE — 80061 LIPID PANEL: CPT

## 2021-08-17 PROCEDURE — 85025 COMPLETE CBC W/AUTO DIFF WBC: CPT

## 2021-08-17 PROCEDURE — 81001 URINALYSIS AUTO W/SCOPE: CPT

## 2021-08-17 PROCEDURE — 83036 HEMOGLOBIN GLYCOSYLATED A1C: CPT

## 2021-08-17 PROCEDURE — 85007 BL SMEAR W/DIFF WBC COUNT: CPT

## 2021-08-17 PROCEDURE — 84100 ASSAY OF PHOSPHORUS: CPT

## 2021-08-17 PROCEDURE — 82570 ASSAY OF URINE CREATININE: CPT

## 2021-08-17 PROCEDURE — 85610 PROTHROMBIN TIME: CPT

## 2021-08-17 PROCEDURE — 80076 HEPATIC FUNCTION PANEL: CPT

## 2021-08-17 PROCEDURE — 80048 BASIC METABOLIC PNL TOTAL CA: CPT

## 2021-08-17 PROCEDURE — 80197 ASSAY OF TACROLIMUS: CPT

## 2021-08-17 PROCEDURE — 84156 ASSAY OF PROTEIN URINE: CPT

## 2021-08-17 PROCEDURE — 87086 URINE CULTURE/COLONY COUNT: CPT

## 2021-08-17 PROCEDURE — 87147 CULTURE TYPE IMMUNOLOGIC: CPT

## 2021-08-18 LAB — BACTERIA SPEC AEROBE CULT: ABNORMAL

## 2021-08-19 LAB — TACROLIMUS BLD LC/MS/MS-MCNC: 9.1 NG/ML (ref 2–20)

## 2021-09-02 ENCOUNTER — LAB (OUTPATIENT)
Dept: LAB | Facility: HOSPITAL | Age: 31
End: 2021-09-02

## 2021-09-02 DIAGNOSIS — Z94.0 KIDNEY REPLACED BY TRANSPLANT: ICD-10-CM

## 2021-09-02 DIAGNOSIS — R79.89 ELEVATED SERUM CREATININE: ICD-10-CM

## 2021-09-02 DIAGNOSIS — D84.9 IMMUNOSUPPRESSION-RELATED INFECTIOUS DISEASE (HCC): ICD-10-CM

## 2021-09-02 DIAGNOSIS — R39.9 UNSPECIFIED SYMPTOMS AND SIGNS INVOLVING THE GENITOURINARY SYSTEM: ICD-10-CM

## 2021-09-02 DIAGNOSIS — B99.8 IMMUNOSUPPRESSION-RELATED INFECTIOUS DISEASE (HCC): ICD-10-CM

## 2021-09-02 LAB
ALBUMIN SERPL-MCNC: 4.1 G/DL (ref 3.5–5.2)
ALP SERPL-CCNC: 59 U/L (ref 39–117)
ALT SERPL W P-5'-P-CCNC: 21 U/L (ref 1–33)
ANION GAP SERPL CALCULATED.3IONS-SCNC: 10.4 MMOL/L (ref 5–15)
AST SERPL-CCNC: 19 U/L (ref 1–32)
BILIRUB CONJ SERPL-MCNC: <0.2 MG/DL (ref 0–0.3)
BILIRUB INDIRECT SERPL-MCNC: NORMAL MG/DL
BILIRUB SERPL-MCNC: 0.4 MG/DL (ref 0–1.2)
BILIRUB UR QL STRIP: NEGATIVE
BUN SERPL-MCNC: 22 MG/DL (ref 6–20)
BUN/CREAT SERPL: 16.1 (ref 7–25)
CALCIUM SPEC-SCNC: 10.1 MG/DL (ref 8.6–10.5)
CHLORIDE SERPL-SCNC: 98 MMOL/L (ref 98–107)
CHOLEST SERPL-MCNC: 161 MG/DL (ref 0–200)
CLARITY UR: ABNORMAL
CO2 SERPL-SCNC: 21.6 MMOL/L (ref 22–29)
COLOR UR: YELLOW
CREAT SERPL-MCNC: 1.37 MG/DL (ref 0.57–1)
CREAT UR-MCNC: 29.9 MG/DL
DEPRECATED RDW RBC AUTO: 42.5 FL (ref 37–54)
EOSINOPHIL # BLD MANUAL: 0.06 10*3/MM3 (ref 0–0.4)
EOSINOPHIL NFR BLD MANUAL: 1 % (ref 0.3–6.2)
ERYTHROCYTE [DISTWIDTH] IN BLOOD BY AUTOMATED COUNT: 13.4 % (ref 12.3–15.4)
GFR SERPL CREATININE-BSD FRML MDRD: 45 ML/MIN/1.73
GLUCOSE SERPL-MCNC: 97 MG/DL (ref 65–99)
GLUCOSE UR STRIP-MCNC: NEGATIVE MG/DL
HBA1C MFR BLD: 5 % (ref 4.8–5.6)
HCT VFR BLD AUTO: 41 % (ref 34–46.6)
HDLC SERPL-MCNC: 47 MG/DL (ref 40–60)
HGB BLD-MCNC: 13.5 G/DL (ref 12–15.9)
HGB UR QL STRIP.AUTO: NEGATIVE
INR PPP: 0.91 (ref 0.9–1.1)
KETONES UR QL STRIP: NEGATIVE
LDLC SERPL CALC-MCNC: 82 MG/DL (ref 0–100)
LDLC/HDLC SERPL: 1.63 {RATIO}
LEUKOCYTE ESTERASE UR QL STRIP.AUTO: NEGATIVE
LYMPHOCYTES # BLD MANUAL: 0.51 10*3/MM3 (ref 0.7–3.1)
LYMPHOCYTES NFR BLD MANUAL: 12 % (ref 5–12)
LYMPHOCYTES NFR BLD MANUAL: 9 % (ref 19.6–45.3)
MAGNESIUM SERPL-MCNC: 1.6 MG/DL (ref 1.6–2.6)
MCH RBC QN AUTO: 28.9 PG (ref 26.6–33)
MCHC RBC AUTO-ENTMCNC: 32.9 G/DL (ref 31.5–35.7)
MCV RBC AUTO: 87.8 FL (ref 79–97)
MONOCYTES # BLD AUTO: 0.68 10*3/MM3 (ref 0.1–0.9)
NEUTROPHILS # BLD AUTO: 4.42 10*3/MM3 (ref 1.7–7)
NEUTROPHILS NFR BLD MANUAL: 78 % (ref 42.7–76)
NITRITE UR QL STRIP: NEGATIVE
PH UR STRIP.AUTO: 6 [PH] (ref 5–8)
PHOSPHATE SERPL-MCNC: 2.5 MG/DL (ref 2.5–4.5)
PLAT MORPH BLD: NORMAL
PLATELET # BLD AUTO: 112 10*3/MM3 (ref 140–450)
PMV BLD AUTO: 11.3 FL (ref 6–12)
POTASSIUM SERPL-SCNC: 4.4 MMOL/L (ref 3.5–5.2)
PROT SERPL-MCNC: 6.6 G/DL (ref 6–8.5)
PROT UR QL STRIP: NEGATIVE
PROT UR-MCNC: 4 MG/DL
PROT/CREAT UR: 133.8 MG/G CREA (ref 0–200)
PROTHROMBIN TIME: 12.8 SECONDS (ref 12–15.1)
RBC # BLD AUTO: 4.67 10*6/MM3 (ref 3.77–5.28)
RBC MORPH BLD: NORMAL
SODIUM SERPL-SCNC: 130 MMOL/L (ref 136–145)
SP GR UR STRIP: 1.01 (ref 1–1.03)
TRIGL SERPL-MCNC: 186 MG/DL (ref 0–150)
UROBILINOGEN UR QL STRIP: ABNORMAL
VLDLC SERPL-MCNC: 32 MG/DL (ref 5–40)
WBC # BLD AUTO: 5.67 10*3/MM3 (ref 3.4–10.8)
WBC MORPH BLD: NORMAL

## 2021-09-02 PROCEDURE — 83036 HEMOGLOBIN GLYCOSYLATED A1C: CPT

## 2021-09-02 PROCEDURE — 84100 ASSAY OF PHOSPHORUS: CPT

## 2021-09-02 PROCEDURE — 80197 ASSAY OF TACROLIMUS: CPT

## 2021-09-02 PROCEDURE — 84156 ASSAY OF PROTEIN URINE: CPT

## 2021-09-02 PROCEDURE — 80061 LIPID PANEL: CPT

## 2021-09-02 PROCEDURE — 36415 COLL VENOUS BLD VENIPUNCTURE: CPT

## 2021-09-02 PROCEDURE — 80048 BASIC METABOLIC PNL TOTAL CA: CPT

## 2021-09-02 PROCEDURE — 82570 ASSAY OF URINE CREATININE: CPT

## 2021-09-02 PROCEDURE — 81003 URINALYSIS AUTO W/O SCOPE: CPT

## 2021-09-02 PROCEDURE — 80076 HEPATIC FUNCTION PANEL: CPT

## 2021-09-02 PROCEDURE — 83735 ASSAY OF MAGNESIUM: CPT

## 2021-09-02 PROCEDURE — 87086 URINE CULTURE/COLONY COUNT: CPT

## 2021-09-02 PROCEDURE — 85007 BL SMEAR W/DIFF WBC COUNT: CPT

## 2021-09-02 PROCEDURE — 85610 PROTHROMBIN TIME: CPT

## 2021-09-02 PROCEDURE — 85025 COMPLETE CBC W/AUTO DIFF WBC: CPT

## 2021-09-03 LAB — BACTERIA SPEC AEROBE CULT: NORMAL

## 2021-09-04 LAB — TACROLIMUS BLD LC/MS/MS-MCNC: 8.3 NG/ML (ref 2–20)

## 2021-09-21 ENCOUNTER — LAB (OUTPATIENT)
Dept: LAB | Facility: HOSPITAL | Age: 31
End: 2021-09-21

## 2021-09-21 DIAGNOSIS — D84.9 IMMUNOSUPPRESSION-RELATED INFECTIOUS DISEASE (HCC): ICD-10-CM

## 2021-09-21 DIAGNOSIS — R39.9 UNSPECIFIED SYMPTOMS AND SIGNS INVOLVING THE GENITOURINARY SYSTEM: ICD-10-CM

## 2021-09-21 DIAGNOSIS — R79.89 ELEVATED SERUM CREATININE: ICD-10-CM

## 2021-09-21 DIAGNOSIS — B99.8 IMMUNOSUPPRESSION-RELATED INFECTIOUS DISEASE (HCC): ICD-10-CM

## 2021-09-21 DIAGNOSIS — Z94.0 KIDNEY REPLACED BY TRANSPLANT: ICD-10-CM

## 2021-09-21 LAB
ALBUMIN SERPL-MCNC: 4.3 G/DL (ref 3.5–5.2)
ALP SERPL-CCNC: 60 U/L (ref 39–117)
ALT SERPL W P-5'-P-CCNC: 20 U/L (ref 1–33)
ANION GAP SERPL CALCULATED.3IONS-SCNC: 9.7 MMOL/L (ref 5–15)
ANISOCYTOSIS BLD QL: ABNORMAL
AST SERPL-CCNC: 21 U/L (ref 1–32)
BASOPHILS # BLD MANUAL: 0.06 10*3/MM3 (ref 0–0.2)
BASOPHILS NFR BLD AUTO: 1 % (ref 0–1.5)
BILIRUB CONJ SERPL-MCNC: <0.2 MG/DL (ref 0–0.3)
BILIRUB INDIRECT SERPL-MCNC: NORMAL MG/DL
BILIRUB SERPL-MCNC: 0.5 MG/DL (ref 0–1.2)
BILIRUB UR QL STRIP: NEGATIVE
BUN SERPL-MCNC: 25 MG/DL (ref 6–20)
BUN/CREAT SERPL: 21.6 (ref 7–25)
CALCIUM SPEC-SCNC: 10.1 MG/DL (ref 8.6–10.5)
CHLORIDE SERPL-SCNC: 98 MMOL/L (ref 98–107)
CHOLEST SERPL-MCNC: 176 MG/DL (ref 0–200)
CLARITY UR: CLEAR
CO2 SERPL-SCNC: 22.3 MMOL/L (ref 22–29)
COLOR UR: YELLOW
CREAT SERPL-MCNC: 1.16 MG/DL (ref 0.57–1)
CREAT UR-MCNC: 31.8 MG/DL
DEPRECATED RDW RBC AUTO: 41.3 FL (ref 37–54)
EOSINOPHIL # BLD MANUAL: 0.06 10*3/MM3 (ref 0–0.4)
EOSINOPHIL NFR BLD MANUAL: 1 % (ref 0.3–6.2)
ERYTHROCYTE [DISTWIDTH] IN BLOOD BY AUTOMATED COUNT: 12.9 % (ref 12.3–15.4)
GFR SERPL CREATININE-BSD FRML MDRD: 54 ML/MIN/1.73
GLUCOSE SERPL-MCNC: 116 MG/DL (ref 65–99)
GLUCOSE UR STRIP-MCNC: NEGATIVE MG/DL
HBA1C MFR BLD: 4.92 % (ref 4.8–5.6)
HCT VFR BLD AUTO: 42.6 % (ref 34–46.6)
HDLC SERPL-MCNC: 44 MG/DL (ref 40–60)
HGB BLD-MCNC: 14 G/DL (ref 12–15.9)
HGB UR QL STRIP.AUTO: NEGATIVE
INR PPP: 0.96 (ref 0.9–1.1)
KETONES UR QL STRIP: NEGATIVE
LDLC SERPL CALC-MCNC: 103 MG/DL (ref 0–100)
LDLC/HDLC SERPL: 2.24 {RATIO}
LEUKOCYTE ESTERASE UR QL STRIP.AUTO: NEGATIVE
LYMPHOCYTES # BLD MANUAL: 0.45 10*3/MM3 (ref 0.7–3.1)
LYMPHOCYTES NFR BLD MANUAL: 10.1 % (ref 5–12)
LYMPHOCYTES NFR BLD MANUAL: 8.1 % (ref 19.6–45.3)
MAGNESIUM SERPL-MCNC: 1.6 MG/DL (ref 1.6–2.6)
MCH RBC QN AUTO: 29 PG (ref 26.6–33)
MCHC RBC AUTO-ENTMCNC: 32.9 G/DL (ref 31.5–35.7)
MCV RBC AUTO: 88.2 FL (ref 79–97)
MONOCYTES # BLD AUTO: 0.56 10*3/MM3 (ref 0.1–0.9)
MYELOCYTES NFR BLD MANUAL: 1 % (ref 0–0)
NEUTROPHILS # BLD AUTO: 4.36 10*3/MM3 (ref 1.7–7)
NEUTROPHILS NFR BLD MANUAL: 78.8 % (ref 42.7–76)
NITRITE UR QL STRIP: NEGATIVE
PH UR STRIP.AUTO: 6 [PH] (ref 5–8)
PHOSPHATE SERPL-MCNC: 2.7 MG/DL (ref 2.5–4.5)
PLAT MORPH BLD: NORMAL
PLATELET # BLD AUTO: 181 10*3/MM3 (ref 140–450)
PMV BLD AUTO: 11.1 FL (ref 6–12)
POIKILOCYTOSIS BLD QL SMEAR: ABNORMAL
POTASSIUM SERPL-SCNC: 4.6 MMOL/L (ref 3.5–5.2)
PROT SERPL-MCNC: 6.7 G/DL (ref 6–8.5)
PROT UR QL STRIP: NEGATIVE
PROT UR-MCNC: 6 MG/DL
PROT/CREAT UR: 188.7 MG/G CREA (ref 0–200)
PROTHROMBIN TIME: 13.3 SECONDS (ref 12–15.1)
RBC # BLD AUTO: 4.83 10*6/MM3 (ref 3.77–5.28)
SODIUM SERPL-SCNC: 130 MMOL/L (ref 136–145)
SP GR UR STRIP: 1.01 (ref 1–1.03)
TRIGL SERPL-MCNC: 167 MG/DL (ref 0–150)
UROBILINOGEN UR QL STRIP: NORMAL
VLDLC SERPL-MCNC: 29 MG/DL (ref 5–40)
WBC # BLD AUTO: 5.53 10*3/MM3 (ref 3.4–10.8)
WBC MORPH BLD: NORMAL

## 2021-09-21 PROCEDURE — 80076 HEPATIC FUNCTION PANEL: CPT

## 2021-09-21 PROCEDURE — 83036 HEMOGLOBIN GLYCOSYLATED A1C: CPT

## 2021-09-21 PROCEDURE — 84156 ASSAY OF PROTEIN URINE: CPT

## 2021-09-21 PROCEDURE — 80061 LIPID PANEL: CPT

## 2021-09-21 PROCEDURE — 87086 URINE CULTURE/COLONY COUNT: CPT

## 2021-09-21 PROCEDURE — 36415 COLL VENOUS BLD VENIPUNCTURE: CPT

## 2021-09-21 PROCEDURE — 81003 URINALYSIS AUTO W/O SCOPE: CPT

## 2021-09-21 PROCEDURE — 83735 ASSAY OF MAGNESIUM: CPT

## 2021-09-21 PROCEDURE — 85007 BL SMEAR W/DIFF WBC COUNT: CPT

## 2021-09-21 PROCEDURE — 85610 PROTHROMBIN TIME: CPT

## 2021-09-21 PROCEDURE — 85025 COMPLETE CBC W/AUTO DIFF WBC: CPT

## 2021-09-21 PROCEDURE — 80048 BASIC METABOLIC PNL TOTAL CA: CPT

## 2021-09-21 PROCEDURE — 82570 ASSAY OF URINE CREATININE: CPT

## 2021-09-21 PROCEDURE — 84100 ASSAY OF PHOSPHORUS: CPT

## 2021-09-21 PROCEDURE — 80197 ASSAY OF TACROLIMUS: CPT

## 2021-09-22 LAB — BACTERIA SPEC AEROBE CULT: NORMAL

## 2021-09-24 LAB — TACROLIMUS BLD LC/MS/MS-MCNC: 6.9 NG/ML (ref 2–20)

## 2021-10-12 ENCOUNTER — LAB (OUTPATIENT)
Dept: LAB | Facility: HOSPITAL | Age: 31
End: 2021-10-12

## 2021-10-12 ENCOUNTER — TRANSCRIBE ORDERS (OUTPATIENT)
Dept: LAB | Facility: HOSPITAL | Age: 31
End: 2021-10-12

## 2021-10-12 DIAGNOSIS — Z20.822 COVID-19 RULED OUT: Primary | ICD-10-CM

## 2021-10-12 DIAGNOSIS — Z20.822 COVID-19 RULED OUT: ICD-10-CM

## 2021-10-12 PROCEDURE — U0004 COV-19 TEST NON-CDC HGH THRU: HCPCS

## 2021-10-12 PROCEDURE — U0005 INFEC AGEN DETEC AMPLI PROBE: HCPCS

## 2021-10-13 LAB — SARS-COV-2 RNA NOSE QL NAA+PROBE: NOT DETECTED

## 2021-11-03 ENCOUNTER — LAB (OUTPATIENT)
Dept: LAB | Facility: HOSPITAL | Age: 31
End: 2021-11-03

## 2021-11-03 DIAGNOSIS — D84.9 IMMUNOSUPPRESSION-RELATED INFECTIOUS DISEASE (HCC): ICD-10-CM

## 2021-11-03 DIAGNOSIS — B99.8 IMMUNOSUPPRESSION-RELATED INFECTIOUS DISEASE (HCC): ICD-10-CM

## 2021-11-03 DIAGNOSIS — Z94.0 KIDNEY REPLACED BY TRANSPLANT: ICD-10-CM

## 2021-11-03 DIAGNOSIS — R79.89 ELEVATED SERUM CREATININE: ICD-10-CM

## 2021-11-03 DIAGNOSIS — R39.9 UNSPECIFIED SYMPTOMS AND SIGNS INVOLVING THE GENITOURINARY SYSTEM: ICD-10-CM

## 2021-11-03 LAB
ALBUMIN SERPL-MCNC: 4.2 G/DL (ref 3.5–5.2)
ALP SERPL-CCNC: 59 U/L (ref 39–117)
ALT SERPL W P-5'-P-CCNC: 23 U/L (ref 1–33)
ANION GAP SERPL CALCULATED.3IONS-SCNC: 7.8 MMOL/L (ref 5–15)
AST SERPL-CCNC: 23 U/L (ref 1–32)
BASOPHILS # BLD AUTO: 0.02 10*3/MM3 (ref 0–0.2)
BASOPHILS NFR BLD AUTO: 0.4 % (ref 0–1.5)
BILIRUB CONJ SERPL-MCNC: 0.2 MG/DL (ref 0–0.3)
BILIRUB INDIRECT SERPL-MCNC: 0.2 MG/DL
BILIRUB SERPL-MCNC: 0.4 MG/DL (ref 0–1.2)
BILIRUB UR QL STRIP: NEGATIVE
BUN SERPL-MCNC: 22 MG/DL (ref 6–20)
BUN/CREAT SERPL: 16.4 (ref 7–25)
CALCIUM SPEC-SCNC: 10.2 MG/DL (ref 8.6–10.5)
CHLORIDE SERPL-SCNC: 103 MMOL/L (ref 98–107)
CHOLEST SERPL-MCNC: 162 MG/DL (ref 0–200)
CLARITY UR: CLEAR
CLUMPED PLATELETS: NORMAL
CO2 SERPL-SCNC: 21.2 MMOL/L (ref 22–29)
COLOR UR: YELLOW
CREAT SERPL-MCNC: 1.34 MG/DL (ref 0.57–1)
CREAT UR-MCNC: 23.9 MG/DL
DEPRECATED RDW RBC AUTO: 40.5 FL (ref 37–54)
EOSINOPHIL # BLD AUTO: 0.08 10*3/MM3 (ref 0–0.4)
EOSINOPHIL NFR BLD AUTO: 1.5 % (ref 0.3–6.2)
ERYTHROCYTE [DISTWIDTH] IN BLOOD BY AUTOMATED COUNT: 12.7 % (ref 12.3–15.4)
GFR SERPL CREATININE-BSD FRML MDRD: 46 ML/MIN/1.73
GLUCOSE SERPL-MCNC: 99 MG/DL (ref 65–99)
GLUCOSE UR STRIP-MCNC: NEGATIVE MG/DL
HBA1C MFR BLD: 4.96 % (ref 4.8–5.6)
HCT VFR BLD AUTO: 39.9 % (ref 34–46.6)
HDLC SERPL-MCNC: 44 MG/DL (ref 40–60)
HGB BLD-MCNC: 13.6 G/DL (ref 12–15.9)
HGB UR QL STRIP.AUTO: NEGATIVE
INR PPP: 0.91 (ref 0.9–1.1)
KETONES UR QL STRIP: NEGATIVE
LDLC SERPL CALC-MCNC: 90 MG/DL (ref 0–100)
LDLC/HDLC SERPL: 1.94 {RATIO}
LEUKOCYTE ESTERASE UR QL STRIP.AUTO: NEGATIVE
LYMPHOCYTES # BLD AUTO: 0.89 10*3/MM3 (ref 0.7–3.1)
LYMPHOCYTES NFR BLD AUTO: 16.9 % (ref 19.6–45.3)
MAGNESIUM SERPL-MCNC: 1.7 MG/DL (ref 1.6–2.6)
MCH RBC QN AUTO: 30.3 PG (ref 26.6–33)
MCHC RBC AUTO-ENTMCNC: 34.1 G/DL (ref 31.5–35.7)
MCV RBC AUTO: 88.9 FL (ref 79–97)
MONOCYTES # BLD AUTO: 0.47 10*3/MM3 (ref 0.1–0.9)
MONOCYTES NFR BLD AUTO: 8.9 % (ref 5–12)
NEUTROPHILS NFR BLD AUTO: 3.75 10*3/MM3 (ref 1.7–7)
NEUTROPHILS NFR BLD AUTO: 71.3 % (ref 42.7–76)
NITRITE UR QL STRIP: NEGATIVE
PH UR STRIP.AUTO: 6 [PH] (ref 5–8)
PHOSPHATE SERPL-MCNC: 2 MG/DL (ref 2.5–4.5)
PLATELET # BLD AUTO: ABNORMAL 10*3/UL
PMV BLD AUTO: 12.1 FL (ref 6–12)
POTASSIUM SERPL-SCNC: 5 MMOL/L (ref 3.5–5.2)
PROT SERPL-MCNC: 6.6 G/DL (ref 6–8.5)
PROT UR QL STRIP: NEGATIVE
PROT UR-MCNC: <4 MG/DL
PROT/CREAT UR: NORMAL MG/G{CREAT}
PROTHROMBIN TIME: 12.8 SECONDS (ref 12–15.1)
RBC # BLD AUTO: 4.49 10*6/MM3 (ref 3.77–5.28)
RBC MORPH BLD: NORMAL
SODIUM SERPL-SCNC: 132 MMOL/L (ref 136–145)
SP GR UR STRIP: 1.01 (ref 1–1.03)
TRIGL SERPL-MCNC: 164 MG/DL (ref 0–150)
UROBILINOGEN UR QL STRIP: NORMAL
VLDLC SERPL-MCNC: 28 MG/DL (ref 5–40)
WBC # BLD AUTO: 5.26 10*3/MM3 (ref 3.4–10.8)
WBC MORPH BLD: NORMAL

## 2021-11-03 PROCEDURE — 80076 HEPATIC FUNCTION PANEL: CPT

## 2021-11-03 PROCEDURE — 36415 COLL VENOUS BLD VENIPUNCTURE: CPT

## 2021-11-03 PROCEDURE — 81003 URINALYSIS AUTO W/O SCOPE: CPT

## 2021-11-03 PROCEDURE — 85025 COMPLETE CBC W/AUTO DIFF WBC: CPT

## 2021-11-03 PROCEDURE — 83735 ASSAY OF MAGNESIUM: CPT

## 2021-11-03 PROCEDURE — 82570 ASSAY OF URINE CREATININE: CPT

## 2021-11-03 PROCEDURE — 85007 BL SMEAR W/DIFF WBC COUNT: CPT

## 2021-11-03 PROCEDURE — 83036 HEMOGLOBIN GLYCOSYLATED A1C: CPT

## 2021-11-03 PROCEDURE — 84156 ASSAY OF PROTEIN URINE: CPT

## 2021-11-03 PROCEDURE — 87086 URINE CULTURE/COLONY COUNT: CPT

## 2021-11-03 PROCEDURE — 85610 PROTHROMBIN TIME: CPT

## 2021-11-03 PROCEDURE — 80048 BASIC METABOLIC PNL TOTAL CA: CPT

## 2021-11-03 PROCEDURE — 80197 ASSAY OF TACROLIMUS: CPT

## 2021-11-03 PROCEDURE — 80061 LIPID PANEL: CPT

## 2021-11-03 PROCEDURE — 84100 ASSAY OF PHOSPHORUS: CPT

## 2021-11-04 LAB — BACTERIA SPEC AEROBE CULT: NO GROWTH

## 2021-11-08 LAB — TACROLIMUS BLD LC/MS/MS-MCNC: 4.8 NG/ML (ref 2–20)

## 2021-12-26 PROCEDURE — 87086 URINE CULTURE/COLONY COUNT: CPT | Performed by: EMERGENCY MEDICINE

## 2021-12-26 PROCEDURE — 87186 SC STD MICRODIL/AGAR DIL: CPT | Performed by: EMERGENCY MEDICINE

## 2021-12-29 ENCOUNTER — LAB (OUTPATIENT)
Dept: LAB | Facility: HOSPITAL | Age: 31
End: 2021-12-29

## 2021-12-29 DIAGNOSIS — R79.89 ELEVATED SERUM CREATININE: ICD-10-CM

## 2021-12-29 DIAGNOSIS — Z94.0 KIDNEY REPLACED BY TRANSPLANT: ICD-10-CM

## 2021-12-29 DIAGNOSIS — B99.8 IMMUNOSUPPRESSION-RELATED INFECTIOUS DISEASE: ICD-10-CM

## 2021-12-29 DIAGNOSIS — D84.9 IMMUNOSUPPRESSION-RELATED INFECTIOUS DISEASE: ICD-10-CM

## 2021-12-29 DIAGNOSIS — R39.9 UNSPECIFIED SYMPTOMS AND SIGNS INVOLVING THE GENITOURINARY SYSTEM: ICD-10-CM

## 2021-12-29 LAB
ALBUMIN SERPL-MCNC: 4.2 G/DL (ref 3.5–5.2)
ALP SERPL-CCNC: 62 U/L (ref 39–117)
ALT SERPL W P-5'-P-CCNC: 19 U/L (ref 1–33)
ANION GAP SERPL CALCULATED.3IONS-SCNC: 9.8 MMOL/L (ref 5–15)
AST SERPL-CCNC: 18 U/L (ref 1–32)
BASOPHILS # BLD AUTO: 0.04 10*3/MM3 (ref 0–0.2)
BASOPHILS NFR BLD AUTO: 0.7 % (ref 0–1.5)
BILIRUB CONJ SERPL-MCNC: <0.2 MG/DL (ref 0–0.3)
BILIRUB INDIRECT SERPL-MCNC: NORMAL MG/DL
BILIRUB SERPL-MCNC: 0.3 MG/DL (ref 0–1.2)
BILIRUB UR QL STRIP: NEGATIVE
BUN SERPL-MCNC: 21 MG/DL (ref 6–20)
BUN/CREAT SERPL: 13.5 (ref 7–25)
CALCIUM SPEC-SCNC: 10.2 MG/DL (ref 8.6–10.5)
CHLORIDE SERPL-SCNC: 103 MMOL/L (ref 98–107)
CHOLEST SERPL-MCNC: 146 MG/DL (ref 0–200)
CLARITY UR: CLEAR
CO2 SERPL-SCNC: 19.2 MMOL/L (ref 22–29)
COLOR UR: YELLOW
CREAT SERPL-MCNC: 1.56 MG/DL (ref 0.57–1)
CREAT UR-MCNC: 23.8 MG/DL
DEPRECATED RDW RBC AUTO: 43 FL (ref 37–54)
EOSINOPHIL # BLD AUTO: 0.11 10*3/MM3 (ref 0–0.4)
EOSINOPHIL NFR BLD AUTO: 1.9 % (ref 0.3–6.2)
ERYTHROCYTE [DISTWIDTH] IN BLOOD BY AUTOMATED COUNT: 12.9 % (ref 12.3–15.4)
GFR SERPL CREATININE-BSD FRML MDRD: 39 ML/MIN/1.73
GLUCOSE SERPL-MCNC: 92 MG/DL (ref 65–99)
GLUCOSE UR STRIP-MCNC: NEGATIVE MG/DL
HBA1C MFR BLD: 4.9 % (ref 4.8–5.6)
HCT VFR BLD AUTO: 44.8 % (ref 34–46.6)
HDLC SERPL-MCNC: 54 MG/DL (ref 40–60)
HGB BLD-MCNC: 14.3 G/DL (ref 12–15.9)
HGB UR QL STRIP.AUTO: NEGATIVE
IMM GRANULOCYTES # BLD AUTO: 0.1 10*3/MM3 (ref 0–0.05)
IMM GRANULOCYTES NFR BLD AUTO: 1.7 % (ref 0–0.5)
INR PPP: 0.85 (ref 0.9–1.1)
KETONES UR QL STRIP: NEGATIVE
LDLC SERPL CALC-MCNC: 72 MG/DL (ref 0–100)
LDLC/HDLC SERPL: 1.3 {RATIO}
LEUKOCYTE ESTERASE UR QL STRIP.AUTO: NEGATIVE
LYMPHOCYTES # BLD AUTO: 1.05 10*3/MM3 (ref 0.7–3.1)
LYMPHOCYTES NFR BLD AUTO: 18.2 % (ref 19.6–45.3)
MAGNESIUM SERPL-MCNC: 1.6 MG/DL (ref 1.6–2.6)
MCH RBC QN AUTO: 29.2 PG (ref 26.6–33)
MCHC RBC AUTO-ENTMCNC: 31.9 G/DL (ref 31.5–35.7)
MCV RBC AUTO: 91.4 FL (ref 79–97)
MONOCYTES # BLD AUTO: 0.48 10*3/MM3 (ref 0.1–0.9)
MONOCYTES NFR BLD AUTO: 8.3 % (ref 5–12)
NEUTROPHILS NFR BLD AUTO: 3.98 10*3/MM3 (ref 1.7–7)
NEUTROPHILS NFR BLD AUTO: 69.2 % (ref 42.7–76)
NITRITE UR QL STRIP: NEGATIVE
NRBC BLD AUTO-RTO: 0 /100 WBC (ref 0–0.2)
PH UR STRIP.AUTO: 6.5 [PH] (ref 5–8)
PHOSPHATE SERPL-MCNC: 2 MG/DL (ref 2.5–4.5)
PLATELET # BLD AUTO: 150 10*3/MM3 (ref 140–450)
PMV BLD AUTO: 13 FL (ref 6–12)
POTASSIUM SERPL-SCNC: 5.2 MMOL/L (ref 3.5–5.2)
PROT ?TM UR-MCNC: <4 MG/DL
PROT SERPL-MCNC: 6.7 G/DL (ref 6–8.5)
PROT UR QL STRIP: NEGATIVE
PROT/CREAT UR: NORMAL MG/G{CREAT}
PROTHROMBIN TIME: 12.1 SECONDS (ref 12–15.1)
RBC # BLD AUTO: 4.9 10*6/MM3 (ref 3.77–5.28)
SODIUM SERPL-SCNC: 132 MMOL/L (ref 136–145)
SP GR UR STRIP: 1.01 (ref 1–1.03)
TRIGL SERPL-MCNC: 109 MG/DL (ref 0–150)
UROBILINOGEN UR QL STRIP: NORMAL
VLDLC SERPL-MCNC: 20 MG/DL (ref 5–40)
WBC NRBC COR # BLD: 5.76 10*3/MM3 (ref 3.4–10.8)

## 2021-12-29 PROCEDURE — 84156 ASSAY OF PROTEIN URINE: CPT

## 2021-12-29 PROCEDURE — 80048 BASIC METABOLIC PNL TOTAL CA: CPT

## 2021-12-29 PROCEDURE — 80197 ASSAY OF TACROLIMUS: CPT

## 2021-12-29 PROCEDURE — 82570 ASSAY OF URINE CREATININE: CPT

## 2021-12-29 PROCEDURE — 83036 HEMOGLOBIN GLYCOSYLATED A1C: CPT

## 2021-12-29 PROCEDURE — 80076 HEPATIC FUNCTION PANEL: CPT

## 2021-12-29 PROCEDURE — 36415 COLL VENOUS BLD VENIPUNCTURE: CPT

## 2021-12-29 PROCEDURE — 87086 URINE CULTURE/COLONY COUNT: CPT

## 2021-12-29 PROCEDURE — 80061 LIPID PANEL: CPT

## 2021-12-29 PROCEDURE — 81003 URINALYSIS AUTO W/O SCOPE: CPT

## 2021-12-29 PROCEDURE — 85025 COMPLETE CBC W/AUTO DIFF WBC: CPT

## 2021-12-29 PROCEDURE — 84100 ASSAY OF PHOSPHORUS: CPT

## 2021-12-29 PROCEDURE — 83735 ASSAY OF MAGNESIUM: CPT

## 2021-12-29 PROCEDURE — 85610 PROTHROMBIN TIME: CPT

## 2021-12-31 ENCOUNTER — TELEPHONE (OUTPATIENT)
Dept: URGENT CARE | Facility: CLINIC | Age: 31
End: 2021-12-31

## 2021-12-31 DIAGNOSIS — B96.20 E-COLI UTI: Primary | ICD-10-CM

## 2021-12-31 DIAGNOSIS — N39.0 E-COLI UTI: Primary | ICD-10-CM

## 2021-12-31 LAB
BACTERIA UR CULT: NORMAL
BACTERIA UR CULT: NORMAL

## 2021-12-31 RX ORDER — NORGESTIMATE AND ETHINYL ESTRADIOL 0.25-0.035
1 KIT ORAL
COMMUNITY
End: 2022-04-26 | Stop reason: ALTCHOICE

## 2021-12-31 RX ORDER — MYCOPHENOLATE MOFETIL 250 MG/1
4 CAPSULE ORAL
COMMUNITY
End: 2022-07-29 | Stop reason: SDUPTHER

## 2021-12-31 RX ORDER — FEXOFENADINE HCL 180 MG/1
1 TABLET ORAL DAILY
COMMUNITY

## 2021-12-31 RX ORDER — NITROFURANTOIN 25; 75 MG/1; MG/1
100 CAPSULE ORAL 2 TIMES DAILY
Qty: 14 CAPSULE | Refills: 0 | Status: SHIPPED | OUTPATIENT
Start: 2021-12-31 | End: 2022-04-26

## 2021-12-31 RX ORDER — SODIUM BICARBONATE 650 MG/1
1 TABLET ORAL 2 TIMES DAILY
COMMUNITY

## 2021-12-31 RX ORDER — MONTELUKAST SODIUM 10 MG/1
1 TABLET ORAL DAILY
COMMUNITY
End: 2022-04-26 | Stop reason: SDUPTHER

## 2021-12-31 RX ORDER — CALCIUM CARBONATE 300MG(750)
1 TABLET,CHEWABLE ORAL 2 TIMES DAILY
COMMUNITY

## 2022-01-03 LAB — TACROLIMUS BLD LC/MS/MS-MCNC: 8 NG/ML (ref 2–20)

## 2022-02-01 ENCOUNTER — OFFICE VISIT (OUTPATIENT)
Dept: INTERNAL MEDICINE | Facility: CLINIC | Age: 32
End: 2022-02-01

## 2022-02-01 VITALS
HEART RATE: 102 BPM | OXYGEN SATURATION: 100 % | RESPIRATION RATE: 18 BRPM | SYSTOLIC BLOOD PRESSURE: 118 MMHG | BODY MASS INDEX: 21.82 KG/M2 | HEIGHT: 67 IN | WEIGHT: 139 LBS | TEMPERATURE: 97.2 F | DIASTOLIC BLOOD PRESSURE: 61 MMHG

## 2022-02-01 DIAGNOSIS — B96.89 ACUTE BACTERIAL SINUSITIS: ICD-10-CM

## 2022-02-01 DIAGNOSIS — J01.90 ACUTE BACTERIAL SINUSITIS: ICD-10-CM

## 2022-02-01 DIAGNOSIS — N30.01 ACUTE CYSTITIS WITH HEMATURIA: ICD-10-CM

## 2022-02-01 DIAGNOSIS — R11.0 NAUSEA: Primary | ICD-10-CM

## 2022-02-01 LAB
BILIRUB BLD-MCNC: NEGATIVE MG/DL
CLARITY, POC: ABNORMAL
COLOR UR: YELLOW
EXPIRATION DATE: ABNORMAL
GLUCOSE UR STRIP-MCNC: NEGATIVE MG/DL
KETONES UR QL: NEGATIVE
LEUKOCYTE EST, POC: ABNORMAL
Lab: ABNORMAL
NITRITE UR-MCNC: NEGATIVE MG/ML
PH UR: 6 [PH] (ref 5–8)
PROT UR STRIP-MCNC: NEGATIVE MG/DL
RBC # UR STRIP: ABNORMAL /UL
SP GR UR: 1.02 (ref 1–1.03)
UROBILINOGEN UR QL: NORMAL

## 2022-02-01 PROCEDURE — U0005 INFEC AGEN DETEC AMPLI PROBE: HCPCS | Performed by: INTERNAL MEDICINE

## 2022-02-01 PROCEDURE — 99214 OFFICE O/P EST MOD 30 MIN: CPT | Performed by: INTERNAL MEDICINE

## 2022-02-01 PROCEDURE — U0004 COV-19 TEST NON-CDC HGH THRU: HCPCS | Performed by: INTERNAL MEDICINE

## 2022-02-01 PROCEDURE — 81003 URINALYSIS AUTO W/O SCOPE: CPT | Performed by: INTERNAL MEDICINE

## 2022-02-01 RX ORDER — LOSARTAN POTASSIUM 25 MG/1
TABLET ORAL
COMMUNITY
Start: 2022-01-14 | End: 2022-07-29 | Stop reason: SDUPTHER

## 2022-02-01 RX ORDER — AMOXICILLIN AND CLAVULANATE POTASSIUM 875; 125 MG/1; MG/1
1 TABLET, FILM COATED ORAL EVERY 12 HOURS SCHEDULED
Qty: 14 TABLET | Refills: 0 | Status: SHIPPED | OUTPATIENT
Start: 2022-02-01 | End: 2022-04-26

## 2022-02-02 LAB — SARS-COV-2 RNA NOSE QL NAA+PROBE: NOT DETECTED

## 2022-02-06 DIAGNOSIS — N30.01 ACUTE CYSTITIS WITH HEMATURIA: Primary | ICD-10-CM

## 2022-02-06 RX ORDER — CIPROFLOXACIN 250 MG/1
250 TABLET, FILM COATED ORAL 2 TIMES DAILY
Qty: 14 TABLET | Refills: 0 | Status: SHIPPED | OUTPATIENT
Start: 2022-02-06 | End: 2022-02-13

## 2022-04-19 ENCOUNTER — TRANSCRIBE ORDERS (OUTPATIENT)
Dept: LAB | Facility: HOSPITAL | Age: 32
End: 2022-04-19

## 2022-04-19 DIAGNOSIS — R79.89 ELEVATED SERUM CREATININE: Primary | ICD-10-CM

## 2022-04-19 DIAGNOSIS — Z94.0 KIDNEY REPLACED BY TRANSPLANT: ICD-10-CM

## 2022-04-19 DIAGNOSIS — R39.9 UNSPECIFIED SYMPTOMS AND SIGNS INVOLVING THE GENITOURINARY SYSTEM: ICD-10-CM

## 2022-04-26 ENCOUNTER — OFFICE VISIT (OUTPATIENT)
Dept: INTERNAL MEDICINE | Facility: CLINIC | Age: 32
End: 2022-04-26

## 2022-04-26 VITALS
TEMPERATURE: 97.1 F | WEIGHT: 160 LBS | HEART RATE: 91 BPM | DIASTOLIC BLOOD PRESSURE: 70 MMHG | HEIGHT: 67 IN | SYSTOLIC BLOOD PRESSURE: 120 MMHG | OXYGEN SATURATION: 98 % | BODY MASS INDEX: 25.11 KG/M2

## 2022-04-26 DIAGNOSIS — Z30.09 ENCOUNTER FOR OTHER GENERAL COUNSELING OR ADVICE ON CONTRACEPTION: ICD-10-CM

## 2022-04-26 DIAGNOSIS — Z23 NEED FOR TDAP VACCINATION: ICD-10-CM

## 2022-04-26 DIAGNOSIS — J30.2 SEASONAL ALLERGIES: Primary | ICD-10-CM

## 2022-04-26 DIAGNOSIS — Z30.019 ENCOUNTER FOR FEMALE BIRTH CONTROL: ICD-10-CM

## 2022-04-26 PROCEDURE — 99214 OFFICE O/P EST MOD 30 MIN: CPT | Performed by: INTERNAL MEDICINE

## 2022-04-26 RX ORDER — NORGESTIMATE AND ETHINYL ESTRADIOL 0.25-0.035
1 KIT ORAL DAILY
Qty: 28 TABLET | Refills: 12 | Status: CANCELLED | OUTPATIENT
Start: 2022-04-26

## 2022-04-26 RX ORDER — NORETHINDRONE AND ETHINYL ESTRADIOL 1 MG-35MCG
1 KIT ORAL DAILY
Qty: 28 TABLET | Refills: 12 | Status: SHIPPED | OUTPATIENT
Start: 2022-04-26 | End: 2023-03-28 | Stop reason: SDUPTHER

## 2022-04-26 RX ORDER — MONTELUKAST SODIUM 10 MG/1
10 TABLET ORAL DAILY
Qty: 90 TABLET | Refills: 3 | Status: SHIPPED | OUTPATIENT
Start: 2022-04-26

## 2022-04-26 RX ORDER — FLUTICASONE PROPIONATE 50 MCG
2 SPRAY, SUSPENSION (ML) NASAL DAILY
Qty: 16 G | Refills: 2 | Status: SHIPPED | OUTPATIENT
Start: 2022-04-26

## 2022-05-05 ENCOUNTER — LAB (OUTPATIENT)
Dept: LAB | Facility: HOSPITAL | Age: 32
End: 2022-05-05

## 2022-05-05 DIAGNOSIS — R39.9 UNSPECIFIED SYMPTOMS AND SIGNS INVOLVING THE GENITOURINARY SYSTEM: ICD-10-CM

## 2022-05-05 DIAGNOSIS — R79.89 ELEVATED SERUM CREATININE: ICD-10-CM

## 2022-05-05 DIAGNOSIS — Z94.0 KIDNEY REPLACED BY TRANSPLANT: ICD-10-CM

## 2022-05-05 LAB
ALBUMIN SERPL-MCNC: 4.1 G/DL (ref 3.5–5.2)
ALP SERPL-CCNC: 60 U/L (ref 39–117)
ALT SERPL W P-5'-P-CCNC: 25 U/L (ref 1–33)
ANION GAP SERPL CALCULATED.3IONS-SCNC: 13.6 MMOL/L (ref 5–15)
AST SERPL-CCNC: 24 U/L (ref 1–32)
BACTERIA UR QL AUTO: NORMAL /HPF
BASOPHILS # BLD AUTO: 0.02 10*3/MM3 (ref 0–0.2)
BASOPHILS NFR BLD AUTO: 0.3 % (ref 0–1.5)
BILIRUB CONJ SERPL-MCNC: <0.2 MG/DL (ref 0–0.3)
BILIRUB INDIRECT SERPL-MCNC: NORMAL MG/DL
BILIRUB SERPL-MCNC: 0.5 MG/DL (ref 0–1.2)
BILIRUB UR QL STRIP: NEGATIVE
BUN SERPL-MCNC: 20 MG/DL (ref 6–20)
BUN/CREAT SERPL: 16.9 (ref 7–25)
CALCIUM SPEC-SCNC: 9.5 MG/DL (ref 8.6–10.5)
CHLORIDE SERPL-SCNC: 102 MMOL/L (ref 98–107)
CHOLEST SERPL-MCNC: 150 MG/DL (ref 0–200)
CLARITY UR: CLEAR
CO2 SERPL-SCNC: 20.4 MMOL/L (ref 22–29)
COLOR UR: YELLOW
CREAT SERPL-MCNC: 1.18 MG/DL (ref 0.57–1)
CREAT UR-MCNC: 28 MG/DL
DEPRECATED RDW RBC AUTO: 39.9 FL (ref 37–54)
EGFRCR SERPLBLD CKD-EPI 2021: 63.1 ML/MIN/1.73
EOSINOPHIL # BLD AUTO: 0.1 10*3/MM3 (ref 0–0.4)
EOSINOPHIL NFR BLD AUTO: 1.6 % (ref 0.3–6.2)
ERYTHROCYTE [DISTWIDTH] IN BLOOD BY AUTOMATED COUNT: 12.4 % (ref 12.3–15.4)
GLUCOSE SERPL-MCNC: 92 MG/DL (ref 65–99)
GLUCOSE UR STRIP-MCNC: NEGATIVE MG/DL
HBA1C MFR BLD: 5 % (ref 4.8–5.6)
HCT VFR BLD AUTO: 41.4 % (ref 34–46.6)
HDLC SERPL-MCNC: 47 MG/DL (ref 40–60)
HGB BLD-MCNC: 13.7 G/DL (ref 12–15.9)
HGB UR QL STRIP.AUTO: ABNORMAL
HYALINE CASTS UR QL AUTO: NORMAL /LPF
IMM GRANULOCYTES # BLD AUTO: 0.05 10*3/MM3 (ref 0–0.05)
IMM GRANULOCYTES NFR BLD AUTO: 0.8 % (ref 0–0.5)
KETONES UR QL STRIP: NEGATIVE
LDLC SERPL CALC-MCNC: 83 MG/DL (ref 0–100)
LDLC/HDLC SERPL: 1.73 {RATIO}
LEUKOCYTE ESTERASE UR QL STRIP.AUTO: NEGATIVE
LYMPHOCYTES # BLD AUTO: 0.91 10*3/MM3 (ref 0.7–3.1)
LYMPHOCYTES NFR BLD AUTO: 14.9 % (ref 19.6–45.3)
MAGNESIUM SERPL-MCNC: 1.4 MG/DL (ref 1.6–2.6)
MCH RBC QN AUTO: 29.8 PG (ref 26.6–33)
MCHC RBC AUTO-ENTMCNC: 33.1 G/DL (ref 31.5–35.7)
MCV RBC AUTO: 90.2 FL (ref 79–97)
MONOCYTES # BLD AUTO: 0.55 10*3/MM3 (ref 0.1–0.9)
MONOCYTES NFR BLD AUTO: 9 % (ref 5–12)
NEUTROPHILS NFR BLD AUTO: 4.47 10*3/MM3 (ref 1.7–7)
NEUTROPHILS NFR BLD AUTO: 73.4 % (ref 42.7–76)
NITRITE UR QL STRIP: NEGATIVE
NRBC BLD AUTO-RTO: 0 /100 WBC (ref 0–0.2)
PH UR STRIP.AUTO: 6.5 [PH] (ref 5–8)
PHOSPHATE SERPL-MCNC: 2.5 MG/DL (ref 2.5–4.5)
PLATELET # BLD AUTO: 151 10*3/MM3 (ref 140–450)
PMV BLD AUTO: 12.1 FL (ref 6–12)
POTASSIUM SERPL-SCNC: 4.6 MMOL/L (ref 3.5–5.2)
PROT ?TM UR-MCNC: <4 MG/DL
PROT SERPL-MCNC: 6.5 G/DL (ref 6–8.5)
PROT UR QL STRIP: NEGATIVE
PROT/CREAT UR: NORMAL MG/G{CREAT}
RBC # BLD AUTO: 4.59 10*6/MM3 (ref 3.77–5.28)
RBC # UR STRIP: NORMAL /HPF
REF LAB TEST METHOD: NORMAL
SODIUM SERPL-SCNC: 136 MMOL/L (ref 136–145)
SP GR UR STRIP: 1.01 (ref 1–1.03)
SQUAMOUS #/AREA URNS HPF: NORMAL /HPF
TRIGL SERPL-MCNC: 108 MG/DL (ref 0–150)
UROBILINOGEN UR QL STRIP: ABNORMAL
VLDLC SERPL-MCNC: 20 MG/DL (ref 5–40)
WBC # UR STRIP: NORMAL /HPF
WBC NRBC COR # BLD: 6.1 10*3/MM3 (ref 3.4–10.8)

## 2022-05-05 PROCEDURE — 81001 URINALYSIS AUTO W/SCOPE: CPT

## 2022-05-05 PROCEDURE — 85025 COMPLETE CBC W/AUTO DIFF WBC: CPT

## 2022-05-05 PROCEDURE — 87086 URINE CULTURE/COLONY COUNT: CPT

## 2022-05-05 PROCEDURE — 84100 ASSAY OF PHOSPHORUS: CPT

## 2022-05-05 PROCEDURE — 84156 ASSAY OF PROTEIN URINE: CPT

## 2022-05-05 PROCEDURE — 80076 HEPATIC FUNCTION PANEL: CPT

## 2022-05-05 PROCEDURE — 80048 BASIC METABOLIC PNL TOTAL CA: CPT

## 2022-05-05 PROCEDURE — 80061 LIPID PANEL: CPT

## 2022-05-05 PROCEDURE — 83735 ASSAY OF MAGNESIUM: CPT

## 2022-05-05 PROCEDURE — 83036 HEMOGLOBIN GLYCOSYLATED A1C: CPT

## 2022-05-05 PROCEDURE — 82570 ASSAY OF URINE CREATININE: CPT

## 2022-05-05 PROCEDURE — 36415 COLL VENOUS BLD VENIPUNCTURE: CPT

## 2022-05-05 PROCEDURE — 80197 ASSAY OF TACROLIMUS: CPT

## 2022-05-06 LAB — BACTERIA SPEC AEROBE CULT: NORMAL

## 2022-05-09 LAB — TACROLIMUS BLD LC/MS/MS-MCNC: 6.4 NG/ML (ref 2–20)

## 2022-07-29 ENCOUNTER — OFFICE VISIT (OUTPATIENT)
Dept: INTERNAL MEDICINE | Facility: CLINIC | Age: 32
End: 2022-07-29

## 2022-07-29 ENCOUNTER — LAB (OUTPATIENT)
Dept: LAB | Facility: HOSPITAL | Age: 32
End: 2022-07-29

## 2022-07-29 VITALS
BODY MASS INDEX: 26.37 KG/M2 | RESPIRATION RATE: 20 BRPM | HEART RATE: 83 BPM | HEIGHT: 67 IN | SYSTOLIC BLOOD PRESSURE: 118 MMHG | DIASTOLIC BLOOD PRESSURE: 64 MMHG | WEIGHT: 168 LBS | TEMPERATURE: 99 F | OXYGEN SATURATION: 99 %

## 2022-07-29 DIAGNOSIS — I10 ESSENTIAL HYPERTENSION: ICD-10-CM

## 2022-07-29 DIAGNOSIS — Z23 NEED FOR VACCINATION FOR PNEUMOCOCCUS: ICD-10-CM

## 2022-07-29 DIAGNOSIS — R39.9 UNSPECIFIED SYMPTOMS AND SIGNS INVOLVING THE GENITOURINARY SYSTEM: ICD-10-CM

## 2022-07-29 DIAGNOSIS — T78.3XXD ANGIOEDEMA, SUBSEQUENT ENCOUNTER: ICD-10-CM

## 2022-07-29 DIAGNOSIS — R79.89 ELEVATED SERUM CREATININE: ICD-10-CM

## 2022-07-29 DIAGNOSIS — Z94.0 KIDNEY REPLACED BY TRANSPLANT: ICD-10-CM

## 2022-07-29 DIAGNOSIS — M32.14 LUPUS NEPHRITIS: Primary | ICD-10-CM

## 2022-07-29 DIAGNOSIS — J30.2 SEASONAL ALLERGIES: ICD-10-CM

## 2022-07-29 DIAGNOSIS — N18.6 ESRD (END STAGE RENAL DISEASE): ICD-10-CM

## 2022-07-29 LAB
ALBUMIN SERPL-MCNC: 4.1 G/DL (ref 3.5–5.2)
ALP SERPL-CCNC: 60 U/L (ref 39–117)
ALT SERPL W P-5'-P-CCNC: 28 U/L (ref 1–33)
ANION GAP SERPL CALCULATED.3IONS-SCNC: 11 MMOL/L (ref 5–15)
AST SERPL-CCNC: 24 U/L (ref 1–32)
BACTERIA UR QL AUTO: NORMAL /HPF
BASOPHILS # BLD AUTO: 0.02 10*3/MM3 (ref 0–0.2)
BASOPHILS NFR BLD AUTO: 0.3 % (ref 0–1.5)
BILIRUB CONJ SERPL-MCNC: <0.2 MG/DL (ref 0–0.3)
BILIRUB INDIRECT SERPL-MCNC: NORMAL MG/DL
BILIRUB SERPL-MCNC: 0.4 MG/DL (ref 0–1.2)
BILIRUB UR QL STRIP: NEGATIVE
BUN SERPL-MCNC: 22 MG/DL (ref 6–20)
BUN/CREAT SERPL: 21.4 (ref 7–25)
CALCIUM SPEC-SCNC: 10 MG/DL (ref 8.6–10.5)
CHLORIDE SERPL-SCNC: 104 MMOL/L (ref 98–107)
CHOLEST SERPL-MCNC: 173 MG/DL (ref 0–200)
CLARITY UR: CLEAR
CO2 SERPL-SCNC: 20 MMOL/L (ref 22–29)
COLOR UR: YELLOW
CREAT SERPL-MCNC: 1.03 MG/DL (ref 0.57–1)
CREAT UR-MCNC: 22.7 MG/DL
DEPRECATED RDW RBC AUTO: 39.5 FL (ref 37–54)
EGFRCR SERPLBLD CKD-EPI 2021: 74.2 ML/MIN/1.73
EOSINOPHIL # BLD AUTO: 0.11 10*3/MM3 (ref 0–0.4)
EOSINOPHIL NFR BLD AUTO: 1.7 % (ref 0.3–6.2)
ERYTHROCYTE [DISTWIDTH] IN BLOOD BY AUTOMATED COUNT: 12.4 % (ref 12.3–15.4)
GLUCOSE SERPL-MCNC: 104 MG/DL (ref 65–99)
GLUCOSE UR STRIP-MCNC: NEGATIVE MG/DL
HBA1C MFR BLD: 5.1 % (ref 4.8–5.6)
HCT VFR BLD AUTO: 45.7 % (ref 34–46.6)
HDLC SERPL-MCNC: 47 MG/DL (ref 40–60)
HGB BLD-MCNC: 15.1 G/DL (ref 12–15.9)
HGB UR QL STRIP.AUTO: ABNORMAL
HYALINE CASTS UR QL AUTO: NORMAL /LPF
IMM GRANULOCYTES # BLD AUTO: 0.09 10*3/MM3 (ref 0–0.05)
IMM GRANULOCYTES NFR BLD AUTO: 1.4 % (ref 0–0.5)
KETONES UR QL STRIP: NEGATIVE
LDLC SERPL CALC-MCNC: 90 MG/DL (ref 0–100)
LDLC/HDLC SERPL: 1.77 {RATIO}
LEUKOCYTE ESTERASE UR QL STRIP.AUTO: NEGATIVE
LYMPHOCYTES # BLD AUTO: 1.11 10*3/MM3 (ref 0.7–3.1)
LYMPHOCYTES NFR BLD AUTO: 17.3 % (ref 19.6–45.3)
MAGNESIUM SERPL-MCNC: 1.3 MG/DL (ref 1.6–2.6)
MCH RBC QN AUTO: 29.8 PG (ref 26.6–33)
MCHC RBC AUTO-ENTMCNC: 33 G/DL (ref 31.5–35.7)
MCV RBC AUTO: 90.1 FL (ref 79–97)
MONOCYTES # BLD AUTO: 0.51 10*3/MM3 (ref 0.1–0.9)
MONOCYTES NFR BLD AUTO: 8 % (ref 5–12)
NEUTROPHILS NFR BLD AUTO: 4.57 10*3/MM3 (ref 1.7–7)
NEUTROPHILS NFR BLD AUTO: 71.3 % (ref 42.7–76)
NITRITE UR QL STRIP: NEGATIVE
NRBC BLD AUTO-RTO: 0 /100 WBC (ref 0–0.2)
PH UR STRIP.AUTO: 6 [PH] (ref 5–8)
PHOSPHATE SERPL-MCNC: 2.6 MG/DL (ref 2.5–4.5)
PLATELET # BLD AUTO: 166 10*3/MM3 (ref 140–450)
PMV BLD AUTO: 12.8 FL (ref 6–12)
POTASSIUM SERPL-SCNC: 4.9 MMOL/L (ref 3.5–5.2)
PROT ?TM UR-MCNC: <4 MG/DL
PROT SERPL-MCNC: 6.8 G/DL (ref 6–8.5)
PROT UR QL STRIP: NEGATIVE
PROT/CREAT UR: NORMAL MG/G{CREAT}
RBC # BLD AUTO: 5.07 10*6/MM3 (ref 3.77–5.28)
RBC # UR STRIP: NORMAL /HPF
REF LAB TEST METHOD: NORMAL
SODIUM SERPL-SCNC: 135 MMOL/L (ref 136–145)
SP GR UR STRIP: 1.01 (ref 1–1.03)
SQUAMOUS #/AREA URNS HPF: NORMAL /HPF
TRIGL SERPL-MCNC: 213 MG/DL (ref 0–150)
UROBILINOGEN UR QL STRIP: ABNORMAL
VLDLC SERPL-MCNC: 36 MG/DL (ref 5–40)
WBC # UR STRIP: NORMAL /HPF
WBC NRBC COR # BLD: 6.41 10*3/MM3 (ref 3.4–10.8)

## 2022-07-29 PROCEDURE — 80048 BASIC METABOLIC PNL TOTAL CA: CPT

## 2022-07-29 PROCEDURE — 36415 COLL VENOUS BLD VENIPUNCTURE: CPT

## 2022-07-29 PROCEDURE — 80061 LIPID PANEL: CPT

## 2022-07-29 PROCEDURE — G0439 PPPS, SUBSEQ VISIT: HCPCS | Performed by: INTERNAL MEDICINE

## 2022-07-29 PROCEDURE — 1170F FXNL STATUS ASSESSED: CPT | Performed by: INTERNAL MEDICINE

## 2022-07-29 PROCEDURE — 80197 ASSAY OF TACROLIMUS: CPT

## 2022-07-29 PROCEDURE — 83036 HEMOGLOBIN GLYCOSYLATED A1C: CPT

## 2022-07-29 PROCEDURE — 83735 ASSAY OF MAGNESIUM: CPT

## 2022-07-29 PROCEDURE — 87086 URINE CULTURE/COLONY COUNT: CPT

## 2022-07-29 PROCEDURE — 81001 URINALYSIS AUTO W/SCOPE: CPT

## 2022-07-29 PROCEDURE — 1159F MED LIST DOCD IN RCRD: CPT | Performed by: INTERNAL MEDICINE

## 2022-07-29 PROCEDURE — 85025 COMPLETE CBC W/AUTO DIFF WBC: CPT

## 2022-07-29 PROCEDURE — 82570 ASSAY OF URINE CREATININE: CPT

## 2022-07-29 PROCEDURE — 84100 ASSAY OF PHOSPHORUS: CPT

## 2022-07-29 PROCEDURE — 84156 ASSAY OF PROTEIN URINE: CPT

## 2022-07-29 PROCEDURE — 80076 HEPATIC FUNCTION PANEL: CPT

## 2022-07-29 RX ORDER — LOSARTAN POTASSIUM 25 MG/1
12.5 TABLET ORAL DAILY
COMMUNITY
Start: 2022-05-10

## 2022-07-29 RX ORDER — EPINEPHRINE 0.3 MG/.3ML
0.3 INJECTION SUBCUTANEOUS ONCE
Qty: 2 EACH | Refills: 2 | Status: SHIPPED | OUTPATIENT
Start: 2022-07-29 | End: 2022-07-29

## 2022-07-29 RX ORDER — PREDNISONE 1 MG/1
TABLET ORAL
COMMUNITY
Start: 2022-05-25

## 2022-07-30 LAB — BACTERIA SPEC AEROBE CULT: NO GROWTH

## 2022-08-02 LAB — TACROLIMUS BLD LC/MS/MS-MCNC: 9.4 NG/ML (ref 2–20)

## 2022-09-03 PROCEDURE — U0004 COV-19 TEST NON-CDC HGH THRU: HCPCS | Performed by: NURSE PRACTITIONER

## 2022-09-03 PROCEDURE — U0005 INFEC AGEN DETEC AMPLI PROBE: HCPCS | Performed by: NURSE PRACTITIONER

## 2022-09-07 ENCOUNTER — TELEMEDICINE (OUTPATIENT)
Dept: INTERNAL MEDICINE | Facility: CLINIC | Age: 32
End: 2022-09-07

## 2022-09-07 DIAGNOSIS — J01.90 ACUTE SINUSITIS, RECURRENCE NOT SPECIFIED, UNSPECIFIED LOCATION: Primary | ICD-10-CM

## 2022-09-07 PROCEDURE — 99213 OFFICE O/P EST LOW 20 MIN: CPT | Performed by: INTERNAL MEDICINE

## 2022-09-07 RX ORDER — AMOXICILLIN AND CLAVULANATE POTASSIUM 875; 125 MG/1; MG/1
1 TABLET, FILM COATED ORAL 2 TIMES DAILY
Qty: 14 TABLET | Refills: 0 | Status: SHIPPED | OUTPATIENT
Start: 2022-09-07 | End: 2022-10-24

## 2022-10-24 ENCOUNTER — TELEMEDICINE (OUTPATIENT)
Dept: FAMILY MEDICINE CLINIC | Facility: TELEHEALTH | Age: 32
End: 2022-10-24

## 2022-10-24 DIAGNOSIS — R39.89 SUSPECTED UTI: Primary | ICD-10-CM

## 2022-10-24 PROCEDURE — 99213 OFFICE O/P EST LOW 20 MIN: CPT | Performed by: NURSE PRACTITIONER

## 2022-10-24 RX ORDER — EPINEPHRINE 0.3 MG/.3ML
INJECTION SUBCUTANEOUS
COMMUNITY
Start: 2022-07-29

## 2022-10-24 RX ORDER — PHENAZOPYRIDINE HYDROCHLORIDE 200 MG/1
200 TABLET, FILM COATED ORAL 3 TIMES DAILY PRN
Qty: 6 TABLET | Refills: 0 | Status: SHIPPED | OUTPATIENT
Start: 2022-10-24

## 2022-10-24 RX ORDER — NITROFURANTOIN 25; 75 MG/1; MG/1
100 CAPSULE ORAL 2 TIMES DAILY
Qty: 10 CAPSULE | Refills: 0 | Status: SHIPPED | OUTPATIENT
Start: 2022-10-24 | End: 2022-10-25

## 2022-11-04 ENCOUNTER — LAB (OUTPATIENT)
Dept: LAB | Facility: HOSPITAL | Age: 32
End: 2022-11-04

## 2022-11-04 DIAGNOSIS — Z94.0 KIDNEY REPLACED BY TRANSPLANT: ICD-10-CM

## 2022-11-04 DIAGNOSIS — R39.9 UNSPECIFIED SYMPTOMS AND SIGNS INVOLVING THE GENITOURINARY SYSTEM: ICD-10-CM

## 2022-11-04 DIAGNOSIS — R79.89 ELEVATED SERUM CREATININE: ICD-10-CM

## 2022-11-04 LAB
ALBUMIN SERPL-MCNC: 4.1 G/DL (ref 3.5–5.2)
ALP SERPL-CCNC: 62 U/L (ref 39–117)
ALT SERPL W P-5'-P-CCNC: 24 U/L (ref 1–33)
ANION GAP SERPL CALCULATED.3IONS-SCNC: 9.8 MMOL/L (ref 5–15)
AST SERPL-CCNC: 25 U/L (ref 1–32)
BACTERIA UR QL AUTO: ABNORMAL /HPF
BASOPHILS # BLD AUTO: 0.03 10*3/MM3 (ref 0–0.2)
BASOPHILS NFR BLD AUTO: 0.4 % (ref 0–1.5)
BILIRUB CONJ SERPL-MCNC: <0.2 MG/DL (ref 0–0.3)
BILIRUB INDIRECT SERPL-MCNC: NORMAL MG/DL
BILIRUB SERPL-MCNC: 0.4 MG/DL (ref 0–1.2)
BILIRUB UR QL STRIP: NEGATIVE
BUN SERPL-MCNC: 22 MG/DL (ref 6–20)
BUN/CREAT SERPL: 17.7 (ref 7–25)
CALCIUM SPEC-SCNC: 9.9 MG/DL (ref 8.6–10.5)
CHLORIDE SERPL-SCNC: 102 MMOL/L (ref 98–107)
CHOLEST SERPL-MCNC: 143 MG/DL (ref 0–200)
CLARITY UR: CLEAR
CO2 SERPL-SCNC: 22.2 MMOL/L (ref 22–29)
COLOR UR: YELLOW
CREAT SERPL-MCNC: 1.24 MG/DL (ref 0.57–1)
CREAT UR-MCNC: 23.9 MG/DL
DEPRECATED RDW RBC AUTO: 42.9 FL (ref 37–54)
EGFRCR SERPLBLD CKD-EPI 2021: 59.4 ML/MIN/1.73
EOSINOPHIL # BLD AUTO: 0.12 10*3/MM3 (ref 0–0.4)
EOSINOPHIL NFR BLD AUTO: 1.4 % (ref 0.3–6.2)
ERYTHROCYTE [DISTWIDTH] IN BLOOD BY AUTOMATED COUNT: 12.9 % (ref 12.3–15.4)
GLUCOSE SERPL-MCNC: 105 MG/DL (ref 65–99)
GLUCOSE UR STRIP-MCNC: NEGATIVE MG/DL
HBA1C MFR BLD: 5.2 % (ref 4.8–5.6)
HCT VFR BLD AUTO: 43.6 % (ref 34–46.6)
HDLC SERPL-MCNC: 42 MG/DL (ref 40–60)
HGB BLD-MCNC: 14.4 G/DL (ref 12–15.9)
HGB UR QL STRIP.AUTO: NEGATIVE
HYALINE CASTS UR QL AUTO: ABNORMAL /LPF
IMM GRANULOCYTES # BLD AUTO: 0.25 10*3/MM3 (ref 0–0.05)
IMM GRANULOCYTES NFR BLD AUTO: 2.9 % (ref 0–0.5)
KETONES UR QL STRIP: NEGATIVE
LDLC SERPL CALC-MCNC: 73 MG/DL (ref 0–100)
LDLC/HDLC SERPL: 1.63 {RATIO}
LEUKOCYTE ESTERASE UR QL STRIP.AUTO: NEGATIVE
LYMPHOCYTES # BLD AUTO: 0.99 10*3/MM3 (ref 0.7–3.1)
LYMPHOCYTES NFR BLD AUTO: 11.6 % (ref 19.6–45.3)
MAGNESIUM SERPL-MCNC: 1.8 MG/DL (ref 1.6–2.6)
MCH RBC QN AUTO: 30.6 PG (ref 26.6–33)
MCHC RBC AUTO-ENTMCNC: 33 G/DL (ref 31.5–35.7)
MCV RBC AUTO: 92.8 FL (ref 79–97)
MONOCYTES # BLD AUTO: 0.54 10*3/MM3 (ref 0.1–0.9)
MONOCYTES NFR BLD AUTO: 6.3 % (ref 5–12)
NEUTROPHILS NFR BLD AUTO: 6.61 10*3/MM3 (ref 1.7–7)
NEUTROPHILS NFR BLD AUTO: 77.4 % (ref 42.7–76)
NITRITE UR QL STRIP: NEGATIVE
NRBC BLD AUTO-RTO: 0 /100 WBC (ref 0–0.2)
PH UR STRIP.AUTO: 6 [PH] (ref 5–8)
PHOSPHATE SERPL-MCNC: 1.7 MG/DL (ref 2.5–4.5)
PLATELET # BLD AUTO: 260 10*3/MM3 (ref 140–450)
PMV BLD AUTO: 12.4 FL (ref 6–12)
POTASSIUM SERPL-SCNC: 4.5 MMOL/L (ref 3.5–5.2)
PROT ?TM UR-MCNC: <4 MG/DL
PROT SERPL-MCNC: 6.7 G/DL (ref 6–8.5)
PROT UR QL STRIP: NEGATIVE
PROT/CREAT UR: NORMAL MG/G{CREAT}
RBC # BLD AUTO: 4.7 10*6/MM3 (ref 3.77–5.28)
RBC # UR STRIP: ABNORMAL /HPF
REF LAB TEST METHOD: ABNORMAL
SODIUM SERPL-SCNC: 134 MMOL/L (ref 136–145)
SP GR UR STRIP: <=1.005 (ref 1–1.03)
SQUAMOUS #/AREA URNS HPF: ABNORMAL /HPF
TRIGL SERPL-MCNC: 163 MG/DL (ref 0–150)
UROBILINOGEN UR QL STRIP: NORMAL
VLDLC SERPL-MCNC: 28 MG/DL (ref 5–40)
WBC # UR STRIP: ABNORMAL /HPF
WBC NRBC COR # BLD: 8.54 10*3/MM3 (ref 3.4–10.8)

## 2022-11-04 PROCEDURE — 36415 COLL VENOUS BLD VENIPUNCTURE: CPT

## 2022-11-04 PROCEDURE — 83036 HEMOGLOBIN GLYCOSYLATED A1C: CPT

## 2022-11-04 PROCEDURE — 82570 ASSAY OF URINE CREATININE: CPT

## 2022-11-04 PROCEDURE — 84156 ASSAY OF PROTEIN URINE: CPT

## 2022-11-04 PROCEDURE — 85025 COMPLETE CBC W/AUTO DIFF WBC: CPT

## 2022-11-04 PROCEDURE — 83735 ASSAY OF MAGNESIUM: CPT

## 2022-11-04 PROCEDURE — 81001 URINALYSIS AUTO W/SCOPE: CPT

## 2022-11-04 PROCEDURE — 80197 ASSAY OF TACROLIMUS: CPT

## 2022-11-04 PROCEDURE — 80076 HEPATIC FUNCTION PANEL: CPT

## 2022-11-04 PROCEDURE — 87086 URINE CULTURE/COLONY COUNT: CPT

## 2022-11-04 PROCEDURE — 80061 LIPID PANEL: CPT

## 2022-11-04 PROCEDURE — 84100 ASSAY OF PHOSPHORUS: CPT

## 2022-11-04 PROCEDURE — 80048 BASIC METABOLIC PNL TOTAL CA: CPT

## 2022-11-05 LAB — BACTERIA SPEC AEROBE CULT: NORMAL

## 2022-11-07 LAB — TACROLIMUS BLD LC/MS/MS-MCNC: 7.2 NG/ML (ref 2–20)

## 2023-02-03 ENCOUNTER — LAB (OUTPATIENT)
Dept: LAB | Facility: HOSPITAL | Age: 33
End: 2023-02-03
Payer: MEDICARE

## 2023-02-03 DIAGNOSIS — R79.89 ELEVATED SERUM CREATININE: ICD-10-CM

## 2023-02-03 DIAGNOSIS — R39.9 UNSPECIFIED SYMPTOMS AND SIGNS INVOLVING THE GENITOURINARY SYSTEM: ICD-10-CM

## 2023-02-03 DIAGNOSIS — Z94.0 KIDNEY REPLACED BY TRANSPLANT: ICD-10-CM

## 2023-02-03 LAB
ALBUMIN SERPL-MCNC: 4.5 G/DL (ref 3.5–5.2)
ALP SERPL-CCNC: 67 U/L (ref 39–117)
ALT SERPL W P-5'-P-CCNC: 27 U/L (ref 1–33)
ANION GAP SERPL CALCULATED.3IONS-SCNC: 10.9 MMOL/L (ref 5–15)
AST SERPL-CCNC: 22 U/L (ref 1–32)
BACTERIA UR QL AUTO: ABNORMAL /HPF
BASOPHILS # BLD AUTO: 0.03 10*3/MM3 (ref 0–0.2)
BASOPHILS NFR BLD AUTO: 0.4 % (ref 0–1.5)
BILIRUB CONJ SERPL-MCNC: <0.2 MG/DL (ref 0–0.3)
BILIRUB INDIRECT SERPL-MCNC: NORMAL MG/DL
BILIRUB SERPL-MCNC: 0.3 MG/DL (ref 0–1.2)
BILIRUB UR QL STRIP: NEGATIVE
BUN SERPL-MCNC: 23 MG/DL (ref 6–20)
BUN/CREAT SERPL: 16.4 (ref 7–25)
CALCIUM SPEC-SCNC: 9.9 MG/DL (ref 8.6–10.5)
CHLORIDE SERPL-SCNC: 105 MMOL/L (ref 98–107)
CHOLEST SERPL-MCNC: 178 MG/DL (ref 0–200)
CLARITY UR: CLEAR
CO2 SERPL-SCNC: 21.1 MMOL/L (ref 22–29)
COLOR UR: YELLOW
CREAT SERPL-MCNC: 1.4 MG/DL (ref 0.57–1)
CREAT UR-MCNC: 64.9 MG/DL
DEPRECATED RDW RBC AUTO: 38.7 FL (ref 37–54)
EGFRCR SERPLBLD CKD-EPI 2021: 51.4 ML/MIN/1.73
EOSINOPHIL # BLD AUTO: 0.14 10*3/MM3 (ref 0–0.4)
EOSINOPHIL NFR BLD AUTO: 1.8 % (ref 0.3–6.2)
ERYTHROCYTE [DISTWIDTH] IN BLOOD BY AUTOMATED COUNT: 12.1 % (ref 12.3–15.4)
GLUCOSE SERPL-MCNC: 101 MG/DL (ref 65–99)
GLUCOSE UR STRIP-MCNC: NEGATIVE MG/DL
HBA1C MFR BLD: 5 % (ref 4.8–5.6)
HCT VFR BLD AUTO: 45.3 % (ref 34–46.6)
HDLC SERPL-MCNC: 47 MG/DL (ref 40–60)
HGB BLD-MCNC: 15 G/DL (ref 12–15.9)
HGB UR QL STRIP.AUTO: NEGATIVE
HYALINE CASTS UR QL AUTO: ABNORMAL /LPF
IMM GRANULOCYTES # BLD AUTO: 0.21 10*3/MM3 (ref 0–0.05)
IMM GRANULOCYTES NFR BLD AUTO: 2.7 % (ref 0–0.5)
KETONES UR QL STRIP: NEGATIVE
LDLC SERPL CALC-MCNC: 108 MG/DL (ref 0–100)
LDLC/HDLC SERPL: 2.23 {RATIO}
LEUKOCYTE ESTERASE UR QL STRIP.AUTO: NEGATIVE
LYMPHOCYTES # BLD AUTO: 1.17 10*3/MM3 (ref 0.7–3.1)
LYMPHOCYTES NFR BLD AUTO: 15.2 % (ref 19.6–45.3)
MAGNESIUM SERPL-MCNC: 1.5 MG/DL (ref 1.6–2.6)
MCH RBC QN AUTO: 29.5 PG (ref 26.6–33)
MCHC RBC AUTO-ENTMCNC: 33.1 G/DL (ref 31.5–35.7)
MCV RBC AUTO: 89.2 FL (ref 79–97)
MONOCYTES # BLD AUTO: 0.55 10*3/MM3 (ref 0.1–0.9)
MONOCYTES NFR BLD AUTO: 7.1 % (ref 5–12)
NEUTROPHILS NFR BLD AUTO: 5.61 10*3/MM3 (ref 1.7–7)
NEUTROPHILS NFR BLD AUTO: 72.8 % (ref 42.7–76)
NITRITE UR QL STRIP: NEGATIVE
NRBC BLD AUTO-RTO: 0 /100 WBC (ref 0–0.2)
PH UR STRIP.AUTO: 6 [PH] (ref 5–8)
PHOSPHATE SERPL-MCNC: 2.6 MG/DL (ref 2.5–4.5)
PLATELET # BLD AUTO: 263 10*3/MM3 (ref 140–450)
PMV BLD AUTO: 12.4 FL (ref 6–12)
POTASSIUM SERPL-SCNC: 4.8 MMOL/L (ref 3.5–5.2)
PROT ?TM UR-MCNC: 6 MG/DL
PROT SERPL-MCNC: 7 G/DL (ref 6–8.5)
PROT UR QL STRIP: NEGATIVE
PROT/CREAT UR: 92.4 MG/G CREA (ref 0–200)
RBC # BLD AUTO: 5.08 10*6/MM3 (ref 3.77–5.28)
RBC # UR STRIP: ABNORMAL /HPF
REF LAB TEST METHOD: ABNORMAL
SODIUM SERPL-SCNC: 137 MMOL/L (ref 136–145)
SP GR UR STRIP: 1.01 (ref 1–1.03)
SQUAMOUS #/AREA URNS HPF: ABNORMAL /HPF
TRIGL SERPL-MCNC: 130 MG/DL (ref 0–150)
UROBILINOGEN UR QL STRIP: NORMAL
VLDLC SERPL-MCNC: 23 MG/DL (ref 5–40)
WBC # UR STRIP: ABNORMAL /HPF
WBC NRBC COR # BLD: 7.71 10*3/MM3 (ref 3.4–10.8)

## 2023-02-03 PROCEDURE — 81001 URINALYSIS AUTO W/SCOPE: CPT

## 2023-02-03 PROCEDURE — 80061 LIPID PANEL: CPT

## 2023-02-03 PROCEDURE — 84156 ASSAY OF PROTEIN URINE: CPT

## 2023-02-03 PROCEDURE — 83735 ASSAY OF MAGNESIUM: CPT

## 2023-02-03 PROCEDURE — 82570 ASSAY OF URINE CREATININE: CPT

## 2023-02-03 PROCEDURE — 82248 BILIRUBIN DIRECT: CPT

## 2023-02-03 PROCEDURE — 85025 COMPLETE CBC W/AUTO DIFF WBC: CPT

## 2023-02-03 PROCEDURE — 83036 HEMOGLOBIN GLYCOSYLATED A1C: CPT

## 2023-02-03 PROCEDURE — 80053 COMPREHEN METABOLIC PANEL: CPT

## 2023-02-03 PROCEDURE — 87086 URINE CULTURE/COLONY COUNT: CPT

## 2023-02-03 PROCEDURE — 80197 ASSAY OF TACROLIMUS: CPT

## 2023-02-03 PROCEDURE — 84100 ASSAY OF PHOSPHORUS: CPT

## 2023-02-03 PROCEDURE — 36415 COLL VENOUS BLD VENIPUNCTURE: CPT

## 2023-02-04 LAB
ALBUMIN SERPL-MCNC: 4.3 G/DL (ref 3.5–5.2)
ANION GAP SERPL CALCULATED.3IONS-SCNC: 13 MMOL/L (ref 5–15)
BACTERIA SPEC AEROBE CULT: NO GROWTH
BUN SERPL-MCNC: 24 MG/DL (ref 6–20)
BUN/CREAT SERPL: 17.5 (ref 7–25)
CALCIUM SPEC-SCNC: 10.1 MG/DL (ref 8.6–10.5)
CHLORIDE SERPL-SCNC: 105 MMOL/L (ref 98–107)
CO2 SERPL-SCNC: 19 MMOL/L (ref 22–29)
CREAT SERPL-MCNC: 1.37 MG/DL (ref 0.57–1)
EGFRCR SERPLBLD CKD-EPI 2021: 52.7 ML/MIN/1.73
GLUCOSE SERPL-MCNC: 98 MG/DL (ref 65–99)
PHOSPHATE SERPL-MCNC: 2.8 MG/DL (ref 2.5–4.5)
POTASSIUM SERPL-SCNC: 4.8 MMOL/L (ref 3.5–5.2)
SODIUM SERPL-SCNC: 137 MMOL/L (ref 136–145)

## 2023-02-06 LAB — TACROLIMUS BLD LC/MS/MS-MCNC: 7 NG/ML (ref 2–20)

## 2023-02-08 ENCOUNTER — OFFICE VISIT (OUTPATIENT)
Dept: INTERNAL MEDICINE | Facility: CLINIC | Age: 33
End: 2023-02-08
Payer: MEDICARE

## 2023-02-08 VITALS
DIASTOLIC BLOOD PRESSURE: 82 MMHG | TEMPERATURE: 98 F | OXYGEN SATURATION: 97 % | HEIGHT: 67 IN | WEIGHT: 157 LBS | HEART RATE: 112 BPM | BODY MASS INDEX: 24.64 KG/M2 | SYSTOLIC BLOOD PRESSURE: 122 MMHG

## 2023-02-08 DIAGNOSIS — J01.00 ACUTE NON-RECURRENT MAXILLARY SINUSITIS: Primary | ICD-10-CM

## 2023-02-08 PROCEDURE — 99213 OFFICE O/P EST LOW 20 MIN: CPT | Performed by: FAMILY MEDICINE

## 2023-02-08 PROCEDURE — 96372 THER/PROPH/DIAG INJ SC/IM: CPT | Performed by: FAMILY MEDICINE

## 2023-02-08 RX ORDER — AMOXICILLIN AND CLAVULANATE POTASSIUM 875; 125 MG/1; MG/1
1 TABLET, FILM COATED ORAL EVERY 12 HOURS SCHEDULED
Qty: 20 TABLET | Refills: 0 | Status: SHIPPED | OUTPATIENT
Start: 2023-02-08

## 2023-02-08 RX ORDER — METHYLPREDNISOLONE SODIUM SUCCINATE 40 MG/ML
40 INJECTION, POWDER, LYOPHILIZED, FOR SOLUTION INTRAMUSCULAR; INTRAVENOUS ONCE
Status: COMPLETED | OUTPATIENT
Start: 2023-02-08 | End: 2023-02-08

## 2023-02-08 RX ADMIN — METHYLPREDNISOLONE SODIUM SUCCINATE 40 MG: 40 INJECTION, POWDER, LYOPHILIZED, FOR SOLUTION INTRAMUSCULAR; INTRAVENOUS at 12:26

## 2023-02-09 PROBLEM — J01.00 ACUTE NON-RECURRENT MAXILLARY SINUSITIS: Status: ACTIVE | Noted: 2023-02-09

## 2023-03-28 DIAGNOSIS — Z30.09 ENCOUNTER FOR OTHER GENERAL COUNSELING OR ADVICE ON CONTRACEPTION: ICD-10-CM

## 2023-03-31 ENCOUNTER — TELEPHONE (OUTPATIENT)
Dept: INTERNAL MEDICINE | Facility: CLINIC | Age: 33
End: 2023-03-31

## 2023-03-31 RX ORDER — NORETHINDRONE AND ETHINYL ESTRADIOL 1 MG-35MCG
1 KIT ORAL DAILY
Qty: 28 TABLET | Refills: 3 | Status: SHIPPED | OUTPATIENT
Start: 2023-03-31 | End: 2023-04-19 | Stop reason: SDUPTHER

## 2023-04-05 DIAGNOSIS — Z30.09 ENCOUNTER FOR OTHER GENERAL COUNSELING OR ADVICE ON CONTRACEPTION: ICD-10-CM

## 2023-04-06 RX ORDER — NORETHINDRONE AND ETHINYL ESTRADIOL 1 MG-35MCG
1 KIT ORAL DAILY
Qty: 28 TABLET | Refills: 3 | OUTPATIENT
Start: 2023-04-06 | End: 2024-04-05

## 2023-04-19 ENCOUNTER — OFFICE VISIT (OUTPATIENT)
Dept: INTERNAL MEDICINE | Facility: CLINIC | Age: 33
End: 2023-04-19
Payer: MEDICARE

## 2023-04-19 VITALS
OXYGEN SATURATION: 100 % | TEMPERATURE: 97.1 F | DIASTOLIC BLOOD PRESSURE: 78 MMHG | SYSTOLIC BLOOD PRESSURE: 134 MMHG | BODY MASS INDEX: 25.43 KG/M2 | WEIGHT: 162 LBS | HEART RATE: 94 BPM | RESPIRATION RATE: 17 BRPM | HEIGHT: 67 IN

## 2023-04-19 DIAGNOSIS — K13.0 LESION OF LIP: ICD-10-CM

## 2023-04-19 DIAGNOSIS — M32.14 LUPUS NEPHRITIS: ICD-10-CM

## 2023-04-19 DIAGNOSIS — R22.0 LIP SWELLING: ICD-10-CM

## 2023-04-19 DIAGNOSIS — E55.9 VITAMIN D DEFICIENCY, UNSPECIFIED: ICD-10-CM

## 2023-04-19 DIAGNOSIS — E04.1 THYROID NODULE: Primary | ICD-10-CM

## 2023-04-19 DIAGNOSIS — J30.2 SEASONAL ALLERGIES: ICD-10-CM

## 2023-04-19 DIAGNOSIS — Z30.09 ENCOUNTER FOR OTHER GENERAL COUNSELING OR ADVICE ON CONTRACEPTION: ICD-10-CM

## 2023-04-19 DIAGNOSIS — L20.9 ATOPIC DERMATITIS OF SCALP: ICD-10-CM

## 2023-04-19 PROCEDURE — 3075F SYST BP GE 130 - 139MM HG: CPT | Performed by: INTERNAL MEDICINE

## 2023-04-19 PROCEDURE — 99214 OFFICE O/P EST MOD 30 MIN: CPT | Performed by: INTERNAL MEDICINE

## 2023-04-19 PROCEDURE — 3078F DIAST BP <80 MM HG: CPT | Performed by: INTERNAL MEDICINE

## 2023-04-19 PROCEDURE — 1160F RVW MEDS BY RX/DR IN RCRD: CPT | Performed by: INTERNAL MEDICINE

## 2023-04-19 PROCEDURE — 1159F MED LIST DOCD IN RCRD: CPT | Performed by: INTERNAL MEDICINE

## 2023-04-19 RX ORDER — CLOBETASOL PROPIONATE 0.46 MG/ML
SOLUTION TOPICAL 2 TIMES DAILY
Qty: 50 ML | Refills: 2 | Status: SHIPPED | OUTPATIENT
Start: 2023-04-19

## 2023-04-19 RX ORDER — NORETHINDRONE AND ETHINYL ESTRADIOL 1 MG-35MCG
1 KIT ORAL DAILY
Qty: 28 TABLET | Refills: 11 | Status: SHIPPED | OUTPATIENT
Start: 2023-04-19 | End: 2024-04-18

## 2023-04-19 RX ORDER — MONTELUKAST SODIUM 10 MG/1
10 TABLET ORAL DAILY
Qty: 90 TABLET | Refills: 3 | Status: SHIPPED | OUTPATIENT
Start: 2023-04-19

## 2023-04-20 LAB
25(OH)D3+25(OH)D2 SERPL-MCNC: 43.6 NG/ML (ref 30–100)
TSH SERPL DL<=0.005 MIU/L-ACNC: 1.01 UIU/ML (ref 0.27–4.2)
VIT B12 SERPL-MCNC: 743 PG/ML (ref 211–946)

## 2023-05-03 ENCOUNTER — TRANSCRIBE ORDERS (OUTPATIENT)
Dept: LAB | Facility: HOSPITAL | Age: 33
End: 2023-05-03
Payer: MEDICARE

## 2023-05-03 DIAGNOSIS — D84.9 IMMUNOSUPPRESSION: ICD-10-CM

## 2023-05-03 DIAGNOSIS — Z94.0 KIDNEY REPLACED BY TRANSPLANT: Primary | ICD-10-CM

## 2023-05-04 ENCOUNTER — LAB (OUTPATIENT)
Dept: LAB | Facility: HOSPITAL | Age: 33
End: 2023-05-04
Payer: MEDICARE

## 2023-05-04 DIAGNOSIS — D84.9 IMMUNOSUPPRESSION: ICD-10-CM

## 2023-05-04 DIAGNOSIS — Z94.0 KIDNEY REPLACED BY TRANSPLANT: ICD-10-CM

## 2023-05-04 LAB
ALBUMIN SERPL-MCNC: 4.1 G/DL (ref 3.5–5.2)
ANION GAP SERPL CALCULATED.3IONS-SCNC: 11.9 MMOL/L (ref 5–15)
BACTERIA UR QL AUTO: NORMAL /HPF
BASOPHILS # BLD AUTO: 0.03 10*3/MM3 (ref 0–0.2)
BASOPHILS NFR BLD AUTO: 0.5 % (ref 0–1.5)
BILIRUB UR QL STRIP: NEGATIVE
BUN SERPL-MCNC: 24 MG/DL (ref 6–20)
BUN/CREAT SERPL: 18 (ref 7–25)
CALCIUM SPEC-SCNC: 10.2 MG/DL (ref 8.6–10.5)
CHLORIDE SERPL-SCNC: 105 MMOL/L (ref 98–107)
CLARITY UR: CLEAR
CO2 SERPL-SCNC: 19.1 MMOL/L (ref 22–29)
COLOR UR: YELLOW
CREAT SERPL-MCNC: 1.33 MG/DL (ref 0.57–1)
CREAT UR-MCNC: 18.4 MG/DL
DEPRECATED RDW RBC AUTO: 39.9 FL (ref 37–54)
EGFRCR SERPLBLD CKD-EPI 2021: 54.3 ML/MIN/1.73
EOSINOPHIL # BLD AUTO: 0.1 10*3/MM3 (ref 0–0.4)
EOSINOPHIL NFR BLD AUTO: 1.8 % (ref 0.3–6.2)
ERYTHROCYTE [DISTWIDTH] IN BLOOD BY AUTOMATED COUNT: 12.4 % (ref 12.3–15.4)
GLUCOSE SERPL-MCNC: 98 MG/DL (ref 65–99)
GLUCOSE UR STRIP-MCNC: NEGATIVE MG/DL
HCT VFR BLD AUTO: 44.4 % (ref 34–46.6)
HGB BLD-MCNC: 14.8 G/DL (ref 12–15.9)
HGB UR QL STRIP.AUTO: NEGATIVE
HYALINE CASTS UR QL AUTO: NORMAL /LPF
IMM GRANULOCYTES # BLD AUTO: 0.06 10*3/MM3 (ref 0–0.05)
IMM GRANULOCYTES NFR BLD AUTO: 1.1 % (ref 0–0.5)
KETONES UR QL STRIP: NEGATIVE
LEUKOCYTE ESTERASE UR QL STRIP.AUTO: NEGATIVE
LYMPHOCYTES # BLD AUTO: 1.02 10*3/MM3 (ref 0.7–3.1)
LYMPHOCYTES NFR BLD AUTO: 18.1 % (ref 19.6–45.3)
MAGNESIUM SERPL-MCNC: 1.5 MG/DL (ref 1.6–2.6)
MCH RBC QN AUTO: 30.1 PG (ref 26.6–33)
MCHC RBC AUTO-ENTMCNC: 33.3 G/DL (ref 31.5–35.7)
MCV RBC AUTO: 90.2 FL (ref 79–97)
MONOCYTES # BLD AUTO: 0.54 10*3/MM3 (ref 0.1–0.9)
MONOCYTES NFR BLD AUTO: 9.6 % (ref 5–12)
NEUTROPHILS NFR BLD AUTO: 3.88 10*3/MM3 (ref 1.7–7)
NEUTROPHILS NFR BLD AUTO: 68.9 % (ref 42.7–76)
NITRITE UR QL STRIP: NEGATIVE
NRBC BLD AUTO-RTO: 0 /100 WBC (ref 0–0.2)
PH UR STRIP.AUTO: 6 [PH] (ref 5–8)
PHOSPHATE SERPL-MCNC: 2.7 MG/DL (ref 2.5–4.5)
PLATELET # BLD AUTO: 233 10*3/MM3 (ref 140–450)
PMV BLD AUTO: 12.1 FL (ref 6–12)
POTASSIUM SERPL-SCNC: 4.7 MMOL/L (ref 3.5–5.2)
PROT ?TM UR-MCNC: <4 MG/DL
PROT UR QL STRIP: NEGATIVE
PROT/CREAT UR: NORMAL MG/G{CREAT}
RBC # BLD AUTO: 4.92 10*6/MM3 (ref 3.77–5.28)
RBC # UR STRIP: NORMAL /HPF
REF LAB TEST METHOD: NORMAL
SODIUM SERPL-SCNC: 136 MMOL/L (ref 136–145)
SP GR UR STRIP: 1.01 (ref 1–1.03)
SQUAMOUS #/AREA URNS HPF: NORMAL /HPF
UROBILINOGEN UR QL STRIP: NORMAL
WBC # UR STRIP: NORMAL /HPF
WBC NRBC COR # BLD: 5.63 10*3/MM3 (ref 3.4–10.8)

## 2023-05-04 PROCEDURE — 84156 ASSAY OF PROTEIN URINE: CPT

## 2023-05-04 PROCEDURE — 36415 COLL VENOUS BLD VENIPUNCTURE: CPT

## 2023-05-04 PROCEDURE — 85025 COMPLETE CBC W/AUTO DIFF WBC: CPT

## 2023-05-04 PROCEDURE — 80197 ASSAY OF TACROLIMUS: CPT

## 2023-05-04 PROCEDURE — 81001 URINALYSIS AUTO W/SCOPE: CPT

## 2023-05-04 PROCEDURE — 80069 RENAL FUNCTION PANEL: CPT

## 2023-05-04 PROCEDURE — 83735 ASSAY OF MAGNESIUM: CPT

## 2023-05-04 PROCEDURE — 87086 URINE CULTURE/COLONY COUNT: CPT

## 2023-05-04 PROCEDURE — 82570 ASSAY OF URINE CREATININE: CPT

## 2023-05-05 LAB — BACTERIA SPEC AEROBE CULT: NO GROWTH

## 2023-05-07 LAB — TACROLIMUS BLD LC/MS/MS-MCNC: 7.7 NG/ML (ref 2–20)

## 2023-06-09 RX ORDER — AMOXICILLIN AND CLAVULANATE POTASSIUM 875; 125 MG/1; MG/1
TABLET, FILM COATED ORAL
Qty: 20 TABLET | Refills: 0 | OUTPATIENT
Start: 2023-06-09

## 2023-08-04 ENCOUNTER — OFFICE VISIT (OUTPATIENT)
Dept: INTERNAL MEDICINE | Facility: CLINIC | Age: 33
End: 2023-08-04
Payer: MEDICARE

## 2023-08-04 VITALS
OXYGEN SATURATION: 99 % | BODY MASS INDEX: 26.06 KG/M2 | WEIGHT: 166 LBS | HEIGHT: 67 IN | HEART RATE: 116 BPM | DIASTOLIC BLOOD PRESSURE: 78 MMHG | SYSTOLIC BLOOD PRESSURE: 118 MMHG | TEMPERATURE: 97 F

## 2023-08-04 DIAGNOSIS — J06.9 ACUTE URI: ICD-10-CM

## 2023-08-04 DIAGNOSIS — H66.93 ACUTE BILATERAL OTITIS MEDIA: Primary | ICD-10-CM

## 2023-08-04 LAB
EXPIRATION DATE: NORMAL
EXPIRATION DATE: NORMAL
FLUAV AG UPPER RESP QL IA.RAPID: NOT DETECTED
FLUBV AG UPPER RESP QL IA.RAPID: NOT DETECTED
INTERNAL CONTROL: NORMAL
INTERNAL CONTROL: NORMAL
Lab: NORMAL
Lab: NORMAL
S PYO AG THROAT QL: NEGATIVE
SARS-COV-2 AG UPPER RESP QL IA.RAPID: NOT DETECTED

## 2023-08-04 PROCEDURE — 1159F MED LIST DOCD IN RCRD: CPT | Performed by: NURSE PRACTITIONER

## 2023-08-04 PROCEDURE — 1160F RVW MEDS BY RX/DR IN RCRD: CPT | Performed by: NURSE PRACTITIONER

## 2023-08-04 PROCEDURE — 3078F DIAST BP <80 MM HG: CPT | Performed by: NURSE PRACTITIONER

## 2023-08-04 PROCEDURE — 87880 STREP A ASSAY W/OPTIC: CPT | Performed by: NURSE PRACTITIONER

## 2023-08-04 PROCEDURE — 3074F SYST BP LT 130 MM HG: CPT | Performed by: NURSE PRACTITIONER

## 2023-08-04 PROCEDURE — 99213 OFFICE O/P EST LOW 20 MIN: CPT | Performed by: NURSE PRACTITIONER

## 2023-08-04 PROCEDURE — 87428 SARSCOV & INF VIR A&B AG IA: CPT | Performed by: NURSE PRACTITIONER

## 2023-08-04 RX ORDER — AMOXICILLIN AND CLAVULANATE POTASSIUM 875; 125 MG/1; MG/1
1 TABLET, FILM COATED ORAL 2 TIMES DAILY
Qty: 20 TABLET | Refills: 0 | Status: SHIPPED | OUTPATIENT
Start: 2023-08-04 | End: 2023-08-14

## 2023-08-04 RX ORDER — DEXTROMETHORPHAN HYDROBROMIDE AND PROMETHAZINE HYDROCHLORIDE 15; 6.25 MG/5ML; MG/5ML
5 SYRUP ORAL NIGHTLY PRN
Qty: 180 ML | Refills: 0 | Status: SHIPPED | OUTPATIENT
Start: 2023-08-04

## 2023-08-21 ENCOUNTER — LAB (OUTPATIENT)
Dept: LAB | Facility: HOSPITAL | Age: 33
End: 2023-08-21
Payer: MEDICARE

## 2023-08-21 DIAGNOSIS — D84.9 IMMUNOSUPPRESSION: ICD-10-CM

## 2023-08-21 DIAGNOSIS — Z94.0 KIDNEY REPLACED BY TRANSPLANT: ICD-10-CM

## 2023-08-21 LAB
ALBUMIN SERPL-MCNC: 4.3 G/DL (ref 3.5–5.2)
ANION GAP SERPL CALCULATED.3IONS-SCNC: 10.8 MMOL/L (ref 5–15)
BACTERIA UR QL AUTO: ABNORMAL /HPF
BASOPHILS # BLD AUTO: 0.03 10*3/MM3 (ref 0–0.2)
BASOPHILS NFR BLD AUTO: 0.4 % (ref 0–1.5)
BILIRUB UR QL STRIP: NEGATIVE
BUN SERPL-MCNC: 27 MG/DL (ref 6–20)
BUN/CREAT SERPL: 22.7 (ref 7–25)
CALCIUM SPEC-SCNC: 10 MG/DL (ref 8.6–10.5)
CHLORIDE SERPL-SCNC: 104 MMOL/L (ref 98–107)
CLARITY UR: CLEAR
CO2 SERPL-SCNC: 20.2 MMOL/L (ref 22–29)
COLOR UR: YELLOW
CREAT SERPL-MCNC: 1.19 MG/DL (ref 0.57–1)
CREAT UR-MCNC: 21.6 MG/DL
DEPRECATED RDW RBC AUTO: 42.5 FL (ref 37–54)
EGFRCR SERPLBLD CKD-EPI 2021: 62 ML/MIN/1.73
EOSINOPHIL # BLD AUTO: 0.14 10*3/MM3 (ref 0–0.4)
EOSINOPHIL NFR BLD AUTO: 2 % (ref 0.3–6.2)
ERYTHROCYTE [DISTWIDTH] IN BLOOD BY AUTOMATED COUNT: 12.6 % (ref 12.3–15.4)
GLUCOSE SERPL-MCNC: 95 MG/DL (ref 65–99)
GLUCOSE UR STRIP-MCNC: NEGATIVE MG/DL
HCT VFR BLD AUTO: 44.9 % (ref 34–46.6)
HGB BLD-MCNC: 14.6 G/DL (ref 12–15.9)
HGB UR QL STRIP.AUTO: NEGATIVE
HYALINE CASTS UR QL AUTO: ABNORMAL /LPF
IMM GRANULOCYTES # BLD AUTO: 0.04 10*3/MM3 (ref 0–0.05)
IMM GRANULOCYTES NFR BLD AUTO: 0.6 % (ref 0–0.5)
KETONES UR QL STRIP: NEGATIVE
LEUKOCYTE ESTERASE UR QL STRIP.AUTO: NEGATIVE
LYMPHOCYTES # BLD AUTO: 1.05 10*3/MM3 (ref 0.7–3.1)
LYMPHOCYTES NFR BLD AUTO: 15 % (ref 19.6–45.3)
MAGNESIUM SERPL-MCNC: 1.5 MG/DL (ref 1.6–2.6)
MCH RBC QN AUTO: 29.9 PG (ref 26.6–33)
MCHC RBC AUTO-ENTMCNC: 32.5 G/DL (ref 31.5–35.7)
MCV RBC AUTO: 92 FL (ref 79–97)
MONOCYTES # BLD AUTO: 0.64 10*3/MM3 (ref 0.1–0.9)
MONOCYTES NFR BLD AUTO: 9.1 % (ref 5–12)
NEUTROPHILS NFR BLD AUTO: 5.11 10*3/MM3 (ref 1.7–7)
NEUTROPHILS NFR BLD AUTO: 72.9 % (ref 42.7–76)
NITRITE UR QL STRIP: NEGATIVE
NRBC BLD AUTO-RTO: 0 /100 WBC (ref 0–0.2)
PH UR STRIP.AUTO: 5.5 [PH] (ref 5–8)
PHOSPHATE SERPL-MCNC: 2.4 MG/DL (ref 2.5–4.5)
PLATELET # BLD AUTO: 231 10*3/MM3 (ref 140–450)
PMV BLD AUTO: 11.5 FL (ref 6–12)
POTASSIUM SERPL-SCNC: 4.2 MMOL/L (ref 3.5–5.2)
PROT ?TM UR-MCNC: <4 MG/DL
PROT UR QL STRIP: NEGATIVE
PROT/CREAT UR: NORMAL MG/G{CREAT}
RBC # BLD AUTO: 4.88 10*6/MM3 (ref 3.77–5.28)
RBC # UR STRIP: ABNORMAL /HPF
REF LAB TEST METHOD: ABNORMAL
SODIUM SERPL-SCNC: 135 MMOL/L (ref 136–145)
SP GR UR STRIP: <=1.005 (ref 1–1.03)
SQUAMOUS #/AREA URNS HPF: ABNORMAL /HPF
UROBILINOGEN UR QL STRIP: NORMAL
WBC # UR STRIP: ABNORMAL /HPF
WBC NRBC COR # BLD: 7.01 10*3/MM3 (ref 3.4–10.8)

## 2023-08-21 PROCEDURE — 80069 RENAL FUNCTION PANEL: CPT

## 2023-08-21 PROCEDURE — 83735 ASSAY OF MAGNESIUM: CPT

## 2023-08-21 PROCEDURE — 81001 URINALYSIS AUTO W/SCOPE: CPT

## 2023-08-21 PROCEDURE — 84156 ASSAY OF PROTEIN URINE: CPT

## 2023-08-21 PROCEDURE — 80197 ASSAY OF TACROLIMUS: CPT

## 2023-08-21 PROCEDURE — 82570 ASSAY OF URINE CREATININE: CPT

## 2023-08-21 PROCEDURE — 36415 COLL VENOUS BLD VENIPUNCTURE: CPT

## 2023-08-21 PROCEDURE — 87086 URINE CULTURE/COLONY COUNT: CPT

## 2023-08-21 PROCEDURE — 85025 COMPLETE CBC W/AUTO DIFF WBC: CPT

## 2023-08-22 LAB — BACTERIA SPEC AEROBE CULT: NORMAL

## 2023-08-24 LAB — TACROLIMUS BLD LC/MS/MS-MCNC: 7 NG/ML (ref 2–20)

## 2023-10-10 ENCOUNTER — OFFICE VISIT (OUTPATIENT)
Dept: OBSTETRICS AND GYNECOLOGY | Facility: CLINIC | Age: 33
End: 2023-10-10
Payer: MEDICARE

## 2023-10-10 VITALS
HEIGHT: 67 IN | WEIGHT: 171.6 LBS | SYSTOLIC BLOOD PRESSURE: 130 MMHG | DIASTOLIC BLOOD PRESSURE: 70 MMHG | BODY MASS INDEX: 26.93 KG/M2

## 2023-10-10 DIAGNOSIS — N83.201 RIGHT OVARIAN CYST: ICD-10-CM

## 2023-10-10 DIAGNOSIS — Z12.4 SCREENING FOR CERVICAL CANCER: ICD-10-CM

## 2023-10-10 DIAGNOSIS — Z01.419 ROUTINE GYNECOLOGICAL EXAMINATION: Primary | ICD-10-CM

## 2023-10-12 LAB — REF LAB TEST METHOD: NORMAL

## 2023-10-19 ENCOUNTER — DOCUMENTATION (OUTPATIENT)
Dept: OBSTETRICS AND GYNECOLOGY | Facility: CLINIC | Age: 33
End: 2023-10-19
Payer: MEDICARE

## 2023-11-08 ENCOUNTER — TELEMEDICINE (OUTPATIENT)
Dept: INTERNAL MEDICINE | Facility: CLINIC | Age: 33
End: 2023-11-08
Payer: MEDICARE

## 2023-11-08 VITALS
BODY MASS INDEX: 26.06 KG/M2 | WEIGHT: 166 LBS | HEIGHT: 67 IN | DIASTOLIC BLOOD PRESSURE: 80 MMHG | SYSTOLIC BLOOD PRESSURE: 124 MMHG

## 2023-11-08 DIAGNOSIS — I10 ESSENTIAL HYPERTENSION: ICD-10-CM

## 2023-11-08 DIAGNOSIS — M32.14 LUPUS NEPHRITIS: ICD-10-CM

## 2023-11-08 DIAGNOSIS — B00.1 COLD SORE: Primary | ICD-10-CM

## 2023-11-08 DIAGNOSIS — K21.9 GASTROESOPHAGEAL REFLUX DISEASE WITHOUT ESOPHAGITIS: ICD-10-CM

## 2023-11-08 DIAGNOSIS — Z94.0 RENAL TRANSPLANT, STATUS POST: ICD-10-CM

## 2023-11-08 PROCEDURE — 1170F FXNL STATUS ASSESSED: CPT | Performed by: INTERNAL MEDICINE

## 2023-11-08 PROCEDURE — G0439 PPPS, SUBSEQ VISIT: HCPCS | Performed by: INTERNAL MEDICINE

## 2023-11-08 PROCEDURE — 3074F SYST BP LT 130 MM HG: CPT | Performed by: INTERNAL MEDICINE

## 2023-11-08 PROCEDURE — 1160F RVW MEDS BY RX/DR IN RCRD: CPT | Performed by: INTERNAL MEDICINE

## 2023-11-08 PROCEDURE — 1159F MED LIST DOCD IN RCRD: CPT | Performed by: INTERNAL MEDICINE

## 2023-11-08 PROCEDURE — 3079F DIAST BP 80-89 MM HG: CPT | Performed by: INTERNAL MEDICINE

## 2023-11-08 RX ORDER — ACYCLOVIR 400 MG/1
400 TABLET ORAL 2 TIMES DAILY
Qty: 14 TABLET | Refills: 0 | Status: SHIPPED | OUTPATIENT
Start: 2023-11-08

## 2023-12-29 DIAGNOSIS — N39.0 ACUTE URINARY TRACT INFECTION: ICD-10-CM

## 2023-12-29 DIAGNOSIS — R10.9 FLANK PAIN: Primary | ICD-10-CM

## 2023-12-29 RX ORDER — PENICILLIN V POTASSIUM 500 MG/1
500 TABLET ORAL 4 TIMES DAILY
Qty: 28 TABLET | Refills: 0 | Status: SHIPPED | OUTPATIENT
Start: 2023-12-29 | End: 2024-01-05

## 2024-04-03 ENCOUNTER — OFFICE VISIT (OUTPATIENT)
Dept: INTERNAL MEDICINE | Facility: CLINIC | Age: 34
End: 2024-04-03
Payer: MEDICARE

## 2024-04-03 VITALS
OXYGEN SATURATION: 100 % | SYSTOLIC BLOOD PRESSURE: 126 MMHG | TEMPERATURE: 97.8 F | BODY MASS INDEX: 27.94 KG/M2 | HEIGHT: 67 IN | DIASTOLIC BLOOD PRESSURE: 69 MMHG | RESPIRATION RATE: 17 BRPM | WEIGHT: 178 LBS | HEART RATE: 98 BPM

## 2024-04-03 DIAGNOSIS — G47.30 SLEEP APNEA, UNSPECIFIED TYPE: ICD-10-CM

## 2024-04-03 DIAGNOSIS — Z94.0 RENAL TRANSPLANT, STATUS POST: ICD-10-CM

## 2024-04-03 DIAGNOSIS — R79.9 ABNORMAL FINDING OF BLOOD CHEMISTRY, UNSPECIFIED: ICD-10-CM

## 2024-04-03 DIAGNOSIS — M32.14 ESRD (END-STAGE RENAL DISEASE) DUE TO SLE: ICD-10-CM

## 2024-04-03 DIAGNOSIS — M32.14 LUPUS NEPHRITIS: ICD-10-CM

## 2024-04-03 DIAGNOSIS — R63.5 WEIGHT GAIN, ABNORMAL: Primary | ICD-10-CM

## 2024-04-03 DIAGNOSIS — R53.82 CHRONIC FATIGUE: ICD-10-CM

## 2024-04-03 DIAGNOSIS — N18.6 ESRD (END-STAGE RENAL DISEASE) DUE TO SLE: ICD-10-CM

## 2024-04-03 RX ORDER — CALCITRIOL 0.25 UG/1
0.25 CAPSULE, LIQUID FILLED ORAL DAILY
COMMUNITY
Start: 2024-03-07 | End: 2025-03-07

## 2024-04-05 LAB
ALBUMIN SERPL-MCNC: 4.5 G/DL (ref 3.5–5.2)
ALBUMIN/GLOB SERPL: 1.8 G/DL
ALP SERPL-CCNC: 78 U/L (ref 39–117)
ALT SERPL-CCNC: 25 U/L (ref 1–33)
AST SERPL-CCNC: 26 U/L (ref 1–32)
BASOPHILS # BLD AUTO: 0.02 10*3/MM3 (ref 0–0.2)
BASOPHILS NFR BLD AUTO: 0.4 % (ref 0–1.5)
BILIRUB SERPL-MCNC: 0.3 MG/DL (ref 0–1.2)
BUN SERPL-MCNC: 24 MG/DL (ref 6–20)
BUN/CREAT SERPL: 16.7 (ref 7–25)
CALCIUM SERPL-MCNC: 10.5 MG/DL (ref 8.6–10.5)
CHLORIDE SERPL-SCNC: 106 MMOL/L (ref 98–107)
CO2 SERPL-SCNC: 22.8 MMOL/L (ref 22–29)
CREAT SERPL-MCNC: 1.44 MG/DL (ref 0.57–1)
CRP SERPL-MCNC: <0.3 MG/DL (ref 0–0.5)
EGFRCR SERPLBLD CKD-EPI 2021: 49 ML/MIN/1.73
EOSINOPHIL # BLD AUTO: 0.11 10*3/MM3 (ref 0–0.4)
EOSINOPHIL NFR BLD AUTO: 2 % (ref 0.3–6.2)
ERYTHROCYTE [DISTWIDTH] IN BLOOD BY AUTOMATED COUNT: 12.3 % (ref 12.3–15.4)
GLOBULIN SER CALC-MCNC: 2.5 GM/DL
GLUCOSE SERPL-MCNC: 101 MG/DL (ref 65–99)
HBA1C MFR BLD: 4.8 % (ref 4.8–5.6)
HCT VFR BLD AUTO: 40 % (ref 34–46.6)
HGB BLD-MCNC: 12.9 G/DL (ref 12–15.9)
IMM GRANULOCYTES # BLD AUTO: 0.08 10*3/MM3 (ref 0–0.05)
IMM GRANULOCYTES NFR BLD AUTO: 1.5 % (ref 0–0.5)
LYMPHOCYTES # BLD AUTO: 0.89 10*3/MM3 (ref 0.7–3.1)
LYMPHOCYTES NFR BLD AUTO: 16.5 % (ref 19.6–45.3)
MCH RBC QN AUTO: 28.7 PG (ref 26.6–33)
MCHC RBC AUTO-ENTMCNC: 32.3 G/DL (ref 31.5–35.7)
MCV RBC AUTO: 89.1 FL (ref 79–97)
MONOCYTES # BLD AUTO: 0.48 10*3/MM3 (ref 0.1–0.9)
MONOCYTES NFR BLD AUTO: 8.9 % (ref 5–12)
NEUTROPHILS # BLD AUTO: 3.81 10*3/MM3 (ref 1.7–7)
NEUTROPHILS NFR BLD AUTO: 70.7 % (ref 42.7–76)
NRBC BLD AUTO-RTO: 0 /100 WBC (ref 0–0.2)
PLATELET # BLD AUTO: 213 10*3/MM3 (ref 140–450)
POTASSIUM SERPL-SCNC: 4.6 MMOL/L (ref 3.5–5.2)
PROT SERPL-MCNC: 7 G/DL (ref 6–8.5)
RBC # BLD AUTO: 4.49 10*6/MM3 (ref 3.77–5.28)
SODIUM SERPL-SCNC: 138 MMOL/L (ref 136–145)
TESTOST FREE SERPL-MCNC: 0.9 PG/ML (ref 0–4.2)
TESTOST SERPL-MCNC: 14 NG/DL (ref 8–60)
TSH SERPL DL<=0.005 MIU/L-ACNC: 0.82 UIU/ML (ref 0.27–4.2)
VIT B12 SERPL-MCNC: 866 PG/ML (ref 211–946)
WBC # BLD AUTO: 5.39 10*3/MM3 (ref 3.4–10.8)

## 2024-04-23 ENCOUNTER — OFFICE VISIT (OUTPATIENT)
Dept: INTERNAL MEDICINE | Facility: CLINIC | Age: 34
End: 2024-04-23
Payer: MEDICARE

## 2024-04-23 VITALS
HEART RATE: 96 BPM | RESPIRATION RATE: 14 BRPM | WEIGHT: 175 LBS | DIASTOLIC BLOOD PRESSURE: 72 MMHG | HEIGHT: 67 IN | BODY MASS INDEX: 27.47 KG/M2 | SYSTOLIC BLOOD PRESSURE: 119 MMHG | TEMPERATURE: 97.2 F | OXYGEN SATURATION: 100 %

## 2024-04-23 DIAGNOSIS — S05.02XA ABRASION OF LEFT CORNEA, INITIAL ENCOUNTER: Primary | ICD-10-CM

## 2024-04-23 PROCEDURE — 3078F DIAST BP <80 MM HG: CPT | Performed by: PHYSICIAN ASSISTANT

## 2024-04-23 PROCEDURE — 1159F MED LIST DOCD IN RCRD: CPT | Performed by: PHYSICIAN ASSISTANT

## 2024-04-23 PROCEDURE — 1160F RVW MEDS BY RX/DR IN RCRD: CPT | Performed by: PHYSICIAN ASSISTANT

## 2024-04-23 PROCEDURE — 3074F SYST BP LT 130 MM HG: CPT | Performed by: PHYSICIAN ASSISTANT

## 2024-04-23 PROCEDURE — 99213 OFFICE O/P EST LOW 20 MIN: CPT | Performed by: PHYSICIAN ASSISTANT

## 2024-04-23 RX ORDER — ERYTHROMYCIN 5 MG/G
OINTMENT OPHTHALMIC
Qty: 3.5 G | Refills: 0 | Status: SHIPPED | OUTPATIENT
Start: 2024-04-23

## 2024-09-18 ENCOUNTER — OFFICE VISIT (OUTPATIENT)
Dept: INTERNAL MEDICINE | Facility: CLINIC | Age: 34
End: 2024-09-18
Payer: MEDICARE

## 2024-09-18 VITALS
DIASTOLIC BLOOD PRESSURE: 73 MMHG | TEMPERATURE: 97.8 F | BODY MASS INDEX: 27.62 KG/M2 | SYSTOLIC BLOOD PRESSURE: 118 MMHG | HEIGHT: 67 IN | OXYGEN SATURATION: 100 % | HEART RATE: 102 BPM | WEIGHT: 176 LBS | RESPIRATION RATE: 18 BRPM

## 2024-09-18 DIAGNOSIS — B96.89 ACUTE BACTERIAL SINUSITIS: ICD-10-CM

## 2024-09-18 DIAGNOSIS — R53.82 CHRONIC FATIGUE: ICD-10-CM

## 2024-09-18 DIAGNOSIS — M32.14 LUPUS NEPHRITIS: ICD-10-CM

## 2024-09-18 DIAGNOSIS — J01.90 ACUTE BACTERIAL SINUSITIS: ICD-10-CM

## 2024-09-18 DIAGNOSIS — J34.9 SINUS PROBLEM: Primary | ICD-10-CM

## 2024-09-18 LAB
EXPIRATION DATE: NORMAL
FLUAV AG UPPER RESP QL IA.RAPID: NOT DETECTED
FLUBV AG UPPER RESP QL IA.RAPID: NOT DETECTED
INTERNAL CONTROL: NORMAL
Lab: NORMAL
SARS-COV-2 AG UPPER RESP QL IA.RAPID: NOT DETECTED

## 2024-09-18 PROCEDURE — 3078F DIAST BP <80 MM HG: CPT | Performed by: INTERNAL MEDICINE

## 2024-09-18 PROCEDURE — 1126F AMNT PAIN NOTED NONE PRSNT: CPT | Performed by: INTERNAL MEDICINE

## 2024-09-18 PROCEDURE — 96372 THER/PROPH/DIAG INJ SC/IM: CPT | Performed by: INTERNAL MEDICINE

## 2024-09-18 PROCEDURE — 87428 SARSCOV & INF VIR A&B AG IA: CPT | Performed by: INTERNAL MEDICINE

## 2024-09-18 PROCEDURE — 3074F SYST BP LT 130 MM HG: CPT | Performed by: INTERNAL MEDICINE

## 2024-09-18 PROCEDURE — 99213 OFFICE O/P EST LOW 20 MIN: CPT | Performed by: INTERNAL MEDICINE

## 2024-09-18 RX ORDER — MYCOPHENOLATE MOFETIL 500 MG/1
TABLET ORAL 2 TIMES DAILY
COMMUNITY

## 2024-09-18 RX ORDER — METHYLPREDNISOLONE SODIUM SUCCINATE 40 MG/ML
80 INJECTION, POWDER, LYOPHILIZED, FOR SOLUTION INTRAMUSCULAR; INTRAVENOUS ONCE
Status: COMPLETED | OUTPATIENT
Start: 2024-09-18 | End: 2024-09-18

## 2024-09-18 RX ADMIN — METHYLPREDNISOLONE SODIUM SUCCINATE 80 MG: 40 INJECTION, POWDER, LYOPHILIZED, FOR SOLUTION INTRAMUSCULAR; INTRAVENOUS at 18:36

## 2024-12-04 ENCOUNTER — TELEPHONE (OUTPATIENT)
Dept: INTERNAL MEDICINE | Facility: CLINIC | Age: 34
End: 2024-12-04
Payer: MEDICARE

## 2024-12-04 NOTE — TELEPHONE ENCOUNTER
I spoke to the patient to let her know that Dr. Orta does not treat her menstrual issues, she is established with GYN and will need  to get seen with them.       Patient asked me to reach out to GYN office to see if she could be worked in or called and someone talk to her about her symptoms.

## 2025-01-18 ENCOUNTER — OFFICE VISIT (OUTPATIENT)
Dept: INTERNAL MEDICINE | Facility: CLINIC | Age: 35
End: 2025-01-18
Payer: MEDICARE

## 2025-01-18 VITALS
TEMPERATURE: 97.9 F | RESPIRATION RATE: 16 BRPM | BODY MASS INDEX: 28.25 KG/M2 | WEIGHT: 180 LBS | OXYGEN SATURATION: 99 % | HEIGHT: 67 IN | HEART RATE: 99 BPM

## 2025-01-18 DIAGNOSIS — J30.2 SEASONAL ALLERGIES: ICD-10-CM

## 2025-01-18 DIAGNOSIS — M32.14 ESRD (END-STAGE RENAL DISEASE) DUE TO SLE: ICD-10-CM

## 2025-01-18 DIAGNOSIS — I15.0 RENOVASCULAR HYPERTENSION: ICD-10-CM

## 2025-01-18 DIAGNOSIS — J32.9 RECURRENT SINUSITIS: ICD-10-CM

## 2025-01-18 DIAGNOSIS — M06.9 RHEUMATOID ARTHRITIS INVOLVING MULTIPLE SITES, UNSPECIFIED WHETHER RHEUMATOID FACTOR PRESENT: ICD-10-CM

## 2025-01-18 DIAGNOSIS — Z91.89 AT RISK FOR ALLERGIC REACTION TO MEDICATION: Primary | ICD-10-CM

## 2025-01-18 DIAGNOSIS — Z94.0 KIDNEY REPLACED BY TRANSPLANT: ICD-10-CM

## 2025-01-18 DIAGNOSIS — N18.9 CHRONIC KIDNEY DISEASE-MINERAL AND BONE DISORDER: ICD-10-CM

## 2025-01-18 DIAGNOSIS — M89.9 CHRONIC KIDNEY DISEASE-MINERAL AND BONE DISORDER: ICD-10-CM

## 2025-01-18 DIAGNOSIS — E83.9 CHRONIC KIDNEY DISEASE-MINERAL AND BONE DISORDER: ICD-10-CM

## 2025-01-18 DIAGNOSIS — M32.14 LUPUS NEPHRITIS: ICD-10-CM

## 2025-01-18 DIAGNOSIS — N18.6 ESRD (END-STAGE RENAL DISEASE) DUE TO SLE: ICD-10-CM

## 2025-01-18 RX ORDER — MONTELUKAST SODIUM 10 MG/1
10 TABLET ORAL DAILY
Qty: 90 TABLET | Refills: 3 | Status: SHIPPED | OUTPATIENT
Start: 2025-01-18

## 2025-01-18 RX ORDER — EPINEPHRINE 0.3 MG/.3ML
0.3 INJECTION SUBCUTANEOUS ONCE
Qty: 1 EACH | Refills: 0 | Status: SHIPPED | OUTPATIENT
Start: 2025-01-18 | End: 2025-01-18

## 2025-01-18 RX ORDER — CALCIFEDIOL 30 UG/1
CAPSULE, EXTENDED RELEASE ORAL
COMMUNITY
Start: 2025-01-07
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
## 2025-01-18 NOTE — PATIENT INSTRUCTIONS
Health Maintenance, Female  Adopting a healthy lifestyle and getting preventive care are important in promoting health and wellness. Ask your health care provider about:  The right schedule for you to have regular tests and exams.  Things you can do on your own to prevent diseases and keep yourself healthy.  What should I know about diet, weight, and exercise?  Eat a healthy diet    Eat a diet that includes plenty of vegetables, fruits, low-fat dairy products, and lean protein.  Do not eat a lot of foods that are high in solid fats, added sugars, or sodium.  Maintain a healthy weight  Body mass index (BMI) is used to identify weight problems. It estimates body fat based on height and weight. Your health care provider can help determine your BMI and help you achieve or maintain a healthy weight.  Get regular exercise  Get regular exercise. This is one of the most important things you can do for your health. Most adults should:  Exercise for at least 150 minutes each week. The exercise should increase your heart rate and make you sweat (moderate-intensity exercise).  Do strengthening exercises at least twice a week. This is in addition to the moderate-intensity exercise.  Spend less time sitting. Even light physical activity can be beneficial.  Watch cholesterol and blood lipids  Have your blood tested for lipids and cholesterol at 20 years of age, then have this test every 5 years.  Have your cholesterol levels checked more often if:  Your lipid or cholesterol levels are high.  You are older than 40 years of age.  You are at high risk for heart disease.  What should I know about cancer screening?  Depending on your health history and family history, you may need to have cancer screening at various ages. This may include screening for:  Breast cancer.  Cervical cancer.  Colorectal cancer.  Skin cancer.  Lung cancer.  What should I know about heart disease, diabetes, and high blood pressure?  Blood pressure and heart  disease  High blood pressure causes heart disease and increases the risk of stroke. This is more likely to develop in people who have high blood pressure readings or are overweight.  Have your blood pressure checked:  Every 3-5 years if you are 18-39 years of age.  Every year if you are 40 years old or older.  Diabetes  Have regular diabetes screenings. This checks your fasting blood sugar level. Have the screening done:  Once every three years after age 40 if you are at a normal weight and have a low risk for diabetes.  More often and at a younger age if you are overweight or have a high risk for diabetes.  What should I know about preventing infection?  Hepatitis B  If you have a higher risk for hepatitis B, you should be screened for this virus. Talk with your health care provider to find out if you are at risk for hepatitis B infection.  Hepatitis C  Testing is recommended for:  Everyone born from 1945 through 1965.  Anyone with known risk factors for hepatitis C.  Sexually transmitted infections (STIs)  Get screened for STIs, including gonorrhea and chlamydia, if:  You are sexually active and are younger than 24 years of age.  You are older than 24 years of age and your health care provider tells you that you are at risk for this type of infection.  Your sexual activity has changed since you were last screened, and you are at increased risk for chlamydia or gonorrhea. Ask your health care provider if you are at risk.  Ask your health care provider about whether you are at high risk for HIV. Your health care provider may recommend a prescription medicine to help prevent HIV infection. If you choose to take medicine to prevent HIV, you should first get tested for HIV. You should then be tested every 3 months for as long as you are taking the medicine.  Pregnancy  If you are about to stop having your period (premenopausal) and you may become pregnant, seek counseling before you get pregnant.  Take 400 to 800  micrograms (mcg) of folic acid every day if you become pregnant.  Ask for birth control (contraception) if you want to prevent pregnancy.  Osteoporosis and menopause  Osteoporosis is a disease in which the bones lose minerals and strength with aging. This can result in bone fractures. If you are 65 years old or older, or if you are at risk for osteoporosis and fractures, ask your health care provider if you should:  Be screened for bone loss.  Take a calcium or vitamin D supplement to lower your risk of fractures.  Be given hormone replacement therapy (HRT) to treat symptoms of menopause.  Follow these instructions at home:  Alcohol use  Do not drink alcohol if:  Your health care provider tells you not to drink.  You are pregnant, may be pregnant, or are planning to become pregnant.  If you drink alcohol:  Limit how much you have to:  0-1 drink a day.  Know how much alcohol is in your drink. In the [place] one drink equals one 12 oz bottle of beer (355 mL), one 5 oz glass of wine (148 mL), or one 1½ oz glass of hard liquor (44 mL).  Lifestyle  Do not use any products that contain nicotine or tobacco. These products include cigarettes, chewing tobacco, and vaping devices, such as e-cigarettes. If you need help quitting, ask your health care provider.  Do not use street drugs.  Do not share needles.  Ask your health care provider for help if you need support or information about quitting drugs.  General instructions  Schedule regular health, dental, and eye exams.  Stay current with your vaccines.  Tell your health care provider if:  You often feel depressed.  You have ever been abused or do not feel safe at home.  Summary  Adopting a healthy lifestyle and getting preventive care are important in promoting health and wellness.  Follow your health care provider's instructions about healthy diet, exercising, and getting tested or screened for diseases.  Follow your health care provider's instructions on monitoring your  cholesterol and blood pressure.  This information is not intended to replace advice given to you by your health care provider. Make sure you discuss any questions you have with your health care provider.  Document Revised: 05/09/2022 Document Reviewed: 05/09/2022  Elsevier Patient Education © 2024 [employer]

## 2025-01-29 ENCOUNTER — TELEPHONE (OUTPATIENT)
Dept: OBSTETRICS AND GYNECOLOGY | Facility: CLINIC | Age: 35
End: 2025-01-29

## 2025-01-29 NOTE — TELEPHONE ENCOUNTER
PROVIDER: PRISCA MEDINA    CALLER: SHAYY KEYS     PHONE NUMBER: [phone]    REASON FOR CALL: SAME DAY CANCELLATION//NOT ABLE TO MAKE IT//RESCHEDULED

## 2025-01-31 ENCOUNTER — OFFICE VISIT (OUTPATIENT)
Dept: INTERNAL MEDICINE | Facility: CLINIC | Age: 35
End: 2025-01-31
Payer: MEDICARE

## 2025-01-31 VITALS
HEIGHT: 67 IN | OXYGEN SATURATION: 100 % | WEIGHT: 179 LBS | BODY MASS INDEX: 28.09 KG/M2 | RESPIRATION RATE: 16 BRPM | SYSTOLIC BLOOD PRESSURE: 128 MMHG | HEART RATE: 90 BPM | TEMPERATURE: 98.1 F | DIASTOLIC BLOOD PRESSURE: 79 MMHG

## 2025-01-31 DIAGNOSIS — N83.209 CYST OF OVARY, UNSPECIFIED LATERALITY: Primary | ICD-10-CM

## 2025-01-31 DIAGNOSIS — Z30.9 ENCOUNTER FOR CONTRACEPTIVE MANAGEMENT, UNSPECIFIED TYPE: ICD-10-CM

## 2025-01-31 RX ORDER — EPINEPHRINE 0.3 MG/.3ML
0.3 INJECTION SUBCUTANEOUS ONCE
COMMUNITY
Start: 2025-01-18

## 2025-01-31 NOTE — PROGRESS NOTES
Chief Complaint   Patient presents with    Contraception     Wants to go back on BC pill       Subjective     History of Present Illness  The patient is a 34-year-old female being seen today in the clinic for concerns regarding birth control.    She has been experiencing severe menstrual cramps, which occasionally induce nausea. She suspects the presence of an ovarian cyst last month, although she did not seek immediate medical attention. She has a history of using Sprintec for birth control, which was effective in managing her symptoms. However, she discontinued its use due to the development of hypertension, which subsequently led to a stroke. She reports no history of thromboembolic events. She was not on any contraceptive medication at the time of the stroke. She also reports a previous experience of having two menstrual cycles within a single month while on Sprintec.    MEDICATIONS  Past: Sprintec    The following portions of the patient's history were reviewed and updated as appropriate: allergies, current medications, past family history, past medical history, past social history, past surgical history and problem list.    Review of Systems   Genitourinary:  Positive for menstrual problem.   All other systems reviewed and are negative.      Allergies   Allergen Reactions    Latex Hives, Itching and Other (See Comments)    Lisinopril-Hydrochlorothiazide Swelling     Experienced tongue swelling 12/10/2020  Experienced tongue swelling 12/10/2020  Experienced tongue swelling 12/10/2020    Macrobid [Nitrofurantoin] Swelling    Nifedipine Anaphylaxis and Swelling    Oxycodone Itching and Other (See Comments)    Doxacurium Rash    Lorazepam Unknown - Low Severity and Rash     Opposite reaction  Other reaction(s): Unknown    Trazodone Unknown - Low Severity     Other reaction(s): Other (See Comments)  Opposite reaction    Trazodone And Nefazodone Unknown - Low Severity and Other (See Comments)     Opposite  "reaction  Other reaction(s): Other (See Comments)  Opposite reaction  Opposite reaction  Other reaction(s): Other (See Comments)  Opposite reaction    Sitagliptin Swelling    Succinylcholine Itching    Succinylcholine Chloride Itching    Haloperidol Unknown - Low Severity and Other (See Comments)     'JUST NOT MYSELF'  Other reaction(s): Unknown - Low Severity  'JUST NOT MYSELF'  'JUST NOT MYSELF'       Past Medical History:   Diagnosis Date    Anemia     Anxiety     Body piercing     Bronchitis 01/2018    MOTHER STATES \"FINISHED ABX COUPLE WEEKS AGO\"    Colitis 10/11/2017    Difficulty with speech     DUE TO STROKE    Dysphasia as late effect of cerebrovascular accident (CVA)     ESRD (end-stage renal disease) due to SLE     H/O Peritoneal dialysis-associated peritonitis     History of Papanicolaou smear of cervix 01/29/2015    History of stomach ulcers     HTN (hypertension)     Lupus     Lupus nephritis     MDRO (multiple drug resistant organisms) resistance     mrsa    Medical non-compliance     Migraines     Murmur     Ovarian cyst 2020    Pericardial effusion without cardiac tamponade     Rheumatoid arthritis     Seizure disorder 2017    WINTER    SLE (systemic lupus erythematosus)     Stroke 02/2017    RIGHT HAND WEAKNESS AND MOTHER STATES \"CONFUSION WITH A LOT OF INFORMATION AT ONCE\".    Urinary tract infection 2021    Wears glasses        Social History     Socioeconomic History    Marital status: Single   Tobacco Use    Smoking status: Never     Passive exposure: Past    Smokeless tobacco: Never   Vaping Use    Vaping status: Never Used   Substance and Sexual Activity    Alcohol use: No    Drug use: Never    Sexual activity: Yes     Partners: Male     Birth control/protection: Condom        Past Surgical History:   Procedure Laterality Date    ENDOSCOPY N/A 11/8/2017    Procedure: ESOPHAGOGASTRODUODENOSCOPY with biopsies;  Surgeon: Mundo Coelho MD;  Location: [facility] ENDOSCOPY;  Service:     ENDOSCOPY N/A " 3/21/2018    Procedure: SMALL BOWEL ENTEROSCOPY  WITH BIOPSIES AND ESOPHAGOGASTRODUODENOSCOPY WITH BIOPSIES;  Surgeon: Mundo Coelho MD;  Location: [facility] ENDOSCOPY;  Service: Gastroenterology    INSERTION HEMODIALYSIS CATHETER Right 11/2/2018    Procedure: HEMODIALYSIS CATHETER INSERTION;  Surgeon: Janes Velasquez MD;  Location: [facility] OR;  Service: General    PERITONEAL CATHETER INSERTION      X6    RENAL BIOPSY      TRANSPLANTATION RENAL Right 03/24/2020    WISDOM TOOTH EXTRACTION         Family History   Problem Relation Age of Onset    No Known Problems Mother     No Known Problems Father     Heart disease Paternal Grandmother     Diabetes Paternal Grandfather     Liver disease Cousin          Current Outpatient Medications:     acetaminophen (TYLENOL) 650 MG 8 hr tablet, Take 1 tablet by mouth Every 8 (Eight) Hours As Needed for Mild Pain., Disp: , Rfl:     acyclovir (Zovirax) 400 MG tablet, Take 1 tablet by mouth 2 (Two) Times a Day. Take no more than 5 doses a day., Disp: 14 tablet, Rfl: 0    clonazePAM (KlonoPIN) 0.5 MG tablet, Take 1 tablet by mouth 2 (Two) Times a Day As Needed for Seizures., Disp: 45 tablet, Rfl: 0    EPINEPHrine (EPIPEN) 0.3 MG/0.3ML solution auto-injector injection, Inject 0.3 mL into the appropriate muscle as directed by prescriber 1 (One) Time., Disp: , Rfl:     hydroxychloroquine (PLAQUENIL) 200 MG tablet, Take 1 tablet by mouth Every 12 (Twelve) Hours., Disp: 60 tablet, Rfl: 3    losartan (COZAAR) 25 MG tablet, Take 0.5 tablets by mouth Daily., Disp: , Rfl:     Magnesium 400 MG tablet, Take 1 tablet by mouth 2 (Two) Times a Day., Disp: , Rfl:     montelukast (SINGULAIR) 10 MG tablet, Take 1 tablet by mouth Daily., Disp: 90 tablet, Rfl: 3    mycophenolate (CellCept) 500 MG tablet, Take  by mouth 2 (Two) Times a Day., Disp: , Rfl:     ondansetron ODT (ZOFRAN-ODT) 4 MG disintegrating tablet, Take one tab by mouth every 6 hours prn nausea and vomiting.  May repeat dose once if  "still symptomatic, Disp: 20 tablet, Rfl: 0    promethazine (PHENERGAN) 25 MG tablet, Take 1 tablet by mouth Every 6 (Six) Hours As Needed for Nausea or Vomiting., Disp: 20 tablet, Rfl: 0    Rayaldee 30 MCG capsule controlled-release, , Disp: , Rfl:     tacrolimus (PROGRAF) 1 MG capsule, Take 7 capsules by mouth Every Morning., Disp: , Rfl:     tacrolimus (PROGRAF) 5 MG capsule, Take 6 mg by mouth Every Night., Disp: , Rfl:     norethindrone-ethinyl estradiol 0.5/0.75/1-35 MG-MCG per tablet, Take 1 tablet by mouth Daily., Disp: 28 tablet, Rfl: 12    Objective   /79   Pulse 90   Temp 98.1 °F (36.7 °C) (Temporal)   Resp 16   Ht 170.2 cm (67\")   Wt 81.2 kg (179 lb)   SpO2 100%   BMI 28.04 kg/m²     Physical Exam  Vitals and nursing note reviewed.   Constitutional:       Appearance: Normal appearance. She is well-developed.   HENT:      Head: Normocephalic and atraumatic.   Eyes:      Extraocular Movements: Extraocular movements intact.      Conjunctiva/sclera: Conjunctivae normal.   Pulmonary:      Effort: Pulmonary effort is normal.   Musculoskeletal:      Cervical back: Normal range of motion and neck supple.   Skin:     General: Skin is warm and dry.      Findings: No rash.   Neurological:      General: No focal deficit present.      Mental Status: She is alert and oriented to person, place, and time.   Psychiatric:         Mood and Affect: Mood normal.         Behavior: Behavior normal.         Results:  Results for orders placed or performed in visit on 09/18/24   POCT SARS-CoV-2 + Flu Antigen JEN    Collection Time: 09/18/24 11:10 AM    Specimen: Swab   Result Value Ref Range    SARS Antigen Not Detected Not Detected, Presumptive Negative    Influenza A Antigen JEN Not Detected Not Detected    Influenza B Antigen JEN Not Detected Not Detected    Internal Control Passed Passed    Lot Number [id number]     Expiration Date 5,182,025          Assessment & Plan   Diagnoses and all orders for this " visit:    1. Cyst of ovary, unspecified laterality (Primary)  -     norethindrone-ethinyl estradiol 0.5/0.75/1-35 MG-MCG per tablet; Take 1 tablet by mouth Daily.  Dispense: 28 tablet; Refill: 12    2. Encounter for contraceptive management, unspecified type  -     norethindrone-ethinyl estradiol 0.5/0.75/1-35 MG-MCG per tablet; Take 1 tablet by mouth Daily.  Dispense: 28 tablet; Refill: 12        Assessment & Plan  Contraceptive management / ovarian cysts  She has a history of severe menstrual cramps and heavy periods, which have previously been managed effectively with birth control. Despite her history of a mild lupus related stroke related to blood pressure, it is deemed safe to resume birth control as she was not on birth control at the time of the stroke. A prescription for birth control has been issued and sent to [facility] pharmacy. She is advised to follow up with her gynecologist for further management.    Follow up:  No follow-ups on file.     Patient or patient representative verbalized consent for the use of Ambient Listening during the visit with  Misha Jane DO for chart documentation. 1/31/2025  09:20 EST  Answers submitted by the patient for this visit:  Birth Control Pill (Submitted on 1/31/2025)  Chief Complaint: Contraception  Visit: initial  Current contraceptive method: condoms, birth control pills  Compliance problems: medication issues

## 2025-03-11 ENCOUNTER — OFFICE VISIT (OUTPATIENT)
Dept: ORTHOPEDIC SURGERY | Facility: CLINIC | Age: 35
End: 2025-03-11
Payer: MEDICARE

## 2025-03-11 VITALS
DIASTOLIC BLOOD PRESSURE: 90 MMHG | HEIGHT: 67 IN | BODY MASS INDEX: 28.56 KG/M2 | SYSTOLIC BLOOD PRESSURE: 132 MMHG | WEIGHT: 182 LBS

## 2025-03-11 DIAGNOSIS — M25.511 ACUTE PAIN OF RIGHT SHOULDER: Primary | ICD-10-CM

## 2025-03-11 RX ORDER — METHYLPREDNISOLONE ACETATE 40 MG/ML
40 INJECTION, SUSPENSION INTRA-ARTICULAR; INTRALESIONAL; INTRAMUSCULAR; SOFT TISSUE
Status: COMPLETED | OUTPATIENT
Start: 2025-03-11 | End: 2025-03-11

## 2025-03-11 RX ORDER — LIDOCAINE HYDROCHLORIDE 20 MG/ML
2 INJECTION, SOLUTION INFILTRATION; PERINEURAL
Status: COMPLETED | OUTPATIENT
Start: 2025-03-11 | End: 2025-03-11

## 2025-03-11 RX ADMIN — LIDOCAINE HYDROCHLORIDE 2 ML: 20 INJECTION, SOLUTION INFILTRATION; PERINEURAL at 16:24

## 2025-03-11 RX ADMIN — METHYLPREDNISOLONE ACETATE 40 MG: 40 INJECTION, SUSPENSION INTRA-ARTICULAR; INTRALESIONAL; INTRAMUSCULAR; SOFT TISSUE at 16:24

## 2025-03-11 NOTE — PROGRESS NOTES
"    Office Note     Name: Leah Jarvis    : 1990     MRN: [id number]     Chief Complaint  Pain of the Right Shoulder (States she has been having pain for about a week and a half, no known injury.)    Subjective     History of Present Illness:  Leah Jarvis is a 35 y.o. female presenting today for the evaluation of acute right shoulder pain ongoing for approximately 10 days.  Patient denies any inciting injury or event.  States the pain is primarily in the anterior and posterior glenohumeral joint line though also has some radiation of pain down the lateral aspect of her right arm.  She does admit to at least 1 episode of paresthesias radiating from the right shoulder into her right hand though this was short lived and improved with different positioning.  She denies any previous injury to the right shoulder.  She does have medical history of lupus and notes that she occasionally has lupus flares which cause bilateral shoulder pain that usually last for a few days, amongst other symptoms.  She was recently evaluated in urgent care and there is suspicion for adhesive capsulitis of the right shoulder.  She notes that she normally does not sleep on her right side.  She does endorse mild right paraspinal neck pain.  At the urgent care she was given a IM cortisone injection and a course of oral steroids.      Review of Systems     Past Medical History:   Diagnosis Date    Anemia     Anxiety     Body piercing     Bronchitis 2018    MOTHER STATES \"FINISHED ABX COUPLE WEEKS AGO\"    Colitis 10/11/2017    Difficulty with speech     DUE TO STROKE    Dysphasia as late effect of cerebrovascular accident (CVA)     ESRD (end-stage renal disease) due to SLE     H/O Peritoneal dialysis-associated peritonitis     History of Papanicolaou smear of cervix 2015    History of stomach ulcers     HTN (hypertension)     Lupus     Lupus nephritis     MDRO (multiple drug resistant organisms) resistance     " "mrsa    Medical non-compliance     Migraines     Murmur     Ovarian cyst 2020    Pericardial effusion without cardiac tamponade     Rheumatoid arthritis     Seizure disorder 2017    WINTER    SLE (systemic lupus erythematosus)     Stroke 02/2017    RIGHT HAND WEAKNESS AND MOTHER STATES \"CONFUSION WITH A LOT OF INFORMATION AT ONCE\".    Urinary tract infection 2021    Wears glasses         Past Surgical History:   Procedure Laterality Date    ENDOSCOPY N/A 11/8/2017    Procedure: ESOPHAGOGASTRODUODENOSCOPY with biopsies;  Surgeon: Mundo Coelho MD;  Location: [facility] ENDOSCOPY;  Service:     ENDOSCOPY N/A 3/21/2018    Procedure: SMALL BOWEL ENTEROSCOPY  WITH BIOPSIES AND ESOPHAGOGASTRODUODENOSCOPY WITH BIOPSIES;  Surgeon: Mundo Coelho MD;  Location: [facility] ENDOSCOPY;  Service: Gastroenterology    INSERTION HEMODIALYSIS CATHETER Right 11/2/2018    Procedure: HEMODIALYSIS CATHETER INSERTION;  Surgeon: Janes Velasquez MD;  Location: [facility] OR;  Service: General    PERITONEAL CATHETER INSERTION      X6    RENAL BIOPSY      TRANSPLANTATION RENAL Right 03/24/2020    WISDOM TOOTH EXTRACTION         Family History   Problem Relation Age of Onset    No Known Problems Mother     No Known Problems Father     Heart disease Paternal Grandmother     Diabetes Paternal Grandfather     Liver disease Cousin        Social History     Socioeconomic History    Marital status: Single   Tobacco Use    Smoking status: Never     Passive exposure: Past    Smokeless tobacco: Never   Vaping Use    Vaping status: Never Used   Substance and Sexual Activity    Alcohol use: No    Drug use: Never    Sexual activity: Yes     Partners: Male     Birth control/protection: Condom         Current Outpatient Medications:     acetaminophen (TYLENOL) 650 MG 8 hr tablet, Take 1 tablet by mouth Every 8 (Eight) Hours As Needed for Mild Pain., Disp: , Rfl:     clonazePAM (KlonoPIN) 0.5 MG tablet, Take 1 tablet by mouth 2 (Two) Times a Day As Needed for " Seizures., Disp: 45 tablet, Rfl: 0    EPINEPHrine (EPIPEN) 0.3 MG/0.3ML solution auto-injector injection, Inject 0.3 mL into the appropriate muscle as directed by prescriber 1 (One) Time., Disp: , Rfl:     hydroxychloroquine (PLAQUENIL) 200 MG tablet, Take 1 tablet by mouth Every 12 (Twelve) Hours., Disp: 60 tablet, Rfl: 3    losartan (COZAAR) 25 MG tablet, Take 0.5 tablets by mouth Daily., Disp: , Rfl:     Magnesium 400 MG tablet, Take 1 tablet by mouth 2 (Two) Times a Day., Disp: , Rfl:     montelukast (SINGULAIR) 10 MG tablet, Take 1 tablet by mouth Daily., Disp: 90 tablet, Rfl: 3    mycophenolate (CellCept) 500 MG tablet, Take  by mouth 2 (Two) Times a Day., Disp: , Rfl:     norethindrone-ethinyl estradiol 0.5/0.75/1-35 MG-MCG per tablet, Take 1 tablet by mouth Daily., Disp: 28 tablet, Rfl: 12    ondansetron ODT (ZOFRAN-ODT) 4 MG disintegrating tablet, Take one tab by mouth every 6 hours prn nausea and vomiting.  May repeat dose once if still symptomatic, Disp: 20 tablet, Rfl: 0    predniSONE (DELTASONE) 20 MG tablet, 3 po daily for 2 days, 2 po daily for 2 days, 1 po daily for 2 days, Disp: 12 tablet, Rfl: 0    promethazine (PHENERGAN) 25 MG tablet, Take 1 tablet by mouth Every 6 (Six) Hours As Needed for Nausea or Vomiting., Disp: 20 tablet, Rfl: 0    Rayaldee 30 MCG capsule controlled-release, , Disp: , Rfl:     tacrolimus (PROGRAF) 1 MG capsule, Take 7 capsules by mouth Every Morning., Disp: , Rfl:     tacrolimus (PROGRAF) 5 MG capsule, Take 6 mg by mouth Every Night., Disp: , Rfl:     Allergies   Allergen Reactions    Latex Hives, Itching and Other (See Comments)    Lisinopril-Hydrochlorothiazide Swelling     Experienced tongue swelling 12/10/2020  Experienced tongue swelling 12/10/2020  Experienced tongue swelling 12/10/2020    Macrobid [Nitrofurantoin] Swelling    Nifedipine Anaphylaxis and Swelling    Oxycodone Itching and Other (See Comments)    Doxacurium Rash    Lorazepam Unknown - Low Severity and  "Rash     Opposite reaction  Other reaction(s): Unknown    Trazodone Unknown - Low Severity     Other reaction(s): Other (See Comments)  Opposite reaction    Trazodone And Nefazodone Unknown - Low Severity and Other (See Comments)     Opposite reaction  Other reaction(s): Other (See Comments)  Opposite reaction  Opposite reaction  Other reaction(s): Other (See Comments)  Opposite reaction    Sitagliptin Swelling    Succinylcholine Itching    Succinylcholine Chloride Itching    Haloperidol Unknown - Low Severity and Other (See Comments)     'JUST NOT MYSELF'  Other reaction(s): Unknown - Low Severity  'JUST NOT MYSELF'  'JUST NOT MYSELF'           Objective   /90   Ht 170.2 cm (67.01\")   Wt 82.6 kg (182 lb)   LMP 02/24/2025   BMI 28.50 kg/m²            Physical Exam  Right Shoulder Exam     Tenderness   Right shoulder tenderness location: anterior & posterior GH joint line.    Range of Motion   Active abduction:  40   Passive abduction:  70   Extension:  40   External rotation:  80   Forward flexion:  70   Internal rotation 0 degrees:  Sacrum     Muscle Strength   Abduction: 4/5   Internal rotation: 5/5   External rotation: 4/5   Supraspinatus: 4/5   Subscapularis: 5/5   Biceps: 5/5     Tests   Vazquez test: positive  Cross arm: negative  Impingement: positive  Drop arm: negative    Other   Erythema: absent  Sensation: normal  Pulse: present    Comments:  Mildly positive Kirkwood's test, unable to do biceps load 1 and 2 due to decreased range of motion.  Positive impingement signs.           Extremity DVT signs are negative by clinical screen.     Independent Review of Radiographic Studies:    I personally reviewed the available, pertinent imaging studies and I agree with the radiologist's interpretation.    XR Shoulder 2+ View Right  Order date: 3/10/2025  Authorizing: Bailey Matthews MD  Ordered by Bailey Matthews MD on 3/10/2025.     Narrative & Impression    FINAL REPORT     CLINICAL HISTORY:  Right " shoulder pain and decreased ROM for over a week     COMPARISON:  None     FINDINGS:  RIGHT SHOULDER  Three views demonstrate no acute fracture or  dislocation. The visualized joint spaces are normally aligned.  The soft tissues are unremarkable.     IMPRESSION:  No acute bony abnormality.     Authenticated and Electronically Signed by Klever Alamo MD  on 03/10/2025 12:21:12 PM     [facility] agree with the above radiology report there does appear to be an abnormality of the acromion process of the right scapula.  This may represent a type II or type III acromion and suggests shoulder symptoms may be stemming from subacromial bursitis/impingement syndrome.      Large Joint Arthrocentesis: R subacromial bursa  Date/Time: 3/11/2025 4:24 PM  Consent given by: patient  Site marked: site marked  Timeout: Immediately prior to procedure a time out was called to verify the correct patient, procedure, equipment, support staff and site/side marked as required   Supporting Documentation  Indications: pain   Procedure Details  Location: shoulder - R subacromial bursa  Preparation: Patient was prepped and draped in the usual sterile fashion  Needle size: 23 G  Medications administered: 40 mg methylPREDNISolone acetate 40 MG/ML; 2 mL lidocaine 2%  Patient tolerance: patient tolerated the procedure well with no immediate complications        Assessment and Plan   Diagnoses and all orders for this visit:    1. Acute pain of right shoulder (Primary)    Other orders  -     Large Joint Arthrocentesis: R subacromial bursa       Discussion of orthopedic goals  Orthopedic activities reviewed and patient expressed appreciation  Risk, benefits, and merits of treatment alternatives reviewed with the patient and questions answered  Patient guided on mobility and conditioning exercises  Ice, heat, and/or modalities as needed and beneficial  Advised patient to call or notify the office for any adverse effect from injection therapy  Patient  was counseled and provided with a HEP.   Reduced physical activity as appropriate and avoid aggravating activities.   Weight bearing parameters reviewed.     Recommendations/Plan:  Exercise, medications, injections, other patient advice, and return appointment as noted.  Patient and/or guardian(s) were encouraged to call or return to the office for any issues or concerns.    Patient is displaying signs and symptoms of acute right shoulder pain most likely stemming from bursitis or impingement syndrome.  Frozen shoulder is within the differential though of less suspicion given that she has passive range of motion greater than active range of motion.  Frozen shoulder is still somewhat likely though may be early in its course.  Patient was given a subacromial cortisone injection into the right shoulder today for symptomatic relief.  Advised applying heat and she was provided with a home exercise plan for shoulder rehabilitation.  I advised follow-up with me in 3 weeks for reevaluation.    Return in about 3 weeks (around 4/1/2025) for Recheck.

## 2025-04-29 ENCOUNTER — OFFICE VISIT (OUTPATIENT)
Dept: ORTHOPEDIC SURGERY | Facility: CLINIC | Age: 35
End: 2025-04-29
Payer: MEDICARE

## 2025-04-29 VITALS
SYSTOLIC BLOOD PRESSURE: 130 MMHG | BODY MASS INDEX: 28.41 KG/M2 | HEIGHT: 67 IN | DIASTOLIC BLOOD PRESSURE: 86 MMHG | WEIGHT: 181 LBS

## 2025-04-29 DIAGNOSIS — M75.41 IMPINGEMENT SYNDROME OF RIGHT SHOULDER: ICD-10-CM

## 2025-04-29 DIAGNOSIS — M25.511 ACUTE PAIN OF RIGHT SHOULDER: Primary | ICD-10-CM

## 2025-04-29 DIAGNOSIS — M62.838 TRAPEZIUS MUSCLE SPASM: ICD-10-CM

## 2025-04-29 PROCEDURE — 99213 OFFICE O/P EST LOW 20 MIN: CPT | Performed by: STUDENT IN AN ORGANIZED HEALTH CARE EDUCATION/TRAINING PROGRAM

## 2025-04-29 RX ORDER — PREDNISONE 5 MG/1
1 TABLET ORAL DAILY
COMMUNITY
Start: 2025-03-25 | End: 2026-03-25

## 2025-04-29 NOTE — PROGRESS NOTES
"    Office Note     Name: Leah Jarvis    : 1990     MRN: [id number]     Chief Complaint  Follow-up of the Right Shoulder (States her shoulder is doing better, she is still having some pain but it is mostly in her neck. )    Subjective     History of Present Illness:  Leah Jarvis is a 35 y.o. female presenting today for right shoulder 6-week follow-up.  Overall patient states that her symptoms are significantly improved and at this time only has mild pain in her right shoulder.  She also notes that her range of motion has improved significantly.  She does complain of tenderness over the right trapezius ongoing for the past week and a half.  She denies paresthesias in her right upper extremity at this time.       Review of Systems     Past Medical History:   Diagnosis Date    Anemia     Anxiety     Body piercing     Bronchitis 2018    MOTHER STATES \"FINISHED ABX COUPLE WEEKS AGO\"    Colitis 10/11/2017    Difficulty with speech     DUE TO STROKE    Dysphasia as late effect of cerebrovascular accident (CVA)     ESRD (end-stage renal disease) due to SLE     H/O Peritoneal dialysis-associated peritonitis     History of Papanicolaou smear of cervix 2015    History of stomach ulcers     HTN (hypertension)     Lupus     Lupus nephritis     MDRO (multiple drug resistant organisms) resistance     mrsa    Medical non-compliance     Migraines     Murmur     Ovarian cyst     Pericardial effusion without cardiac tamponade     Rheumatoid arthritis     Seizure disorder 2017    WINTER    SLE (systemic lupus erythematosus)     Stroke 2017    RIGHT HAND WEAKNESS AND MOTHER STATES \"CONFUSION WITH A LOT OF INFORMATION AT ONCE\".    Urinary tract infection     Wears glasses         Past Surgical History:   Procedure Laterality Date    ENDOSCOPY N/A 2017    Procedure: ESOPHAGOGASTRODUODENOSCOPY with biopsies;  Surgeon: Mundo Coelho MD;  Location: [facility] ENDOSCOPY;  Service:     " ENDOSCOPY N/A 3/21/2018    Procedure: SMALL BOWEL ENTEROSCOPY  WITH BIOPSIES AND ESOPHAGOGASTRODUODENOSCOPY WITH BIOPSIES;  Surgeon: Mundo Coelho MD;  Location: [facility] ENDOSCOPY;  Service: Gastroenterology    INSERTION HEMODIALYSIS CATHETER Right 11/2/2018    Procedure: HEMODIALYSIS CATHETER INSERTION;  Surgeon: Janes Velasquez MD;  Location: [facility] OR;  Service: General    PERITONEAL CATHETER INSERTION      X6    RENAL BIOPSY      TRANSPLANTATION RENAL Right 03/24/2020    WISDOM TOOTH EXTRACTION         Family History   Problem Relation Age of Onset    No Known Problems Mother     No Known Problems Father     Heart disease Paternal Grandmother     Diabetes Paternal Grandfather     Liver disease Cousin        Social History     Socioeconomic History    Marital status: Single   Tobacco Use    Smoking status: Never     Passive exposure: Past    Smokeless tobacco: Never   Vaping Use    Vaping status: Never Used   Substance and Sexual Activity    Alcohol use: No    Drug use: Never    Sexual activity: Yes     Partners: Male     Birth control/protection: Condom         Current Outpatient Medications:     predniSONE (DELTASONE) 5 MG tablet, Take 1 tablet by mouth Daily., Disp: , Rfl:     acetaminophen (TYLENOL) 650 MG 8 hr tablet, Take 1 tablet by mouth Every 8 (Eight) Hours As Needed for Mild Pain., Disp: , Rfl:     clonazePAM (KlonoPIN) 0.5 MG tablet, Take 1 tablet by mouth 2 (Two) Times a Day As Needed for Seizures., Disp: 45 tablet, Rfl: 0    EPINEPHrine (EPIPEN) 0.3 MG/0.3ML solution auto-injector injection, Inject 0.3 mL into the appropriate muscle as directed by prescriber 1 (One) Time., Disp: , Rfl:     hydroxychloroquine (PLAQUENIL) 200 MG tablet, Take 1 tablet by mouth Every 12 (Twelve) Hours., Disp: 60 tablet, Rfl: 3    losartan (COZAAR) 25 MG tablet, Take 0.5 tablets by mouth Daily., Disp: , Rfl:     Magnesium 400 MG tablet, Take 1 tablet by mouth 2 (Two) Times a Day., Disp: , Rfl:     montelukast  (SINGULAIR) 10 MG tablet, Take 1 tablet by mouth Daily., Disp: 90 tablet, Rfl: 3    mycophenolate (CellCept) 500 MG tablet, Take  by mouth 2 (Two) Times a Day., Disp: , Rfl:     norethindrone-ethinyl estradiol 0.5/0.75/1-35 MG-MCG per tablet, Take 1 tablet by mouth Daily., Disp: 28 tablet, Rfl: 12    ondansetron ODT (ZOFRAN-ODT) 4 MG disintegrating tablet, Take one tab by mouth every 6 hours prn nausea and vomiting.  May repeat dose once if still symptomatic, Disp: 20 tablet, Rfl: 0    promethazine (PHENERGAN) 25 MG tablet, Take 1 tablet by mouth Every 6 (Six) Hours As Needed for Nausea or Vomiting., Disp: 20 tablet, Rfl: 0    Rayaldee 30 MCG capsule controlled-release, , Disp: , Rfl:     tacrolimus (PROGRAF) 1 MG capsule, Take 7 capsules by mouth Every Morning., Disp: , Rfl:     tacrolimus (PROGRAF) 5 MG capsule, Take 6 mg by mouth Every Night., Disp: , Rfl:     Allergies   Allergen Reactions    Latex Hives, Itching and Other (See Comments)    Lisinopril-Hydrochlorothiazide Swelling     Experienced tongue swelling 12/10/2020  Experienced tongue swelling 12/10/2020  Experienced tongue swelling 12/10/2020    Macrobid [Nitrofurantoin] Swelling    Nifedipine Anaphylaxis and Swelling    Oxycodone Itching and Other (See Comments)    Doxacurium Rash    Lorazepam Unknown - Low Severity and Rash     Opposite reaction  Other reaction(s): Unknown    Trazodone Unknown - Low Severity     Other reaction(s): Other (See Comments)  Opposite reaction    Trazodone And Nefazodone Unknown - Low Severity and Other (See Comments)     Opposite reaction  Other reaction(s): Other (See Comments)  Opposite reaction  Opposite reaction  Other reaction(s): Other (See Comments)  Opposite reaction    Sitagliptin Swelling    Succinylcholine Itching    Succinylcholine Chloride Itching    Haloperidol Unknown - Low Severity and Other (See Comments)     'JUST NOT MYSELF'  Other reaction(s): Unknown - Low Severity  'JUST NOT MYSELF'  'JUST NOT MYSELF'  "          Objective   /86   Ht 170.2 cm (67.01\")   Wt 82.1 kg (181 lb)   BMI 28.34 kg/m²            Physical Exam  Right Shoulder Exam     Tenderness   Right shoulder tenderness location: Right trapezius.    Range of Motion   The patient has normal right shoulder ROM.    Muscle Strength   The patient has normal right shoulder strength.    Other   Erythema: absent  Sensation: normal  Pulse: present    Comments:  Fistula in right upper arm           Extremity DVT signs are negative by clinical screen.     Independent Review of Radiographic Studies:    I personally reviewed the patient's prior, pertinent imaging with the patient again today.    Procedures    Assessment and Plan   Diagnoses and all orders for this visit:    1. Acute pain of right shoulder (Primary)    2. Impingement syndrome of right shoulder    3. Trapezius muscle spasm       Discussion of orthopedic goals  Orthopedic activities reviewed and patient expressed appreciation  Risk, benefits, and merits of treatment alternatives reviewed with the patient and questions answered  Patient guided on mobility and conditioning exercises  Ice, heat, and/or modalities as needed and beneficial  Patient was counseled and provided with a HEP.   Reduced physical activity as appropriate and avoid aggravating activities.   Weight bearing parameters reviewed.     Recommendations/Plan:  Exercise, medications, injections, other patient advice, and return appointment as noted.  Patient counseled for and provided with a HEP.    Patient and/or guardian(s) were encouraged to call or return to the office for any issues or concerns.    Patient's right shoulder is significantly better at this time and she only has mild symptoms.  I encouraged her to continue with her home exercise plan and to follow-up in approximately 6 weeks for repeat injection if requested.  Also advised ice heat and gentle stretching of the shoulder and neck for her right trapezius spasm.    Return " in about 6 weeks (around 6/10/2025) for Recheck.

## 2025-05-19 ENCOUNTER — OFFICE VISIT (OUTPATIENT)
Dept: INTERNAL MEDICINE | Facility: CLINIC | Age: 35
End: 2025-05-19
Payer: MEDICARE

## 2025-05-19 VITALS
OXYGEN SATURATION: 99 % | WEIGHT: 180 LBS | HEIGHT: 67 IN | DIASTOLIC BLOOD PRESSURE: 88 MMHG | TEMPERATURE: 97.7 F | RESPIRATION RATE: 18 BRPM | SYSTOLIC BLOOD PRESSURE: 129 MMHG | HEART RATE: 95 BPM | BODY MASS INDEX: 28.25 KG/M2

## 2025-05-19 DIAGNOSIS — T78.3XXA ANGIOEDEMA, INITIAL ENCOUNTER: Primary | ICD-10-CM

## 2025-05-19 PROCEDURE — 3079F DIAST BP 80-89 MM HG: CPT | Performed by: STUDENT IN AN ORGANIZED HEALTH CARE EDUCATION/TRAINING PROGRAM

## 2025-05-19 PROCEDURE — 3074F SYST BP LT 130 MM HG: CPT | Performed by: STUDENT IN AN ORGANIZED HEALTH CARE EDUCATION/TRAINING PROGRAM

## 2025-05-19 PROCEDURE — 99214 OFFICE O/P EST MOD 30 MIN: CPT | Performed by: STUDENT IN AN ORGANIZED HEALTH CARE EDUCATION/TRAINING PROGRAM

## 2025-05-19 PROCEDURE — 1125F AMNT PAIN NOTED PAIN PRSNT: CPT | Performed by: STUDENT IN AN ORGANIZED HEALTH CARE EDUCATION/TRAINING PROGRAM

## 2025-05-19 RX ORDER — CALCITRIOL 0.25 UG/1
0.25 CAPSULE, LIQUID FILLED ORAL DAILY
COMMUNITY

## 2025-05-19 RX ORDER — METHYLPREDNISOLONE 4 MG/1
TABLET ORAL
Qty: 21 TABLET | Refills: 0 | Status: SHIPPED | OUTPATIENT
Start: 2025-05-19

## 2025-05-19 NOTE — PROGRESS NOTES
Office Note     Name: Leah Jarvis    : 1990     MRN: [id number]     Chief Complaint  Oral Swelling (Started at 11 this morning)    Subjective     History of Present Illness:  Leah Jarvis is a 35 y.o. female who presents today for upper lip swelling. This has been recurrent, happening about once every week or every 2 weeks now. Last episode was 2 weeks ago but not as severe as today. No cough, chest pain, dyspnea or wheezing. No fever. Not on ACE inhibitor. Does have a hx of SLE and Has been on plaquenil for more than 5 years now. On tacrolimus for the past 4-5 years.   Took 1 dose of prednisone 20mg 3 hours ago.     Family History:   Family History   Problem Relation Age of Onset   • No Known Problems Mother    • Broken bones Father    • Heart disease Maternal Grandfather    • Heart disease Paternal Grandmother    • Diabetes Paternal Grandfather    • Liver disease Cousin        Social History:   Social History     Socioeconomic History   • Marital status: Single   Tobacco Use   • Smoking status: Never     Passive exposure: Past   • Smokeless tobacco: Never   Vaping Use   • Vaping status: Never Used   Substance and Sexual Activity   • Alcohol use: No   • Drug use: Never   • Sexual activity: Yes     Partners: Male     Birth control/protection: Condom       Health Maintenance   Topic Date Due   • Hepatitis B (1 of 3 - 19+ 3-dose series) 11/15/2025 (Originally 2009)   • Pneumococcal Vaccine 0-49 (1 of 2 - PCV) 11/15/2025 (Originally 2009)   • COVID-19 Vaccine (3 - Pfizer risk series) 2026 (Originally 2021)   • ANNUAL WELLNESS VISIT  2026   • PAP SMEAR  10/10/2026   • TDAP/TD VACCINES (3 - Td or Tdap) 2032   • HEPATITIS C SCREENING  Completed   • INFLUENZA VACCINE  Discontinued       Patient Care Team:  Misha Jane DO as PCP - General (Internal Medicine)  Daniel Becker MD, JOSE F as Consulting Physician (Nephrology)  Ihsan Demarco MD as Consulting  "Physician (Rheumatology)  Vijay Jhaveri MD (Internal Medicine)    Objective     Vital Signs  /88   Pulse 95   Temp 97.7 °F (36.5 °C) (Infrared)   Resp 18   Ht 170.2 cm (67.01\")   Wt 81.6 kg (180 lb)   SpO2 99%   BMI 28.19 kg/m²   Estimated body mass index is 28.19 kg/m² as calculated from the following:    Height as of this encounter: 170.2 cm (67.01\").    Weight as of this encounter: 81.6 kg (180 lb).        Physical Exam  Vitals reviewed.   Constitutional:       General: She is not in acute distress.     Appearance: She is not ill-appearing, toxic-appearing or diaphoretic.   HENT:      Head: Normocephalic.      Comments: Left upper lip swelling. No tongue swelling  Cardiovascular:      Rate and Rhythm: Normal rate.      Pulses: Normal pulses.   Pulmonary:      Effort: Pulmonary effort is normal. No respiratory distress.      Breath sounds: No stridor. No wheezing or rhonchi.   Neurological:      Mental Status: She is alert.          Procedures     Assessment and Plan     Diagnoses and all orders for this visit:    1. Angioedema, initial encounter (Primary)  Assessment & Plan:  Patient took prednisone 20mg once this morning  Start medrol dose alonso tomorrow  Advised to call and discuss possible medication adverse effect and interaction with rheumatologist and nephrologist (mycophenolate, tacrolimus) or exacerbation/flare up fo SLE with rheumatology.     Orders:  -     Ambulatory Referral to Allergy  -     methylPREDNISolone (MEDROL) 4 MG dose pack; Take as directed on package instructions.  Dispense: 21 tablet; Refill: 0     Advised anaphylaxis precautions. Proceed with ER if with difficulty breathing, wheezing, chest pain among others. Would recommend not staying alone at home given acute symptoms.     Counseling was given to patient for the following topics: instructions for management, risks and benefits of treatment options, and importance of treatment compliance.    Follow Up  No follow-ups on " file.    MD ESTEFANÍA Pacheco [facility]  [address] [place] [id number] [phone]

## 2025-05-19 NOTE — ASSESSMENT & PLAN NOTE
Patient took prednisone 20mg once this morning  Start medrol dose alonso tomorrow  Advised to call and discuss possible medication adverse effect and interaction with rheumatologist and nephrologist (mycophenolate, tacrolimus) or exacerbation/flare up fo SLE with rheumatology.

## (undated) DEVICE — ENDOGATOR AUXILIARY WATER JET CONNECTOR: Brand: ENDOGATOR

## (undated) DEVICE — DECANT BG O JET

## (undated) DEVICE — GLV SURG SENSICARE W/ALOE PF LF 7.5 STRL

## (undated) DEVICE — SOL IRR H2O BTL 1000ML STRL

## (undated) DEVICE — CVR PROB ULTRASND GLS STRL

## (undated) DEVICE — FRCP BIOP COLD ENDOJAW ALLGTR W/NDL 2.8X2300MM BLU

## (undated) DEVICE — KT PERC INTRO PEELPRO PTFE STDTRWY 16F 14

## (undated) DEVICE — CLAVICLE STRAP: Brand: DEROYAL

## (undated) DEVICE — DRSNG SURESITE123 6X8IN

## (undated) DEVICE — Device

## (undated) DEVICE — SYR LL TP 10ML STRL

## (undated) DEVICE — RICH MAJOR PROCEDURE: Brand: MEDLINE INDUSTRIES, INC.

## (undated) DEVICE — SYR LUERLOK 30CC

## (undated) DEVICE — CONMED SCOPE SAVER BITE BLOCK, 20X27 MM: Brand: SCOPE SAVER

## (undated) DEVICE — 3M™ STERI-STRIP™ REINFORCED ADHESIVE SKIN CLOSURES, R1547, 1/2 IN X 4 IN (12 MM X 100 MM), 6 STRIPS/ENVELOPE: Brand: 3M™ STERI-STRIP™

## (undated) DEVICE — 2000CC GUARDIAN II: Brand: GUARDIAN

## (undated) DEVICE — SPNG GZ STRL 2S 4X4 12PLY

## (undated) DEVICE — KT CATH HEMO NEXTSTEP 15F 23CM RED

## (undated) DEVICE — SOL NACL 0.9PCT 100ML SGL

## (undated) DEVICE — NDL HYPO ECLPS SFTY 25G 1 1/2IN

## (undated) DEVICE — NDL HYPO ECLPS SFTY 18G 1 1/2IN

## (undated) DEVICE — SUT ETHLN 3/0 FS1 663G

## (undated) DEVICE — SYR LUERLOK 5CC